# Patient Record
Sex: MALE | Race: WHITE | NOT HISPANIC OR LATINO | Employment: OTHER | URBAN - METROPOLITAN AREA
[De-identification: names, ages, dates, MRNs, and addresses within clinical notes are randomized per-mention and may not be internally consistent; named-entity substitution may affect disease eponyms.]

---

## 2017-06-15 PROBLEM — E03.9 HYPOTHYROIDISM IN ADULT: Status: ACTIVE | Noted: 2017-06-15

## 2017-06-15 PROBLEM — I10 ESSENTIAL HYPERTENSION: Status: ACTIVE | Noted: 2017-06-15

## 2017-06-15 PROBLEM — K85.90 ACUTE PANCREATITIS: Status: ACTIVE | Noted: 2017-06-15

## 2017-06-15 PROBLEM — E29.1 HYPOGONADISM IN MALE: Status: ACTIVE | Noted: 2017-06-15

## 2017-06-16 PROBLEM — R74.8 ELEVATED LIVER ENZYMES: Status: ACTIVE | Noted: 2017-06-16

## 2019-01-30 ENCOUNTER — OFFICE VISIT (OUTPATIENT)
Dept: NEUROLOGY | Facility: CLINIC | Age: 54
End: 2019-01-30
Payer: MEDICARE

## 2019-01-30 VITALS
HEART RATE: 78 BPM | RESPIRATION RATE: 16 BRPM | SYSTOLIC BLOOD PRESSURE: 142 MMHG | HEIGHT: 66 IN | BODY MASS INDEX: 31.92 KG/M2 | WEIGHT: 198.63 LBS | DIASTOLIC BLOOD PRESSURE: 90 MMHG

## 2019-01-30 DIAGNOSIS — E03.9 HYPOTHYROIDISM IN ADULT: ICD-10-CM

## 2019-01-30 DIAGNOSIS — E29.1 HYPOGONADISM IN MALE: ICD-10-CM

## 2019-01-30 DIAGNOSIS — R06.83 SNORING: ICD-10-CM

## 2019-01-30 DIAGNOSIS — R42 DIZZINESS: ICD-10-CM

## 2019-01-30 DIAGNOSIS — F41.9 ANXIETY: ICD-10-CM

## 2019-01-30 DIAGNOSIS — H53.8 BLURRED VISION: ICD-10-CM

## 2019-01-30 DIAGNOSIS — R41.3 MEMORY LOSS: ICD-10-CM

## 2019-01-30 DIAGNOSIS — M79.605 LEFT LEG PAIN: ICD-10-CM

## 2019-01-30 DIAGNOSIS — R51.9 HEADACHE, UNSPECIFIED HEADACHE TYPE: ICD-10-CM

## 2019-01-30 DIAGNOSIS — M54.50 LUMBAR PAIN: ICD-10-CM

## 2019-01-30 DIAGNOSIS — R55 PRE-SYNCOPE: ICD-10-CM

## 2019-01-30 DIAGNOSIS — R29.818 ROMBERG'S TEST POSITIVE: Primary | ICD-10-CM

## 2019-01-30 DIAGNOSIS — I10 ESSENTIAL HYPERTENSION: ICD-10-CM

## 2019-01-30 DIAGNOSIS — G47.33 OSA (OBSTRUCTIVE SLEEP APNEA): ICD-10-CM

## 2019-01-30 DIAGNOSIS — E66.9 OBESITY (BMI 30.0-34.9): ICD-10-CM

## 2019-01-30 PROBLEM — E66.811 OBESITY (BMI 30.0-34.9): Status: ACTIVE | Noted: 2019-01-30

## 2019-01-30 PROCEDURE — 3080F DIAST BP >= 90 MM HG: CPT | Mod: CPTII,S$GLB,, | Performed by: NURSE PRACTITIONER

## 2019-01-30 PROCEDURE — 3077F SYST BP >= 140 MM HG: CPT | Mod: CPTII,S$GLB,, | Performed by: NURSE PRACTITIONER

## 2019-01-30 PROCEDURE — 3008F BODY MASS INDEX DOCD: CPT | Mod: CPTII,S$GLB,, | Performed by: NURSE PRACTITIONER

## 2019-01-30 PROCEDURE — 99499 UNLISTED E&M SERVICE: CPT | Mod: S$GLB,,, | Performed by: NURSE PRACTITIONER

## 2019-01-30 PROCEDURE — 99205 PR OFFICE/OUTPT VISIT, NEW, LEVL V, 60-74 MIN: ICD-10-PCS | Mod: S$GLB,,, | Performed by: NURSE PRACTITIONER

## 2019-01-30 PROCEDURE — 3077F PR MOST RECENT SYSTOLIC BLOOD PRESSURE >= 140 MM HG: ICD-10-PCS | Mod: CPTII,S$GLB,, | Performed by: NURSE PRACTITIONER

## 2019-01-30 PROCEDURE — 99999 PR PBB SHADOW E&M-EST. PATIENT-LVL V: CPT | Mod: PBBFAC,,, | Performed by: NURSE PRACTITIONER

## 2019-01-30 PROCEDURE — 99499 RISK ADDL DX/OHS AUDIT: ICD-10-PCS | Mod: S$GLB,,, | Performed by: NURSE PRACTITIONER

## 2019-01-30 PROCEDURE — 3080F PR MOST RECENT DIASTOLIC BLOOD PRESSURE >= 90 MM HG: ICD-10-PCS | Mod: CPTII,S$GLB,, | Performed by: NURSE PRACTITIONER

## 2019-01-30 PROCEDURE — 99999 PR PBB SHADOW E&M-EST. PATIENT-LVL V: ICD-10-PCS | Mod: PBBFAC,,, | Performed by: NURSE PRACTITIONER

## 2019-01-30 PROCEDURE — 99205 OFFICE O/P NEW HI 60 MIN: CPT | Mod: S$GLB,,, | Performed by: NURSE PRACTITIONER

## 2019-01-30 PROCEDURE — 3008F PR BODY MASS INDEX (BMI) DOCUMENTED: ICD-10-PCS | Mod: CPTII,S$GLB,, | Performed by: NURSE PRACTITIONER

## 2019-01-30 RX ORDER — FLUTICASONE PROPIONATE 50 MCG
SPRAY, SUSPENSION (ML) NASAL
Refills: 2 | COMMUNITY
Start: 2019-01-15 | End: 2020-05-19 | Stop reason: ALTCHOICE

## 2019-01-30 RX ORDER — MELOXICAM 15 MG/1
15 TABLET ORAL DAILY
COMMUNITY
End: 2019-01-30

## 2019-01-30 RX ORDER — PANTOPRAZOLE SODIUM 40 MG/1
40 TABLET, DELAYED RELEASE ORAL DAILY
Refills: 1 | COMMUNITY
Start: 2019-01-10 | End: 2019-12-09 | Stop reason: SDUPTHER

## 2019-01-30 RX ORDER — AMLODIPINE AND BENAZEPRIL HYDROCHLORIDE 5; 10 MG/1; MG/1
CAPSULE ORAL
Status: ON HOLD | COMMUNITY
End: 2019-07-12 | Stop reason: HOSPADM

## 2019-01-30 RX ORDER — SUCRALFATE 1 G/1
TABLET ORAL
Refills: 1 | COMMUNITY
Start: 2019-01-23 | End: 2019-04-16

## 2019-01-30 RX ORDER — CLONAZEPAM 1 MG/1
TABLET ORAL
COMMUNITY
End: 2019-05-21

## 2019-01-30 NOTE — PROGRESS NOTES
"Subjective:      Chief Complaint:  Headache (Re-establish care); Blurred Vision; Memory Loss; and Pain (Pain to the left leg and lower back )      History of Present Illness  Dalton Kent is a 53 y.o. male who presents for an initial visit for evaluation for memory loss, headaches, blurred vision, and lower back and leg pain. He has HTN, hypothyroidism, hypogonadism, GERD, and RLS and REM sleep behavior disorder. Questionable diagnosis of prior Lyme disease. History of pancreatitis, bilateral CTR, L-spine surgery and C-spine surgery in 2016. He was previously followed by Dr. Honeycutt at [facility] [place].     Patient is a poor historian, due to his memory loss, and typically has wife with him at appointments, but is unaccompanied today.     His memory loss began several years ago, and has progressed over time. He cannot remember items regarding appointments, for example. He calls his wife, then forgets what he wants to tell her. He has severe anxiety, but denies depression. He drives without incident. He cooks on a stove, but has burned food, after forgetting that he was cooking. His wife notices his memory complaints. He is medically disabled from his spinal issues. He does have low testosterone and receives injections.     No family history of dementia. He has had multiple head injuries, and recalls having a garage door hit him on the head. No history of CVA. No tremor. He does have a history of hallucinations "a while back", but does not recall if he was taking certain medications at the time.     He has chronic lower back pain, but has progressed recently. He also has pain to his left hip and left leg. He had L-spine surgery with Dr. Geovanny Ontiveros in 2016. C-spine surgery in 2016 as well.   No falls, but he does feel of balance at times. No paresthesias to his feet. No bowel/bladder issues.     His headaches occur daily, and begin upon waking, are located to the upper occipital area. He has had headaches for years; " "however, they used to be in a different location and began occipitally one year ago.They last for 2-3 hours at a time. No photophobia, phonophobia, focal weakness, or autonomic complaints.     He does have blurred vision at times, more upon waking. This occurs with headaches and without headaches.     He does snore a lot. He has gained a significant amount of weight in the past few years. BP is elevated in with SBP in the 140's today. He does not take any abortive agent, due to GI complaints.     He reports to feeling as if he will pass out after yawning or sneezing hard. This began 3 months ago. This resolves quickly, but he describes it as a "black out".     I have reviewed all of this patient's past medical and surgical histories as well as family and social histories and active allergies and medications as documented in the electronic medical record.    Review of Systems  Review of Systems   Constitutional: Positive for fatigue.   HENT: Positive for tinnitus. Negative for hearing loss, trouble swallowing and voice change.    Eyes: Positive for visual disturbance.   Cardiovascular: Negative for leg swelling.   Gastrointestinal: Positive for abdominal pain.   Musculoskeletal: Positive for arthralgias and back pain.   Skin: Negative for rash.   Neurological: Positive for headaches. Negative for dizziness, tremors, seizures, facial asymmetry, speech difficulty, weakness, light-headedness and numbness.        Presyncope   Hematological: Does not bruise/bleed easily.   Psychiatric/Behavioral: Positive for decreased concentration and sleep disturbance. Negative for agitation, behavioral problems, confusion, dysphoric mood and hallucinations. The patient is nervous/anxious. The patient is not hyperactive.         Memory loss       Objective:       Vitals:    01/30/19 0844   BP: (!) 142/90   Pulse: 78   Resp: 16     Exam:  Gen Appearance, well developed/nourished in no apparent distress  CV: 2+ distal pulses with no " edema or swelling  Neuro:  MS: Awake, alert, oriented to place, person, time, situation. Sustains attention. Recent memory mildly impaired/remote memory intact, Language is full to spontaneous speech/repetition/naming/comprehension. Fund of Knowledge is full. Able to name current President  CN: Optic discs are flat with normal vasculature, PERRL, Extraoccular movements and visual fields are full. Normal facial sensation and strength, Hearing symmetric, Tongue and Palate are midline and strong. Shoulder Shrug symmetric and strong.  Motor: Normal bulk, tone, no abnormal movements. 5/5 strength bilateral upper/lower extremities with 1+ reflexes with muted plantar response  Sensory: symmetric to light touch, pain, temp, and vibration/proprioception. Romberg positive  Cerebellar: Finger-nose,Heal-shin, Rapid alternating movements intact  Gait: Normal stance, no ataxia    Imaging:   Will obtain prior workup from [facility] [place]    Labs:   Will obtain most recent labs from PCP    Assessment:   Dalton Kent is a 53 y.o. male who presents for an initial visit for evaluation for memory loss, headaches, blurred vision, and lower back and leg pain. He has HTN, hypothyroidism, hypogonadism, GERD, and RLS and REM sleep behavior disorder. Questionable diagnosis of prior Lyme disease. He was previously followed by Dr. Honeycutt at [facility] [place].   Plan:   I recommend:   1. Obtain all prior records from [facility]/Dr. Honeycutt; however, he has not seen him in years.   2. Obtain recent labs from Dr. Asim Blackman, and determine if any further labs are needed regarding his memory complaints.   3. MRI Brain without to evaluate c/o memory loss and headaches.   4. MRA Head and Carotid US to evaluate for vascular causes of his presyncopal complaints with sneezing and yawning. Need to rule out vertebrobasilar insufficiency.   5. MRI L-spine, given his left lumbar and left leg complaints. Note prior L-spine surgery in 2016 per Dr. Geovanny Ontiveros, whom he declines  seeing again.   6. EMG BLE to evaluate for lumbar radicular disease.   7. ROSANNA's to evaluate for PVD of the LLE, given his possible claudication complaints. Venous US negative in 2017.   8. Neuropsych testing to evaluate memory loss. He has untreated anxiety and hypogonadism and hypothyroidism, which is treated.   9. Sleep study to evaluate for ASAD, given his elevated BP, morning headaches, + snoring, and recent significant weight gain.   10. No specific recommendation for his headaches at this time, as clinical picture is suggestive of HTN or ASAD induced headaches currently. He does not use NSAIDs, due to GI upset, or any other abortive agent.   11. Refer to [facility] for formal eye exam to evaluate his complaint of blurred vision, which occurs independent of his headaches.     FU 8 weeks; will call with results

## 2019-02-01 ENCOUNTER — TELEPHONE (OUTPATIENT)
Dept: NEUROLOGY | Facility: CLINIC | Age: 54
End: 2019-02-01

## 2019-02-01 NOTE — TELEPHONE ENCOUNTER
----- Message from Jasmyne Emerson MA sent at 2019  8:18 AM CST -----  Contact: SELF  Dalton Kent  MRN: [id number]  : 1965  PCP: Asim Blackman  Home Phone      [phone]  Work Phone      Not on file.  Mobile          [phone]      MESSAGE: PATIENT HAS TWO MRIs SCHEDULED ON 2019, BUT REPORTS THAT HE IS CLAUSTROPHOBIC. REQUESTING FOR A MEDICATION TO BE SENT TO [id number] [place] [place] - [address] AT [facility] TO TAKE PRIOR TO THE TESTING TO HELP THE PATIENT RELAX. PLEASE ADVISE.    Phone number: [phone]

## 2019-02-05 RX ORDER — ALPRAZOLAM 0.5 MG/1
0.5 TABLET ORAL SEE ADMIN INSTRUCTIONS
Qty: 2 TABLET | Refills: 0 | Status: SHIPPED | OUTPATIENT
Start: 2019-02-05 | End: 2019-02-19 | Stop reason: SDUPTHER

## 2019-02-06 ENCOUNTER — HOSPITAL ENCOUNTER (OUTPATIENT)
Dept: PULMONOLOGY | Facility: HOSPITAL | Age: 54
Discharge: HOME | End: 2019-02-06
Attending: NURSE PRACTITIONER
Payer: MEDICARE

## 2019-02-06 ENCOUNTER — HOSPITAL ENCOUNTER (OUTPATIENT)
Dept: RADIOLOGY | Facility: HOSPITAL | Age: 54
Discharge: HOME | End: 2019-02-06
Attending: NURSE PRACTITIONER
Payer: MEDICARE

## 2019-02-06 DIAGNOSIS — R29.818 ROMBERG'S TEST POSITIVE: ICD-10-CM

## 2019-02-06 DIAGNOSIS — R42 DIZZINESS: ICD-10-CM

## 2019-02-06 DIAGNOSIS — R41.3 MEMORY LOSS: ICD-10-CM

## 2019-02-06 DIAGNOSIS — R51.9 HEADACHE, UNSPECIFIED HEADACHE TYPE: ICD-10-CM

## 2019-02-06 DIAGNOSIS — M54.50 LUMBAR PAIN: ICD-10-CM

## 2019-02-06 DIAGNOSIS — M79.605 LEFT LEG PAIN: ICD-10-CM

## 2019-02-06 DIAGNOSIS — R55 PRE-SYNCOPE: ICD-10-CM

## 2019-02-06 LAB
LEFT ABI: 0.94
LEFT ARM BP: 128 MMHG
LEFT DORSALIS PEDIS: 129 MMHG
LEFT POSTERIOR TIBIAL: 136 MMHG
LEFT TBI: 0.85
LEFT TOE PRESSURE: 123 MMHG
RIGHT ABI: 1.12
RIGHT ARM BP: 144 MMHG
RIGHT DORSALIS PEDIS: 161 MMHG
RIGHT POSTERIOR TIBIAL: 161 MMHG
RIGHT TBI: 1.04
RIGHT TOE PRESSURE: 150 MMHG

## 2019-02-06 PROCEDURE — 70551 MRI BRAIN STEM W/O DYE: CPT | Mod: TC

## 2019-02-06 PROCEDURE — 70551 MRI BRAIN STEM W/O DYE: CPT | Mod: 26,,, | Performed by: RADIOLOGY

## 2019-02-06 PROCEDURE — 93922 UPR/L XTREMITY ART 2 LEVELS: CPT | Mod: 26,,, | Performed by: INTERNAL MEDICINE

## 2019-02-06 PROCEDURE — 93880 EXTRACRANIAL BILAT STUDY: CPT | Mod: 26,,, | Performed by: RADIOLOGY

## 2019-02-06 PROCEDURE — 70551 MRI BRAIN WITHOUT CONTRAST: ICD-10-PCS | Mod: 26,,, | Performed by: RADIOLOGY

## 2019-02-06 PROCEDURE — 72148 MRI LUMBAR SPINE W/O DYE: CPT | Mod: 26,,, | Performed by: RADIOLOGY

## 2019-02-06 PROCEDURE — 93922 CV US ANKLE BRACHIAL INDICES WO STRESS W TOE TRACINGS (CUPID ONLY): ICD-10-PCS | Mod: 26,,, | Performed by: INTERNAL MEDICINE

## 2019-02-06 PROCEDURE — 72148 MRI LUMBAR SPINE WITHOUT CONTRAST: ICD-10-PCS | Mod: 26,,, | Performed by: RADIOLOGY

## 2019-02-06 PROCEDURE — 72148 MRI LUMBAR SPINE W/O DYE: CPT | Mod: TC

## 2019-02-06 PROCEDURE — 93880 EXTRACRANIAL BILAT STUDY: CPT | Mod: TC

## 2019-02-06 PROCEDURE — 93922 UPR/L XTREMITY ART 2 LEVELS: CPT | Mod: 50

## 2019-02-06 PROCEDURE — 93880 US CAROTID BILATERAL: ICD-10-PCS | Mod: 26,,, | Performed by: RADIOLOGY

## 2019-02-11 ENCOUNTER — HOSPITAL ENCOUNTER (OUTPATIENT)
Dept: SLEEP MEDICINE | Facility: HOSPITAL | Age: 54
Discharge: HOME | End: 2019-02-11
Attending: NURSE PRACTITIONER
Payer: MEDICARE

## 2019-02-11 DIAGNOSIS — G47.33 OBSTRUCTIVE SLEEP APNEA (ADULT) (PEDIATRIC): ICD-10-CM

## 2019-02-11 PROCEDURE — 95810 POLYSOM 6/> YRS 4/> PARAM: CPT

## 2019-02-11 PROCEDURE — 95810 PR POLYSOMNOGRAPHY, 4 OR MORE: ICD-10-PCS | Mod: 26,,, | Performed by: INTERNAL MEDICINE

## 2019-02-11 PROCEDURE — 95810 POLYSOM 6/> YRS 4/> PARAM: CPT | Mod: 26,,, | Performed by: INTERNAL MEDICINE

## 2019-02-19 RX ORDER — ALPRAZOLAM 0.5 MG/1
0.5 TABLET ORAL SEE ADMIN INSTRUCTIONS
Qty: 2 TABLET | Refills: 0 | Status: SHIPPED | OUTPATIENT
Start: 2019-02-19 | End: 2019-03-27

## 2019-02-19 NOTE — TELEPHONE ENCOUNTER
----- Message from Suellen Xiong sent at 2019  2:57 PM CST -----  Contact: WIFE  Dalton Kent  MRN: [id number]  : 1965  PCP: Asim Blackman  Home Phone      [phone]  Work Phone      Not on file.  Mobile          [phone]      MESSAGE: Patient is having an MRI tomorrow and needs something called into [facility] to help him calm down before he does the MRI.        Phone: [phone]

## 2019-02-20 ENCOUNTER — HOSPITAL ENCOUNTER (OUTPATIENT)
Dept: RADIOLOGY | Facility: HOSPITAL | Age: 54
Discharge: HOME | End: 2019-02-20
Attending: NURSE PRACTITIONER
Payer: MEDICARE

## 2019-02-20 DIAGNOSIS — R55 PRE-SYNCOPE: ICD-10-CM

## 2019-02-20 DIAGNOSIS — R51.9 HEADACHE, UNSPECIFIED HEADACHE TYPE: ICD-10-CM

## 2019-02-20 DIAGNOSIS — R42 DIZZINESS: ICD-10-CM

## 2019-02-20 PROCEDURE — 70544 MR ANGIOGRAPHY HEAD W/O DYE: CPT | Mod: 26,,, | Performed by: RADIOLOGY

## 2019-02-20 PROCEDURE — 70544 MR ANGIOGRAPHY HEAD W/O DYE: CPT | Mod: TC

## 2019-02-20 PROCEDURE — 70544 MRA BRAIN WITHOUT CONTRAST: ICD-10-PCS | Mod: 26,,, | Performed by: RADIOLOGY

## 2019-02-25 ENCOUNTER — PROCEDURE VISIT (OUTPATIENT)
Dept: NEUROLOGY | Facility: CLINIC | Age: 54
End: 2019-02-25
Payer: MEDICARE

## 2019-02-25 DIAGNOSIS — M79.605 LEFT LEG PAIN: ICD-10-CM

## 2019-02-25 DIAGNOSIS — M54.50 LUMBAR PAIN: ICD-10-CM

## 2019-02-25 PROCEDURE — 95886 MUSC TEST DONE W/N TEST COMP: CPT | Mod: S$GLB,,, | Performed by: PSYCHIATRY & NEUROLOGY

## 2019-02-25 PROCEDURE — 95910 PR NERVE CONDUCTION STUDY; 7-8 STUDIES: ICD-10-PCS | Mod: S$GLB,,, | Performed by: PSYCHIATRY & NEUROLOGY

## 2019-02-25 PROCEDURE — 95886 PR EMG COMPLETE, W/ NERVE CONDUCTION STUDIES, 5+ MUSCLES: ICD-10-PCS | Mod: S$GLB,,, | Performed by: PSYCHIATRY & NEUROLOGY

## 2019-02-25 PROCEDURE — 95910 NRV CNDJ TEST 7-8 STUDIES: CPT | Mod: S$GLB,,, | Performed by: PSYCHIATRY & NEUROLOGY

## 2019-02-25 RX ORDER — ROPINIROLE 1 MG/1
1 TABLET, FILM COATED ORAL NIGHTLY
Status: ON HOLD | COMMUNITY
End: 2019-07-12 | Stop reason: SDUPTHER

## 2019-02-25 NOTE — PROCEDURES
REPORT OF EMG and NERVE CONDUCTION STUDY    Name: Dalton Kent  Date of Study:  2/26/09  Referring Provider: RICH Bradshaw  Test Performed by:  MD Zully  Full Values Attached  Informed Consent Scanned.   No anesthesia used.   Amount of Blood Loss: none. The patient tolerated this procedure well.       Informed consent was obtained prior to performing this study. Two patient identifiers were confirmed with the patient prior to performing this study. A time out to determine correct patient and and agreement on procedure performed was conducted prior to the concentric needle examination.    Reason for the study:  Lumbar and left leg pain      Findings:   Nerve conduction studies of the  bilateral peroneal motor, bilateral tibial motor, bilateral sural nerves and bilateral H-reflexes were performed.   Amplitudes, distal latencies, conduction velocities, and F-waves were normal. H reflexes were symetric  EMG of selected muscles of the  bilateral legs  were performed as indicated on the attached sheets. Paraspinal muscles were not sampled due to scar from prior surgery in that region.   Insertional activity was normal without fasciculation or fibrillation, normal sized and phasia of motor units.      Impression: Normal EMG of the legs, although his study was mildly limited due to inability to sample lumbar paraspinal muscles given his prior lumbar surgical scarring.       Scar Andrea M.D. [facility].     CC: L Bradshaw.

## 2019-02-26 ENCOUNTER — TELEPHONE (OUTPATIENT)
Dept: NEUROLOGY | Facility: CLINIC | Age: 54
End: 2019-02-26

## 2019-02-26 NOTE — TELEPHONE ENCOUNTER
Sleep study showed mild sleep apnea only. CPAP was not recommended, but instead advised the following:     Do not sleep on the back.   Avoid alcohol and other sedatives.

## 2019-03-27 ENCOUNTER — OFFICE VISIT (OUTPATIENT)
Dept: NEUROLOGY | Facility: CLINIC | Age: 54
End: 2019-03-27
Payer: MEDICARE

## 2019-03-27 VITALS
WEIGHT: 195.75 LBS | HEART RATE: 98 BPM | BODY MASS INDEX: 31.46 KG/M2 | RESPIRATION RATE: 16 BRPM | SYSTOLIC BLOOD PRESSURE: 112 MMHG | DIASTOLIC BLOOD PRESSURE: 82 MMHG | HEIGHT: 66 IN

## 2019-03-27 DIAGNOSIS — F41.0 PANIC DISORDER: ICD-10-CM

## 2019-03-27 DIAGNOSIS — K21.9 GASTROESOPHAGEAL REFLUX DISEASE, ESOPHAGITIS PRESENCE NOT SPECIFIED: ICD-10-CM

## 2019-03-27 DIAGNOSIS — M48.061 LUMBAR FORAMINAL STENOSIS: ICD-10-CM

## 2019-03-27 DIAGNOSIS — R51.9 CHRONIC NONINTRACTABLE HEADACHE, UNSPECIFIED HEADACHE TYPE: ICD-10-CM

## 2019-03-27 DIAGNOSIS — E03.9 HYPOTHYROIDISM IN ADULT: ICD-10-CM

## 2019-03-27 DIAGNOSIS — R09.81 SINUS CONGESTION: ICD-10-CM

## 2019-03-27 DIAGNOSIS — G25.81 RESTLESS LEGS SYNDROME: ICD-10-CM

## 2019-03-27 DIAGNOSIS — I10 ESSENTIAL HYPERTENSION: ICD-10-CM

## 2019-03-27 DIAGNOSIS — M54.50 LUMBAR PAIN: ICD-10-CM

## 2019-03-27 DIAGNOSIS — R41.3 MEMORY LOSS: Primary | ICD-10-CM

## 2019-03-27 DIAGNOSIS — R45.1 AGITATION: ICD-10-CM

## 2019-03-27 DIAGNOSIS — M79.605 LEFT LEG PAIN: ICD-10-CM

## 2019-03-27 DIAGNOSIS — R55 PRE-SYNCOPE: ICD-10-CM

## 2019-03-27 DIAGNOSIS — E29.1 HYPOGONADISM IN MALE: ICD-10-CM

## 2019-03-27 DIAGNOSIS — F41.9 ANXIETY: ICD-10-CM

## 2019-03-27 DIAGNOSIS — G47.33 OBSTRUCTIVE SLEEP APNEA (ADULT) (PEDIATRIC): ICD-10-CM

## 2019-03-27 DIAGNOSIS — G89.29 CHRONIC NONINTRACTABLE HEADACHE, UNSPECIFIED HEADACHE TYPE: ICD-10-CM

## 2019-03-27 PROBLEM — R42 DIZZINESS: Status: RESOLVED | Noted: 2019-01-30 | Resolved: 2019-03-27

## 2019-03-27 PROBLEM — R29.818 ROMBERG'S TEST POSITIVE: Status: RESOLVED | Noted: 2019-01-30 | Resolved: 2019-03-27

## 2019-03-27 PROBLEM — H53.8 BLURRED VISION: Status: RESOLVED | Noted: 2019-01-30 | Resolved: 2019-03-27

## 2019-03-27 PROBLEM — R06.83 SNORING: Status: RESOLVED | Noted: 2019-01-30 | Resolved: 2019-03-27

## 2019-03-27 PROCEDURE — 3074F SYST BP LT 130 MM HG: CPT | Mod: CPTII,S$GLB,, | Performed by: NURSE PRACTITIONER

## 2019-03-27 PROCEDURE — 99215 PR OFFICE/OUTPT VISIT, EST, LEVL V, 40-54 MIN: ICD-10-PCS | Mod: S$GLB,,, | Performed by: NURSE PRACTITIONER

## 2019-03-27 PROCEDURE — 3008F PR BODY MASS INDEX (BMI) DOCUMENTED: ICD-10-PCS | Mod: CPTII,S$GLB,, | Performed by: NURSE PRACTITIONER

## 2019-03-27 PROCEDURE — 99999 PR PBB SHADOW E&M-EST. PATIENT-LVL IV: ICD-10-PCS | Mod: PBBFAC,,, | Performed by: NURSE PRACTITIONER

## 2019-03-27 PROCEDURE — 3079F PR MOST RECENT DIASTOLIC BLOOD PRESSURE 80-89 MM HG: ICD-10-PCS | Mod: CPTII,S$GLB,, | Performed by: NURSE PRACTITIONER

## 2019-03-27 PROCEDURE — 99215 OFFICE O/P EST HI 40 MIN: CPT | Mod: S$GLB,,, | Performed by: NURSE PRACTITIONER

## 2019-03-27 PROCEDURE — 3008F BODY MASS INDEX DOCD: CPT | Mod: CPTII,S$GLB,, | Performed by: NURSE PRACTITIONER

## 2019-03-27 PROCEDURE — 99499 RISK ADDL DX/OHS AUDIT: ICD-10-PCS | Mod: S$GLB,,, | Performed by: NURSE PRACTITIONER

## 2019-03-27 PROCEDURE — 99499 UNLISTED E&M SERVICE: CPT | Mod: S$GLB,,, | Performed by: NURSE PRACTITIONER

## 2019-03-27 PROCEDURE — 3074F PR MOST RECENT SYSTOLIC BLOOD PRESSURE < 130 MM HG: ICD-10-PCS | Mod: CPTII,S$GLB,, | Performed by: NURSE PRACTITIONER

## 2019-03-27 PROCEDURE — 3079F DIAST BP 80-89 MM HG: CPT | Mod: CPTII,S$GLB,, | Performed by: NURSE PRACTITIONER

## 2019-03-27 PROCEDURE — 99999 PR PBB SHADOW E&M-EST. PATIENT-LVL IV: CPT | Mod: PBBFAC,,, | Performed by: NURSE PRACTITIONER

## 2019-03-27 RX ORDER — NORTRIPTYLINE HYDROCHLORIDE 25 MG/1
25 CAPSULE ORAL NIGHTLY
Qty: 30 CAPSULE | Refills: 11 | Status: SHIPPED | OUTPATIENT
Start: 2019-03-27 | End: 2019-06-19 | Stop reason: SDUPTHER

## 2019-03-27 NOTE — PATIENT INSTRUCTIONS
See your ENT to ask if there is an alternate nose spray, which may help your sinus complaints more. I would advise not taking a daily antihistamine, as this can affect your memory.     Keep a headache diary until your next visit to discern if the headache medication works.

## 2019-03-27 NOTE — PROGRESS NOTES
"Subjective:      Chief Complaint:  Neurologic Problem (8 week follow up)      History of Present Illness  Dalton Kent is a 53 y.o. male who presents for an initial visit for evaluation for memory loss, headaches, blurred vision, and lower back and leg pain. He has HTN, hypothyroidism, hypogonadism, GERD, and RLS and REM sleep behavior disorder. Questionable diagnosis of prior Lyme disease. History of pancreatitis, bilateral CTR, L-spine surgery and C-spine surgery in 2016. He was previously followed by Dr. Honeycutt at [facility] [place].     He presents today for a follow up and to discuss diagnostic testing results. Patient is a poor historian, due to his memory loss, and is with his mother in law today. He was seen in 1/2019 for complaint of memory loss, chronic lower back pain, headaches, and presyncopal complaints.      He completed a sleep study, which showed mild ASAD only, and CPAP use was not suggested for this. He continues to report to "jumping" in his sleep. He takes Klonopin every night, which is helpful for this. He does not feel well rested in the morning.     He tripped over a 12 pack of coke since his last visit, and hurt his right shoulder. He received a right shoulder injection per Dr. Javed and is currently in PT for this.     Lumbar pain is ongoing, and is worse when leaning over for an extended amount of time. He continues with leg pain. ROSANNA's, which were completed at his last visit, were unremarkable. His MRI L-spine showed moderate foraminal narrowing at left L4/5. He reports ongoing restless leg complaints, helped with Requip, but he does have some breakthrough complaints to the left leg. He had L-spine surgery with Dr. Geovanny Ontiveros in 2016. C-spine surgery in 2016 as well. No falls, but he does feel of balance at times. No paresthesias to his feet. No bowel/bladder issues.     Memory is overall unchanged. He is scheduled for Neuropsych testing next month at [facility] [place].   His memory loss began several years " "ago, and has progressed over time. He cannot remember items regarding appointments, for example. He calls his wife, then forgets what he wants to tell her. He has severe anxiety, but denies depression. He drives without incident. He cooks on a stove, but has burned food, after forgetting that he was cooking. His wife notices his memory complaints. He is medically disabled from his spinal issues. He does have low testosterone and receives injections.     No family history of dementia. He has had multiple head injuries, and recalls having a garage door hit him on the head. No history of CVA. No tremor. He does have a history of hallucinations "a while back", but does not recall if he was taking certain medications at the time.     He reports to having a great deal of anxiety, which is chronic for him. He becomes agitated very easily.     Headaches are unchanged. His headaches occur daily, and begin upon waking, are located to the upper occipital area. He has had headaches for years; however, they used to be in a different location and began occipitally one year ago.They last for 2-3 hours at a time. No photophobia, phonophobia, focal weakness, or autonomic complaints.     He does complaint of sinus pain and pressure, helped with a nose spray prior. He has seen an ENT in the past.     He had a formal eye exam after his last visit for complaint of blurred vision, and was prescribed updated eyeglasses, which he did not obtain, due to cost.     He continues to feel as if he will pass out when yawning, sneezing, and with bowel movements. This began several months ago. He saw a Cardiologist a few years ago for a general cardiac workup, which was unremarkable per patient. He has never actually passed out, and his presyncopal complaints resolve within seconds.     I have reviewed all of this patient's past medical and surgical histories as well as family and social histories and active allergies and medications as documented " in the electronic medical record.    Review of Systems  Review of Systems   Constitutional: Positive for fatigue.   HENT: Positive for sinus pressure, sinus pain and tinnitus. Negative for hearing loss, trouble swallowing and voice change.    Eyes: Positive for visual disturbance.   Respiratory: Negative for shortness of breath.    Cardiovascular: Negative for leg swelling.   Gastrointestinal: Positive for abdominal pain.   Musculoskeletal: Positive for arthralgias, back pain and gait problem.   Skin: Negative for rash.   Neurological: Positive for dizziness and headaches. Negative for tremors, seizures, facial asymmetry, speech difficulty, weakness, light-headedness and numbness.        Presyncope   Hematological: Does not bruise/bleed easily.   Psychiatric/Behavioral: Positive for agitation, decreased concentration and sleep disturbance. Negative for behavioral problems, confusion, dysphoric mood and hallucinations. The patient is nervous/anxious. The patient is not hyperactive.         Memory loss       Objective:       Vitals:    03/27/19 1353   BP: 112/82   Pulse: 98   Resp: 16     Exam:  Gen Appearance, well developed/nourished in no apparent distress  CV: 2+ distal pulses with no edema or swelling  Neuro:  MS: Awake, alert, oriented to place, person, time, situation. Sustains attention. Recent memory mildly impaired/remote memory intact, Language is full to spontaneous speech/repetition/naming/comprehension. Fund of Knowledge is full. Able to name current President  CN: Optic discs are flat with normal vasculature, PERRL, Extraoccular movements and visual fields are full. Normal facial sensation and strength, Hearing symmetric, Tongue and Palate are midline and strong. Shoulder Shrug symmetric and strong.  Motor: Normal bulk, tone, no abnormal movements. 5/5 strength bilateral upper/lower extremities with 1+ reflexes with muted plantar response  Sensory: symmetric to light touch, pain, temp, and  vibration/proprioception. Romberg positive  Cerebellar: Finger-nose,Heal-shin, Rapid alternating movements intact  Gait: Normal stance, no ataxia    Imaging:   MRI Brain 1/2019:   FINDINGS:  Intracranial compartment: Ventricles and sulci are normal in size for age without evidence of hydrocephalus.  No extra-axial blood or fluid collections. Minimal burden of patchy T2 hyperintense foci scattered in the supratentorial periventricular and subcortical white matter most likely relate to sequelae of chronic microvascular ischemic disease.  The brain parenchyma appears normal. No mass lesion, acute hemorrhage, edema or acute infarct.  Normal vascular flow voids are preserved.    Skull/extracranial contents (limited evaluation): Bone marrow signal intensity is normal.      Impression       No acute abnormality.     MRA Brain 1/2019:   FINDINGS:  Anterior intracranial circulation: No high-grade focal stenosis, occlusion, aneurysm, or vascular malformation.    Posterior intracranial circulation: No high-grade focal stenosis, occlusion, aneurysm, or vascular malformation.      Impression       Normal MRA of the [place] of [place]     1/2019 MRI L-spine:   FINDINGS:  Spinal Alignment: Normal.    Vertebrae: Posterior fusion hardware at L4-5 and anterior fusion hardware at L5-S1 are unchanged, with associated disc spacer devices at L4-5 and L5-S1.  Susceptibility artifact is present surrounding the hardware.  There are associated postsurgical findings of left L4 hemilaminectomy.  Mild susceptibility artifact surrounds the hardware.  1.5 cm hemangioma is present in the posterosuperior portion of the T12 vertebral body.    Discs: Mild diffuse desiccation.  Disc spacer devices are present at L5-4 5 and L5-S1.    Cord: Normal cord signal. Conus terminates normally at the L1-2 level.    Degenerative findings: Minimal circumferential disc bulge at L2-3 with minimal bilateral facet arthropathy and minimal facet effusions in conjunction  with mild buckling of the ligamentum flavum does not result in significant compressive phenomena.  Mild circumferential disc bulge at L3-4 with mild bilateral facet arthropathy and buckling of the ligamentum flavum results in mild spinal canal stenosis and mild to moderate foraminal stenoses.  Spinal canal is posteriorly decompressed at L4-5; right neural foramen is widely patent while there is apparent moderate narrowing of the left neural foramen related to either postoperative granulation tissue along the inferior neural foramen or disc material.  Mild bilateral facet arthropathy at L5-S1 results in at most mild foraminal stenoses.    Paraspinal muscles & soft tissues: Normal.      Impression       Unchanged postoperative findings of L4-S1 fusion with left L4 hemilaminectomy.  There is apparent moderate narrowing of the left neural foramen at L4-5 related to either postoperative granulation tissue along the inferior foramen or disc material.    Additional mild disc and facet degeneration above the fused levels resulting in mild spinal canal stenosis and mild to moderate foraminal stenoses at L3-4.       2/2019 ROSANNA's:  · Normal resting ROSANNA's.    EMG BLE 2/2019:   Normal EMG of the legs, though study was mildly limited, due to inability to sample lumbar paraspinal muscles, given his prior lumbar surgical scarring.     Labs:   Will obtain most recent labs from PCP    Assessment:   Dalton Kent is a 53 y.o. male who presents for an initial visit for evaluation for memory loss, headaches, blurred vision, and lower back and leg pain. He has HTN, hypothyroidism, hypogonadism, GERD, and RLS and REM sleep behavior disorder. Questionable diagnosis of prior Lyme disease. He was previously followed by Dr. Honeycutt at [facility] [place].   Plan:   I recommend:   1. Continue with Neuropsych testing to formally evaluate his complaint of memory loss. I suspect this to be mood related, thus far. A pseudo-dementia is in consideration. Sleep  study showed mild ASAD only, not requiring treatment.   2. Refer to Psychiatry for severe anxiety/panic and agitation, which I suspect represents depression. His memory loss may be related. MRI Brain overall unremarkable, other than some cerebral atherosclerosis.   3. I would like to refer him to Cardiology for presyncope with yawning, sneezing, or defecating, but he declines this, as it is tolerable, and he has never actually passed out. MRI Brain and MRA unremarkable for IC and vascular causes. He understands that we could be missing a potential arhythmia/cardiac cause of his presyncope by not seeing Cardiology.   4. I would like to refer him to pain management for his left lumbar complaints, which are quite bothersome, but he declines, due to cost concerns. There is moderate foraminal narrowing at Left L4/5, which can explain his lumbar complaints; however, EMG BLE 2/2019 was unremarkable for radiculopathy; mildly limited study, due to prior surgical scarring above the lumbar paraspinal muscles. Note prior L-spine surgery in 2016 per Dr. Geovanny Ontiveros, whom he declines seeing again.   6. He is currently seeing Dr. Javed for right shoulder complaints, after a recent trip and fall, and received an injection and is in PT for this.   7. Advised to see an ENT regarding his sinus complaints. I advised not to take a daily antihistamine, due to concern over potential memory issues.   8. Start Pamelor for headache prevention and for insomnia. Would avoid beta blockers, given his relatively low BP, which is already treated with antihypertensives, and I would avoid AED's, given the potential effect of worsening memory loss. If Pamelor ineffective, could consider Botox of Aimovig.   9. Keep a headache diary until next visit.   10. Formal eye exam done to evaluate complaint of blurred vision-Rx for eyeglasses given, but not obtained, due to cost.   11. Attempted to obtain recent labs from Dr. Blackman, as well as prior workup from  [facility], but this was never received.     FU 12 weeks

## 2019-03-28 ENCOUNTER — TELEPHONE (OUTPATIENT)
Dept: PSYCHIATRY | Facility: CLINIC | Age: 54
End: 2019-03-28

## 2019-03-28 ENCOUNTER — TELEPHONE (OUTPATIENT)
Dept: NEUROLOGY | Facility: CLINIC | Age: 54
End: 2019-03-28

## 2019-03-28 DIAGNOSIS — R55 VASOVAGAL NEAR SYNCOPE: Primary | ICD-10-CM

## 2019-03-28 NOTE — TELEPHONE ENCOUNTER
----- Message from Suellen Xiong sent at 3/28/2019  9:22 AM CDT -----  Contact: WIFE  Dalton Kent  MRN: [id number]  : 1965  PCP: Asim Blackman  Home Phone      [phone]  Work Phone      Not on file.  Mobile          [phone]      MESSAGE: Wife states that the patient was seen on 19 and Azeb suggested that he have a work-up by a cardiologist but the patient declined to schedule the appointment at that time.  The wife would for us to schedule the appointment with the cardiologist.      [phone]

## 2019-03-28 NOTE — TELEPHONE ENCOUNTER
Anyone at [facility] is appropriate. I placed an external referral order so that he may see who is available. Please forward [facility] a copy of my last note on this patient.

## 2019-04-16 ENCOUNTER — OFFICE VISIT (OUTPATIENT)
Dept: PSYCHIATRY | Facility: CLINIC | Age: 54
End: 2019-04-16
Attending: PSYCHIATRY & NEUROLOGY
Payer: COMMERCIAL

## 2019-04-16 VITALS
DIASTOLIC BLOOD PRESSURE: 86 MMHG | WEIGHT: 195.44 LBS | HEART RATE: 82 BPM | SYSTOLIC BLOOD PRESSURE: 132 MMHG | HEIGHT: 66 IN | BODY MASS INDEX: 31.41 KG/M2 | RESPIRATION RATE: 19 BRPM

## 2019-04-16 DIAGNOSIS — F41.1 GENERALIZED ANXIETY DISORDER: Primary | ICD-10-CM

## 2019-04-16 DIAGNOSIS — F33.0 MILD EPISODE OF RECURRENT MAJOR DEPRESSIVE DISORDER: ICD-10-CM

## 2019-04-16 DIAGNOSIS — R41.89 PSEUDODEMENTIA: ICD-10-CM

## 2019-04-16 DIAGNOSIS — R41.3 MEMORY LOSS: ICD-10-CM

## 2019-04-16 DIAGNOSIS — F51.4 NIGHT TERRORS, ADULT: ICD-10-CM

## 2019-04-16 PROCEDURE — 99999 PR PBB SHADOW E&M-EST. PATIENT-LVL III: ICD-10-PCS | Mod: PBBFAC,,, | Performed by: PSYCHIATRY & NEUROLOGY

## 2019-04-16 PROCEDURE — 90792 PSYCH DIAG EVAL W/MED SRVCS: CPT | Mod: S$GLB,,, | Performed by: PSYCHIATRY & NEUROLOGY

## 2019-04-16 PROCEDURE — 90792 PR PSYCHIATRIC DIAGNOSTIC EVALUATION W/MEDICAL SERVICES: ICD-10-PCS | Mod: S$GLB,,, | Performed by: PSYCHIATRY & NEUROLOGY

## 2019-04-16 PROCEDURE — 99999 PR PBB SHADOW E&M-EST. PATIENT-LVL III: CPT | Mod: PBBFAC,,, | Performed by: PSYCHIATRY & NEUROLOGY

## 2019-04-16 RX ORDER — PRAZOSIN HYDROCHLORIDE 2 MG/1
2 CAPSULE ORAL NIGHTLY
Qty: 30 CAPSULE | Refills: 0 | Status: SHIPPED | OUTPATIENT
Start: 2019-04-16 | End: 2019-05-11 | Stop reason: SDUPTHER

## 2019-04-16 RX ORDER — PRAZOSIN HYDROCHLORIDE 1 MG/1
1 CAPSULE ORAL 2 TIMES DAILY
Qty: 60 CAPSULE | Refills: 0 | Status: SHIPPED | OUTPATIENT
Start: 2019-04-16 | End: 2019-05-09 | Stop reason: SDUPTHER

## 2019-04-16 RX ORDER — ALPRAZOLAM 0.5 MG/1
0.5 TABLET ORAL DAILY PRN
Qty: 3 TABLET | Refills: 0 | Status: SHIPPED | OUTPATIENT
Start: 2019-04-16 | End: 2019-05-21

## 2019-04-16 RX ORDER — ESCITALOPRAM OXALATE 10 MG/1
10 TABLET ORAL DAILY
Qty: 30 TABLET | Refills: 0 | Status: SHIPPED | OUTPATIENT
Start: 2019-04-16 | End: 2019-05-09 | Stop reason: SDUPTHER
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
## 2019-04-16 NOTE — PATIENT INSTRUCTIONS
Start taking Lexapro 10mg once per day in the morning    Start taking Prazosin 1mg twice per day.  After one week, add 2mg at night for a total of 3mg    This medication can cause you to be light headed when you stand up    Try to find some youtube progressive muscle relaxation

## 2019-04-23 ENCOUNTER — OFFICE VISIT (OUTPATIENT)
Dept: PSYCHIATRY | Facility: CLINIC | Age: 54
End: 2019-04-23
Payer: MEDICARE

## 2019-04-23 DIAGNOSIS — F41.1 GENERALIZED ANXIETY DISORDER: ICD-10-CM

## 2019-04-23 DIAGNOSIS — R41.3 MEMORY LOSS: Primary | ICD-10-CM

## 2019-04-23 DIAGNOSIS — F33.0 MILD EPISODE OF RECURRENT MAJOR DEPRESSIVE DISORDER: ICD-10-CM

## 2019-04-23 DIAGNOSIS — R41.89 PSEUDODEMENTIA: ICD-10-CM

## 2019-04-23 PROCEDURE — 96133 NRPSYC TST EVAL PHYS/QHP EA: CPT | Mod: S$GLB,,, | Performed by: PSYCHOLOGIST

## 2019-04-23 PROCEDURE — 96139 PSYCL/NRPSYC TST TECH EA: CPT | Mod: 59,S$GLB,, | Performed by: PSYCHOLOGIST

## 2019-04-23 PROCEDURE — 90791 PR PSYCHIATRIC DIAGNOSTIC EVALUATION: ICD-10-PCS | Mod: S$GLB,,, | Performed by: PSYCHOLOGIST

## 2019-04-23 PROCEDURE — 96132 NRPSYC TST EVAL PHYS/QHP 1ST: CPT | Mod: S$GLB,,, | Performed by: PSYCHOLOGIST

## 2019-04-23 PROCEDURE — 96139 PR PSYCH/NEUROPSYCH TEST ADMIN/SCORING, BY TECH, 2+ TESTS, EA ADDTL 30 MIN: ICD-10-PCS | Mod: 59,S$GLB,, | Performed by: PSYCHOLOGIST

## 2019-04-23 PROCEDURE — 96133 PR NEUROPSYCHOLOGIC TEST EVAL SVCS, EA ADDTL HR: ICD-10-PCS | Mod: S$GLB,,, | Performed by: PSYCHOLOGIST

## 2019-04-23 PROCEDURE — 96138 PSYCL/NRPSYC TECH 1ST: CPT | Mod: S$GLB,,, | Performed by: PSYCHOLOGIST

## 2019-04-23 PROCEDURE — 96138 PR PSYCH/NEUROPSYCH TEST ADMIN/SCORING, BY TECH, 2+ TESTS, 1ST 30 MIN: ICD-10-PCS | Mod: S$GLB,,, | Performed by: PSYCHOLOGIST

## 2019-04-23 PROCEDURE — 96132 PR NEUROPSYCHOLOGIC TEST EVAL SVCS, 1ST HR: ICD-10-PCS | Mod: S$GLB,,, | Performed by: PSYCHOLOGIST

## 2019-04-23 PROCEDURE — 90791 PSYCH DIAGNOSTIC EVALUATION: CPT | Mod: S$GLB,,, | Performed by: PSYCHOLOGIST

## 2019-04-25 ENCOUNTER — TELEPHONE (OUTPATIENT)
Dept: PSYCHIATRY | Facility: CLINIC | Age: 54
End: 2019-04-25

## 2019-04-25 NOTE — PSYCH TESTING
[facility] [address] [facility] [place], [place]  [phone]    REPORT OF NEUROPSYCHOLOGICAL EVALUATION    NAME: Dalton Kent   OC #:  [id number]  : 1965    REFERRED BY: Azeb Bradshaw NP    EVALUATED BY:  Pauline Avelar, Ph.D., Clinical Psychologist  Vira Griffith, Psychometrician    DATE OF EVALUATION: 2019    EVALUATION PROCEDURES AND TIME:  Conducted by Psychologist:  Integration of patient data, interpretation of standardized test results and clinical data, clinical decision making, treatment planning and report  Conducted by Technician:  Test administration and scoring of the following tests:  Repeatable Battery for the Assessment of Neuropsychological Status (RBANS)  Temporal Orientation Test  Naming Test from the Neuropsychological Assessment Battery (NAB)  Controlled Oral Word Association Test  Facial Recognition Test  Nicholas Visual Motor Gestalt Test  Wechsler Memory Scale IV Logical Memory & Visual Reproduction Battery (WMS-IV LMVR)  Wisconsin Card Sorting Test - 64 (WCST-64)  Trail Making Test, Parts A & B  August Depression Inventory - II (BDI-II)  August Anxiety Inventory (DAVION)  CPT Codes: 75623 - 1 unit; +75828 - 1 unit; 16014 - 1 unit; +26247 - 3 units   Time:  Psychologist - 2 hours; Technician - 1 hour 56 minutes    EVALUATION FINDINGS:  The diagnostic interview revealed that Mr. Dalton Kent is a 53-year-old white  male referred for Neuropsychological Evaluation on an outpatient basis due to subjective memory decline for the past 10 years.  He was not accompanied to this interview, so no one was available to corroborate or supplement the history he provided. Of note, he drove here this morning from [place], [place], without difficulty. He reported that his short-term memory is poor. He has to watch a movie 3 or 4 times to remember it. He reported he forgets things his wife tells him. He misplaces items at home. He forgets where he is going when driving. He  reported difficulty concentrating, particularly when there is a lot of noise. 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 Upon direct questioning, he also reported that he repeats himself; gets confused about what day it is; has to use a calendar to remind himself of things; has left food cooking on the stove; and loses his train of thought in conversation; and reports word-finding difficulty (not observed).  He manages his own medications but admits he sometimes does not take them correctly. His wife has always managed the family finances. No obvious functional impairment was reported or observed. He reported his memory seems to be getting worse over time. No precipitant could be identified. There is no family history of memory problems/dementia. Mr. Kent has a psychiatric history of depression, anxiety, and night terrors (he reports this is resolved). He has just started psychiatric treatment with Dr. Baker. He continues to report symptoms of anxiety and displays symptoms of depression (tearfulness, psychomotor slowing, poor sleep, flat affect). He denied suicidal ideation. He was cooperative in interview. The Mental Status Exam suggested reduced recent memory, concentration, abstraction ability, and judgment/insight.    The medical record also revealed prior medical history of memory loss, night terrors, headaches, RLS, HTN, hypothyroidism, obesity, GERD, chronic pain, and elevated liver enzymes. MRI of the brain completed on 2/6/2019 yielded these results: No acute abnormality.    TEST DATA:  Neuropsychological tests administered by the technician revealed that the patient reported he is a high school graduate with some college.  He was employed as a  prior to [employer] on disability 3 years ago.  He was alert and cooperative during the evaluation.  Effort on all tests was satisfactory to produce valid results.    Mr. Kent obtained a total score of 60  on the RBANS, which is at the 0.4 percentile, suggesting severe impairment in general cognitive functioning.  Five areas were tested.  The Immediate Memory Index revealed severe impairment in the ability to remember verbal information immediately after it is presented, with a score of 61 at the 0.5 percentile.  The Visuospatial/Constructional Index revealed mild impairment in the ability to perceive spatial relations and to construct a spatially accurate copy of a drawing, with a score of 78 at the 7th percentile. Visuospatial perception was mildly to moderately impaired. Constructional ability was average. The Language Index revealed mild impairment in the ability to respond verbally to either naming or retrieving learned material, with a score of 79 at the 8th percentile. Naming was average. Verbal fluency was moderately impaired. Attention, or the capacity to remember and manipulate both visually and orally presented information in short-term storage, was severely impaired, with a score of 64 at the 1st percentile. Delayed memory, or anterograde memory capacity, was severely impaired, with a score of 60 at the 0.4 percentile.  Subtest performances revealed mild to moderate impairment in list recall and list recognition, mild impairment in story recall, and average figure recall. The pattern of delayed verbal memory subtest scores is unusual in that story recall is better than list recall and list recognition, whereas story recall is typically considered the most difficult subtest for those with dementia. For reference, the expected average score on each index is 100, which is at the 50th percentile.    The Temporal Orientation Test indicated he was oriented to day of the week, day of the month, month, year, and time of day.  His score on this measure suggested no impairment in temporal orientation.    Naming, as assessed by the NAB Test, was mildly impaired.  Verbal fluency, as assessed by the Controlled Oral  Word Association Test, was in the low average range.    Performance on the Facial Recognition Test suggested prosopagnosia was present, as his score was in the moderately impaired range.  Reproductions of Nicholas Visual Motor Gestalt Test designs revealed no evidence of constructional dyspraxia.    The WMS-IV LMVR was administered to further assess memory.  His Immediate Memory Index of 55 was in the severely impaired range and his Delayed Memory Index of 60 was in the severely impaired range. His Auditory Memory Index of 47 was in the severely impaired range and his Visual Memory Index of 70 was in the moderately impaired range. The expected average score for each index is 100. Scaled scores of the memory indexes revealed severe impairment in immediate and delayed verbal memory, moderate impairment in immediate visual memory, and mild impairment in delayed visual memory. The pattern of scores for visual memory is unusual in that delayed visual memory was better that immediate visual memory, while in dementia delayed memory is typically worse than immediate memory.     The WCST-64 was administered to assess abstract reasoning and conceptualization.  His categories completed score (4) was in the average range. His total errors score (14) was in the average range and his perseverative errors score (7) was in the average range.  His performance suggested abstract reasoning skills were not impaired.    His score on the Trail Making Test, Part A, (40 seconds for completion) indicated that psychomotor speed was in the mildly impaired range.  His score on Part B (101 seconds for completion) indicated that his ability to handle complex relationships which require rapid change of set, or mental flexibility, was in the mildly impaired range.    The BDI-II was administered to assess for depression.  His score of 36 suggested severe depression was present.  The DAVION was administered to assess for anxiety.  His score of 0  suggested minimal anxiety was present.    SUMMARY AND RECOMMENDATIONS:  Mr. Kent was referred for Neuropsychological Evaluation on an outpatient basis due to subjective memory decline for the past 10 years.  His general cognitive abilities as assessed by the RBANS were in the severely impaired range, with severely impaired immediate verbal memory, attention, and delayed memory; and mildly impaired visuospatial/constructional abilities (due to reduced visuospatial perception) and language (due to reduced verbal fluency).  Further assessment of specific cognitive abilities revealed deficits in naming, facial recognition, psychomotor speed, and mental flexibility, with intact temporal orientation, verbal fluency, constructional ability, and abstract reasoning.  Additional memory assessment revealed severe impairment in immediate and delayed verbal memory, moderate impairment in immediate visual memory, and mild impairment in delayed visual memory.  Personality test data suggested the presence of severe depression.  Neuropsychological Evaluation results revealed impairment in immediate and delayed verbal memory, immediate visual memory, visuospatial perception, attention, facial recognition, psychomotor speed, and mental flexibility; and variability in naming, verbal fluency, and delayed visual memory (performance in the average/low average and impaired ranges); with severe depression. The history and pattern of test results suggests the impairment he experiences is likely due to pseudodementia due to depression as opposed to dementia. (He has no medical history to account for memory loss; has severe depression; unusual pattern among some memory subtests; no family history of dementia; good abstract reasoning; drove here from [place] alone without difficulty; has no functional impairment.) It is hoped that aggressive psychiatric treatment may result in improved cognitive functioning. He will continue to see   Baldev for psychiatric care.        Interpretation and report and coding were completed on 4/25/2019.

## 2019-04-25 NOTE — PROGRESS NOTES
Psychiatry Initial Visit (PhD/LCSW)    Date:  4/23/2019    CPT Code: 15647    Referred by:  Azeb Bradshaw NP    Chief complaint/reason for encounter:  Neuropsychological Evaluation    Clinical status of patient:  Outpatient    Met with:  Patient    History of present illness: Mr. Dalton Kent is a 53-year-old white  male referred for Neuropsychological Evaluation on an outpatient basis due to subjective memory decline for the past 10 years.  He was not accompanied to this interview, so no one was available to corroborate or supplement the history he provided. Of note, he drove here this morning from [place], without difficulty. He reported that his short-term memory is poor. He has to watch a movie 3 or 4 times to remember it. He reported he forgets things his wife tells him. He misplaces items at home. He forgets where he is going when driving. He reported difficulty concentrating, particularly when there is a lot of noise. 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 Upon direct questioning, he also reported that he repeats himself; gets confused about what day it is; has to use a calendar to remind himself of things; has left food cooking on the stove; and loses his train of thought in conversation; and reports word-finding difficulty (not observed).  He manages his own medications but admits he sometimes does not take them correctly. His wife has always managed the family finances. No obvious functional impairment was reported or observed. He reported his memory seems to be getting worse over time. No precipitant could be identified. There is no family history of memory problems/dementia. Mr. Kent has a psychiatric history of depression, anxiety, and night terrors (he reports this is resolved). He has never been hospitalized. His PCP has prescribed some psychiatric medication in the past. He has just started psychiatric treatment with Dr. Baker with  diagnoses of LEANNE, mild episode of recurrent major depression, and pseudodementia. He continues to report symptoms of anxiety and displays symptoms of depression (tearfulness, psychomotor slowing, poor sleep, flat affect). He denied suicidal ideation. He was cooperative in interview.     Pain scale: noncontributory    Symptoms:  Mood: tearfulness, insomnia, psychomotor slowing  Anxiety:  excessive worry, fearfulness  Substance abuse: denied  Cognitive functioning:  subjective memory decline    Psychiatric history: as above    Medical history: memory loss, night terrors, headaches, RLS, HTN, hypothyroidism, obesity, GERD, chronic pain, and elevated liver enzymes. MRI of the brain completed on 2/6/2019 yielded these results: No acute abnormality.    Family history of psychiatric illness: mother with panic disorder    Social history (marriage, employment, etc.):    High school graduate with some college.  He was employed as a  prior to [employer] on disability 3 years ago.  . 2 daughters. Lives with wife and mother-in-law. Jain. Enjoys fishing, walking, housework, working in [employer].    Substance use:   Alcohol: 6 beers on the weekend   Drugs: no   Tobacco: quit smoking over 20 years ago   Caffeine: 6 cokes per day    Strengths and Liabilities:   Strength:  Pt is expressive/articulate.   Liability:  Pt has subjective memory loss.    Mental Status Exam:  General appearance:  appears stated age, casually dressed  Speech:  normal rate and tone  Level of cooperation:  cooperative  Thought processes:  logical, goal-directed  Mood:  depressed, anxious  Thought content:  no illusions, no visual hallucinations, no auditory hallucinations, no delusions, no active or passive homicidal thoughts, no active or passive suicidal ideation, no obsessions, no compulsions, no violence  Affect:  flat  Orientation:  oriented to person, place, and time  Memory:  Recent memory:  1 of 3 objects after brief delay.     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  Report of Neuropsychological Evaluation will follow. It can be accessed through the Chart Review activity in Epic under the Notes tab.  It will be titled Psych Testing.  Pt will continue to see Dr. Baker for psychiatric care.    Return to clinic:  as scheduled    Length of time:  55 minutes

## 2019-04-30 ENCOUNTER — TELEPHONE (OUTPATIENT)
Dept: NEUROLOGY | Facility: CLINIC | Age: 54
End: 2019-04-30

## 2019-04-30 NOTE — TELEPHONE ENCOUNTER
His memory testing showed severe impairment; however, his testing results were not suggestive of dementia, but of a pseudo-dementia to where he has impairment like a patient with dementia, but this is from his depression, which can slow the brain down significantly. The treatment for this is aggressive management of his depression, so continued care with Psychiatry is necessary to improve this.

## 2019-05-14 RX ORDER — PRAZOSIN HYDROCHLORIDE 1 MG/1
1 CAPSULE ORAL 2 TIMES DAILY
Qty: 60 CAPSULE | Refills: 0 | Status: SHIPPED | OUTPATIENT
Start: 2019-05-14 | End: 2019-05-21 | Stop reason: SDUPTHER

## 2019-05-14 RX ORDER — PRAZOSIN HYDROCHLORIDE 2 MG/1
2 CAPSULE ORAL NIGHTLY
Qty: 30 CAPSULE | Refills: 0 | Status: SHIPPED | OUTPATIENT
Start: 2019-05-14 | End: 2019-05-21 | Stop reason: SDUPTHER

## 2019-05-14 RX ORDER — ESCITALOPRAM OXALATE 10 MG/1
TABLET ORAL
Qty: 30 TABLET | Refills: 0 | Status: SHIPPED | OUTPATIENT
Start: 2019-05-14 | End: 2019-05-21

## 2019-05-21 ENCOUNTER — OFFICE VISIT (OUTPATIENT)
Dept: PSYCHIATRY | Facility: CLINIC | Age: 54
End: 2019-05-21
Attending: PSYCHIATRY & NEUROLOGY
Payer: COMMERCIAL

## 2019-05-21 VITALS
BODY MASS INDEX: 31.01 KG/M2 | SYSTOLIC BLOOD PRESSURE: 122 MMHG | WEIGHT: 197.56 LBS | RESPIRATION RATE: 18 BRPM | DIASTOLIC BLOOD PRESSURE: 74 MMHG | HEIGHT: 67 IN | HEART RATE: 78 BPM

## 2019-05-21 DIAGNOSIS — R41.89 PSEUDODEMENTIA: ICD-10-CM

## 2019-05-21 DIAGNOSIS — R71.8 MICROCYTOSIS: ICD-10-CM

## 2019-05-21 DIAGNOSIS — F41.1 GENERALIZED ANXIETY DISORDER: ICD-10-CM

## 2019-05-21 DIAGNOSIS — F33.0 MILD EPISODE OF RECURRENT MAJOR DEPRESSIVE DISORDER: Primary | ICD-10-CM

## 2019-05-21 PROCEDURE — 99214 OFFICE O/P EST MOD 30 MIN: CPT | Mod: S$GLB,,, | Performed by: PSYCHIATRY & NEUROLOGY

## 2019-05-21 PROCEDURE — 3074F SYST BP LT 130 MM HG: CPT | Mod: CPTII,S$GLB,, | Performed by: PSYCHIATRY & NEUROLOGY

## 2019-05-21 PROCEDURE — 3008F BODY MASS INDEX DOCD: CPT | Mod: CPTII,S$GLB,, | Performed by: PSYCHIATRY & NEUROLOGY

## 2019-05-21 PROCEDURE — 3078F DIAST BP <80 MM HG: CPT | Mod: CPTII,S$GLB,, | Performed by: PSYCHIATRY & NEUROLOGY

## 2019-05-21 PROCEDURE — 99999 PR PBB SHADOW E&M-EST. PATIENT-LVL IV: ICD-10-PCS | Mod: PBBFAC,,, | Performed by: PSYCHIATRY & NEUROLOGY

## 2019-05-21 PROCEDURE — 99999 PR PBB SHADOW E&M-EST. PATIENT-LVL IV: CPT | Mod: PBBFAC,,, | Performed by: PSYCHIATRY & NEUROLOGY

## 2019-05-21 PROCEDURE — 3074F PR MOST RECENT SYSTOLIC BLOOD PRESSURE < 130 MM HG: ICD-10-PCS | Mod: CPTII,S$GLB,, | Performed by: PSYCHIATRY & NEUROLOGY

## 2019-05-21 PROCEDURE — 99214 PR OFFICE/OUTPT VISIT, EST, LEVL IV, 30-39 MIN: ICD-10-PCS | Mod: S$GLB,,, | Performed by: PSYCHIATRY & NEUROLOGY

## 2019-05-21 PROCEDURE — 3078F PR MOST RECENT DIASTOLIC BLOOD PRESSURE < 80 MM HG: ICD-10-PCS | Mod: CPTII,S$GLB,, | Performed by: PSYCHIATRY & NEUROLOGY

## 2019-05-21 PROCEDURE — 3008F PR BODY MASS INDEX (BMI) DOCUMENTED: ICD-10-PCS | Mod: CPTII,S$GLB,, | Performed by: PSYCHIATRY & NEUROLOGY

## 2019-05-21 RX ORDER — METOPROLOL SUCCINATE 25 MG/1
25 TABLET, EXTENDED RELEASE ORAL 2 TIMES DAILY
COMMUNITY
End: 2021-02-18 | Stop reason: DRUGHIGH

## 2019-05-21 RX ORDER — PRAZOSIN HYDROCHLORIDE 1 MG/1
1 CAPSULE ORAL 2 TIMES DAILY
Qty: 60 CAPSULE | Refills: 1 | Status: ON HOLD | OUTPATIENT
Start: 2019-05-21 | End: 2019-07-12 | Stop reason: SDUPTHER

## 2019-05-21 RX ORDER — PRAZOSIN HYDROCHLORIDE 2 MG/1
2 CAPSULE ORAL NIGHTLY
Qty: 30 CAPSULE | Refills: 1 | Status: ON HOLD | OUTPATIENT
Start: 2019-05-21 | End: 2019-07-12 | Stop reason: HOSPADM

## 2019-05-21 RX ORDER — ESCITALOPRAM OXALATE 20 MG/1
20 TABLET ORAL DAILY
Qty: 30 TABLET | Refills: 1 | Status: ON HOLD | OUTPATIENT
Start: 2019-05-21 | End: 2019-07-12 | Stop reason: SDUPTHER
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
## 2019-06-07 RX ORDER — PRAZOSIN HYDROCHLORIDE 1 MG/1
1 CAPSULE ORAL 2 TIMES DAILY
Qty: 60 CAPSULE | Refills: 0 | OUTPATIENT
Start: 2019-06-07 | End: 2019-08-06

## 2019-06-07 RX ORDER — ESCITALOPRAM OXALATE 10 MG/1
TABLET ORAL
Qty: 30 TABLET | Refills: 0 | OUTPATIENT
Start: 2019-06-07

## 2019-06-12 ENCOUNTER — TELEPHONE (OUTPATIENT)
Dept: PSYCHIATRY | Facility: CLINIC | Age: 54
End: 2019-06-12

## 2019-06-19 ENCOUNTER — OFFICE VISIT (OUTPATIENT)
Dept: NEUROLOGY | Facility: CLINIC | Age: 54
End: 2019-06-19
Payer: MEDICARE

## 2019-06-19 VITALS
RESPIRATION RATE: 16 BRPM | SYSTOLIC BLOOD PRESSURE: 114 MMHG | BODY MASS INDEX: 31.35 KG/M2 | HEART RATE: 110 BPM | WEIGHT: 199.75 LBS | DIASTOLIC BLOOD PRESSURE: 72 MMHG | HEIGHT: 67 IN

## 2019-06-19 DIAGNOSIS — R79.0 ABNORMAL LEVEL OF BLOOD MINERAL: ICD-10-CM

## 2019-06-19 DIAGNOSIS — G89.29 CHRONIC NONINTRACTABLE HEADACHE, UNSPECIFIED HEADACHE TYPE: Primary | ICD-10-CM

## 2019-06-19 DIAGNOSIS — G47.52 REM SLEEP BEHAVIOR DISORDER: ICD-10-CM

## 2019-06-19 DIAGNOSIS — F41.9 ANXIETY: ICD-10-CM

## 2019-06-19 DIAGNOSIS — R53.81 MALAISE: ICD-10-CM

## 2019-06-19 DIAGNOSIS — K21.9 GASTROESOPHAGEAL REFLUX DISEASE, ESOPHAGITIS PRESENCE NOT SPECIFIED: ICD-10-CM

## 2019-06-19 DIAGNOSIS — M79.605 LEFT LEG PAIN: ICD-10-CM

## 2019-06-19 DIAGNOSIS — R53.1 WEAKNESS: ICD-10-CM

## 2019-06-19 DIAGNOSIS — G25.81 RESTLESS LEGS SYNDROME: ICD-10-CM

## 2019-06-19 DIAGNOSIS — M48.061 LUMBAR FORAMINAL STENOSIS: ICD-10-CM

## 2019-06-19 DIAGNOSIS — E29.1 HYPOGONADISM IN MALE: ICD-10-CM

## 2019-06-19 DIAGNOSIS — R41.3 MEMORY LOSS: ICD-10-CM

## 2019-06-19 DIAGNOSIS — M54.50 LUMBAR PAIN: ICD-10-CM

## 2019-06-19 DIAGNOSIS — E55.9 VITAMIN D DEFICIENCY: ICD-10-CM

## 2019-06-19 DIAGNOSIS — I10 ESSENTIAL HYPERTENSION: ICD-10-CM

## 2019-06-19 DIAGNOSIS — R41.89 PSEUDODEMENTIA: ICD-10-CM

## 2019-06-19 DIAGNOSIS — R55 PRE-SYNCOPE: ICD-10-CM

## 2019-06-19 DIAGNOSIS — G47.33 OBSTRUCTIVE SLEEP APNEA (ADULT) (PEDIATRIC): ICD-10-CM

## 2019-06-19 DIAGNOSIS — E03.9 HYPOTHYROIDISM IN ADULT: ICD-10-CM

## 2019-06-19 DIAGNOSIS — R51.9 CHRONIC NONINTRACTABLE HEADACHE, UNSPECIFIED HEADACHE TYPE: Primary | ICD-10-CM

## 2019-06-19 PROCEDURE — 99499 UNLISTED E&M SERVICE: CPT | Mod: S$GLB,,, | Performed by: NURSE PRACTITIONER

## 2019-06-19 PROCEDURE — 99499 RISK ADDL DX/OHS AUDIT: ICD-10-PCS | Mod: S$GLB,,, | Performed by: NURSE PRACTITIONER

## 2019-06-19 PROCEDURE — 3074F SYST BP LT 130 MM HG: CPT | Mod: CPTII,S$GLB,, | Performed by: NURSE PRACTITIONER

## 2019-06-19 PROCEDURE — 3074F PR MOST RECENT SYSTOLIC BLOOD PRESSURE < 130 MM HG: ICD-10-PCS | Mod: CPTII,S$GLB,, | Performed by: NURSE PRACTITIONER

## 2019-06-19 PROCEDURE — 3078F PR MOST RECENT DIASTOLIC BLOOD PRESSURE < 80 MM HG: ICD-10-PCS | Mod: CPTII,S$GLB,, | Performed by: NURSE PRACTITIONER

## 2019-06-19 PROCEDURE — 3008F PR BODY MASS INDEX (BMI) DOCUMENTED: ICD-10-PCS | Mod: CPTII,S$GLB,, | Performed by: NURSE PRACTITIONER

## 2019-06-19 PROCEDURE — 99999 PR PBB SHADOW E&M-EST. PATIENT-LVL IV: ICD-10-PCS | Mod: PBBFAC,,, | Performed by: NURSE PRACTITIONER

## 2019-06-19 PROCEDURE — 99215 OFFICE O/P EST HI 40 MIN: CPT | Mod: S$GLB,,, | Performed by: NURSE PRACTITIONER

## 2019-06-19 PROCEDURE — 99215 PR OFFICE/OUTPT VISIT, EST, LEVL V, 40-54 MIN: ICD-10-PCS | Mod: S$GLB,,, | Performed by: NURSE PRACTITIONER

## 2019-06-19 PROCEDURE — 3078F DIAST BP <80 MM HG: CPT | Mod: CPTII,S$GLB,, | Performed by: NURSE PRACTITIONER

## 2019-06-19 PROCEDURE — 99999 PR PBB SHADOW E&M-EST. PATIENT-LVL IV: CPT | Mod: PBBFAC,,, | Performed by: NURSE PRACTITIONER

## 2019-06-19 PROCEDURE — 3008F BODY MASS INDEX DOCD: CPT | Mod: CPTII,S$GLB,, | Performed by: NURSE PRACTITIONER

## 2019-06-19 RX ORDER — CLONAZEPAM 1 MG/1
1 TABLET ORAL NIGHTLY
Status: ON HOLD | COMMUNITY
End: 2019-07-12 | Stop reason: HOSPADM

## 2019-06-19 RX ORDER — CLONAZEPAM 0.5 MG/1
0.5 TABLET ORAL NIGHTLY
Status: ON HOLD | COMMUNITY
End: 2019-07-12 | Stop reason: SDUPTHER

## 2019-06-19 RX ORDER — NORTRIPTYLINE HYDROCHLORIDE 25 MG/1
25 CAPSULE ORAL NIGHTLY
Qty: 30 CAPSULE | Refills: 5 | Status: ON HOLD | OUTPATIENT
Start: 2019-06-19 | End: 2019-07-12 | Stop reason: SDUPTHER
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
## 2019-06-19 NOTE — PATIENT INSTRUCTIONS
Please check with your insurance to see which counselors are covered by your insurance, and please consider going to counseling for your anxiety and agitation.

## 2019-07-01 PROBLEM — R45.851 THREATENING SUICIDE: Status: ACTIVE | Noted: 2019-07-01

## 2019-07-02 ENCOUNTER — TELEPHONE (OUTPATIENT)
Dept: PSYCHIATRY | Facility: CLINIC | Age: 54
End: 2019-07-02

## 2019-07-02 PROBLEM — F33.2 SEVERE RECURRENT MAJOR DEPRESSION WITHOUT PSYCHOTIC FEATURES: Status: ACTIVE | Noted: 2019-07-02

## 2019-07-02 PROBLEM — F10.20 ALCOHOL USE DISORDER, MODERATE, DEPENDENCE: Status: ACTIVE | Noted: 2019-07-02
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
## 2019-07-09 ENCOUNTER — TELEPHONE (OUTPATIENT)
Dept: NEUROLOGY | Facility: CLINIC | Age: 54
End: 2019-07-09

## 2019-07-09 NOTE — TELEPHONE ENCOUNTER
I am sorry to hear of his admit and difficulties. Is there something particular that I can help her with in regards to what I am doing for him in this clinic?

## 2019-07-09 NOTE — TELEPHONE ENCOUNTER
----- Message from Elicia Owens MA sent at 2019  3:44 PM CDT -----  Contact: Wife, Josselyn  I contacted Mrs Kent to let her know you would be out of the office. I offered to take a message for you, she would like to speak with you. Per chart, patient is admitted to [facility] for attempted suicide. Let me know what you want to do.      ----- Message -----  From: Elicia Owens MA  Sent: 2019  11:54 AM  To: Elicia Owens MA        ----- Message -----  From: Denita Villalobos  Sent: 2019  11:42 AM  To: [facility] Staff    Dalton Kent  MRN: [id number]  : 1965  PCP: Asim Blackman  Home Phone      [phone]  Work Phone      Not on file.  Mobile          [phone]      MESSAGE:   Patient's wife would like a call. Patient has been admitted at [facility]. She would like to discuss with Azeb. Please advise.    Josselyn [phone]

## 2019-07-11 ENCOUNTER — TELEPHONE (OUTPATIENT)
Dept: NEUROLOGY | Facility: CLINIC | Age: 54
End: 2019-07-11

## 2019-07-11 NOTE — TELEPHONE ENCOUNTER
His labs show a low Vitamin D level. This can cause fatigue. Please start Vitamin D3 5000IU over the counter 1 by mouth each day.     He has mild anemia, but also has a low Ferritin/Iron level, which can aggravate restless leg symptoms. I would like him to see his PCP once he is discharged for evaluation of what could be causing his anemia and low Ferritin. He may need a colonoscopy or other testing to evaluate for sources of unknown blood loss. He may benefit from Iron supplementation, which he can discuss further with his PCP.

## 2019-07-16 ENCOUNTER — OFFICE VISIT (OUTPATIENT)
Dept: PSYCHIATRY | Facility: CLINIC | Age: 54
End: 2019-07-16
Attending: PSYCHIATRY & NEUROLOGY
Payer: MEDICARE

## 2019-07-16 VITALS
WEIGHT: 200.38 LBS | HEIGHT: 66 IN | DIASTOLIC BLOOD PRESSURE: 84 MMHG | HEART RATE: 96 BPM | SYSTOLIC BLOOD PRESSURE: 132 MMHG | BODY MASS INDEX: 32.2 KG/M2 | RESPIRATION RATE: 19 BRPM

## 2019-07-16 DIAGNOSIS — R41.89 PSEUDODEMENTIA: ICD-10-CM

## 2019-07-16 DIAGNOSIS — F41.1 GENERALIZED ANXIETY DISORDER: ICD-10-CM

## 2019-07-16 DIAGNOSIS — F10.20 ALCOHOL USE DISORDER, MODERATE, DEPENDENCE: ICD-10-CM

## 2019-07-16 DIAGNOSIS — F33.0 MILD EPISODE OF RECURRENT MAJOR DEPRESSIVE DISORDER: Primary | ICD-10-CM

## 2019-07-16 PROCEDURE — 99999 PR PBB SHADOW E&M-EST. PATIENT-LVL II: CPT | Mod: PBBFAC,,, | Performed by: PSYCHIATRY & NEUROLOGY

## 2019-07-16 PROCEDURE — 3008F PR BODY MASS INDEX (BMI) DOCUMENTED: ICD-10-PCS | Mod: CPTII,S$GLB,, | Performed by: PSYCHIATRY & NEUROLOGY

## 2019-07-16 PROCEDURE — 3079F PR MOST RECENT DIASTOLIC BLOOD PRESSURE 80-89 MM HG: ICD-10-PCS | Mod: CPTII,S$GLB,, | Performed by: PSYCHIATRY & NEUROLOGY

## 2019-07-16 PROCEDURE — 3079F DIAST BP 80-89 MM HG: CPT | Mod: CPTII,S$GLB,, | Performed by: PSYCHIATRY & NEUROLOGY

## 2019-07-16 PROCEDURE — 90833 PSYTX W PT W E/M 30 MIN: CPT | Mod: S$GLB,,, | Performed by: PSYCHIATRY & NEUROLOGY

## 2019-07-16 PROCEDURE — 99214 OFFICE O/P EST MOD 30 MIN: CPT | Mod: S$GLB,,, | Performed by: PSYCHIATRY & NEUROLOGY

## 2019-07-16 PROCEDURE — 3075F SYST BP GE 130 - 139MM HG: CPT | Mod: CPTII,S$GLB,, | Performed by: PSYCHIATRY & NEUROLOGY

## 2019-07-16 PROCEDURE — 3008F BODY MASS INDEX DOCD: CPT | Mod: CPTII,S$GLB,, | Performed by: PSYCHIATRY & NEUROLOGY

## 2019-07-16 PROCEDURE — 90833 PR PSYCHOTHERAPY W/PATIENT W/E&M, 30 MIN (ADD ON): ICD-10-PCS | Mod: S$GLB,,, | Performed by: PSYCHIATRY & NEUROLOGY

## 2019-07-16 PROCEDURE — 3075F PR MOST RECENT SYSTOLIC BLOOD PRESS GE 130-139MM HG: ICD-10-PCS | Mod: CPTII,S$GLB,, | Performed by: PSYCHIATRY & NEUROLOGY

## 2019-07-16 PROCEDURE — 99214 PR OFFICE/OUTPT VISIT, EST, LEVL IV, 30-39 MIN: ICD-10-PCS | Mod: S$GLB,,, | Performed by: PSYCHIATRY & NEUROLOGY

## 2019-07-16 PROCEDURE — 99999 PR PBB SHADOW E&M-EST. PATIENT-LVL II: ICD-10-PCS | Mod: PBBFAC,,, | Performed by: PSYCHIATRY & NEUROLOGY

## 2019-07-16 RX ORDER — CLONAZEPAM 0.5 MG/1
0.5 TABLET ORAL NIGHTLY
Qty: 30 TABLET | Refills: 0 | Status: SHIPPED | OUTPATIENT
Start: 2019-07-16 | End: 2019-08-14

## 2019-07-16 NOTE — PROGRESS NOTES
Outpatient Psychiatry Follow-Up Visit (MD/NP)    7/16/2019    Clinical Status of Patient:  Outpatient (Ambulatory)    Chief Complaint:  Dalton Kent is a 53 y.o. male who presents today for follow-up of depression.  Met with patient and spouse.      Interval History and Content of Current Session:  Interim Events/Subjective Report/Content of Current Session:     Psychiatric History  Diagnosis: No  Providers including therapists: no  Past suicidal ideation or attempts: no  Hospitalizations: yes recently  Family Psych Hx: no  Access to a gun: no  Seizures: no  Thyroid Disorder: on synthroid  Past Medication:     Gabapentin made him very anxious and depressed     Current Medication:  Depakote 500mg bid  remeron 15mg qhs  Clonazepam 0.5mg qhs  Lexapro 20mg qday  Nortriptyline 25mg qhs  Prazosin 1mg BID        Compliance / Side effects     Psychosocial  Development: met all milestones  Education: some college  Work: disabled   Marital Status:  30+  Home: family  Children: 2 daughters  Jainism: Anabaptism  Hobbies: Fish  Diet:  Exercise:     Substance Use  Tobacco: former  Alcohol: 6 pack on the weekend  Illicit Substances: no     4/16/19  Patient is referred from Azeb Bradshaw.  He is 53 and has been experiencing signficant problems with his memory.  He has upcoming memory testing.  He also suffers from chronic headaches and high degrees of anxiety around things such as being around a lot of people, flying, and other social like anxieties.  He denies depression but cries easily at home and in the office.  He has been on antidepressants before but not sure which.       5/21  Patient continues to have little insight into his depression.  He has crying spells at least once per week.  He had psychological testing done that was suggestive of pseudodementia and depression. Wife has noticed some improvement of memory since starting lexapro.  He continues to have vivid dreams / nightmares.  He would  like to wean off of clonazepam.      7/16  Patient recently admitted to [facility] with suicidal ideation.  He is here for hospital follow up.  He was given remeron and depakote as new medicine, as well as had his clonazepam lowered to 0.5mg.  He is no longer drinking.  He is interested in stopping his clonazepam.  Patient feels like his mood is worsened because he hasn't had a testosterone injection.  Overall his mood has significantly improved since stopping drinking.     Improved Controlled Symptoms of Depression: diminished mood or loss of interest/anhedonia; irritability, diminished energy, change in sleep, change in appetite, diminished concentration or cognition or indecisiveness, PMA/R, excessive guilt or hopelessness or worthlessness, suicidal ideations     Affect improved, does appear tired     Endorses Changes in Sleep: +trouble with initiation, maintenance, early morning awakening with inability to return to sleep, hypersomnolence      Recently he is sleeping 6-7 hours per night     Denies Suicidal/Homicidal ideations: active/passive ideations, organized plans, future intentions     No psychosis or nestor     Continued Symptoms of LEANNE: all of the following excessive anxiety/worry/fear, more days than not, about numerous issues, difficult to control, with restlessness, fatigue, poor concentration, irritability, muscle tension, sleep disturbance; causes functionally impairing distress      No Symptoms of Panic Disorder: recurrent panic attacks (palpitations/heart racing, sweating, shakiness, dyspnea, choking, chest pain/discomfort, Gi symptoms, dizzy/lightheadedness, hot/col flashes, paresthesias, derealization, fear of losing control or fear of dying), precipitated or un-precipitated, source of worry and/or behavioral changes secondary, with or without agoraphobia     Denies Symptoms of PTSD: h/o trauma; re-experiencing/intrusive symptoms, avoidant behavior, negative alterations in cognition or mood,  "hyperarousal symptoms; with or without dissociative symptoms     Psychotherapy:  · Target symptoms: depression, anxiety   · Why chosen therapy is appropriate versus another modality: relevant to diagnosis  · Outcome monitoring methods: self-report, observation  · Therapeutic intervention type: behavior modifying psychotherapy, supportive psychotherapy  · Topics discussed/themes: building skills sets for symptom management, symptom recognition  · The patient's response to the intervention is accepting. The patient's progress toward treatment goals is good.   · Duration of intervention: 16 minutes.    Review of Systems   Review of systems  Constitutional: No recent change in weight or fever  Musculoskeletal: +hip pain  Respiratory: No cough or shortness of breath  Cardiovascular: No chest pain, palpitations, or leg swelling  Gastrointestinal: No abdominal pain, nausea vomiting, or diarrhea  Genitourinary: No pain on urination  Neurologic: No dizziness, seizures, or headaches  Eyes: No change in vision  HENT: No congestion, hearing problem, tinnitus, dysphagia, or sore throat  Skin: No rash or wound      Past Medical, Family and Social History: The patient's past medical, family and social history have been reviewed and updated as appropriate within the electronic medical record - see encounter notes.    Compliance: yes    Side effects: None    Risk Parameters:  Patient reports no suicidal ideation  Patient reports no homicidal ideation  Patient reports no self-injurious behavior  Patient reports no violent behavior    Exam (detailed: at least 9 elements; comprehensive: all 15 elements)   Constitutional  Vitals:  Most recent vital signs, dated less than 90 days prior to this appointment, were reviewed.   Vitals:    07/16/19 1030   BP: 132/84   Pulse: 96   Resp: 19   Weight: 90.9 kg (200 lb 6.4 oz)   Height: 5' 6" (1.676 m)        General:  unremarkable, age appropriate     Musculoskeletal  Muscle Strength/Tone:  no " spasicity, no rigidity   Gait & Station:  non-ataxic     Psychiatric  Speech:  no latency; no press   Mood & Affect:  steady  congruent and appropriate   Thought Process:  normal and logical   Associations:  intact   Thought Content:  normal, no suicidality, no homicidality, delusions, or paranoia   Insight:  intact   Judgement: behavior is adequate to circumstances   Orientation:  grossly intact   Memory: intact for content of interview   Language: grossly intact   Attention Span & Concentration:  able to focus   Fund of Knowledge:  intact and appropriate to age and level of education     Assessment and Diagnosis   Status/Progress: Based on the examination today, the patient's problem(s) is/are improved.  New problems have been presented today.   Co-morbidities are not complicating management of the primary condition.  There are no active rule-out diagnoses for this patient at this time.     General Impression:       ICD-10-CM ICD-9-CM   1. Mild episode of recurrent major depressive disorder F33.0 296.31   2. Alcohol use disorder, moderate, dependence F10.20 303.90   3. Generalized anxiety disorder F41.1 300.02   4. 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 The patient expresses understanding of the above and displays the capacity to agree with this treatment given said understanding. Patient also agrees that, currently, the benefits outweigh the risks and  would like to pursue treatment at this time.     Checked LA  and no irregularities were noted.      Return to Clinic: 3 weeks

## 2019-07-17 ENCOUNTER — OFFICE VISIT (OUTPATIENT)
Dept: INTERNAL MEDICINE | Facility: CLINIC | Age: 54
End: 2019-07-17
Payer: MEDICARE

## 2019-07-17 VITALS
RESPIRATION RATE: 16 BRPM | HEART RATE: 88 BPM | HEIGHT: 66 IN | SYSTOLIC BLOOD PRESSURE: 118 MMHG | OXYGEN SATURATION: 97 % | WEIGHT: 200.81 LBS | DIASTOLIC BLOOD PRESSURE: 66 MMHG | BODY MASS INDEX: 32.27 KG/M2

## 2019-07-17 DIAGNOSIS — Z12.11 SPECIAL SCREENING FOR MALIGNANT NEOPLASMS, COLON: ICD-10-CM

## 2019-07-17 DIAGNOSIS — M70.62 GREATER TROCHANTERIC BURSITIS, LEFT: Primary | ICD-10-CM

## 2019-07-17 DIAGNOSIS — I10 ESSENTIAL HYPERTENSION: ICD-10-CM

## 2019-07-17 PROCEDURE — 3074F SYST BP LT 130 MM HG: CPT | Mod: CPTII,S$GLB,, | Performed by: INTERNAL MEDICINE

## 2019-07-17 PROCEDURE — 3074F PR MOST RECENT SYSTOLIC BLOOD PRESSURE < 130 MM HG: ICD-10-PCS | Mod: CPTII,S$GLB,, | Performed by: INTERNAL MEDICINE

## 2019-07-17 PROCEDURE — 3078F PR MOST RECENT DIASTOLIC BLOOD PRESSURE < 80 MM HG: ICD-10-PCS | Mod: CPTII,S$GLB,, | Performed by: INTERNAL MEDICINE

## 2019-07-17 PROCEDURE — 3078F DIAST BP <80 MM HG: CPT | Mod: CPTII,S$GLB,, | Performed by: INTERNAL MEDICINE

## 2019-07-17 PROCEDURE — 99999 PR PBB SHADOW E&M-EST. PATIENT-LVL IV: ICD-10-PCS | Mod: PBBFAC,,, | Performed by: INTERNAL MEDICINE

## 2019-07-17 PROCEDURE — 99203 PR OFFICE/OUTPT VISIT, NEW, LEVL III, 30-44 MIN: ICD-10-PCS | Mod: 25,S$GLB,, | Performed by: INTERNAL MEDICINE

## 2019-07-17 PROCEDURE — 99999 PR PBB SHADOW E&M-EST. PATIENT-LVL IV: CPT | Mod: PBBFAC,,, | Performed by: INTERNAL MEDICINE

## 2019-07-17 PROCEDURE — 3008F BODY MASS INDEX DOCD: CPT | Mod: CPTII,S$GLB,, | Performed by: INTERNAL MEDICINE

## 2019-07-17 PROCEDURE — 3008F PR BODY MASS INDEX (BMI) DOCUMENTED: ICD-10-PCS | Mod: CPTII,S$GLB,, | Performed by: INTERNAL MEDICINE

## 2019-07-17 PROCEDURE — 96372 PR INJECTION,THERAP/PROPH/DIAG2ST, IM OR SUBCUT: ICD-10-PCS | Mod: S$GLB,,, | Performed by: INTERNAL MEDICINE

## 2019-07-17 PROCEDURE — 96372 THER/PROPH/DIAG INJ SC/IM: CPT | Mod: S$GLB,,, | Performed by: INTERNAL MEDICINE

## 2019-07-17 PROCEDURE — 99203 OFFICE O/P NEW LOW 30 MIN: CPT | Mod: 25,S$GLB,, | Performed by: INTERNAL MEDICINE

## 2019-07-17 RX ORDER — METHYLPREDNISOLONE ACETATE 80 MG/ML
80 INJECTION, SUSPENSION INTRA-ARTICULAR; INTRALESIONAL; INTRAMUSCULAR; SOFT TISSUE ONCE
Status: COMPLETED | OUTPATIENT
Start: 2019-07-17 | End: 2019-07-17

## 2019-07-17 RX ORDER — DIVALPROEX SODIUM 500 MG/1
TABLET, FILM COATED, EXTENDED RELEASE ORAL
Refills: 0 | COMMUNITY
Start: 2019-07-15 | End: 2019-09-30

## 2019-07-17 RX ORDER — METHYLPREDNISOLONE 4 MG/1
TABLET ORAL
Qty: 1 PACKAGE | Refills: 0 | Status: SHIPPED | OUTPATIENT
Start: 2019-07-17 | End: 2019-08-21

## 2019-07-17 RX ORDER — AMLODIPINE AND BENAZEPRIL HYDROCHLORIDE 5; 10 MG/1; MG/1
1 CAPSULE ORAL DAILY
COMMUNITY
End: 2019-11-27 | Stop reason: SDUPTHER

## 2019-07-17 RX ADMIN — METHYLPREDNISOLONE ACETATE 80 MG: 80 INJECTION, SUSPENSION INTRA-ARTICULAR; INTRALESIONAL; INTRAMUSCULAR; SOFT TISSUE at 04:07

## 2019-07-17 NOTE — LETTER
July 17, 2019      Azeb Bradshaw NP  [address] [address]           [facility]  [address] [facility] [address]  Phone: [phone]  Fax: [phone]          Patient: Dalton Kent   MR Number: [id number]   YOB: 1965   Date of Visit: 7/17/2019       Dear Azeb Bradshaw:    Thank you for referring Dalton Kent to me for evaluation. Attached you will find relevant portions of my assessment and plan of care.    If you have questions, please do not hesitate to call me. I look forward to following Dalton Kent along with you.    Sincerely,    Horacio Velazquez MD    Enclosure  CC:  No Recipients    If you would like to receive this communication electronically, please contact [email or web address] or [phone] to request more information on [employer] access.    For providers and/or their staff who would like to refer a patient to [facility], please contact us through our one-stop-shop provider referral line, [facility], at [phone].    If you feel you have received this communication in error or would no longer like to receive these types of communications, please e-mail [email or web address]

## 2019-07-17 NOTE — PROGRESS NOTES
Subjective:       Patient ID: Dalton Kent is a 53 y.o. male.    Chief Complaint: Establish Care and Hip Pain (left burning)    Hip Pain    Incident onset: left hip lateral pain for 6 weeks. There was no injury mechanism. The pain is present in the left hip. The pain is at a severity of 6/10. The pain is moderate. Nothing aggravates the symptoms. He has tried nothing for the symptoms.     Review of Systems   Musculoskeletal: Positive for arthralgias.       Objective:      Physical Exam   Musculoskeletal:        Left hip: He exhibits bony tenderness.        Legs:  Trochanteric bursitis   Tenderness upon palpation        Assessment:       1. Greater trochanteric bursitis, left    2. Special screening for malignant neoplasms, colon    3. Essential hypertension        Plan:   Dalton was seen today for establish care and hip pain.    Diagnoses and all orders for this visit:    Greater trochanteric bursitis, left  -     methylPREDNISolone acetate injection 80 mg  -     methylPREDNISolone (MEDROL DOSEPACK) 4 mg tablet; use as directed  If not better will do intra bursal steroids.    Special screening for malignant neoplasms, colon  -     Case request GI: COLONOSCOPY    Essential hypertension  -     CBC auto differential; Future  -     Comprehensive metabolic panel; Future  -     TSH; Future  -     Lipid panel; Future    Well controlled.  Continue same medication and dose.  1. Keep weight close to ideal body weight.   2.   Avoid high salt foods (olives, pickles, smoked meats, salted potato chips, etc.).   Do not add salt to your food at the table.   Use only small amounts of salt when cooking.   3. Begin an exercise program. Discuss with your doctor what type of exercise program would be best for you. It doesn't have to be difficult. Even brisk walking for 20 minutes three times a week is a good form of exercise.   4. Avoid medicines which contain heart stimulants. This includes many cold and sinus decongestant  pills and sprays as well as diet pills. Check the warnings about hypertension on the label. Stimulants such as amphetamine or cocaine could be lethal for someone with hypertension. Never take these.      Labs from hospital reviewed.      Problem List Items Addressed This Visit     None

## 2019-07-18 ENCOUNTER — TELEPHONE (OUTPATIENT)
Dept: INTERNAL MEDICINE | Facility: CLINIC | Age: 54
End: 2019-07-18

## 2019-07-18 NOTE — TELEPHONE ENCOUNTER
----- Message from Vonnie Santiago sent at 2019  8:41 AM CDT -----  Contact: Josselyn (wife)  Dalton Kent  MRN: [id number]  : 1965  PCP: Horacio Velazquez  Home Phone      [phone]  Work Phone      Not on file.  Mobile          [phone]    MESSAGE:   The patient had an appointment with Marcus Karimi on 19. He was prescribed medication -    methylPREDNISolone (MEDROL DOSEPACK) 4 mg tablet. The medication was sent to [facility]. Pt would like the Rx for his medication sent to [facility] [place].    Phone # [phone]    [id number] - [place] - [address] AT [facility]

## 2019-07-18 NOTE — TELEPHONE ENCOUNTER
Prescription below canceled at [facility] by pharmacy technician Noy. Prescription below called to pharmacist Navya. Patient notified.     methylPREDNISolone (MEDROL DOSEPACK) 4 mg tablet 1 Package 0 7/17/2019  --   Sig: use as directed   Sent to pharmacy as: methylPREDNISolone (MEDROL DOSEPACK) 4 mg tablet   Class: Normal   Order: [id number]   Date/Time Signed: 7/17/2019 15:59       E-Prescribing Status: Receipt confirmed by pharmacy (7/17/2019  3:59 PM CDT)

## 2019-07-19 DIAGNOSIS — Z12.11 COLON CANCER SCREENING: ICD-10-CM

## 2019-07-29 DIAGNOSIS — G89.29 CHRONIC NONINTRACTABLE HEADACHE, UNSPECIFIED HEADACHE TYPE: ICD-10-CM

## 2019-07-29 DIAGNOSIS — R51.9 CHRONIC NONINTRACTABLE HEADACHE, UNSPECIFIED HEADACHE TYPE: ICD-10-CM

## 2019-07-30 RX ORDER — NORTRIPTYLINE HYDROCHLORIDE 25 MG/1
25 CAPSULE ORAL NIGHTLY
Qty: 90 CAPSULE | Refills: 1 | OUTPATIENT
Start: 2019-07-30 | End: 2020-07-29

## 2019-07-31 RX ORDER — PRAZOSIN HYDROCHLORIDE 1 MG/1
1 CAPSULE ORAL 2 TIMES DAILY
Qty: 60 CAPSULE | Refills: 11 | Status: SHIPPED | OUTPATIENT
Start: 2019-07-31 | End: 2019-08-21

## 2019-08-09 ENCOUNTER — TELEPHONE (OUTPATIENT)
Dept: PSYCHIATRY | Facility: CLINIC | Age: 54
End: 2019-08-09

## 2019-08-09 ENCOUNTER — PATIENT OUTREACH (OUTPATIENT)
Dept: ADMINISTRATIVE | Facility: HOSPITAL | Age: 54
End: 2019-08-09

## 2019-08-09 RX ORDER — PRAZOSIN HYDROCHLORIDE 2 MG/1
2 CAPSULE ORAL 2 TIMES DAILY
Qty: 60 CAPSULE | Refills: 1 | Status: SHIPPED | OUTPATIENT
Start: 2019-08-09 | End: 2019-08-12 | Stop reason: SDUPTHER

## 2019-08-09 NOTE — TELEPHONE ENCOUNTER
----- Message from Perla Black MA sent at 8/9/2019 10:27 AM CDT -----  Contact: caryn wife  States pt is supposed to be taking 3 mg of prazosin. He gets a 1mg and 2mg Rx. They only received the 1mg Rx. Please order the 2mg of prazosin.     [facility] mail order

## 2019-08-12 RX ORDER — PRAZOSIN HYDROCHLORIDE 2 MG/1
2 CAPSULE ORAL NIGHTLY
Qty: 90 CAPSULE | Refills: 0 | Status: SHIPPED | OUTPATIENT
Start: 2019-08-12 | End: 2019-09-13 | Stop reason: ALTCHOICE

## 2019-08-12 RX ORDER — ESCITALOPRAM OXALATE 20 MG/1
20 TABLET ORAL DAILY
Qty: 90 TABLET | Refills: 0 | Status: SHIPPED | OUTPATIENT
Start: 2019-08-12 | End: 2019-11-15 | Stop reason: ALTCHOICE

## 2019-08-12 NOTE — TELEPHONE ENCOUNTER
Patient's wife Josselyn states patient is needing this medication to go to [facility] per his insurance.

## 2019-08-13 RX ORDER — BISACODYL 5 MG
20 TABLET, DELAYED RELEASE (ENTERIC COATED) ORAL ONCE
Qty: 4 TABLET | Refills: 0 | Status: ON HOLD | OUTPATIENT
Start: 2019-08-28 | End: 2019-08-29 | Stop reason: HOSPADM

## 2019-08-14 ENCOUNTER — PATIENT MESSAGE (OUTPATIENT)
Dept: NEUROLOGY | Facility: CLINIC | Age: 54
End: 2019-08-14

## 2019-08-14 ENCOUNTER — ANESTHESIA EVENT (OUTPATIENT)
Dept: ENDOSCOPY | Facility: HOSPITAL | Age: 54
End: 2019-08-14
Payer: MEDICARE

## 2019-08-14 ENCOUNTER — HOSPITAL ENCOUNTER (OUTPATIENT)
Dept: PREADMISSION TESTING | Facility: HOSPITAL | Age: 54
Discharge: HOME | End: 2019-08-14
Attending: COLON & RECTAL SURGERY
Payer: MEDICARE

## 2019-08-14 DIAGNOSIS — Z12.11 COLON CANCER SCREENING: Primary | ICD-10-CM

## 2019-08-14 NOTE — ANESTHESIA PREPROCEDURE EVALUATION
08/14/2019  Dalton Kent is a 53 y.o., male   Pre-op Assessment    I have reviewed the Patient Summary Reports.    I have reviewed the Nursing Notes.   I have reviewed the Medications.     Review of Systems  Anesthesia Hx:  No problems with previous Anesthesia  History of prior surgery of interest to airway management or planning: Denies Family Hx of Anesthesia complications.   Denies Personal Hx of Anesthesia complications.   Social:  Non-Smoker, Social Alcohol Use    Hematology/Oncology:  Hematology Normal   Oncology Normal     EENT/Dental:EENT/Dental Normal   Cardiovascular:   Exercise tolerance: good Hypertension, well controlled    Pulmonary:  Pulmonary Normal  Denies Sleep Apnea.    Renal/:  Renal/ Normal     Hepatic/GI:   Bowel Prep. GERD, well controlled    Musculoskeletal:  Musculoskeletal Normal Hx lumbar sx   Neurological:   Headaches    Endocrine:   Hypothyroidism    Dermatological:  Skin Normal    Psych:   Psychiatric History depression          Physical Exam  General:  Well nourished, Obesity    Airway/Jaw/Neck:  Airway Findings: Mouth Opening: Normal Tongue: Normal  General Airway Assessment: Adult  TM Distance: Normal, at least 6 cm  Jaw/Neck Findings:  Neck ROM: Normal ROM      Dental:  Dental Findings: In tact   Chest/Lungs:  Chest/Lungs Clear    Heart/Vascular:  Heart Findings: Normal Heart murmur: negative    Abdomen:  Abdomen Findings: Normal      Mental Status:  Mental Status Findings:  Cooperative, Alert and Oriented         Anesthesia Plan  Type of Anesthesia, risks & benefits discussed:  Anesthesia Type:  general  Patient's Preference:   Intra-op Monitoring Plan: standard ASA monitors  Intra-op Monitoring Plan Comments:   Post Op Pain Control Plan:   Post Op Pain Control Plan Comments:   Induction:   IV  Beta Blocker:  Patient is on a Beta-Blocker and has received one  dose within the past 24 hours (No further documentation required).       Informed Consent: Patient understands risks and agrees with Anesthesia plan.  Questions answered. Anesthesia consent signed with patient.  ASA Score: 2     Day of Surgery Review of History & Physical: I have interviewed and examined the patient. I have reviewed the patient's H&P dated: 8/29/19. There are no significant changes.  H&P update referred to the surgeon.

## 2019-08-21 ENCOUNTER — OFFICE VISIT (OUTPATIENT)
Dept: PSYCHIATRY | Facility: CLINIC | Age: 54
End: 2019-08-21
Payer: COMMERCIAL

## 2019-08-21 ENCOUNTER — OFFICE VISIT (OUTPATIENT)
Dept: INTERNAL MEDICINE | Facility: CLINIC | Age: 54
End: 2019-08-21
Payer: MEDICARE

## 2019-08-21 VITALS
BODY MASS INDEX: 32.94 KG/M2 | HEIGHT: 66 IN | SYSTOLIC BLOOD PRESSURE: 122 MMHG | RESPIRATION RATE: 18 BRPM | DIASTOLIC BLOOD PRESSURE: 70 MMHG | WEIGHT: 204.94 LBS | HEART RATE: 76 BPM

## 2019-08-21 VITALS
DIASTOLIC BLOOD PRESSURE: 80 MMHG | RESPIRATION RATE: 16 BRPM | HEART RATE: 80 BPM | WEIGHT: 203.5 LBS | SYSTOLIC BLOOD PRESSURE: 120 MMHG | BODY MASS INDEX: 32.71 KG/M2 | HEIGHT: 66 IN

## 2019-08-21 DIAGNOSIS — G25.81 RESTLESS LEGS SYNDROME: ICD-10-CM

## 2019-08-21 DIAGNOSIS — E78.2 MIXED HYPERLIPIDEMIA: ICD-10-CM

## 2019-08-21 DIAGNOSIS — F33.2 SEVERE RECURRENT MAJOR DEPRESSION WITHOUT PSYCHOTIC FEATURES: ICD-10-CM

## 2019-08-21 DIAGNOSIS — R41.89 PSEUDODEMENTIA: ICD-10-CM

## 2019-08-21 DIAGNOSIS — E03.9 HYPOTHYROIDISM IN ADULT: ICD-10-CM

## 2019-08-21 DIAGNOSIS — G25.81 RESTLESS LEG SYNDROME: Primary | ICD-10-CM

## 2019-08-21 DIAGNOSIS — I10 ESSENTIAL HYPERTENSION: Primary | ICD-10-CM

## 2019-08-21 DIAGNOSIS — F10.20 ALCOHOL USE DISORDER, MODERATE, DEPENDENCE: ICD-10-CM

## 2019-08-21 DIAGNOSIS — R79.9 ABNORMAL FINDING OF BLOOD CHEMISTRY: ICD-10-CM

## 2019-08-21 DIAGNOSIS — F10.20 UNCOMPLICATED ALCOHOL DEPENDENCE: ICD-10-CM

## 2019-08-21 DIAGNOSIS — Z11.59 NEED FOR HEPATITIS C SCREENING TEST: ICD-10-CM

## 2019-08-21 DIAGNOSIS — F41.9 ANXIETY: ICD-10-CM

## 2019-08-21 DIAGNOSIS — M54.9 BACK PAIN, UNSPECIFIED BACK LOCATION, UNSPECIFIED BACK PAIN LATERALITY, UNSPECIFIED CHRONICITY: ICD-10-CM

## 2019-08-21 PROBLEM — R45.851 THREATENING SUICIDE: Status: RESOLVED | Noted: 2019-07-01 | Resolved: 2019-08-21

## 2019-08-21 PROCEDURE — 99999 PR PBB SHADOW E&M-EST. PATIENT-LVL III: CPT | Mod: PBBFAC,,, | Performed by: PSYCHIATRY & NEUROLOGY

## 2019-08-21 PROCEDURE — 99215 OFFICE O/P EST HI 40 MIN: CPT | Mod: S$GLB,,, | Performed by: PSYCHIATRY & NEUROLOGY

## 2019-08-21 PROCEDURE — 90833 PSYTX W PT W E/M 30 MIN: CPT | Mod: S$GLB,,, | Performed by: PSYCHIATRY & NEUROLOGY

## 2019-08-21 PROCEDURE — 3008F BODY MASS INDEX DOCD: CPT | Mod: CPTII,S$GLB,, | Performed by: PSYCHIATRY & NEUROLOGY

## 2019-08-21 PROCEDURE — 3079F PR MOST RECENT DIASTOLIC BLOOD PRESSURE 80-89 MM HG: ICD-10-PCS | Mod: CPTII,S$GLB,, | Performed by: INTERNAL MEDICINE

## 2019-08-21 PROCEDURE — 99499 RISK ADDL DX/OHS AUDIT: ICD-10-PCS | Mod: S$GLB,,, | Performed by: PSYCHIATRY & NEUROLOGY

## 2019-08-21 PROCEDURE — 3078F PR MOST RECENT DIASTOLIC BLOOD PRESSURE < 80 MM HG: ICD-10-PCS | Mod: CPTII,S$GLB,, | Performed by: PSYCHIATRY & NEUROLOGY

## 2019-08-21 PROCEDURE — 3079F DIAST BP 80-89 MM HG: CPT | Mod: CPTII,S$GLB,, | Performed by: INTERNAL MEDICINE

## 2019-08-21 PROCEDURE — 90471 IMMUNIZATION ADMIN: CPT | Mod: S$GLB,,, | Performed by: INTERNAL MEDICINE

## 2019-08-21 PROCEDURE — 99499 UNLISTED E&M SERVICE: CPT | Mod: S$GLB,,, | Performed by: PSYCHIATRY & NEUROLOGY

## 2019-08-21 PROCEDURE — 99213 PR OFFICE/OUTPT VISIT, EST, LEVL III, 20-29 MIN: ICD-10-PCS | Mod: 25,S$GLB,, | Performed by: INTERNAL MEDICINE

## 2019-08-21 PROCEDURE — 90833 PR PSYCHOTHERAPY W/PATIENT W/E&M, 30 MIN (ADD ON): ICD-10-PCS | Mod: S$GLB,,, | Performed by: PSYCHIATRY & NEUROLOGY

## 2019-08-21 PROCEDURE — 3008F PR BODY MASS INDEX (BMI) DOCUMENTED: ICD-10-PCS | Mod: CPTII,S$GLB,, | Performed by: INTERNAL MEDICINE

## 2019-08-21 PROCEDURE — 3008F PR BODY MASS INDEX (BMI) DOCUMENTED: ICD-10-PCS | Mod: CPTII,S$GLB,, | Performed by: PSYCHIATRY & NEUROLOGY

## 2019-08-21 PROCEDURE — 90714 TD VACCINE GREATER THAN OR EQUAL TO 7YO PRESERVATIVE FREE IM: ICD-10-PCS | Mod: S$GLB,,, | Performed by: INTERNAL MEDICINE

## 2019-08-21 PROCEDURE — 99999 PR PBB SHADOW E&M-EST. PATIENT-LVL III: ICD-10-PCS | Mod: PBBFAC,,, | Performed by: PSYCHIATRY & NEUROLOGY

## 2019-08-21 PROCEDURE — 99215 PR OFFICE/OUTPT VISIT, EST, LEVL V, 40-54 MIN: ICD-10-PCS | Mod: S$GLB,,, | Performed by: PSYCHIATRY & NEUROLOGY

## 2019-08-21 PROCEDURE — 90471 TD VACCINE GREATER THAN OR EQUAL TO 7YO PRESERVATIVE FREE IM: ICD-10-PCS | Mod: S$GLB,,, | Performed by: INTERNAL MEDICINE

## 2019-08-21 PROCEDURE — 99999 PR PBB SHADOW E&M-EST. PATIENT-LVL III: CPT | Mod: PBBFAC,,, | Performed by: INTERNAL MEDICINE

## 2019-08-21 PROCEDURE — 3074F SYST BP LT 130 MM HG: CPT | Mod: CPTII,S$GLB,, | Performed by: INTERNAL MEDICINE

## 2019-08-21 PROCEDURE — 99499 UNLISTED E&M SERVICE: CPT | Mod: S$GLB,,, | Performed by: INTERNAL MEDICINE

## 2019-08-21 PROCEDURE — 3074F PR MOST RECENT SYSTOLIC BLOOD PRESSURE < 130 MM HG: ICD-10-PCS | Mod: CPTII,S$GLB,, | Performed by: PSYCHIATRY & NEUROLOGY

## 2019-08-21 PROCEDURE — 99499 RISK ADDL DX/OHS AUDIT: ICD-10-PCS | Mod: S$GLB,,, | Performed by: INTERNAL MEDICINE

## 2019-08-21 PROCEDURE — 90714 TD VACC NO PRESV 7 YRS+ IM: CPT | Mod: S$GLB,,, | Performed by: INTERNAL MEDICINE

## 2019-08-21 PROCEDURE — 3074F SYST BP LT 130 MM HG: CPT | Mod: CPTII,S$GLB,, | Performed by: PSYCHIATRY & NEUROLOGY

## 2019-08-21 PROCEDURE — 3008F BODY MASS INDEX DOCD: CPT | Mod: CPTII,S$GLB,, | Performed by: INTERNAL MEDICINE

## 2019-08-21 PROCEDURE — 99999 PR PBB SHADOW E&M-EST. PATIENT-LVL III: ICD-10-PCS | Mod: PBBFAC,,, | Performed by: INTERNAL MEDICINE

## 2019-08-21 PROCEDURE — 3074F PR MOST RECENT SYSTOLIC BLOOD PRESSURE < 130 MM HG: ICD-10-PCS | Mod: CPTII,S$GLB,, | Performed by: INTERNAL MEDICINE

## 2019-08-21 PROCEDURE — 99213 OFFICE O/P EST LOW 20 MIN: CPT | Mod: 25,S$GLB,, | Performed by: INTERNAL MEDICINE

## 2019-08-21 PROCEDURE — 3078F DIAST BP <80 MM HG: CPT | Mod: CPTII,S$GLB,, | Performed by: PSYCHIATRY & NEUROLOGY

## 2019-08-21 RX ORDER — MELOXICAM 15 MG/1
15 TABLET ORAL DAILY
Qty: 30 TABLET | Refills: 0 | Status: SHIPPED | OUTPATIENT
Start: 2019-08-21 | End: 2019-09-20

## 2019-08-21 RX ORDER — LEVOTHYROXINE SODIUM 125 UG/1
125 TABLET ORAL DAILY
Qty: 90 TABLET | Refills: 1 | Status: SHIPPED | OUTPATIENT
Start: 2019-08-21 | End: 2020-06-15

## 2019-08-21 NOTE — PROGRESS NOTES
"Outpatient Psychiatry Follow-Up Visit (MD/NP)    8/21/2019    Clinical Status of Patient:  Outpatient (Ambulatory)    Chief Complaint:  Dalton Kent is a 53 y.o. male who presents today for follow-up of depression.  Met with patient.      Interval History and Content of Current Session:  Interim Events/Subjective Report/Content of Current Session: This is this pt's first encounter with this provider.     The patient was seen and examined. His chart was reviewed. Reviewed notes by Matthew Baker MD at 7/16/2019 11:01 AM.    The patient has been compliant with treatment. He denied any side effects. He reports good tolerability and efficacy. He is on the following medications:  Depakote 500mg bid  remeron 15mg Po qhs  Lexapro 20mg Po qday  Nortriptyline 25mg PO qhs  Prazosin 3mg po q HS BID    No new psychosocial stressors have occurred since his last appointment. The patient has a supportive wife- the marriage is stable. His 2 daughters are doing well. He is on SSI-disability. He tries to stay active around the house and fish when able. Finances are stable. Housing is stable.     No new medical issues have occurred since his last appointment. He is having a colonoscopy done in the near future. He denied any recent medication changes.     "I'm ok.. Just having trouble sleeping still."     Improved/controlled Symptoms of Depression: denied diminished mood or loss of interest/anhedonia; mild irritability, +diminished energy, +change in sleep, no change in appetite, no diminished concentration or cognition or indecisiveness,no PMA/R, no excessive guilt or hopelessness or worthlessness, no suicidal ideations     Continued Changes in Sleep: no trouble with initiation/maintenance, no early morning awakening with inability to return to sleep, no hypersomnolence; pt reports that he still moves in his sleep and does not feel rested in the AM.      Denied Suicidal/Homicidal ideations: no active/passive " ideations, organized plans, or future intentions     Denied Symptoms of nestor or hypomania: no elevated, expansive, or irritable mood with no increased energy or activity; with no inflated self-esteem or grandiosity, decreased need for sleep, increased rate of speech, FOI or racing thoughts, distractibility, increased goal directed activity or PMA, or risky/disinhibited behavior    Denied Symptoms of psychosis: no hallucinations, delusions, disorganized thinking, disorganized behavior or abnormal motor behavior, or negative symptoms    Controlled Symptoms of LEANNE: all of the following excessive anxiety/worry/fear, more days than not, about numerous issues, difficult to control, with restlessness, fatigue, poor concentration, irritability, muscle tension, sleep disturbance; causes functionally impairing distress; no panic attacks; without agoraphobia     Alcohol use: pt reports no usage since he was hospitalized in 7/2019; he had self-tapered off of clonopin without incident     Psychotherapy:  · Target symptoms: depression, anxiety   · Why chosen therapy is appropriate versus another modality: relevant to diagnosis  · Outcome monitoring methods: self-report, observation  · Therapeutic intervention type: behavior modifying psychotherapy, supportive psychotherapy  · Topics discussed/themes: difficulty managing affect in interpersonal relationships, building skills sets for symptom management, symptom recognition  · The patient's response to the intervention is accepting. The patient's progress toward treatment goals is good.   · Duration of intervention: 16 minutes.    Review of Systems   Review of systems  General ROS: negative  Ophthalmic ROS: negative  ENT ROS: negative  Allergy and Immunology ROS: negative  Hematological and Lymphatic ROS: negative  Endocrine ROS: negative  Respiratory ROS: no cough, shortness of breath, or wheezing  Cardiovascular ROS: no chest pain or dyspnea on exertion  Gastrointestinal ROS: no  "abdominal pain, change in bowel habits, or black or bloody stools  Genito-Urinary ROS: no dysuria, trouble voiding, or hematuria  Musculoskeletal ROS: positive for chronic back pain   Neurological ROS: no TIA or stroke symptoms  negative  Dermatological ROS: negative        Past Medical, Family and Social History: The patient's past medical, family and social history have been reviewed and updated as appropriate within the electronic medical record - see encounter notes.    Compliance: yes    Side effects: None    Risk Parameters:  Patient reports no suicidal ideation  Patient reports no homicidal ideation  Patient reports no self-injurious behavior  Patient reports no violent behavior    Exam (detailed: at least 9 elements; comprehensive: all 15 elements)   Constitutional  Vitals:  Most recent vital signs, dated less than 90 days prior to this appointment, were reviewed.   Vitals:    08/21/19 1229   BP: 122/70   Pulse: 76   Resp: 18   Weight: 92.9 kg (204 lb 14.7 oz)   Height: 5' 6" (1.676 m)     Body mass index is 33.07 kg/m².       General:  unremarkable, age appropriate     Musculoskeletal  Muscle Strength/Tone:  no spasicity, no rigidity   Gait & Station:  non-ataxic     Psychiatric  Speech:  no latency; no press   Mood & Affect:  steady  congruent and appropriate   Thought Process:  normal and logical   Associations:  intact   Thought Content:  normal, no suicidality, no homicidality, delusions, or paranoia   Insight:  intact   Judgement: behavior is adequate to circumstances   Orientation:  grossly intact   Memory: intact for content of interview   Language: grossly intact   Attention Span & Concentration:  able to focus   Fund of Knowledge:  intact and appropriate to age and level of education     Assessment and Diagnosis   Status/Progress: Based on the examination today, the patient's problem(s) is/are improved.  New problems have been presented today.   Co-morbidities are not complicating management of the " primary condition.  There are no active rule-out diagnoses for this patient at this time.     General Impression:     MDD, mild, recurrent  LEANNE  Pseudodementia/Memory loss    Alcohol use disorder, in early full remission    RLS  HTN  HLD  Chronic Headaches  GERD  Microcytic Anemia  Obese: chronic back pain      Intervention/Counseling/Treatment Plan     Medications:   Depression/Anxiety:  Continue Lexapro 20mg Qday  Continue Prazosin 3mg QHS  Continue Remeron 15mg QHS  Continue Nortriptyline 25mg PO qhs  Continue Depakote ER- change to 1000 mg po q HS     Cognitive Impairment:  Likely pseudodemtnia per neurocognitive evaluation     Anxiety:  Lexapro  taperred off of clonazepam     Night terrors:  Prazosin as above    Microcytosis:  Labs reviewed, talk to PCP about iron deficiency anemia    Alcohol Use Disorder:  Pt counseled; encourage sustained remission    RLS:   requip 1 mg po q HS  Consider trial of gabapentin  Check Iron panel    Hypothyroidism:   TSH still elevated; consider further optimizing further if indicated- defer to PCP       Discussed diagnosis, risks and benefits of proposed treatment vs alternative treatments vs no treatment, and potential side effects of these treatments. The patient expresses understanding of the above and displays the capacity to agree with this treatment given said understanding. Patient also agrees that, currently, the benefits outweigh the risks and would like to pursue treatment at this time.     Therapy:  Pt to continue with psychotherapy as scheduled     Labs:  Reviewed labs from 8/14/19 with the patient; check ferritin, iron panel, B12/MMA, folate, Mg, VPA      Return to Clinic: 1 month, sooner if needed     Jimmy Bradshaw MD  Psychiatry

## 2019-08-21 NOTE — PROGRESS NOTES
Subjective:       Patient ID: Dalton Kent is a 53 y.o. male.    Chief Complaint: Follow-up    Dalton Kent is a 53 y.o. male  Here with follow up for labs .  His BP is well controlled.        Review of Systems   Constitutional: Negative for chills and fever.   HENT: Negative for congestion, postnasal drip and sore throat.    Eyes: Negative for photophobia.   Respiratory: Negative for chest tightness and shortness of breath.    Cardiovascular: Negative for chest pain.   Gastrointestinal: Negative for abdominal distention, abdominal pain, blood in stool and vomiting.   Genitourinary: Negative for dysuria, flank pain and hematuria.   Musculoskeletal: Positive for arthralgias. Negative for back pain.   Skin: Negative for pallor.   Neurological: Negative for dizziness, seizures, facial asymmetry, speech difficulty and numbness.   Hematological: Does not bruise/bleed easily.   Psychiatric/Behavioral: Negative for agitation and suicidal ideas. The patient is not nervous/anxious.        Objective:      Physical Exam   Constitutional: He is oriented to person, place, and time. He appears well-developed and well-nourished.   HENT:   Head: Normocephalic and atraumatic.   Neck: No JVD present.   Cardiovascular: Normal rate, regular rhythm and normal heart sounds.   Pulmonary/Chest: Effort normal and breath sounds normal.   Abdominal: Soft. Bowel sounds are normal. There is no tenderness.   Musculoskeletal: He exhibits no edema.        Left hip: He exhibits bony tenderness.        Legs:  Trochanteric bursitis   Tenderness upon palpation    Neurological: He is alert and oriented to person, place, and time.   Skin: Skin is warm and dry.        Bruising ;fell  About 1 weeks ago    Psychiatric: He has a normal mood and affect. His behavior is normal. Judgment and thought content normal.   Nursing note and vitals reviewed.      Assessment:       1. Essential hypertension    2. Mixed hyperlipidemia    3.  Hypothyroidism in adult    4. Back pain, unspecified back location, unspecified back pain laterality, unspecified chronicity        Plan:   Dalton was seen today for follow-up.    Diagnoses and all orders for this visit:    Essential hypertension    Well controlled.  Continue same medication and dose.  1. Keep weight close to ideal body weight.   2.   Avoid high salt foods (olives, pickles, smoked meats, salted potato chips, etc.).   Do not add salt to your food at the table.   Use only small amounts of salt when cooking.   3. Begin an exercise program. Discuss with your doctor what type of exercise program would be best for you. It doesn't have to be difficult. Even brisk walking for 20 minutes three times a week is a good form of exercise.   4. Avoid medicines which contain heart stimulants. This includes many cold and sinus decongestant pills and sprays as well as diet pills. Check the warnings about hypertension on the label. Stimulants such as amphetamine or cocaine could be lethal for someone with hypertension. Never take these.    Mixed hyperlipidemia  Limit the cholesterol in your diet to less than 300 mg per day.   Fats should contribute no more than 20 to 35% of your daily calories.   Less than 7 to 10% of your calories should come from saturated fat.   Avoid saturated fat products e.g., Butter, some oils, meat, and poultry fat contain a lot of saturated fat.   Check food labels for fat and cholesterol content. Choose the foods with less fat per serving.   Limit the amount of butter and margarine you eat.   Use salad dressings and margarine made with polyunsaturated and monounsaturated fats.   Use egg whites or egg substitutes rather than whole eggs.   Replace whole-milk dairy products with nonfat or low-fat milk, cheese, spreads, and yogurt.   Eat skinless chicken, turkey, fish, and meatless entrees more often than red meat.   Choose lean cuts of meat and trim off all visible fat. Keep portion sizes  moderate.   Avoid fatty desserts such as ice cream, cream-filled cakes, and cheesecakes. Choose fresh fruits, nonfat frozen yogurt, Popsicles, etc.   Reduce the amount of fried foods, vending machine food, and fast food you eat.   Eat fruits and vegetables (especially fresh fruits and leafy vegetables), beans, and whole grains daily. The fiber in these foods helps lower cholesterol.   Look for low-fat or nonfat varieties of the foods you like to eat, or look for substitutes.   You may need to exercise 60 minutes a day to prevent weight gain and 90 minutes a day to lose weight.  Hypothyroidism in adult  -     levothyroxine (SYNTHROID) 125 MCG tablet; Take 1 tablet (125 mcg total) by mouth once daily.  tsh high  Increase dose     Back pain, unspecified back location, unspecified back pain laterality, unspecified chronicity  -     meloxicam (MOBIC) 15 MG tablet; Take 1 tablet (15 mg total) by mouth once daily.      Problem List Items Addressed This Visit     Essential hypertension - Primary    Hypothyroidism in adult    Mixed hyperlipidemia

## 2019-08-29 ENCOUNTER — HOSPITAL ENCOUNTER (OUTPATIENT)
Facility: HOSPITAL | Age: 54
Discharge: HOME | End: 2019-08-29
Attending: COLON & RECTAL SURGERY | Admitting: COLON & RECTAL SURGERY
Payer: MEDICARE

## 2019-08-29 ENCOUNTER — ANESTHESIA (OUTPATIENT)
Dept: ENDOSCOPY | Facility: HOSPITAL | Age: 54
End: 2019-08-29
Payer: MEDICARE

## 2019-08-29 VITALS
HEART RATE: 68 BPM | SYSTOLIC BLOOD PRESSURE: 109 MMHG | OXYGEN SATURATION: 100 % | RESPIRATION RATE: 18 BRPM | DIASTOLIC BLOOD PRESSURE: 68 MMHG | TEMPERATURE: 97 F

## 2019-08-29 DIAGNOSIS — Z12.11 COLON CANCER SCREENING: ICD-10-CM

## 2019-08-29 PROCEDURE — 27201012 HC FORCEPS, HOT/COLD, DISP: Performed by: COLON & RECTAL SURGERY

## 2019-08-29 PROCEDURE — 25000003 PHARM REV CODE 250: Performed by: NURSE ANESTHETIST, CERTIFIED REGISTERED

## 2019-08-29 PROCEDURE — 88305 TISSUE SPECIMEN TO PATHOLOGY - SURGERY: ICD-10-PCS | Mod: 26,,, | Performed by: PATHOLOGY

## 2019-08-29 PROCEDURE — 37000009 HC ANESTHESIA EA ADD 15 MINS: Performed by: COLON & RECTAL SURGERY

## 2019-08-29 PROCEDURE — 45380 COLONOSCOPY AND BIOPSY: CPT | Performed by: COLON & RECTAL SURGERY

## 2019-08-29 PROCEDURE — 45380 PR COLONOSCOPY,BIOPSY: ICD-10-PCS | Mod: PT,,, | Performed by: COLON & RECTAL SURGERY

## 2019-08-29 PROCEDURE — 37000008 HC ANESTHESIA 1ST 15 MINUTES: Performed by: COLON & RECTAL SURGERY

## 2019-08-29 PROCEDURE — 45380 COLONOSCOPY AND BIOPSY: CPT | Mod: PT,,, | Performed by: COLON & RECTAL SURGERY

## 2019-08-29 PROCEDURE — 00811 ANES LWR INTST NDSC NOS: CPT | Mod: QZ,PT | Performed by: NURSE ANESTHETIST, CERTIFIED REGISTERED

## 2019-08-29 PROCEDURE — 88305 TISSUE EXAM BY PATHOLOGIST: CPT | Performed by: PATHOLOGY

## 2019-08-29 PROCEDURE — 63600175 PHARM REV CODE 636 W HCPCS: Performed by: NURSE ANESTHETIST, CERTIFIED REGISTERED

## 2019-08-29 PROCEDURE — 88305 TISSUE EXAM BY PATHOLOGIST: CPT | Mod: 26,,, | Performed by: PATHOLOGY

## 2019-08-29 PROCEDURE — 63600175 PHARM REV CODE 636 W HCPCS: Performed by: COLON & RECTAL SURGERY

## 2019-08-29 RX ORDER — LIDOCAINE HYDROCHLORIDE 20 MG/ML
INJECTION, SOLUTION EPIDURAL; INFILTRATION; INTRACAUDAL; PERINEURAL
Status: DISCONTINUED | OUTPATIENT
Start: 2019-08-29 | End: 2019-08-29

## 2019-08-29 RX ORDER — SODIUM CHLORIDE, SODIUM LACTATE, POTASSIUM CHLORIDE, CALCIUM CHLORIDE 600; 310; 30; 20 MG/100ML; MG/100ML; MG/100ML; MG/100ML
INJECTION, SOLUTION INTRAVENOUS CONTINUOUS
Status: DISCONTINUED | OUTPATIENT
Start: 2019-08-29 | End: 2019-08-29 | Stop reason: HOSPADM

## 2019-08-29 RX ORDER — PROPOFOL 10 MG/ML
VIAL (ML) INTRAVENOUS
Status: DISCONTINUED | OUTPATIENT
Start: 2019-08-29 | End: 2019-08-29

## 2019-08-29 RX ADMIN — PROPOFOL 50 MG: 10 INJECTION, EMULSION INTRAVENOUS at 10:08

## 2019-08-29 RX ADMIN — SODIUM CHLORIDE, SODIUM LACTATE, POTASSIUM CHLORIDE, AND CALCIUM CHLORIDE: .6; .31; .03; .02 INJECTION, SOLUTION INTRAVENOUS at 09:08

## 2019-08-29 RX ADMIN — PROPOFOL 150 MG: 10 INJECTION, EMULSION INTRAVENOUS at 10:08

## 2019-08-29 RX ADMIN — LIDOCAINE HYDROCHLORIDE 5 ML: 20 INJECTION, SOLUTION EPIDURAL; INFILTRATION; INTRACAUDAL; PERINEURAL at 10:08

## 2019-08-29 RX ADMIN — PROPOFOL 50 MG: 10 INJECTION, EMULSION INTRAVENOUS at 11:08

## 2019-08-29 NOTE — ANESTHESIA POSTPROCEDURE EVALUATION
Anesthesia Post Evaluation    Patient: Dalton Kent    Procedure(s) Performed: Procedure(s) (LRB):  COLONOSCOPY (N/A)    Final Anesthesia Type: general  Patient location during evaluation: PACU  Patient participation: Yes- Able to Participate  Level of consciousness: awake and alert, oriented and awake  Post-procedure vital signs: reviewed and stable  Pain management: adequate  Airway patency: patent  PONV status at discharge: No PONV  Anesthetic complications: no      Cardiovascular status: blood pressure returned to baseline, hemodynamically stable and stable  Respiratory status: unassisted, spontaneous ventilation and room air  Hydration status: euvolemic  Follow-up not needed.          Vitals Value Taken Time   /68 8/29/2019 11:15 AM   Temp 36.1 °C (97 °F) 8/29/2019 11:06 AM   Pulse 68 8/29/2019 11:15 AM   Resp 18 8/29/2019 11:15 AM   SpO2 100 % 8/29/2019 11:15 AM         Event Time     Out of Recovery 11:29:09          Pain/Adarsh Score: Adarsh Score: 9 (8/29/2019 11:06 AM)

## 2019-08-29 NOTE — H&P
Procedure : Colonoscopy    Indication(s):  asymptomatic screening exam    Review of patient's allergies indicates:   Allergen Reactions    Gralise [gabapentin]      Depression    Norco [hydrocodone-acetaminophen] Itching    Percocet [oxycodone-acetaminophen] Itching       Past Medical History:   Diagnosis Date    Alcohol abuse     Anxiety     Depression     GERD (gastroesophageal reflux disease)     Headache     Hx of psychiatric care     Hypertension     Hypothyroid     Lyme disease     Restless leg syndrome     Sleep difficulties     Therapy     Uncontrolled REM sleep behavior disorder        Prior to Admission medications    Medication Sig Start Date End Date Taking? Authorizing Provider   amlodipine-benazepril 5-10 mg (LOTREL) 5-10 mg per capsule Take 1 capsule by mouth once daily.   Yes Historical Provider, MD   diphenhydrAMINE (BENADRYL) 50 MG capsule Take 1 capsule (50 mg total) by mouth every evening. 7/12/19  Yes CARMELINA Gao   divalproex ER (DEPAKOTE) 500 MG Tb24 TK 1 T PO BID 7/15/19  Yes Historical Provider, MD   escitalopram oxalate (LEXAPRO) 20 MG tablet Take 1 tablet (20 mg total) by mouth once daily. 8/12/19 10/11/19 Yes Jimmy Bradshaw MD   levothyroxine (SYNTHROID) 112 MCG tablet Take 112 mcg by mouth once daily.   Yes Historical Provider, MD   levothyroxine (SYNTHROID) 125 MCG tablet Take 1 tablet (125 mcg total) by mouth once daily. 8/21/19 8/20/20 Yes Horacio Velazquez MD   meloxicam (MOBIC) 15 MG tablet Take 1 tablet (15 mg total) by mouth once daily. 8/21/19 9/20/19 Yes Horacio Velazquez MD   metoprolol succinate (TOPROL-XL) 25 MG 24 hr tablet Take 25 mg by mouth once daily.   Yes Historical Provider, MD   nortriptyline (PAMELOR) 25 MG capsule Take 1 capsule (25 mg total) by mouth every evening. 7/12/19 7/11/20 Yes CARMELINA Gao   pantoprazole (PROTONIX) 40 MG tablet Take 40 mg by mouth once daily. 1/10/19  Yes Historical Provider, MD    prazosin (MINIPRESS) 2 MG Cap Take 1 capsule (2 mg total) by mouth every evening.  Patient taking differently: Take 3 mg by mouth every evening.  8/12/19 8/11/20 Yes Jimmy Bradshaw MD   rOPINIRole (REQUIP) 1 MG tablet Take 1 tablet (1 mg total) by mouth every evening. 7/12/19  Yes CARMELINA Gao   bisacodyl (DULCOLAX) 5 mg EC tablet Take 4 tablets (20 mg total) by mouth once. Per Dr. Mart instruction sheet for 1 dose 8/28/19 8/28/19  Giuseppe Mart MD   fluticasone (FLONASE) 50 mcg/actuation nasal spray 1 TO 2 SPRAY(S) IN EACH NOSTRIL DAILY 1/15/19   Historical Provider, MD   mirtazapine (REMERON) 15 MG tablet Take 1 tablet (15 mg total) by mouth every evening. 7/12/19 7/11/20  CARMELINA Gao   polyethylene glycol (COLYTE) 240-22.72-6.72 -5.84 gram SolR Take 4000mls by mouth once as directed by Dr. Mart's instruction sheet 8/28/19 8/28/19  Giuseppe Mart MD       Sedation Problems: NO    Family History   Problem Relation Age of Onset    Colon cancer Neg Hx     Colon polyps Neg Hx     Crohn's disease Neg Hx     Rectal cancer Neg Hx     Ulcerative colitis Neg Hx        Fam Hx of Sedation Problems: NO    Social History     Socioeconomic History    Marital status:      Spouse name: Josselyn Kent    Number of children: 2    Years of education: Not on file    Highest education level: Not on file   Occupational History    Not on file   Social Needs    Financial resource strain: Not on file    Food insecurity:     Worry: Not on file     Inability: Not on file    Transportation needs:     Medical: Not on file     Non-medical: Not on file   Tobacco Use    Smoking status: Never Smoker    Smokeless tobacco: Never Used   Substance and Sexual Activity    Alcohol use: Yes     Comment: socially    Drug use: No    Sexual activity: Yes     Partners: Female   Lifestyle    Physical activity:     Days per week: Not on file     Minutes per session: Not on file    Stress: To  some extent   Relationships    Social connections:     Talks on phone: Not on file     Gets together: Not on file     Attends Yarsani service: Not on file     Active member of club or organization: Not on file     Attends meetings of clubs or organizations: Not on file     Relationship status: Not on file   Other Topics Concern    Patient feels they ought to cut down on drinking/drug use No    Patient annoyed by others criticizing their drinking/drug use No    Patient has felt bad or guilty about drinking/drug use No    Patient has had a drink/used drugs as an eye opener in the AM No   Social History Narrative    Not on file       Review of Systems -     Respiratory ROS: no cough, shortness of breath, or wheezing  Cardiovascular ROS: no chest pain or dyspnea on exertion  Gastrointestinal ROS: no abdominal pain, change in bowel habits, or black or bloody stools  Musculoskeletal ROS: negative  Neurological ROS: no TIA or stroke symptoms        Physical Exam:  General: no distress  Head: normocephalic  Airway:  normal oropharynx, airway normal  Neck: supple, symmetrical, trachea midline  Lungs:  normal respiratory effort  Heart: regular rate and rhythm, S1, S2 normal, no murmur, rub or gallop  Abdomen: soft, non-tender non-distented; bowel sounds normal; no masses,  no organomegaly  Extremities: no cyanosis or edema, or clubbing       Deep Sedation: Mallampati Score per anesthesia     SedationPlan :Moderate     ASA : II    Patient is medically cleared for anesthesia.    Anesthesia/Surgery risks, benefits and alternative options discussed and understood by patient/family.

## 2019-08-29 NOTE — TRANSFER OF CARE
Anesthesia Transfer of Care Note    Patient: Dalton Kent    Procedure(s) Performed: Procedure(s) (LRB):  COLONOSCOPY (N/A)    Patient location: PACU    Anesthesia Type: general    Transport from OR: Transported from OR on 6-10 L/min O2 by face mask with adequate spontaneous ventilation    Post pain: adequate analgesia    Post assessment: no apparent anesthetic complications and tolerated procedure well    Post vital signs: stable    Level of consciousness: sedated    Nausea/Vomiting: no nausea/vomiting    Complications: none    Transfer of care protocol was followed      Last vitals:   Visit Vitals  /66 (BP Location: Left arm, Patient Position: Lying)   Pulse 73   Temp 36.1 °C (97 °F)   Resp 18   SpO2 99%

## 2019-08-29 NOTE — DISCHARGE SUMMARY
Discharge Note  Short Stay      SUMMARY     Admit Date: 8/29/2019    Attending Physician: Giuseppe Mart MD     Discharge Physician: Giuseppe Mart MD    Discharge Date: 8/29/2019 11:13 AM    Admission Diagnosis: asymptomatic screening exam    Final Diagnosis:  Colon lipoma    Procedures Performed:    Colonoscopy biopsy cold forceps    Disposition: Home or Self Care    Condition at Discharge:  Stable    Patient Instructions:   Current Discharge Medication List      CONTINUE these medications which have NOT CHANGED    Details   amlodipine-benazepril 5-10 mg (LOTREL) 5-10 mg per capsule Take 1 capsule by mouth once daily.      diphenhydrAMINE (BENADRYL) 50 MG capsule Take 1 capsule (50 mg total) by mouth every evening.  Qty: 30 capsule, Refills: 11      divalproex ER (DEPAKOTE) 500 MG Tb24 TK 1 T PO BID  Refills: 0      escitalopram oxalate (LEXAPRO) 20 MG tablet Take 1 tablet (20 mg total) by mouth once daily.  Qty: 90 tablet, Refills: 0      !! levothyroxine (SYNTHROID) 112 MCG tablet Take 112 mcg by mouth once daily.      !! levothyroxine (SYNTHROID) 125 MCG tablet Take 1 tablet (125 mcg total) by mouth once daily.  Qty: 90 tablet, Refills: 1    Associated Diagnoses: Hypothyroidism in adult      meloxicam (MOBIC) 15 MG tablet Take 1 tablet (15 mg total) by mouth once daily.  Qty: 30 tablet, Refills: 0    Associated Diagnoses: Back pain, unspecified back location, unspecified back pain laterality, unspecified chronicity      metoprolol succinate (TOPROL-XL) 25 MG 24 hr tablet Take 25 mg by mouth once daily.      nortriptyline (PAMELOR) 25 MG capsule Take 1 capsule (25 mg total) by mouth every evening.  Qty: 30 capsule, Refills: 11    Associated Diagnoses: Chronic nonintractable headache, unspecified headache type      pantoprazole (PROTONIX) 40 MG tablet Take 40 mg by mouth once daily.  Refills: 1      prazosin (MINIPRESS) 2 MG Cap Take 1 capsule (2 mg total) by mouth every evening.  Qty: 90 capsule, Refills: 0       rOPINIRole (REQUIP) 1 MG tablet Take 1 tablet (1 mg total) by mouth every evening.  Qty: 30 tablet, Refills: 11      fluticasone (FLONASE) 50 mcg/actuation nasal spray 1 TO 2 SPRAY(S) IN EACH NOSTRIL DAILY  Refills: 2      mirtazapine (REMERON) 15 MG tablet Take 1 tablet (15 mg total) by mouth every evening.  Qty: 30 tablet, Refills: 11       !! - Potential duplicate medications found. Please discuss with provider.      STOP taking these medications       bisacodyl (DULCOLAX) 5 mg EC tablet Comments:   Reason for Stopping:         polyethylene glycol (COLYTE) 240-22.72-6.72 -5.84 gram SolR Comments:   Reason for Stopping:               Discharge Procedure Orders (must include Diet, Follow-up, Activity)   Discharge Procedure Orders (must include Diet, Follow-up, Activity)   Activity as tolerated        Repeat colonoscopy 10 years.  High fiber diet

## 2019-08-31 ENCOUNTER — PATIENT MESSAGE (OUTPATIENT)
Dept: INTERNAL MEDICINE | Facility: CLINIC | Age: 54
End: 2019-08-31

## 2019-08-31 ENCOUNTER — LAB VISIT (OUTPATIENT)
Dept: LAB | Facility: HOSPITAL | Age: 54
End: 2019-08-31
Attending: PSYCHIATRY & NEUROLOGY
Payer: MEDICARE

## 2019-08-31 DIAGNOSIS — R79.9 ABNORMAL FINDING OF BLOOD CHEMISTRY: ICD-10-CM

## 2019-08-31 DIAGNOSIS — F10.20 UNCOMPLICATED ALCOHOL DEPENDENCE: ICD-10-CM

## 2019-08-31 DIAGNOSIS — G25.81 RESTLESS LEG SYNDROME: ICD-10-CM

## 2019-08-31 LAB
FERRITIN SERPL-MCNC: 7 NG/ML (ref 20–300)
FOLATE SERPL-MCNC: 12.1 NG/ML (ref 4–24)
IRON SERPL-MCNC: 93 UG/DL (ref 45–160)
MAGNESIUM SERPL-MCNC: 2.2 MG/DL (ref 1.6–2.6)
SATURATED IRON: 18 % (ref 20–50)
TOTAL IRON BINDING CAPACITY: 517 UG/DL (ref 250–450)
TRANSFERRIN SERPL-MCNC: 349 MG/DL (ref 200–375)
VALPROATE SERPL-MCNC: 32.2 UG/ML (ref 50–100)

## 2019-08-31 PROCEDURE — 80164 ASSAY DIPROPYLACETIC ACD TOT: CPT

## 2019-08-31 PROCEDURE — 82746 ASSAY OF FOLIC ACID SERUM: CPT

## 2019-08-31 PROCEDURE — 82728 ASSAY OF FERRITIN: CPT

## 2019-08-31 PROCEDURE — 83540 ASSAY OF IRON: CPT

## 2019-08-31 PROCEDURE — 83921 ORGANIC ACID SINGLE QUANT: CPT

## 2019-08-31 PROCEDURE — 83735 ASSAY OF MAGNESIUM: CPT

## 2019-08-31 PROCEDURE — 83735 ASSAY OF MAGNESIUM: CPT | Mod: 91

## 2019-08-31 PROCEDURE — 36415 COLL VENOUS BLD VENIPUNCTURE: CPT

## 2019-09-05 LAB — METHYLMALONATE SERPL-SCNC: 0.34 UMOL/L

## 2019-09-06 LAB — MAGNESIUM RBC-MCNC: 5.1 MG/DL (ref 4–6.4)

## 2019-09-07 ENCOUNTER — PATIENT MESSAGE (OUTPATIENT)
Dept: SURGERY | Facility: CLINIC | Age: 54
End: 2019-09-07

## 2019-09-07 ENCOUNTER — PATIENT MESSAGE (OUTPATIENT)
Dept: PSYCHIATRY | Facility: CLINIC | Age: 54
End: 2019-09-07

## 2019-09-09 ENCOUNTER — PATIENT MESSAGE (OUTPATIENT)
Dept: PSYCHIATRY | Facility: CLINIC | Age: 54
End: 2019-09-09

## 2019-09-09 ENCOUNTER — PATIENT MESSAGE (OUTPATIENT)
Dept: INTERNAL MEDICINE | Facility: CLINIC | Age: 54
End: 2019-09-09

## 2019-09-09 DIAGNOSIS — M54.9 BACK PAIN, UNSPECIFIED BACK LOCATION, UNSPECIFIED BACK PAIN LATERALITY, UNSPECIFIED CHRONICITY: Primary | ICD-10-CM

## 2019-09-11 ENCOUNTER — TELEPHONE (OUTPATIENT)
Dept: PSYCHIATRY | Facility: CLINIC | Age: 54
End: 2019-09-11

## 2019-09-11 NOTE — TELEPHONE ENCOUNTER
As per Dr Jimmy Bradshaw, patient's labs showed his iron level was low. Patient advised to take iron supplement OTC twice daily. Patient is also advised to follow up with his PCP to find out a cause.     Spoke with patient's wife, Josselyn. Josselyn voiced understanding. She stated patient will be scheduled ASAP and he will begin the supplement today.

## 2019-09-12 ENCOUNTER — OFFICE VISIT (OUTPATIENT)
Dept: INTERNAL MEDICINE | Facility: CLINIC | Age: 54
End: 2019-09-12
Payer: MEDICARE

## 2019-09-12 VITALS
BODY MASS INDEX: 32.81 KG/M2 | HEART RATE: 84 BPM | WEIGHT: 204.13 LBS | RESPIRATION RATE: 16 BRPM | SYSTOLIC BLOOD PRESSURE: 128 MMHG | DIASTOLIC BLOOD PRESSURE: 84 MMHG | HEIGHT: 66 IN

## 2019-09-12 DIAGNOSIS — R79.0 DECREASED FERRITIN: Primary | ICD-10-CM

## 2019-09-12 PROCEDURE — G0008 ADMIN INFLUENZA VIRUS VAC: HCPCS | Mod: S$GLB,,, | Performed by: INTERNAL MEDICINE

## 2019-09-12 PROCEDURE — 3008F PR BODY MASS INDEX (BMI) DOCUMENTED: ICD-10-PCS | Mod: CPTII,S$GLB,, | Performed by: INTERNAL MEDICINE

## 2019-09-12 PROCEDURE — 3008F BODY MASS INDEX DOCD: CPT | Mod: CPTII,S$GLB,, | Performed by: INTERNAL MEDICINE

## 2019-09-12 PROCEDURE — 99213 OFFICE O/P EST LOW 20 MIN: CPT | Mod: 25,S$GLB,, | Performed by: INTERNAL MEDICINE

## 2019-09-12 PROCEDURE — 3074F SYST BP LT 130 MM HG: CPT | Mod: CPTII,S$GLB,, | Performed by: INTERNAL MEDICINE

## 2019-09-12 PROCEDURE — 3079F DIAST BP 80-89 MM HG: CPT | Mod: CPTII,S$GLB,, | Performed by: INTERNAL MEDICINE

## 2019-09-12 PROCEDURE — 99999 PR PBB SHADOW E&M-EST. PATIENT-LVL III: ICD-10-PCS | Mod: PBBFAC,,, | Performed by: INTERNAL MEDICINE

## 2019-09-12 PROCEDURE — G0008 FLU VACCINE (QUAD) GREATER THAN OR EQUAL TO 3YO PRESERVATIVE FREE IM: ICD-10-PCS | Mod: S$GLB,,, | Performed by: INTERNAL MEDICINE

## 2019-09-12 PROCEDURE — 3079F PR MOST RECENT DIASTOLIC BLOOD PRESSURE 80-89 MM HG: ICD-10-PCS | Mod: CPTII,S$GLB,, | Performed by: INTERNAL MEDICINE

## 2019-09-12 PROCEDURE — 90686 IIV4 VACC NO PRSV 0.5 ML IM: CPT | Mod: S$GLB,,, | Performed by: INTERNAL MEDICINE

## 2019-09-12 PROCEDURE — 3074F PR MOST RECENT SYSTOLIC BLOOD PRESSURE < 130 MM HG: ICD-10-PCS | Mod: CPTII,S$GLB,, | Performed by: INTERNAL MEDICINE

## 2019-09-12 PROCEDURE — 90686 FLU VACCINE (QUAD) GREATER THAN OR EQUAL TO 3YO PRESERVATIVE FREE IM: ICD-10-PCS | Mod: S$GLB,,, | Performed by: INTERNAL MEDICINE

## 2019-09-12 PROCEDURE — 99999 PR PBB SHADOW E&M-EST. PATIENT-LVL III: CPT | Mod: PBBFAC,,, | Performed by: INTERNAL MEDICINE

## 2019-09-12 PROCEDURE — 99213 PR OFFICE/OUTPT VISIT, EST, LEVL III, 20-29 MIN: ICD-10-PCS | Mod: 25,S$GLB,, | Performed by: INTERNAL MEDICINE

## 2019-09-12 RX ORDER — ROPINIROLE 1 MG/1
1 TABLET, FILM COATED ORAL NIGHTLY
Qty: 90 TABLET | Refills: 0 | Status: SHIPPED | OUTPATIENT
Start: 2019-09-12 | End: 2019-11-27 | Stop reason: SDUPTHER

## 2019-09-12 NOTE — TELEPHONE ENCOUNTER
Requested Prescriptions     Pending Prescriptions Disp Refills    rOPINIRole (REQUIP) 1 MG tablet 90 tablet 0     Sig: Take 1 tablet (1 mg total) by mouth every evening.   Seen today. [facility]

## 2019-09-12 NOTE — PROGRESS NOTES
Subjective:       Patient ID: Dalton Kent is a 53 y.o. male.    Chief Complaint: Results (iron ; Dr Bradshaw sent me here for low iron )    Dalton Kent is a 53 y.o. male  Here for low ferritin     His anemia work up showed low iron stores     8/14 : HB 12.5,  HCTZ 41       S/p colonoscopy recently .    Reviewed       Findings:  lipoma, 15 mm in size, located in Proximal ascending colon, biopsies obtained with cold biopsy forceps     EBL: none     Impression:  Ascending colon lipoma  Otherwise normal colonoscopy to the cecum with no evidence of neoplasia, diverticular disease or mucosal abnormality.     Recommendation:  Repeat exam: 10 years  Return to my office prn  High fiber diet          He is taking meloxicam  For pains  He assures me no black stools   He is compliant  With acid reducer .      I asked him to stop meloxicam.      Review of Systems   Constitutional: Negative for chills and fever.   HENT: Negative for congestion, postnasal drip and sore throat.    Eyes: Negative for photophobia.   Respiratory: Negative for chest tightness and shortness of breath.    Cardiovascular: Negative for chest pain.   Gastrointestinal: Negative for abdominal distention, abdominal pain, blood in stool and vomiting.   Genitourinary: Negative for dysuria, flank pain and hematuria.   Musculoskeletal: Positive for arthralgias. Negative for back pain.   Skin: Negative for pallor.   Neurological: Negative for dizziness, seizures, facial asymmetry, speech difficulty and numbness.   Hematological: Does not bruise/bleed easily.   Psychiatric/Behavioral: Negative for agitation and suicidal ideas. The patient is not nervous/anxious.        Objective:      Physical Exam   Constitutional: He is oriented to person, place, and time. He appears well-developed and well-nourished.   HENT:   Head: Normocephalic and atraumatic.   Neck: No JVD present.   Cardiovascular: Normal rate, regular rhythm and normal heart  sounds.   Pulmonary/Chest: Effort normal and breath sounds normal.   Abdominal: Soft. Bowel sounds are normal. There is no tenderness.   Musculoskeletal: He exhibits no edema.        Left hip: He exhibits bony tenderness.        Legs:  Trochanteric bursitis   Tenderness upon palpation    Neurological: He is alert and oriented to person, place, and time.   Skin: Skin is warm and dry.   Bruising ;fell  About 1 weeks ago    Psychiatric: He has a normal mood and affect. His behavior is normal. Judgment and thought content normal.   Nursing note and vitals reviewed.      Assessment:       1. Decreased ferritin        Plan:   Dalton was seen today for results.    Diagnoses and all orders for this visit:    Decreased ferritin    his hct is normal   No blood loss reported   He is scheduled  For upcoming blood work   Continue PPI   Stop meloxicam   Take tylenol for  aches and pains  No NSAIDS .    Problem List Items Addressed This Visit     None

## 2019-09-13 ENCOUNTER — OFFICE VISIT (OUTPATIENT)
Dept: PSYCHIATRY | Facility: CLINIC | Age: 54
End: 2019-09-13
Payer: COMMERCIAL

## 2019-09-13 VITALS
RESPIRATION RATE: 19 BRPM | HEIGHT: 66 IN | BODY MASS INDEX: 32.9 KG/M2 | WEIGHT: 204.69 LBS | SYSTOLIC BLOOD PRESSURE: 134 MMHG | DIASTOLIC BLOOD PRESSURE: 78 MMHG | HEART RATE: 80 BPM

## 2019-09-13 DIAGNOSIS — F41.9 ANXIETY: Primary | ICD-10-CM

## 2019-09-13 DIAGNOSIS — G25.81 RESTLESS LEGS SYNDROME: ICD-10-CM

## 2019-09-13 DIAGNOSIS — F10.20 ALCOHOL USE DISORDER, MODERATE, DEPENDENCE: ICD-10-CM

## 2019-09-13 DIAGNOSIS — G47.52 REM SLEEP BEHAVIOR DISORDER: ICD-10-CM

## 2019-09-13 DIAGNOSIS — F33.2 SEVERE RECURRENT MAJOR DEPRESSION WITHOUT PSYCHOTIC FEATURES: ICD-10-CM

## 2019-09-13 DIAGNOSIS — R41.89 PSEUDODEMENTIA: ICD-10-CM

## 2019-09-13 PROCEDURE — 3075F SYST BP GE 130 - 139MM HG: CPT | Mod: CPTII,S$GLB,, | Performed by: PSYCHIATRY & NEUROLOGY

## 2019-09-13 PROCEDURE — 90833 PR PSYCHOTHERAPY W/PATIENT W/E&M, 30 MIN (ADD ON): ICD-10-PCS | Mod: S$GLB,,, | Performed by: PSYCHIATRY & NEUROLOGY

## 2019-09-13 PROCEDURE — 99215 PR OFFICE/OUTPT VISIT, EST, LEVL V, 40-54 MIN: ICD-10-PCS | Mod: S$GLB,,, | Performed by: PSYCHIATRY & NEUROLOGY

## 2019-09-13 PROCEDURE — 3078F PR MOST RECENT DIASTOLIC BLOOD PRESSURE < 80 MM HG: ICD-10-PCS | Mod: CPTII,S$GLB,, | Performed by: PSYCHIATRY & NEUROLOGY

## 2019-09-13 PROCEDURE — 99999 PR PBB SHADOW E&M-EST. PATIENT-LVL III: CPT | Mod: PBBFAC,,, | Performed by: PSYCHIATRY & NEUROLOGY

## 2019-09-13 PROCEDURE — 3008F BODY MASS INDEX DOCD: CPT | Mod: CPTII,S$GLB,, | Performed by: PSYCHIATRY & NEUROLOGY

## 2019-09-13 PROCEDURE — 90833 PSYTX W PT W E/M 30 MIN: CPT | Mod: S$GLB,,, | Performed by: PSYCHIATRY & NEUROLOGY

## 2019-09-13 PROCEDURE — 3008F PR BODY MASS INDEX (BMI) DOCUMENTED: ICD-10-PCS | Mod: CPTII,S$GLB,, | Performed by: PSYCHIATRY & NEUROLOGY

## 2019-09-13 PROCEDURE — 99999 PR PBB SHADOW E&M-EST. PATIENT-LVL III: ICD-10-PCS | Mod: PBBFAC,,, | Performed by: PSYCHIATRY & NEUROLOGY

## 2019-09-13 PROCEDURE — 99215 OFFICE O/P EST HI 40 MIN: CPT | Mod: S$GLB,,, | Performed by: PSYCHIATRY & NEUROLOGY

## 2019-09-13 PROCEDURE — 3078F DIAST BP <80 MM HG: CPT | Mod: CPTII,S$GLB,, | Performed by: PSYCHIATRY & NEUROLOGY

## 2019-09-13 PROCEDURE — 3075F PR MOST RECENT SYSTOLIC BLOOD PRESS GE 130-139MM HG: ICD-10-PCS | Mod: CPTII,S$GLB,, | Performed by: PSYCHIATRY & NEUROLOGY

## 2019-09-13 NOTE — PROGRESS NOTES
"Outpatient Psychiatry Follow-Up Visit (MD/NP)    9/13/2019    Clinical Status of Patient:  Outpatient (Ambulatory)    Chief Complaint:  Dalton Kent is a 53 y.o. male who presents today for follow-up of depression.  Met with patient and spouse.      Interval History and Content of Current Session:  Interim Events/Subjective Report/Content of Current Session: This is this pt's first encounter with this provider.     The patient was seen and examined. His chart was reviewed. Reviewed notes by Horacio Velazquez MD at 8/21/2019  3:00 PM; Giuseppe Mart MD at 8/29/2019 10:39 AM;  Giuseppe Mart MD at 8/29/2019 11:13 AM; Horacio Velazquez MD at 9/12/2019 10:39 AM; reviewed sleep study in media section of chart.    The patient has been compliant with treatment. He denied any side effects. He reports good tolerability and efficacy. He is on the following medications:    No new psychosocial stressors have occurred since his last appointment. The patient has a supportive wife- the marriage is stable. His 2 daughters are doing well. He is on SSI-disability. He tries to stay active around the house and fish when able. Finances are stable. Housing is stable.     No new medical issues have occurred since his last appointment. He had the colonoscopy done. He denied any recent medication changes aside from the increase in synthroid..     "Alrihgt.. I don't know." He reports that symptoms persist minimally changed since last seen. His wife corroborates the account.     Improved/controlled Symptoms of Depression: denied diminished mood or loss of interest/anhedonia; +irritability, +diminished energy, +change in sleep, no change in appetite, no diminished concentration or cognition or indecisiveness, no PMA/R, no excessive guilt or hopelessness or worthlessness, no suicidal ideations     Continued Changes in Sleep: no trouble with initiation/maintenance, no early morning awakening with inability to return to sleep, no " hypersomnolence; pt reports that he still moves in his sleep and does not feel rested in the AM (sleep study in 7/2019 reports REM movement sleep disorder).      Denied Suicidal/Homicidal ideations: no active/passive ideations, organized plans, or future intentions     Denied Symptoms of nestor or hypomania: no elevated, expansive, or irritable mood with no increased energy or activity; with no inflated self-esteem or grandiosity, decreased need for sleep, increased rate of speech, FOI or racing thoughts, distractibility, increased goal directed activity or PMA, or risky/disinhibited behavior    Denied Symptoms of psychosis: no hallucinations, delusions, disorganized thinking, disorganized behavior or abnormal motor behavior, or negative symptoms    Continued Symptoms of LEANNE: +excessive anxiety/worry/fear, +more days than not, +about numerous issues, +difficult to control, with +restlessness, +fatigue, no poor concentration, +irritability, +muscle tension, +sleep disturbance; +causes functionally impairing distress; no panic attacks; without agoraphobia     Alcohol use: pt reports no usage since he was hospitalized in 7/2019; he had no lapses since last seen. His wife feels that he never was dependent on it.     Memory problems: continue unchanged (no change since stopping klonopin); possibly in part iatrogenic with noted polypharmacy     Collateral from his wife reports that he has been relatively stable, but he has been irritable and tired.     Psychotherapy:  · Target symptoms: depression, anxiety   · Why chosen therapy is appropriate versus another modality: relevant to diagnosis  · Outcome monitoring methods: self-report, observation  · Therapeutic intervention type: behavior modifying psychotherapy, supportive psychotherapy  · Topics discussed/themes: difficulty managing affect in interpersonal relationships, building skills sets for symptom management, symptom recognition  · The patient's response to the  "intervention is accepting. The patient's progress toward treatment goals is good.   · Duration of intervention: 16 minutes.    Review of Systems   Review of systems  General ROS: negative  Ophthalmic ROS: negative  ENT ROS: negative  Allergy and Immunology ROS: negative  Hematological and Lymphatic ROS: negative  Endocrine ROS: negative  Respiratory ROS: no cough, shortness of breath, or wheezing  Cardiovascular ROS: no chest pain or dyspnea on exertion  Gastrointestinal ROS: no abdominal pain, change in bowel habits, or black or bloody stools  Genito-Urinary ROS: no dysuria, trouble voiding, or hematuria  Musculoskeletal ROS: positive for chronic back pain and leg weakness   Neurological ROS: no TIA or stroke symptoms  negative  Dermatological ROS: negative      Past Medical, Family and Social History: The patient's past medical, family and social history have been reviewed and updated as appropriate within the electronic medical record - see encounter notes.    Compliance: yes    Side effects: None    Risk Parameters:  Patient reports no suicidal ideation  Patient reports no homicidal ideation  Patient reports no self-injurious behavior  Patient reports no violent behavior    Exam (detailed: at least 9 elements; comprehensive: all 15 elements)   Constitutional  Vitals:  Most recent vital signs, dated less than 90 days prior to this appointment, were reviewed.   Vitals:    09/13/19 1158   BP: 134/78   Pulse: 80   Resp: 19   Weight: 92.8 kg (204 lb 11.2 oz)   Height: 5' 6" (1.676 m)     Body mass index is 33.04 kg/m².       General:  unremarkable, age appropriate     Musculoskeletal  Muscle Strength/Tone:  no spasicity, no rigidity   Gait & Station:  non-ataxic     Psychiatric  Speech:  no latency; no press   Mood & Affect:  steady  congruent and appropriate   Thought Process:  normal and logical   Associations:  intact   Thought Content:  normal, no suicidality, no homicidality, delusions, or paranoia   Insight:  " intact   Judgement: behavior is adequate to circumstances   Orientation:  grossly intact   Memory: intact for content of interview   Language: grossly intact   Attention Span & Concentration:  able to focus   Fund of Knowledge:  intact and appropriate to age and level of education     Assessment and Diagnosis   Status/Progress: Based on the examination today, the patient's problem(s) is/are adequately but not ideally controlled.  New problems have been presented today.   Co-morbidities are not complicating management of the primary condition.  There are no active rule-out diagnoses for this patient at this time.     General Impression:     MDD, mild, recurrent  LEANNE  Pseudodementia/Memory loss  REM Movement Sleep disorders    Alcohol use disorder, in early full remission    RLS  HTN  HLD  Chronic Headaches  GERD  Microcytic Anemia  Obese: chronic back pain      Intervention/Counseling/Treatment Plan     Medications:   Depression/Anxiety:  Continue Lexapro 20mg Qday  Continue Prazosin 3mg QHS- taper off as it has been ineffective and not indicated for REM movement sleep disorders  Continue Remeron 15mg QHS- consider optimizing further   Continue Nortriptyline 25mg PO qhs-consider tapering off if able  Continue Depakote ER 1000 mg po q HS- consider tapering off as the indication and benefit are questionable     Cognitive Impairment:  Likely pseudodemtnia per neurocognitive evaluation     Anxiety:  Lexapro  taperred off of clonazepam     REM movement sleep disorder  Prazosin as above    Microcytosis:  Labs reviewed, talk to PCP about iron deficiency anemia    Alcohol Use Disorder:  Pt counseled; encourage sustained remission    RLS:   requip 1 mg po q HS  Consider re-trial of Lyrica  +iron deficiency     Hypothyroidism:   TSH still elevated; consider further optimizing further if indicated- defer to PCP  -synthroid increased to 125 mcg po q AM- recheck level in 3 month     Iron deficiency:  Start Iron 325 mg po BID for  3 months then re-check levels in 3 months    Discussed diagnosis, risks and benefits of proposed treatment vs alternative treatments vs no treatment, and potential side effects of these treatments. The patient expresses understanding of the above and displays the capacity to agree with this treatment given said understanding. Patient also agrees that, currently, the benefits outweigh the risks and would like to pursue treatment at this time.     Therapy:  Pt to continue with psychotherapy as scheduled     Labs:  Reviewed labs from 8/31/19 with the patient; reviewed  ferritin, iron panel, B12/MMA, folate, Mg, and VPA; +iron deficiency noted    Will try to consolidate treatment as he is on duplicate therapy with perceived limited efficacy     Return to Clinic: 1 month, sooner if needed     Jimmy Bradshaw MD  Psychiatry

## 2019-09-17 ENCOUNTER — PATIENT MESSAGE (OUTPATIENT)
Dept: INTERNAL MEDICINE | Facility: CLINIC | Age: 54
End: 2019-09-17

## 2019-09-17 DIAGNOSIS — I10 HYPERTENSION, UNSPECIFIED TYPE: Primary | ICD-10-CM

## 2019-09-30 ENCOUNTER — OFFICE VISIT (OUTPATIENT)
Dept: PSYCHIATRY | Facility: CLINIC | Age: 54
End: 2019-09-30
Payer: MEDICARE

## 2019-09-30 VITALS
SYSTOLIC BLOOD PRESSURE: 121 MMHG | BODY MASS INDEX: 33.15 KG/M2 | DIASTOLIC BLOOD PRESSURE: 68 MMHG | WEIGHT: 206.25 LBS | HEART RATE: 78 BPM | RESPIRATION RATE: 19 BRPM | HEIGHT: 66 IN

## 2019-09-30 DIAGNOSIS — R41.89 PSEUDODEMENTIA: ICD-10-CM

## 2019-09-30 DIAGNOSIS — F41.9 ANXIETY: Primary | ICD-10-CM

## 2019-09-30 DIAGNOSIS — G25.81 RESTLESS LEGS SYNDROME: ICD-10-CM

## 2019-09-30 DIAGNOSIS — G47.52 REM SLEEP BEHAVIOR DISORDER: ICD-10-CM

## 2019-09-30 DIAGNOSIS — F33.2 SEVERE RECURRENT MAJOR DEPRESSION WITHOUT PSYCHOTIC FEATURES: ICD-10-CM

## 2019-09-30 PROCEDURE — 99215 PR OFFICE/OUTPT VISIT, EST, LEVL V, 40-54 MIN: ICD-10-PCS | Mod: S$GLB,,, | Performed by: PSYCHIATRY & NEUROLOGY

## 2019-09-30 PROCEDURE — 99999 PR PBB SHADOW E&M-EST. PATIENT-LVL III: ICD-10-PCS | Mod: PBBFAC,,, | Performed by: PSYCHIATRY & NEUROLOGY

## 2019-09-30 PROCEDURE — 99215 OFFICE O/P EST HI 40 MIN: CPT | Mod: S$GLB,,, | Performed by: PSYCHIATRY & NEUROLOGY

## 2019-09-30 PROCEDURE — 99999 PR PBB SHADOW E&M-EST. PATIENT-LVL III: CPT | Mod: PBBFAC,,, | Performed by: PSYCHIATRY & NEUROLOGY

## 2019-09-30 PROCEDURE — 3078F DIAST BP <80 MM HG: CPT | Mod: CPTII,S$GLB,, | Performed by: PSYCHIATRY & NEUROLOGY

## 2019-09-30 PROCEDURE — 3074F SYST BP LT 130 MM HG: CPT | Mod: CPTII,S$GLB,, | Performed by: PSYCHIATRY & NEUROLOGY

## 2019-09-30 PROCEDURE — 3008F BODY MASS INDEX DOCD: CPT | Mod: CPTII,S$GLB,, | Performed by: PSYCHIATRY & NEUROLOGY

## 2019-09-30 PROCEDURE — 3078F PR MOST RECENT DIASTOLIC BLOOD PRESSURE < 80 MM HG: ICD-10-PCS | Mod: CPTII,S$GLB,, | Performed by: PSYCHIATRY & NEUROLOGY

## 2019-09-30 PROCEDURE — 3074F PR MOST RECENT SYSTOLIC BLOOD PRESSURE < 130 MM HG: ICD-10-PCS | Mod: CPTII,S$GLB,, | Performed by: PSYCHIATRY & NEUROLOGY

## 2019-09-30 PROCEDURE — 3008F PR BODY MASS INDEX (BMI) DOCUMENTED: ICD-10-PCS | Mod: CPTII,S$GLB,, | Performed by: PSYCHIATRY & NEUROLOGY

## 2019-09-30 RX ORDER — DIVALPROEX SODIUM 250 MG/1
TABLET, FILM COATED, EXTENDED RELEASE ORAL
Qty: 28 TABLET | Refills: 0 | Status: SHIPPED | OUTPATIENT
Start: 2019-09-30 | End: 2019-10-20 | Stop reason: SDUPTHER

## 2019-09-30 RX ORDER — MIRTAZAPINE 30 MG/1
30 TABLET, FILM COATED ORAL NIGHTLY
Qty: 30 TABLET | Refills: 1 | Status: SHIPPED | OUTPATIENT
Start: 2019-09-30 | End: 2019-10-22 | Stop reason: SDUPTHER

## 2019-09-30 NOTE — PROGRESS NOTES
"Outpatient Psychiatry Follow-Up Visit (MD/NP)    9/30/2019    Clinical Status of Patient:  Outpatient (Ambulatory)    Chief Complaint:  Dalton Kent is a 53 y.o. male who presents today for follow-up of depression.  Met with patient and spouse.      Interval History and Content of Current Session:  Interim Events/Subjective Report/Content of Current Session: This is this pt's first encounter with this provider.     The patient was seen and examined. His chart was reviewed. Reviewed notes by Horacio Velazquez MD at 8/21/2019  3:00 PM; Giuseppe Mart MD at 8/29/2019 10:39 AM;  Giuseppe Mart MD at 8/29/2019 11:13 AM; Horacio Velazquez MD at 9/12/2019 10:39 AM; reviewed sleep study in media section of chart. No new labs were charted in Epic since his last appointment    The patient has been compliant with treatment. He denied any side effects. He reports good tolerability and efficacy. He stopped the prazosin without any changes.     No new psychosocial stressors have occurred since his last appointment. The patient has a supportive wife- the marriage is stable. His 2 daughters are doing well. He is on SSI-disability. He tries to stay active around the house and fish when able. Finances are stable. Housing is stable.     No new medical issues have occurred since his last appointment. He denied any recent medication changes aside from the increase in synthroid..     "Ok.. I don't know.. Still not sleeping right." He reports that symptoms persist minimally changed since his last appionment. His wife corroborates the account and noted nothing has been better or worse.     Improved/controlled Symptoms of Depression: denied diminished mood or loss of interest/anhedonia; +irritability, +diminished energy, +change in sleep, no change in appetite, no diminished concentration or cognition or indecisiveness, no PMA/R, no excessive guilt or hopelessness or worthlessness, no suicidal ideations     Continued Changes " in Sleep: no trouble with initiation/maintenance, no early morning awakening with inability to return to sleep, no hypersomnolence; pt reports that he still moves in his sleep and does not feel rested in the AM (sleep study in 7/2019 reports REM movement sleep disorder).      Denied Suicidal/Homicidal ideations: no active/passive ideations, organized plans, or future intentions     Denied Symptoms of nestor or hypomania: no elevated, expansive, or irritable mood with no increased energy or activity; with no inflated self-esteem or grandiosity, decreased need for sleep, increased rate of speech, FOI or racing thoughts, distractibility, increased goal directed activity or PMA, or risky/disinhibited behavior    Denied Symptoms of psychosis: no hallucinations, delusions, disorganized thinking, disorganized behavior or abnormal motor behavior, or negative symptoms    Continued Symptoms of LEANNE: +excessive anxiety/worry/fear, +more days than not, +about numerous issues, +difficult to control, with +restlessness, +fatigue, no poor concentration, +irritability, +muscle tension, +sleep disturbance; +causes functionally impairing distress; no panic attacks; without agoraphobia     Alcohol use: pt reports no usage since he was hospitalized in 7/2019; he had no lapses since last seen. His wife feels that he never was dependent on it.     Memory problems: continue unchanged (no change since stopping klonopin); possibly in part iatrogenic with noted polypharmacy     Collateral from his wife reports that he has been relatively stable, but he has been irritable and tired and remains unchanged since last seen     Psychotherapy:  · Target symptoms: depression, anxiety   · Why chosen therapy is appropriate versus another modality: relevant to diagnosis  · Outcome monitoring methods: self-report, observation  · Therapeutic intervention type: behavior modifying psychotherapy, supportive psychotherapy  · Topics discussed/themes: difficulty  "managing affect in interpersonal relationships, building skills sets for symptom management, symptom recognition  · The patient's response to the intervention is accepting. The patient's progress toward treatment goals is good.   · Duration of intervention: 5 minutes.    Review of Systems   Review of systems  General ROS: negative  Ophthalmic ROS: negative  ENT ROS: negative  Allergy and Immunology ROS: negative  Hematological and Lymphatic ROS: negative  Endocrine ROS: negative  Respiratory ROS: no cough, shortness of breath, or wheezing  Cardiovascular ROS: no chest pain or dyspnea on exertion  Gastrointestinal ROS: no abdominal pain, change in bowel habits, or black or bloody stools  Genito-Urinary ROS: no dysuria, trouble voiding, or hematuria  Musculoskeletal ROS: positive for chronic back pain and leg weakness   Neurological ROS: no TIA or stroke symptoms  negative  Dermatological ROS: negative      Past Medical, Family and Social History: The patient's past medical, family and social history have been reviewed and updated as appropriate within the electronic medical record - see encounter notes.    Compliance: yes    Side effects: None    Risk Parameters:  Patient reports no suicidal ideation  Patient reports no homicidal ideation  Patient reports no self-injurious behavior  Patient reports no violent behavior    Exam (detailed: at least 9 elements; comprehensive: all 15 elements)   Constitutional  Vitals:  Most recent vital signs, dated less than 90 days prior to this appointment, were reviewed.   Vitals:    09/30/19 1259   BP: 121/68   Pulse: 78   Resp: 19   Weight: 93.6 kg (206 lb 3.9 oz)   Height: 5' 6" (1.676 m)     Body mass index is 33.29 kg/m².       General:  unremarkable, age appropriate     Musculoskeletal  Muscle Strength/Tone:  no spasicity, no rigidity   Gait & Station:  non-ataxic     Psychiatric  Speech:  no latency; no press   Mood & Affect:  steady, anxious"the same"  congruent and " appropriate   Thought Process:  normal and logical   Associations:  intact   Thought Content:  normal, no suicidality, no homicidality, delusions, or paranoia   Insight:  intact, has awareness of illness   Judgement: behavior is adequate to circumstances   Orientation:  grossly intact, person, place, situation, time/date, day of week, month of year, year   Memory: intact for content of interview, able to remember recent events- yes, able to remember remote events- yes   Language: grossly intact, able to name, able to repeat   Attention Span & Concentration:  able to focus, completed tasks   Fund of Knowledge:  intact and appropriate to age and level of education, familiar with aspects of current personal life     Assessment and Diagnosis   Status/Progress: Based on the examination today, the patient's problem(s) is/are adequately but not ideally controlled.  New problems have been presented today.   Co-morbidities are not complicating management of the primary condition.  There are no active rule-out diagnoses for this patient at this time.     General Impression:     MDD, mild, recurrent  LEANNE  Pseudodementia/Memory loss  REM Movement Sleep disorders    Alcohol use disorder, in early full remission    RLS  HTN  HLD  Chronic Headaches  GERD  Microcytic Anemia  Obese: chronic back pain      Intervention/Counseling/Treatment Plan     Medications:   Depression/Anxiety:  Continue Lexapro 20mg Qday  Stop Prazosin- pt no longer taking and tapered off without incident  Increase Remeron 30 mg QHS- consider optimizing further   Continue Nortriptyline 25 mg PO qhs-consider tapering off if able penodng results of remeron  Decrease Depakote   Mg po q HS x 2 week, 500 mg x 2 weeks, 250 mg po x 2 weeks, then stop- will monitor closely for effect     Cognitive Impairment:  Likely pseudodemtnia per neurocognitive evaluation or in part iatrogenic; continuee to monitor     Anxiety:  Lexapro/remeron as above  Previously taperred  off of clonazepam     REM movement sleep disorder  Consider re-trial of klonopin  Start trial of melatoinin OTC   Remeron as above    Microcytosis:  Labs reviewed, secondary to iron deficiency anemia    Alcohol Use Disorder:  Pt counseled; encourage sustained remission    RLS:   Continue requip 1 mg po q HS- consider further optimizing  Consider re-trial of Lyrica  +iron deficiency     Hypothyroidism:   TSH still elevated; consider further optimizing further if indicated- defer to PCP  -synthroid increased to 125 mcg po q AM- rechecking this Wednesday     Iron deficiency:  Start/continue Iron 325 mg po BID for 3 months then re-check levels in 2.5 months    Discussed diagnosis, risks and benefits of proposed treatment vs alternative treatments vs no treatment, and potential side effects of these treatments. The patient expresses understanding of the above and displays the capacity to agree with this treatment given said understanding. Patient also agrees that, currently, the benefits outweigh the risks and would like to pursue treatment at this time.     Therapy:  Pt to continue with psychotherapy as scheduled/needed     Labs:  Reviewed labs from 8/31/19 with the patient; reviewed  ferritin, iron panel, B12/MMA, folate, Mg, and VPA; +iron deficiency noted; no new labs obtained since his last appointment.     Will try to consolidate treatment as he is on duplicate therapy with perceived limited efficacy     Return to Clinic: 6 weeks, sooner if needed     Jimmy Bradshaw MD  Psychiatry

## 2019-10-02 ENCOUNTER — LAB VISIT (OUTPATIENT)
Dept: LAB | Facility: HOSPITAL | Age: 54
End: 2019-10-02
Attending: INTERNAL MEDICINE
Payer: MEDICARE

## 2019-10-02 DIAGNOSIS — E03.9 HYPOTHYROIDISM IN ADULT: ICD-10-CM

## 2019-10-02 DIAGNOSIS — I10 HYPERTENSION, UNSPECIFIED TYPE: ICD-10-CM

## 2019-10-02 DIAGNOSIS — Z11.59 NEED FOR HEPATITIS C SCREENING TEST: ICD-10-CM

## 2019-10-02 LAB
TESTOST SERPL-MCNC: 251 NG/DL (ref 304–1227)
TSH SERPL DL<=0.005 MIU/L-ACNC: 1.4 UIU/ML (ref 0.4–4)

## 2019-10-02 PROCEDURE — 84443 ASSAY THYROID STIM HORMONE: CPT

## 2019-10-02 PROCEDURE — 86803 HEPATITIS C AB TEST: CPT

## 2019-10-02 PROCEDURE — 84403 ASSAY OF TOTAL TESTOSTERONE: CPT

## 2019-10-02 PROCEDURE — 36415 COLL VENOUS BLD VENIPUNCTURE: CPT

## 2019-10-03 LAB — HCV AB SERPL QL IA: NEGATIVE

## 2019-10-08 ENCOUNTER — PATIENT OUTREACH (OUTPATIENT)
Dept: ADMINISTRATIVE | Facility: OTHER | Age: 54
End: 2019-10-08

## 2019-10-08 ENCOUNTER — TELEPHONE (OUTPATIENT)
Dept: INTERNAL MEDICINE | Facility: CLINIC | Age: 54
End: 2019-10-08

## 2019-10-08 NOTE — TELEPHONE ENCOUNTER
----- Message from Vonnie Santiago sent at 10/8/2019 12:55 PM CDT -----  Contact: Josselyn (wife)  Dalton Kent  MRN: [id number]  : 1965  PCP: Horacio Velazquez  Home Phone      [phone]  Work Phone      Not on file.  Mobile          [phone]    MESSAGE:   She requests to speak to a nurse regarding lab work results performed at [facility].     Phone # [phone]    Pharmacy - [id number] - [place] - [address] AT [facility]

## 2019-10-11 ENCOUNTER — HOSPITAL ENCOUNTER (OUTPATIENT)
Dept: RADIOLOGY | Facility: HOSPITAL | Age: 54
Discharge: HOME | End: 2019-10-11
Attending: PHYSICAL MEDICINE & REHABILITATION
Payer: MEDICARE

## 2019-10-11 ENCOUNTER — OFFICE VISIT (OUTPATIENT)
Dept: PAIN MEDICINE | Facility: CLINIC | Age: 54
End: 2019-10-11
Payer: MEDICARE

## 2019-10-11 VITALS
HEART RATE: 106 BPM | DIASTOLIC BLOOD PRESSURE: 78 MMHG | BODY MASS INDEX: 33.54 KG/M2 | RESPIRATION RATE: 16 BRPM | SYSTOLIC BLOOD PRESSURE: 142 MMHG | WEIGHT: 208.69 LBS | HEIGHT: 66 IN

## 2019-10-11 DIAGNOSIS — Z98.1 HISTORY OF LUMBAR SPINAL FUSION: ICD-10-CM

## 2019-10-11 DIAGNOSIS — M54.2 NECK PAIN, CHRONIC: ICD-10-CM

## 2019-10-11 DIAGNOSIS — Z98.1 S/P CERVICAL SPINAL FUSION: ICD-10-CM

## 2019-10-11 DIAGNOSIS — M54.12 CERVICAL RADICULOPATHY: Primary | ICD-10-CM

## 2019-10-11 DIAGNOSIS — M96.1 POSTLAMINECTOMY SYNDROME OF LUMBAR REGION: ICD-10-CM

## 2019-10-11 DIAGNOSIS — M47.816 LUMBAR SPONDYLOSIS: ICD-10-CM

## 2019-10-11 DIAGNOSIS — M54.50 CHRONIC LEFT-SIDED LOW BACK PAIN WITHOUT SCIATICA: ICD-10-CM

## 2019-10-11 DIAGNOSIS — M51.36 DDD (DEGENERATIVE DISC DISEASE), LUMBAR: ICD-10-CM

## 2019-10-11 DIAGNOSIS — G89.29 NECK PAIN, CHRONIC: ICD-10-CM

## 2019-10-11 DIAGNOSIS — G89.29 CHRONIC LEFT-SIDED LOW BACK PAIN WITHOUT SCIATICA: ICD-10-CM

## 2019-10-11 DIAGNOSIS — M79.18 MYOFASCIAL PAIN: ICD-10-CM

## 2019-10-11 PROCEDURE — 3077F PR MOST RECENT SYSTOLIC BLOOD PRESSURE >= 140 MM HG: ICD-10-PCS | Mod: CPTII,S$GLB,, | Performed by: PHYSICAL MEDICINE & REHABILITATION

## 2019-10-11 PROCEDURE — 72110 XR LUMBAR SPINE 5 VIEW WITH FLEX AND EXT: ICD-10-PCS | Mod: 26,,, | Performed by: RADIOLOGY

## 2019-10-11 PROCEDURE — 3077F SYST BP >= 140 MM HG: CPT | Mod: CPTII,S$GLB,, | Performed by: PHYSICAL MEDICINE & REHABILITATION

## 2019-10-11 PROCEDURE — 3078F DIAST BP <80 MM HG: CPT | Mod: CPTII,S$GLB,, | Performed by: PHYSICAL MEDICINE & REHABILITATION

## 2019-10-11 PROCEDURE — 20553 NJX 1/MLT TRIGGER POINTS 3/>: CPT | Mod: S$GLB,,, | Performed by: PHYSICAL MEDICINE & REHABILITATION

## 2019-10-11 PROCEDURE — 99205 PR OFFICE/OUTPT VISIT, NEW, LEVL V, 60-74 MIN: ICD-10-PCS | Mod: 25,S$GLB,, | Performed by: PHYSICAL MEDICINE & REHABILITATION

## 2019-10-11 PROCEDURE — 99999 PR PBB SHADOW E&M-EST. PATIENT-LVL IV: CPT | Mod: PBBFAC,,, | Performed by: PHYSICAL MEDICINE & REHABILITATION

## 2019-10-11 PROCEDURE — 3078F PR MOST RECENT DIASTOLIC BLOOD PRESSURE < 80 MM HG: ICD-10-PCS | Mod: CPTII,S$GLB,, | Performed by: PHYSICAL MEDICINE & REHABILITATION

## 2019-10-11 PROCEDURE — 3008F PR BODY MASS INDEX (BMI) DOCUMENTED: ICD-10-PCS | Mod: CPTII,S$GLB,, | Performed by: PHYSICAL MEDICINE & REHABILITATION

## 2019-10-11 PROCEDURE — 99205 OFFICE O/P NEW HI 60 MIN: CPT | Mod: 25,S$GLB,, | Performed by: PHYSICAL MEDICINE & REHABILITATION

## 2019-10-11 PROCEDURE — 99999 PR PBB SHADOW E&M-EST. PATIENT-LVL IV: ICD-10-PCS | Mod: PBBFAC,,, | Performed by: PHYSICAL MEDICINE & REHABILITATION

## 2019-10-11 PROCEDURE — 72110 X-RAY EXAM L-2 SPINE 4/>VWS: CPT | Mod: TC

## 2019-10-11 PROCEDURE — 72110 X-RAY EXAM L-2 SPINE 4/>VWS: CPT | Mod: 26,,, | Performed by: RADIOLOGY

## 2019-10-11 PROCEDURE — 20553 PR INJECT TRIGGER POINTS, > 3: ICD-10-PCS | Mod: S$GLB,,, | Performed by: PHYSICAL MEDICINE & REHABILITATION

## 2019-10-11 PROCEDURE — 72050 X-RAY EXAM NECK SPINE 4/5VWS: CPT | Mod: TC

## 2019-10-11 PROCEDURE — 3008F BODY MASS INDEX DOCD: CPT | Mod: CPTII,S$GLB,, | Performed by: PHYSICAL MEDICINE & REHABILITATION

## 2019-10-11 PROCEDURE — 72050 XR CERVICAL SPINE COMPLETE 5 VIEW: ICD-10-PCS | Mod: 26,,, | Performed by: RADIOLOGY

## 2019-10-11 PROCEDURE — 72050 X-RAY EXAM NECK SPINE 4/5VWS: CPT | Mod: 26,,, | Performed by: RADIOLOGY

## 2019-10-11 RX ORDER — AA/PROT/LYSINE/METHIO/VIT C/B6 50-12.5 MG
10 TABLET ORAL DAILY
COMMUNITY
End: 2019-12-06

## 2019-10-11 RX ORDER — PREGABALIN 75 MG/1
CAPSULE ORAL
Qty: 180 CAPSULE | Refills: 1 | Status: SHIPPED | OUTPATIENT
Start: 2019-10-11 | End: 2019-10-11

## 2019-10-11 RX ORDER — BUPIVACAINE HYDROCHLORIDE 2.5 MG/ML
10 INJECTION, SOLUTION EPIDURAL; INFILTRATION; INTRACAUDAL
Status: COMPLETED | OUTPATIENT
Start: 2019-10-11 | End: 2019-10-11

## 2019-10-11 RX ORDER — PREGABALIN 75 MG/1
CAPSULE ORAL
Qty: 60 CAPSULE | Refills: 1 | Status: SHIPPED | OUTPATIENT
Start: 2019-10-11 | End: 2019-12-06

## 2019-10-11 RX ORDER — TRIAMCINOLONE ACETONIDE 40 MG/ML
40 INJECTION, SUSPENSION INTRA-ARTICULAR; INTRAMUSCULAR
Status: COMPLETED | OUTPATIENT
Start: 2019-10-11 | End: 2019-10-11

## 2019-10-11 RX ADMIN — BUPIVACAINE HYDROCHLORIDE 25 MG: 2.5 INJECTION, SOLUTION EPIDURAL; INFILTRATION; INTRACAUDAL at 03:10

## 2019-10-11 RX ADMIN — TRIAMCINOLONE ACETONIDE 40 MG: 40 INJECTION, SUSPENSION INTRA-ARTICULAR; INTRAMUSCULAR at 03:10

## 2019-10-11 NOTE — PROCEDURES
Procedures     Trigger Point Injection:   The procedure was discussed with the patient including complications of damage, bleeding, infection, and failure of pain relief.     All medications, allergies, and relevant histories were reviewed. No recent antibiotics or infections.  A time-out was taken to verify the correct patient, procedure, laterality, and appropriate medications/allergies.    Trigger points were identified by palpation and marked. Alcohol utilized to prep  sites. A 25-gauge needle was advanced to the point of maximal tenderness, and 1 mL of a mixture of 4 mL of 0.25% bupivacaine with kenalog 40 mg was injected after negative aspiration in a fanlike distribution. All sites done in the same manner. Patient tolerated the procedure well and without complications. Sites injected included:  Left quadratus lumborum, left gluteal musculature, left lumbar paraspinals    The patient tolerated the procedure well and was discharged in excellent condition.

## 2019-10-11 NOTE — PROGRESS NOTES
Onset: Years  Location: Shoulder, arm, lower back left side, hip pain   Radiation: left leg and right arm  Quality: Aching, burning, throbbing, grabbing, tingling, deep, superficial, electric   Exacerbating Factors: exercise, lifting, sitting, standing and walking for more than 20 minutes  Alleviating Factors: nothing  Associated Symptoms: denies {RED FLAGS:05217}    Severity: Currently: 4/10   Typical Range: 7-8/10     Exacerbation: 8/10

## 2019-10-11 NOTE — LETTER
October 11, 2019      Horacio Velazquez MD  [address]  [facility] [address]           [place] [facility]  [address] [facility] [address]  Phone: [phone]  Fax: [phone]          Patient: Dalton Kent   MR Number: [id number]   YOB: 1965   Date of Visit: 10/11/2019       Dear Dr. Horacio Velazquez:    Thank you for referring Dalton Kent to me for evaluation. Attached you will find relevant portions of my assessment and plan of care.    If you have questions, please do not hesitate to call me. I look forward to following Dalton Kent along with you.    Sincerely,    Tori Prado MD    Enclosure  CC:  No Recipients    If you would like to receive this communication electronically, please contact [email or web address] or [phone] to request more information on [employer] access.    For providers and/or their staff who would like to refer a patient to [facility], please contact us through our one-stop-shop provider referral line, [facility] [place], at [phone].    If you feel you have received this communication in error or would no longer like to receive these types of communications, please e-mail [email or web address]

## 2019-10-11 NOTE — PROGRESS NOTES
[facility]  New Patient H&P    Referring Provider: Horacio Velazquez Md  [address] [address]    Chief Complaint:   Chief Complaint   Patient presents with    Consult    Back Pain    Neck Pain    Shoulder Pain       History of Present Illness: Dalton Kent is a 53 y.o. male referred by Dr. Horacio Velazquez for back and neck pain.    Back pain is worse. Back pain began ~ 10 years ago. He has had 2 back surgeries. Back pain is localized to the left over the lateral iliac crest. No radiation into legs. He is unable to describe the quality of the pain, other than feeling deep. Worse with walking, cutting grass, standing, and slight flexion. Pain alleviated by nothing in particular. Left leg gets weak with cutting grass or doing dishes. Denies leg numbness.     He has had neck surgery with Dr. Ontiveros in 2015. He doesn't have neck pain but is having right arm pain and shooting pain into his small, ring and middle fingers and also pain in his tricep area. Pain described as shooting and shocking in the arm. Nothing particularly aggravates it, but did start while fishing and worse at night time. Nothing helps the pain. He did therapy recently.     Onset: Years  Location: Shoulder, arm, lower back left side, hip pain   Radiation: left leg and right arm  Quality: Aching, burning, throbbing, grabbing, tingling, deep, superficial, electric   Exacerbating Factors: exercise, lifting, sitting, standing and walking for more than 20 minutes  Alleviating Factors: nothing  Associated Symptoms: denies night fever/night sweats, urinary incontinence, bowel incontinence, significant weight loss, significant motor weakness and loss of sensations    Severity: Currently: 4/10   Typical Range: 7-8/10     Exacerbation: 8/10     Pain Disability Index  Family/Home Responsibilities:: 7  Recreation:: 9  Social Activity:: 5  Occupation:: 10  Sexual Behavior:: 10  Self Care:: 5  Life-Support Activities:: 1  Pain  Disability Index (PDI): 47    Previous Therapies:  PT/OT: yes last was 3-4 years ago  Surgery: yes He has had neck surgery with Dr. Ontiveros in 2015. He has had 2 back surgeries. First back surgery was in [facility] with Dr. Sorin Thomas. Second back surgery was with Dr. Ontiveros in [place] at [facility] in 2014. He has had back injection with Demetrius Rosas in [place] prior to the back surgeries.   Previous Medications:   - NSAIDS: Cant take because of stomach ulcers  - Muscle Relaxants: Not tried  - TCAs: Nortriptyline 25 mg  - SNRIs: Not tried  - Topicals: Bengay, biofreeze not effective  - Anticonvulsants: Gabapentin caused worsening depression, claustrophobia, trouble swallowing  - Opioids: None currently    Current Pain Medications:  1. Nortriptyline 25 mg   2. Tylenol     Blood Thinners: None    Full Medication List:    Current Outpatient Medications:     amlodipine-benazepril 5-10 mg (LOTREL) 5-10 mg per capsule, Take 1 capsule by mouth once daily., Disp: , Rfl:     coenzyme Q10 (CO Q-10) 10 mg capsule, Take 10 mg by mouth once daily., Disp: , Rfl:     divalproex ER (DEPAKOTE ER) 250 MG 24 hr tablet, Take 750 mg (500 +250 mg tab) nightly for 2 weeks, then 500 mg nightly for 2 weeks, then 250 mg nightly for 2 weeks then stop, Disp: 28 tablet, Rfl: 0    escitalopram oxalate (LEXAPRO) 20 MG tablet, Take 1 tablet (20 mg total) by mouth once daily., Disp: 90 tablet, Rfl: 0    fluticasone (FLONASE) 50 mcg/actuation nasal spray, 1 TO 2 SPRAY(S) IN EACH NOSTRIL DAILY, Disp: , Rfl: 2    levothyroxine (SYNTHROID) 125 MCG tablet, Take 1 tablet (125 mcg total) by mouth once daily., Disp: 90 tablet, Rfl: 1    melatonin 10 mg Tab, Take 10 mg by mouth every evening., Disp: , Rfl:     metoprolol succinate (TOPROL-XL) 25 MG 24 hr tablet, Take 25 mg by mouth once daily., Disp: , Rfl:     mirtazapine (REMERON) 30 MG tablet, Take 1 tablet (30 mg total) by mouth every evening., Disp: 30 tablet, Rfl:  1    Multivits/Iron Fum/FA/D3/Lycop (MULTI FOR HIM ORAL), Take by mouth., Disp: , Rfl:     nortriptyline (PAMELOR) 25 MG capsule, Take 1 capsule (25 mg total) by mouth every evening., Disp: 30 capsule, Rfl: 11    pantoprazole (PROTONIX) 40 MG tablet, Take 40 mg by mouth once daily., Disp: , Rfl: 1    rOPINIRole (REQUIP) 1 MG tablet, Take 1 tablet (1 mg total) by mouth every evening., Disp: 90 tablet, Rfl: 0    UNABLE TO FIND, medication name: Inner ear for ringing, Disp: , Rfl:     diphenhydrAMINE (BENADRYL) 50 MG capsule, Take 1 capsule (50 mg total) by mouth every evening. (Patient not taking: Reported on 10/11/2019), Disp: 30 capsule, Rfl: 11    pregabalin (LYRICA) 75 MG capsule, Take 1 capsule QHS x 1 week, then increase to BID (if tolerated)., Disp: 60 capsule, Rfl: 1    Current Facility-Administered Medications:     bupivacaine (PF) 0.25% (2.5 mg/ml) injection 25 mg, 10 mL, Intrapleural, 1 time in Clinic/HOD, Tori Prado MD    triamcinolone acetonide injection 40 mg, 40 mg, INTRABURSAL, 1 time in Clinic/HOD, Tori Prado MD     Review of Systems:  Review of Systems   Constitutional: Negative for fever and weight loss.   HENT: Positive for tinnitus. Negative for ear pain.    Eyes: Negative for pain and redness.   Respiratory: Negative for cough and shortness of breath.    Cardiovascular: Negative for chest pain and palpitations.   Gastrointestinal: Positive for heartburn. Negative for constipation.        GERD w/ NSAIDs   Genitourinary: Negative.         Denies urinary incontinence. Denies urine retention.    Musculoskeletal: Positive for back pain and neck pain.   Skin: Negative for itching and rash.   Neurological: Positive for tingling, weakness and headaches. Negative for seizures.   Endo/Heme/Allergies: Negative for environmental allergies. Does not bruise/bleed easily.   Psychiatric/Behavioral: Positive for depression. The patient is nervous/anxious and has insomnia.   "      Allergies:  Gralise [gabapentin]; Norco [hydrocodone-acetaminophen]; and Percocet [oxycodone-acetaminophen]     Medical History:   has a past medical history of Alcohol abuse, Anxiety, Depression, GERD (gastroesophageal reflux disease), Headache, psychiatric care, Hypertension, Hypothyroid, Lyme disease, Restless leg syndrome, Sleep difficulties, Therapy, and Uncontrolled REM sleep behavior disorder.    Surgical History:   has a past surgical history that includes Anal fistulotomy; Anal sphincterotomy; Appendectomy; Colonoscopy (2/2015); Back surgery; Neck surgery; Hand surgery (Bilateral); Carpal tunnel release; Trigger finger release; Nose surgery; and Colonoscopy (N/A, 8/29/2019).    Social History:   reports that he has never smoked. He has never used smokeless tobacco. He reports that he drinks alcohol. He reports that he does not use drugs.    Physical Exam:  BP (!) 142/78   Pulse 106   Resp 16   Ht 5' 6" (1.676 m)   Wt 94.7 kg (208 lb 10.7 oz)   BMI 33.68 kg/m²   GEN: No acute distress. Calm, comfortable  HENT: Normocephalic, atraumatic, moist mucous membranes  EYE: Anicteric sclera, non-injected.   CV: Non-diaphoretic. Regular Rate. Radial Pulses 2+.  RESP: Breathing comfortably. Chest expansion symmetric.  EXT: No clubbing, cyanosis.   SKIN: Warm, & dry to palpation.  PSYCH: Pleasant mood and appropriate affect. Recent and remote memory intact.   GAIT: Independent, normal ambulation  Lumbar Spine Exam:       Inspection: No erythema, bruising.       Palpation: (+) TTP of lumbar paraspinals, quadratus lumborum insertion at iliac crest on left.       ROM:  Limited in flexion, extension, lateral bending      (+) Facet loading bilaterally      (-) Straight Leg Raise bilaterally  Hip Exam:      Inspection: No gross deformity or apparent leg length discrepancy      Palpation: No TTP to bilateral greater trochanteric bursas, piriformis.       ROM: Slight limitation but no pain in internal rotation, " external rotation  Neck Exam:       Inspection: No erythema, bruising. Poor posture, hunched shoulders up into ears      Palpation: No TTP of cervical paraspinals bilaterally or midline spinous processes.       ROM: Limitation in lateral rotation to either side.       Provocative Maneuvers:  (-) Spurling's bilaterally  Shoulder Exam:       Inspection: No erythema, bruising, surgical incisions      Palpation: No TTP of AC joint, subacromial area.       ROM: Bilateral abduction limitation to about 120 degrees  Neurologic Exam:     Alert. Speech is fluent and appropriate.     Cranial Nerves: Extra-ocular movements intact. Pupils equal. No strabismus. Face Symmetric. Jaw opens midline. Uvula midline. Tongue midline. Shoulder shrug symmetric.       Strength: 5/5 throughout bilateral upper extremities     Sensation: Grossly intact to light touch in bilateral upper extremities     Reflexes: 2+ in bilateral patella, Unable to elicit bilateral triceps, biceps, brachioradialis     Tone: No abnormality appreciated in bilateral upper or lower extremities     No Clonus     (-) Rosas bilaterally    Imaging:  - MRI L-spine 02/06/2019:  Spinal Alignment: Normal.  Vertebrae: Posterior fusion hardware at L4-5 and anterior fusion hardware at L5-S1 are unchanged, with associated disc spacer devices at L4-5 and L5-S1.  Susceptibility artifact is present surrounding the hardware.  There are associated postsurgical findings of left L4 hemilaminectomy.  Mild susceptibility artifact surrounds the hardware.  1.5 cm hemangioma is present in the posterosuperior portion of the T12 vertebral body.  Discs: Mild diffuse desiccation.  Disc spacer devices are present at L5-4 5 and L5-S1.  Cord: Normal cord signal. Conus terminates normally at the L1-2 level.  Degenerative findings: Minimal circumferential disc bulge at L2-3 with minimal bilateral facet arthropathy and minimal facet effusions in conjunction with mild buckling of the ligamentum  flavum does not result in significant compressive phenomena.  Mild circumferential disc bulge at L3-4 with mild bilateral facet arthropathy and buckling of the ligamentum flavum results in mild spinal canal stenosis and mild to moderate foraminal stenoses.  Spinal canal is posteriorly decompressed at L4-5; right neural foramen is widely patent while there is apparent moderate narrowing of the left neural foramen related to either postoperative granulation tissue along the inferior neural foramen or disc material.  Mild bilateral facet arthropathy at L5-S1 results in at most mild foraminal stenoses.  Paraspinal muscles & soft tissues: Normal    - EMG/NCS BLE 2/25/19:  Impression: Normal EMG of the legs, although his study was mildly limited due to inability to sample lumbar paraspinal muscles given his prior lumbar surgical scarring.     Labs:  BMP  Lab Results   Component Value Date     08/14/2019    K 4.9 08/14/2019     08/14/2019    CO2 32 (H) 08/14/2019    BUN 18 08/14/2019    CREATININE 1.2 08/14/2019    CALCIUM 9.4 08/14/2019    ANIONGAP 9 08/14/2019    ESTGFRAFRICA >60 08/14/2019    EGFRNONAA >60 08/14/2019     Lab Results   Component Value Date    ALT 22 08/14/2019    AST 17 08/14/2019    ALKPHOS 65 08/14/2019    BILITOT 0.4 08/14/2019     Lab Results   Component Value Date     08/14/2019       Assessment:  Dalton Kent is a 53 y.o. male with the following diagnoses based on history, exam, and imaging:    Problem List Items Addressed This Visit     None      Visit Diagnoses     Neck pain, chronic    -  Primary    Relevant Medications    pregabalin (LYRICA) 75 MG capsule    Other Relevant Orders    X-Ray Cervical Spine Complete 5 view    Ambulatory consult to Physical Therapy    S/P cervical spinal fusion        Relevant Medications    pregabalin (LYRICA) 75 MG capsule    Other Relevant Orders    X-Ray Cervical Spine Complete 5 view    Ambulatory consult to Physical Therapy     History of lumbar spinal fusion        Relevant Medications    pregabalin (LYRICA) 75 MG capsule    Other Relevant Orders    X-Ray Lumbar Complete With Flex And Ext    X-Ray Cervical Spine Complete 5 view    Ambulatory consult to Physical Therapy    Chronic left-sided low back pain without sciatica        Relevant Medications    pregabalin (LYRICA) 75 MG capsule    Other Relevant Orders    X-Ray Lumbar Complete With Flex And Ext    X-Ray Cervical Spine Complete 5 view    Ambulatory consult to Physical Therapy    Myofascial pain        Relevant Medications    triamcinolone acetonide injection 40 mg    bupivacaine (PF) 0.25% (2.5 mg/ml) injection 25 mg    pregabalin (LYRICA) 75 MG capsule    Other Relevant Orders    Ambulatory consult to Physical Therapy    Lumbar spondylosis        Relevant Medications    pregabalin (LYRICA) 75 MG capsule    Other Relevant Orders    X-Ray Lumbar Complete With Flex And Ext    Ambulatory consult to Physical Therapy    DDD (degenerative disc disease), lumbar        Relevant Medications    pregabalin (LYRICA) 75 MG capsule    Other Relevant Orders    Ambulatory consult to Physical Therapy    Postlaminectomy syndrome of lumbar region        Relevant Medications    pregabalin (LYRICA) 75 MG capsule    Other Relevant Orders    X-Ray Lumbar Complete With Flex And Ext    Ambulatory consult to Physical Therapy    Cervical radiculopathy        Relevant Medications    pregabalin (LYRICA) 75 MG capsule    Other Relevant Orders    Ambulatory consult to Physical Therapy    EMG W/ ULTRASOUND AND NERVE CONDUCTION TEST 1 Extremity          This is a pleasant 53-year-old gentleman with chronic neck and back pain.  He has had both neck and back surgeries but has persistent pain. His back pain appears to be multifactorial in nature, with contributions from myofascial pain, lumbar spondylosis and possible discogenic pain. He does not have neck pain, but does have radicular arm pain on the right.      Treatment Plan: I discussed with the patient the following assessment and recommendations. The following is the plan the patient agreed upon:  - PT/OT/HEP:  Referral to PT for neck and back pain.  I would like him to try TENS unit there and transition quickly to home exercise program.  He states he only wants to do 2 visits and then transition to home exercise program due to difficulty with transportation to get there, cost  - Procedures:   - Performed trigger point injection into left lumbar paraspinals, quadratus lumborum insertion at iliac crest, and gluteal insertion at iliac crest on the left. I have explained the risks, benefits, and alternatives of the procedure in detail. The patient voices understanding and all questions have been answered. The patient agrees to proceed as planned.  - discussed future possible caudal epidural steroid injection, medial branch blocks above level of fusion in the lumbar spine, and even possible spinal cord stimulation in the future as well.  - Medications:  Will start Lyrica 75 mg twice daily.  Advised him to start with 75 mg at night and increase to twice daily after 1 week.  He does have an allergy to gabapentin, and I informed him that if he has any response similar to what happened with gabapentin to stop this medication immediately.  Lyrica may also help with his anxiety.  -  reviewed and consistent  - continue nortriptyline 25 mg at night.  He does have room to increase this in the future.  - Imaging: Reviewed.  Will order x-rays of lumbar and cervical spine.  - Labs: Reviewed.  Medications are appropriately dosed for current hepatorenal function.  - NIKKI for Dr. Ontiveros and Dr. Javed at [facility]  - Ordered EMG of right upper extremity to assess possible cervical radiculopathy.    Follow Up: RTC in 2 months or sooner as needed.    I spent greater than 65 minutes in total in todays visit with the patient, with more than 30 minutes of that time direct face  to face counseling and education with the patient today. We discussed the disease process, prognosis, treatment plan, and risks and benefits.    Tori Prado M.D.  Interventional Pain Medicine / Physical Medicine & Rehabilitation    Disclaimer: This note was partly generated using dictation software which may occasionally result in transcription errors.

## 2019-10-14 ENCOUNTER — PATIENT MESSAGE (OUTPATIENT)
Dept: NEUROLOGY | Facility: CLINIC | Age: 54
End: 2019-10-14

## 2019-10-18 ENCOUNTER — DOCUMENTATION ONLY (OUTPATIENT)
Dept: PAIN MEDICINE | Facility: CLINIC | Age: 54
End: 2019-10-18

## 2019-10-18 NOTE — PROGRESS NOTES
Outside records documentation:  Records obtained from outside provider at [facility].   Records from 2016.  Prior L5-S1 ALIF by Dr. Thomas, but with recurrent back pain and left lower extremity symptoms secondary to the level above (L4-5) herniated disk.  He underwent L4-5 TLIF with instrumentation and bone graft at [facility] with Dr. Isaias Ontiveros MD in May of 2016.  Postoperatively he was being treated with Percocet, tramadol and Gralise. Gralise had caused depressive episodes and was stopped.  EMG and nerve conduction study with bilateral C6 radiculopathy and bilateral carpal tunnel syndrome at the time.  In August of 2016, he underwent C5-6, C6-7 ACDF with Dr. Isaias Ontiveros MD for cervical spondylosis.  Previous injections with Dr. Honeycutt & Dr. Pulido.   He underwent right carpal tunnel release with Dr. Marin Rosas MD in May of 2017  He had injections of the right middle finger trigger finger at the A1 pulley.  He later underwent left carpal tunnel relief and right middle finger tenolysis with Dr. Marin Rosas MD.    Outside Shoulder MRI Right 2/22/19:  Impression:   1. Degenerative arthropathy at AC joint  2. Pericapsular soft tissue swelling and periosteal soft tissue swelling about the AC joint and distal clavicle. A low-grade partial tear of the trapezius muscle at the insertion upon the clavicle and a low-grade edema or strain of the anterior head of deltoid muscle  3. Suspect a sprain of the coracoclavicular ligaments, which are not torn  4. TEndinosis of the supraspinatus without a tear  5. Slight fraying of the labrum

## 2019-10-20 RX ORDER — DIVALPROEX SODIUM 250 MG/1
TABLET, FILM COATED, EXTENDED RELEASE ORAL
Qty: 28 TABLET | Refills: 0 | Status: SHIPPED | OUTPATIENT
Start: 2019-10-20 | End: 2019-11-15

## 2019-10-22 RX ORDER — MIRTAZAPINE 30 MG/1
30 TABLET, FILM COATED ORAL NIGHTLY
Qty: 30 TABLET | Refills: 1 | Status: SHIPPED | OUTPATIENT
Start: 2019-10-22 | End: 2019-11-20 | Stop reason: SDUPTHER

## 2019-10-24 ENCOUNTER — PATIENT OUTREACH (OUTPATIENT)
Dept: ADMINISTRATIVE | Facility: OTHER | Age: 54
End: 2019-10-24

## 2019-10-28 ENCOUNTER — PROCEDURE VISIT (OUTPATIENT)
Dept: NEUROLOGY | Facility: CLINIC | Age: 54
End: 2019-10-28
Payer: MEDICARE

## 2019-10-28 DIAGNOSIS — Z98.1 S/P CERVICAL SPINAL FUSION: ICD-10-CM

## 2019-10-28 DIAGNOSIS — M54.12 CERVICAL RADICULAR PAIN: ICD-10-CM

## 2019-10-28 PROCEDURE — 95911 NRV CNDJ TEST 9-10 STUDIES: CPT | Mod: S$GLB,,, | Performed by: PSYCHIATRY & NEUROLOGY

## 2019-10-28 PROCEDURE — 95886 PR EMG COMPLETE, W/ NERVE CONDUCTION STUDIES, 5+ MUSCLES: ICD-10-PCS | Mod: S$GLB,,, | Performed by: PSYCHIATRY & NEUROLOGY

## 2019-10-28 PROCEDURE — 95886 MUSC TEST DONE W/N TEST COMP: CPT | Mod: S$GLB,,, | Performed by: PSYCHIATRY & NEUROLOGY

## 2019-10-28 PROCEDURE — 95911 PR NERVE CONDUCTION STUDY; 9-10 STUDIES: ICD-10-PCS | Mod: S$GLB,,, | Performed by: PSYCHIATRY & NEUROLOGY

## 2019-10-28 NOTE — PROCEDURES
EMG W/ ULTRASOUND AND NERVE CONDUCTION TEST 2 Extremities  Date/Time: 10/28/2019 4:00 PM  Performed by: Scar Andrea MD  Authorized by: Tori Prado MD       REPORT OF EMG and NERVE CONDUCTION STUDY    Name: Dalton Kent  Date of Study:  10/28/19  Referring Physician:  Dr Prado  Test Performed by:  MD Zully  Full Values Attached  Informed Consent Scanned.   No anesthesia used.   Amount of Blood Loss: none. The patient tolerated this procedure well.       Informed consent was obtained prior to performing this study. Two patient identifiers were confirmed with the patient prior to performing this study. A time out to determine correct patient and and agreement on procedure performed was conducted prior to the concentric needle examination.    Reason for the study:  Right shoulder and arm pain      Findings:   Nerve conduction studies of the bilateral median (motor and sensory)nerves, bilateral ulnar (motor and sensory) nerves, and  bilateral radial sensory nerves were performed.   Amplitudes, distal latencies, conduction velocities, and F-waves were normal.   EMG of selected muscles of the bilateral arms and bilateral cervical paraspinal muscles were performed as indicated on the attached sheets.  Insertional activity was normal without fasciculation or fibrillation, normal sized and phasia of motor units.      Impression:  Abnormal Study secondary to the Presence of:    Normal EMG/NCS of the arms.   Note EMG/NCS is limited in evaluation of central (ex. myelopathic) lesions. Clinical and imaging correlation suggested.     Scar Andrea M.D. [facility].     A copy of this EMG/NCS report will be sent to Dr Prado . Thanks for sending this patient for EMG/NCS. The patient plans to await further recommendations from you regarding the above report at his next scheduled visit.

## 2019-11-15 ENCOUNTER — OFFICE VISIT (OUTPATIENT)
Dept: PSYCHIATRY | Facility: CLINIC | Age: 54
End: 2019-11-15
Payer: MEDICARE

## 2019-11-15 VITALS
DIASTOLIC BLOOD PRESSURE: 86 MMHG | RESPIRATION RATE: 19 BRPM | HEART RATE: 92 BPM | BODY MASS INDEX: 33.91 KG/M2 | WEIGHT: 211 LBS | SYSTOLIC BLOOD PRESSURE: 140 MMHG | HEIGHT: 66 IN

## 2019-11-15 DIAGNOSIS — G47.52 REM SLEEP BEHAVIOR DISORDER: Primary | ICD-10-CM

## 2019-11-15 DIAGNOSIS — F33.2 SEVERE RECURRENT MAJOR DEPRESSION WITHOUT PSYCHOTIC FEATURES: ICD-10-CM

## 2019-11-15 DIAGNOSIS — F41.9 ANXIETY: ICD-10-CM

## 2019-11-15 DIAGNOSIS — R41.89 PSEUDODEMENTIA: ICD-10-CM

## 2019-11-15 PROCEDURE — 90833 PR PSYCHOTHERAPY W/PATIENT W/E&M, 30 MIN (ADD ON): ICD-10-PCS | Mod: S$GLB,,, | Performed by: PSYCHIATRY & NEUROLOGY

## 2019-11-15 PROCEDURE — 99214 PR OFFICE/OUTPT VISIT, EST, LEVL IV, 30-39 MIN: ICD-10-PCS | Mod: S$GLB,,, | Performed by: PSYCHIATRY & NEUROLOGY

## 2019-11-15 PROCEDURE — 3008F PR BODY MASS INDEX (BMI) DOCUMENTED: ICD-10-PCS | Mod: CPTII,S$GLB,, | Performed by: PSYCHIATRY & NEUROLOGY

## 2019-11-15 PROCEDURE — 90833 PSYTX W PT W E/M 30 MIN: CPT | Mod: S$GLB,,, | Performed by: PSYCHIATRY & NEUROLOGY

## 2019-11-15 PROCEDURE — 99214 OFFICE O/P EST MOD 30 MIN: CPT | Mod: S$GLB,,, | Performed by: PSYCHIATRY & NEUROLOGY

## 2019-11-15 PROCEDURE — 3079F DIAST BP 80-89 MM HG: CPT | Mod: CPTII,S$GLB,, | Performed by: PSYCHIATRY & NEUROLOGY

## 2019-11-15 PROCEDURE — 3077F SYST BP >= 140 MM HG: CPT | Mod: CPTII,S$GLB,, | Performed by: PSYCHIATRY & NEUROLOGY

## 2019-11-15 PROCEDURE — 3079F PR MOST RECENT DIASTOLIC BLOOD PRESSURE 80-89 MM HG: ICD-10-PCS | Mod: CPTII,S$GLB,, | Performed by: PSYCHIATRY & NEUROLOGY

## 2019-11-15 PROCEDURE — 99999 PR PBB SHADOW E&M-EST. PATIENT-LVL III: CPT | Mod: PBBFAC,,, | Performed by: PSYCHIATRY & NEUROLOGY

## 2019-11-15 PROCEDURE — 99999 PR PBB SHADOW E&M-EST. PATIENT-LVL III: ICD-10-PCS | Mod: PBBFAC,,, | Performed by: PSYCHIATRY & NEUROLOGY

## 2019-11-15 PROCEDURE — 3008F BODY MASS INDEX DOCD: CPT | Mod: CPTII,S$GLB,, | Performed by: PSYCHIATRY & NEUROLOGY

## 2019-11-15 PROCEDURE — 3077F PR MOST RECENT SYSTOLIC BLOOD PRESSURE >= 140 MM HG: ICD-10-PCS | Mod: CPTII,S$GLB,, | Performed by: PSYCHIATRY & NEUROLOGY

## 2019-11-15 RX ORDER — DULOXETIN HYDROCHLORIDE 30 MG/1
30 CAPSULE, DELAYED RELEASE ORAL DAILY
Qty: 30 CAPSULE | Refills: 1 | Status: SHIPPED | OUTPATIENT
Start: 2019-11-15 | End: 2019-12-07 | Stop reason: SDUPTHER

## 2019-11-15 NOTE — PROGRESS NOTES
"Outpatient Psychiatry Follow-Up Visit (MD/NP)    11/15/2019    Clinical Status of Patient:  Outpatient (Ambulatory)    Chief Complaint:  Dalton Kent is a 54 y.o. male who presents today for follow-up of depression.  Met with patient.      Interval History and Content of Current Session:  Interim Events/Subjective Report/Content of Current Session:     The patient was seen and examined. His chart was reviewed. Reviewed notes by Tori Prado MD at 10/11/2019  3:22 PM;  Tori Prado MD at 10/11/2019  3:22 PM; Tori Prado MD at 10/18/2019  1:50 PM; and Scar Andrea MD at 10/28/2019  4:21 PM    The patient has been compliant with treatment. He denied any side effects. He reports good tolerability and efficacy. He stopped the prazosin without any changes (now reports increased "fear of death").     No new psychosocial stressors have occurred since his last appointment. The patient has a supportive wife- the marriage is stable. His 2 daughters are doing well. He is on SSI-disability. He tries to stay active around the house and fish when able. Finances are stable. Housing is stable.     No new medical issues have occurred since his last appointment. He had some medication changes since last seen. He is seeing pain speciality he was resumed on Lyrica 75 mg po BID (no noted benefit at this time    "Ok.. I'm afraid to die a lot and I don't want to start drinking again.." He reports that symptoms persist minimally changed since his last appointment (no worsening since stopping depakote).    Improved/controlled Symptoms of Depression: denied diminished mood or loss of interest/anhedonia; +irritability, +diminished energy, +change in sleep, no change in appetite, no diminished concentration or cognition or indecisiveness, no PMA/R, no excessive guilt or hopelessness or worthlessness, no suicidal ideations     Continued Changes in Sleep: no trouble with initiation/maintenance, no early morning " awakening with inability to return to sleep, no hypersomnolence; pt reports that he still moves in his sleep and does not feel rested in the AM (sleep study in 7/2019 reports REM movement sleep disorder).      Denied Suicidal/Homicidal ideations: no active/passive ideations, organized plans, or future intentions     Denied Symptoms of nestor or hypomania: no elevated, expansive, or irritable mood with no increased energy or activity; with no inflated self-esteem or grandiosity, decreased need for sleep, increased rate of speech, FOI or racing thoughts, distractibility, increased goal directed activity or PMA, or risky/disinhibited behavior    Denied Symptoms of psychosis: no hallucinations, delusions, disorganized thinking, disorganized behavior or abnormal motor behavior, or negative symptoms    Continued Symptoms of LEANNE: +excessive anxiety/worry/fear, +more days than not, +about numerous issues, +difficult to control, with +restlessness, +fatigue, no poor concentration, +irritability, +muscle tension, +sleep disturbance; +causes functionally impairing distress; no panic attacks; without agoraphobia     Alcohol use: pt reports no usage since he was hospitalized in 7/2019; he had no lapses since last seen. He has had cravings to drink    Memory problems: continue unchanged (no change since stopping klonopin); possibly in part iatrogenic with noted polypharmacy     Previous medication trials include: Depakote, nortriptyline, prazosin, Lexapro,  Xanax, klonopin, ativan, remeron    Psychotherapy:  · Target symptoms: depression, anxiety   · Why chosen therapy is appropriate versus another modality: relevant to diagnosis  · Outcome monitoring methods: self-report, observation  · Therapeutic intervention type: behavior modifying psychotherapy, supportive psychotherapy  · Topics discussed/themes: difficulty managing affect in interpersonal relationships, building skills sets for symptom management, symptom  "recognition  · The patient's response to the intervention is accepting. The patient's progress toward treatment goals is good.   · Duration of intervention: 16 minutes.    Review of Systems   Review of systems  General ROS: negative  Ophthalmic ROS: negative  ENT ROS: negative  Allergy and Immunology ROS: negative  Hematological and Lymphatic ROS: negative  Endocrine ROS: negative  Respiratory ROS: no cough, shortness of breath, or wheezing  Cardiovascular ROS: no chest pain or dyspnea on exertion  Gastrointestinal ROS: no abdominal pain, change in bowel habits, or black or bloody stools  Genito-Urinary ROS: no dysuria, trouble voiding, or hematuria  Musculoskeletal ROS: positive for chronic back pain and leg weakness   Neurological ROS: no TIA or stroke symptoms  negative  Dermatological ROS: negative      Past Medical, Family and Social History: The patient's past medical, family and social history have been reviewed and updated as appropriate within the electronic medical record - see encounter notes.    Compliance: yes    Side effects: None    Risk Parameters:  Patient reports no suicidal ideation  Patient reports no homicidal ideation  Patient reports no self-injurious behavior  Patient reports no violent behavior    Exam (detailed: at least 9 elements; comprehensive: all 15 elements)   Constitutional  Vitals:  Most recent vital signs, dated less than 90 days prior to this appointment, were reviewed.   Vitals:    11/15/19 1122   BP: (!) 140/86   Pulse: 92   Resp: 19   Weight: 95.7 kg (210 lb 15.7 oz)   Height: 5' 6" (1.676 m)     Body mass index is 34.05 kg/m².       General:  unremarkable, age appropriate     Musculoskeletal  Muscle Strength/Tone:  no spasicity, no rigidity   Gait & Station:  non-ataxic     Psychiatric  Speech:  no latency; no press   Mood & Affect:  steady, anxious"the same"  congruent and appropriate   Thought Process:  normal and logical   Associations:  intact   Thought Content:  normal, " no suicidality, no homicidality, delusions, or paranoia   Insight:  intact, has awareness of illness   Judgement: behavior is adequate to circumstances   Orientation:  grossly intact, person, place, situation, time/date, day of week, month of year, year   Memory: intact for content of interview, able to remember recent events- yes, able to remember remote events- yes   Language: grossly intact, able to name, able to repeat   Attention Span & Concentration:  able to focus, completed tasks   Fund of Knowledge:  intact and appropriate to age and level of education, familiar with aspects of current personal life     Assessment and Diagnosis   Status/Progress: Based on the examination today, the patient's problem(s) is/are adequately but not ideally controlled.  New problems have been presented today.   Co-morbidities are not complicating management of the primary condition.  There are no active rule-out diagnoses for this patient at this time.     General Impression:     MDD, mild, recurrent  LEANNE  Pseudodementia/Memory loss  REM Movement Sleep disorders    Alcohol use disorder, in early full remission    RLS  HTN  HLD  Chronic Headaches  GERD  Microcytic Anemia  Obese: chronic back pain      Intervention/Counseling/Treatment Plan     Medications:   Depression/Anxiety:  Decrease Lexapro to 10 mg Qday x 1 week then stop; start Cymbalta 30 mg po q day in 1 week   ContinueRemeron to 45 mg po QHS- consider optimizing further   Continue Nortriptyline 25 mg PO qhs-consider tapering off if able pending results of remeron  Stop Depakote- pt tapered off without incident     Cognitive Impairment:  Likely pseudodemtnia per neurocognitive evaluation or in part iatrogenic; continuee to monitor     Anxiety:  Lexapro/remeron as above  Previously taperred off of clonazepam     REM movement sleep disorder  Consider re-trial of klonopin  Start trial of melatoinin OTC   Remeron as above    Microcytosis:  Labs reviewed, secondary to iron  deficiency anemia    Alcohol Use Disorder:  Pt counseled; encourage sustained remission    RLS:   Continue requip 1 mg po q HS- consider further optimizing  Consider re-trial of Lyrica  +iron deficiency     Hypothyroidism:   TSH still elevated; consider further optimizing further if indicated- defer to PCP  -synthroid increased to 125 mcg po q AM- rechecking this Wednesday     Iron deficiency:  Start/continue Iron 325 mg po BID for 3 months then re-check levels in 1 month    Chronic Headaches:  Cymbalta as above    Discussed diagnosis, risks and benefits of proposed treatment vs alternative treatments vs no treatment, and potential side effects of these treatments. The patient expresses understanding of the above and displays the capacity to agree with this treatment given said understanding. Patient also agrees that, currently, the benefits outweigh the risks and would like to pursue treatment at this time.     Therapy:  Pt to continue with psychotherapy as scheduled/needed     Labs:  Reviewed labs from 8/31/19 with the patient; reviewed  ferritin, iron panel, B12/MMA, folate, Mg, and VPA; +iron deficiency noted; no new labs obtained since his last appointment.     Will try to consolidate treatment as he is on duplicate therapy with perceived limited efficacy     Return to Clinic: 6 weeks, sooner if needed     Jimmy Bradshaw MD  Psychiatry

## 2019-11-19 ENCOUNTER — PATIENT MESSAGE (OUTPATIENT)
Dept: INTERNAL MEDICINE | Facility: CLINIC | Age: 54
End: 2019-11-19

## 2019-11-19 NOTE — TELEPHONE ENCOUNTER
This patient would like to know if it's ok for him to take magnesium tablets. I see that Dr. Bradshaw checked his magnesium level on 8/31/19 and it was normal. Please advise. Thanks.

## 2019-11-20 RX ORDER — MIRTAZAPINE 30 MG/1
30 TABLET, FILM COATED ORAL NIGHTLY
Qty: 30 TABLET | Refills: 1 | Status: SHIPPED | OUTPATIENT
Start: 2019-11-20 | End: 2019-12-18 | Stop reason: SDUPTHER

## 2019-11-21 ENCOUNTER — PATIENT MESSAGE (OUTPATIENT)
Dept: PAIN MEDICINE | Facility: CLINIC | Age: 54
End: 2019-11-21

## 2019-11-22 ENCOUNTER — TELEPHONE (OUTPATIENT)
Dept: PAIN MEDICINE | Facility: CLINIC | Age: 54
End: 2019-11-22

## 2019-11-27 NOTE — TELEPHONE ENCOUNTER
Requested Prescriptions     Pending Prescriptions Disp Refills    amlodipine-benazepril 5-10 mg (LOTREL) 5-10 mg per capsule 90 capsule 1     Sig: Take 1 capsule by mouth once daily.    rOPINIRole (REQUIP) 1 MG tablet 90 tablet 1     Sig: Take 1 tablet (1 mg total) by mouth every evening.   LOV: 9/12/19. [facility]

## 2019-12-01 ENCOUNTER — PATIENT OUTREACH (OUTPATIENT)
Dept: ADMINISTRATIVE | Facility: OTHER | Age: 54
End: 2019-12-01

## 2019-12-01 ENCOUNTER — PATIENT MESSAGE (OUTPATIENT)
Dept: INTERNAL MEDICINE | Facility: CLINIC | Age: 54
End: 2019-12-01

## 2019-12-02 RX ORDER — ROPINIROLE 1 MG/1
1 TABLET, FILM COATED ORAL NIGHTLY
Qty: 90 TABLET | Refills: 1 | Status: SHIPPED | OUTPATIENT
Start: 2019-12-02 | End: 2020-03-16

## 2019-12-02 RX ORDER — AMLODIPINE AND BENAZEPRIL HYDROCHLORIDE 5; 10 MG/1; MG/1
1 CAPSULE ORAL DAILY
Qty: 90 CAPSULE | Refills: 1 | Status: SHIPPED | OUTPATIENT
Start: 2019-12-02 | End: 2021-01-28

## 2019-12-06 ENCOUNTER — OFFICE VISIT (OUTPATIENT)
Dept: PAIN MEDICINE | Facility: CLINIC | Age: 54
End: 2019-12-06
Payer: MEDICARE

## 2019-12-06 ENCOUNTER — PATIENT MESSAGE (OUTPATIENT)
Dept: PAIN MEDICINE | Facility: CLINIC | Age: 54
End: 2019-12-06

## 2019-12-06 VITALS
BODY MASS INDEX: 34.86 KG/M2 | DIASTOLIC BLOOD PRESSURE: 80 MMHG | RESPIRATION RATE: 16 BRPM | OXYGEN SATURATION: 96 % | WEIGHT: 216.94 LBS | HEIGHT: 66 IN | SYSTOLIC BLOOD PRESSURE: 132 MMHG | HEART RATE: 105 BPM

## 2019-12-06 DIAGNOSIS — G89.29 CHRONIC RIGHT SHOULDER PAIN: ICD-10-CM

## 2019-12-06 DIAGNOSIS — M54.50 CHRONIC LEFT-SIDED LOW BACK PAIN WITHOUT SCIATICA: ICD-10-CM

## 2019-12-06 DIAGNOSIS — M96.1 POSTLAMINECTOMY SYNDROME OF LUMBAR REGION: ICD-10-CM

## 2019-12-06 DIAGNOSIS — M54.12 CERVICAL RADICULOPATHY: ICD-10-CM

## 2019-12-06 DIAGNOSIS — M25.511 CHRONIC RIGHT SHOULDER PAIN: ICD-10-CM

## 2019-12-06 DIAGNOSIS — G89.29 CHRONIC LEFT-SIDED LOW BACK PAIN WITHOUT SCIATICA: ICD-10-CM

## 2019-12-06 DIAGNOSIS — M79.10 MYALGIA, UNSPECIFIED SITE: ICD-10-CM

## 2019-12-06 DIAGNOSIS — G89.29 NECK PAIN, CHRONIC: ICD-10-CM

## 2019-12-06 DIAGNOSIS — M54.2 NECK PAIN, CHRONIC: ICD-10-CM

## 2019-12-06 DIAGNOSIS — M79.18 MYOFASCIAL PAIN: ICD-10-CM

## 2019-12-06 DIAGNOSIS — M47.816 LUMBAR SPONDYLOSIS: ICD-10-CM

## 2019-12-06 DIAGNOSIS — Z98.1 HISTORY OF LUMBAR SPINAL FUSION: ICD-10-CM

## 2019-12-06 DIAGNOSIS — M51.36 DDD (DEGENERATIVE DISC DISEASE), LUMBAR: ICD-10-CM

## 2019-12-06 DIAGNOSIS — Z98.1 S/P CERVICAL SPINAL FUSION: Primary | ICD-10-CM

## 2019-12-06 PROCEDURE — 3075F PR MOST RECENT SYSTOLIC BLOOD PRESS GE 130-139MM HG: ICD-10-PCS | Mod: CPTII,S$GLB,, | Performed by: PHYSICAL MEDICINE & REHABILITATION

## 2019-12-06 PROCEDURE — 99999 PR PBB SHADOW E&M-EST. PATIENT-LVL IV: CPT | Mod: PBBFAC,,, | Performed by: PHYSICAL MEDICINE & REHABILITATION

## 2019-12-06 PROCEDURE — 96372 THER/PROPH/DIAG INJ SC/IM: CPT | Mod: S$GLB,,, | Performed by: PHYSICAL MEDICINE & REHABILITATION

## 2019-12-06 PROCEDURE — 3079F PR MOST RECENT DIASTOLIC BLOOD PRESSURE 80-89 MM HG: ICD-10-PCS | Mod: CPTII,S$GLB,, | Performed by: PHYSICAL MEDICINE & REHABILITATION

## 2019-12-06 PROCEDURE — 3079F DIAST BP 80-89 MM HG: CPT | Mod: CPTII,S$GLB,, | Performed by: PHYSICAL MEDICINE & REHABILITATION

## 2019-12-06 PROCEDURE — 96372 PR INJECTION,THERAP/PROPH/DIAG2ST, IM OR SUBCUT: ICD-10-PCS | Mod: S$GLB,,, | Performed by: PHYSICAL MEDICINE & REHABILITATION

## 2019-12-06 PROCEDURE — 99999 PR PBB SHADOW E&M-EST. PATIENT-LVL IV: ICD-10-PCS | Mod: PBBFAC,,, | Performed by: PHYSICAL MEDICINE & REHABILITATION

## 2019-12-06 PROCEDURE — 3008F BODY MASS INDEX DOCD: CPT | Mod: CPTII,S$GLB,, | Performed by: PHYSICAL MEDICINE & REHABILITATION

## 2019-12-06 PROCEDURE — 3008F PR BODY MASS INDEX (BMI) DOCUMENTED: ICD-10-PCS | Mod: CPTII,S$GLB,, | Performed by: PHYSICAL MEDICINE & REHABILITATION

## 2019-12-06 PROCEDURE — 99214 PR OFFICE/OUTPT VISIT, EST, LEVL IV, 30-39 MIN: ICD-10-PCS | Mod: 25,S$GLB,, | Performed by: PHYSICAL MEDICINE & REHABILITATION

## 2019-12-06 PROCEDURE — 99214 OFFICE O/P EST MOD 30 MIN: CPT | Mod: 25,S$GLB,, | Performed by: PHYSICAL MEDICINE & REHABILITATION

## 2019-12-06 PROCEDURE — 3075F SYST BP GE 130 - 139MM HG: CPT | Mod: CPTII,S$GLB,, | Performed by: PHYSICAL MEDICINE & REHABILITATION

## 2019-12-06 RX ORDER — BUPIVACAINE HYDROCHLORIDE 2.5 MG/ML
10 INJECTION, SOLUTION EPIDURAL; INFILTRATION; INTRACAUDAL
Status: COMPLETED | OUTPATIENT
Start: 2019-12-06 | End: 2019-12-06

## 2019-12-06 RX ORDER — PREGABALIN 150 MG/1
150 CAPSULE ORAL 2 TIMES DAILY
Qty: 60 CAPSULE | Refills: 3 | Status: SHIPPED | OUTPATIENT
Start: 2019-12-06 | End: 2020-03-16

## 2019-12-06 RX ORDER — TRIAMCINOLONE ACETONIDE 40 MG/ML
40 INJECTION, SUSPENSION INTRA-ARTICULAR; INTRAMUSCULAR
Status: COMPLETED | OUTPATIENT
Start: 2019-12-06 | End: 2019-12-06

## 2019-12-06 RX ADMIN — BUPIVACAINE HYDROCHLORIDE 25 MG: 2.5 INJECTION, SOLUTION EPIDURAL; INFILTRATION; INTRACAUDAL at 02:12

## 2019-12-06 RX ADMIN — TRIAMCINOLONE ACETONIDE 40 MG: 40 INJECTION, SUSPENSION INTRA-ARTICULAR; INTRAMUSCULAR at 03:12

## 2019-12-06 NOTE — PROCEDURES
Procedures     Trigger Point Injection:   The procedure was discussed with the patient including complications of damage, bleeding, infection, and failure of pain relief.     All medications, allergies, and relevant histories were reviewed. No recent antibiotics or infections.  A time-out was taken to verify the correct patient, procedure, laterality, and appropriate medications/allergies.    Trigger points were identified by palpation and marked. Alcohol utilized to prep  sites. A 25-gauge needle was advanced to the point of maximal tenderness, and 1 mL of a mixture of 9 mL of 0.25% bupivacaine with kenalog 40 mg was injected after negative aspiration in a fanlike distribution. All sites done in the same manner. Patient tolerated the procedure well and without complications. Sites injected included:  Right trapezius, right levator scapulae, right rhomboid, right cervical paraspinals.     The patient tolerated the procedure well and was discharged in excellent condition.

## 2019-12-06 NOTE — PROGRESS NOTES
Onset: years  Location: back and arm pain  Radiation: shoulder and arms   Quality: Aching, Deep and Sharp  Exacerbating Factors: standing for more than 5 minutes, walking for more than 5 minutes and daily activity  Alleviating Factors: nothing  Associated Symptoms: denies {RED FLAGS:65546}    Severity: Currently: 8/10   Typical Range: 8-9/10     Exacerbation: 9/10

## 2019-12-06 NOTE — PROGRESS NOTES
[facility]    Chief Complaint:   Chief Complaint   Patient presents with    Follow-up     pt was placed on wait list for pt at [facility] and was not yet contacted        Initial HPI (10/11/19): Dalton Kent is a 54 y.o. male referred by Dr. Velazquez for back and neck pain.    Back pain is worse. Back pain began ~ 10 years ago. He has had 2 back surgeries. Back pain is localized to the left over the lateral iliac crest. No radiation into legs. He is unable to describe the quality of the pain, other than feeling deep. Worse with walking, cutting grass, standing, and slight flexion. Pain alleviated by nothing in particular. Left leg gets weak with cutting grass or doing dishes. Denies leg numbness.     He has had neck surgery with Dr. Ontiveros in 2015. He doesn't have neck pain but is having right arm pain and shooting pain into his small, ring and middle fingers and also pain in his tricep area. Pain described as shooting and shocking in the arm. Nothing particularly aggravates it, but did start while fishing and worse at night time. Nothing helps the pain. He did therapy recently.     Onset: Years  Location: Shoulder, arm, lower back left side, hip pain   Radiation: left leg and right arm  Quality: Aching, burning, throbbing, grabbing, tingling, deep, superficial, electric   Exacerbating Factors: exercise, lifting, sitting, standing and walking for more than 20 minutes  Alleviating Factors: nothing  Associated Symptoms: denies night fever/night sweats, urinary incontinence, bowel incontinence, significant weight loss, significant motor weakness and loss of sensations    Severity: Currently: 4/10   Typical Range: 7-8/10     Exacerbation: 8/10     Interval History (12/6/19):  Dalton Kent returns today for follow up.  At the last clinic visit, referred to PT for neck and back pain to try TENS unit and transition quickly to HEP after 2 visits. We also performed TPI into left lumbar  paraspinals, quadratus lumborum insertion at iliac crest, and gluteal insertion at iliac crest on the left. Rx-ed Lyrica 75 mg twice daily, continued nortriptyline 25 mg at night. Ordered x-rays of lumbar and cervical spine at last visit. We had him sign NIKKI for Dr. Ontiveros and Dr. Javed at [facility]. We also referred him for EMG of right upper extremity to assess possible cervical radiculopathy.    He is still waiting on [facility] for PT. He doesn't think the lumbar TPIs helped. He is taking lyrica, and he denies any side effects. His wife does feel his anxiety is getting better. Currently, the neck pain is worse and the back pain is stable.  No change in the location or quality of the back pain since the most recent visit is reported.  Neck/shoulder pain is diffuse and may be more focused in the axilla area. States it feels like an egg is there. No significant interval events or traumas. No change in bowel or bladder function, no saddle anesthesia, & no new weakness or numbness is reported.     Current Pain Scales:   Severity: Currently: 8/10   Typical Range: 8-9/10     Exacerbation: 9/10       Pain Disability Index  Family/Home Responsibilities:: 8  Recreation:: 8  Social Activity:: 8  Occupation:: 0  Sexual Behavior:: 0  Self Care:: 8  Life-Support Activities:: 8  Pain Disability Index (PDI): 40    Previous Interventions:  - 10/11/19: Lumbar TPIs: Did not think they were effective  - Previous spine injections with Dr. Jimmy Rosas in [place] prior to the back surgeries. Also with Dr. Honeycutt and Dr. Pulido.     Previous Therapies:  PT/OT: yes last was 3-4 years ago  Surgery: yes He has had C5-7 ACDF with Dr. Ontiveros in 2016. He has had 2 back surgeries. First back surgery was L5-S1 ALIF in [facility] with Dr. Sorin Thomas. He had above level L4-5 herniated disk and underwent second back surgery, L4-5 TLIF at [facility] with Dr. Isaias Ontiveros in May 2016. Right then left carpal  tunnel release in 2017 w/ Dr. Marin Rosas in May 2017.   Previous Medications:   - NSAIDS: Cant take because of stomach ulcers  - Muscle Relaxants: Not tried  - TCAs: Nortriptyline 25 mg  - SNRIs: Not tried  - Topicals: Bengay, biofreeze not effective  - Anticonvulsants: Gabapentin (Gralise) caused worsening depression, claustrophobia, trouble swallowing  - Opioids: None currently    Current Pain Medications:  1. Nortriptyline 25 mg   2. Tylenol  3. Cymbalta 30 mg daily.      Blood Thinners: None    Full Medication List:    Current Outpatient Medications:     amlodipine-benazepril 5-10 mg (LOTREL) 5-10 mg per capsule, Take 1 capsule by mouth once daily., Disp: 90 capsule, Rfl: 1    DULoxetine (CYMBALTA) 30 MG capsule, Take 1 capsule (30 mg total) by mouth once daily., Disp: 30 capsule, Rfl: 1    fluticasone (FLONASE) 50 mcg/actuation nasal spray, 1 TO 2 SPRAY(S) IN EACH NOSTRIL DAILY, Disp: , Rfl: 2    levothyroxine (SYNTHROID) 125 MCG tablet, Take 1 tablet (125 mcg total) by mouth once daily., Disp: 90 tablet, Rfl: 1    melatonin 5 mg Chew, Take 10 mg by mouth every evening. , Disp: , Rfl:     metoprolol succinate (TOPROL-XL) 25 MG 24 hr tablet, Take 25 mg by mouth once daily., Disp: , Rfl:     mirtazapine (REMERON) 30 MG tablet, TAKE 1 TABLET (30 MG TOTAL) BY MOUTH EVERY EVENING., Disp: 30 tablet, Rfl: 1    Multivits/Iron Fum/FA/D3/Lycop (MULTI FOR HIM ORAL), Take by mouth., Disp: , Rfl:     nortriptyline (PAMELOR) 25 MG capsule, Take 1 capsule (25 mg total) by mouth every evening., Disp: 30 capsule, Rfl: 11    pantoprazole (PROTONIX) 40 MG tablet, Take 40 mg by mouth once daily., Disp: , Rfl: 1    pregabalin (LYRICA) 150 MG capsule, Take 1 capsule (150 mg total) by mouth 2 (two) times daily., Disp: 60 capsule, Rfl: 3    rOPINIRole (REQUIP) 1 MG tablet, Take 1 tablet (1 mg total) by mouth every evening., Disp: 90 tablet, Rfl: 1  No current facility-administered medications for this visit.   "    Review of Systems:  Review of Systems   Constitutional: Negative for fever and weight loss.   HENT: Positive for tinnitus. Negative for ear pain.    Eyes: Negative for pain and redness.   Respiratory: Negative for cough and shortness of breath.    Cardiovascular: Negative for chest pain and palpitations.   Gastrointestinal: Positive for heartburn. Negative for constipation.        GERD w/ NSAIDs   Genitourinary: Negative.         Denies urinary incontinence. Denies urine retention.    Musculoskeletal: Positive for back pain and neck pain.   Skin: Negative for itching and rash.   Neurological: Positive for tingling, weakness and headaches. Negative for seizures.   Endo/Heme/Allergies: Negative for environmental allergies. Does not bruise/bleed easily.   Psychiatric/Behavioral: Positive for depression. The patient is nervous/anxious and has insomnia.        Allergies:  Gralise [gabapentin]; Norco [hydrocodone-acetaminophen]; and Percocet [oxycodone-acetaminophen]     Medical History:   has a past medical history of Alcohol abuse, Anxiety, Depression, GERD (gastroesophageal reflux disease), Headache, psychiatric care, Hypertension, Hypothyroid, Lyme disease, Restless leg syndrome, Sleep difficulties, Therapy, and Uncontrolled REM sleep behavior disorder.    Surgical History:   has a past surgical history that includes Anal fistulotomy; Anal sphincterotomy; Appendectomy; Colonoscopy (2/2015); Back surgery; Neck surgery; Hand surgery (Bilateral); Carpal tunnel release; Trigger finger release; Nose surgery; and Colonoscopy (N/A, 8/29/2019).    Social History:   reports that he has never smoked. He has never used smokeless tobacco. He reports that he drinks alcohol. He reports that he does not use drugs.    Physical Exam:  /80   Pulse 105   Resp 16   Ht 5' 6" (1.676 m)   Wt 98.4 kg (216 lb 14.9 oz)   SpO2 96%   BMI 35.01 kg/m²   GEN: No acute distress. Calm, comfortable  HENT: Normocephalic, atraumatic, " moist mucous membranes  EYE: Anicteric sclera, non-injected.   CV: Non-diaphoretic. Regular Rate. Radial Pulses 2+.  RESP: Breathing comfortably. Chest expansion symmetric.  EXT: No clubbing, cyanosis.   SKIN: Warm, & dry to palpation.  PSYCH: Pleasant mood and appropriate affect. Recent and remote memory intact.   GAIT: Independent, normal ambulation  Neck Exam:       Inspection: No erythema, bruising. Poor posture, hunched shoulders up into ears      Palpation: (+) TTP of cervical paraspinals and trapezius on the right.       ROM: Limitation in lateral rotation to either side.       Provocative Maneuvers:  (-) Spurling's bilaterally  Shoulder Exam:       Inspection: No erythema, bruising, surgical incisions      Palpation: No TTP of AC joint, subacromial area.       ROM: Bilateral abduction limitation to about 120 degrees. Pain with internal rotation      Provocative Maneuvers:    (-) Hawkin's b/l    (-) Empty can b/l   Neurologic Exam:     Alert. Speech is fluent and appropriate.     Cranial Nerves: Extra-ocular movements intact. Pupils equal. No strabismus. Face Symmetric. Jaw opens midline. Uvula midline. Tongue midline. Shoulder shrug symmetric.       Strength: 5/5 throughout bilateral upper extremities     Sensation: Grossly intact to light touch in bilateral upper extremities     Reflexes: 2+ in bilateral patella, Unable to elicit bilateral triceps, biceps, brachioradialis     Tone: No abnormality appreciated in bilateral upper or lower extremities     No Clonus     (-) Rosas bilaterally    Imaging:  - EMG/NCS BUE 10/28/19:  Normal EMG/NCS of the arms.     - X-ray C-spine 10/11/19:  Prior multilevel fusion at C5-C6 and C6-C7.  Fusion screws appear intact and alignment is maintained.  No significant change in alignment with flexion or extension.  There is no plain film evidence of fracture or acute subluxation.  No prevertebral soft tissue swelling.  Minimal degenerative spurring along the anterior inferior  endplate of C4.  No disc space narrowing at C2, C3 or C4    - X-ray L-spine 10/11/19:  Prior multilevel fusion at L4-L5 and L5-S1.  Surgical fixation hardware appears intact.  Alignment is maintained.  There is no significant change in alignment with flexion or extension.  Mild degenerative change involving L1 through L3 without significant focal disc space narrowing.  There is no evidence of an acute fracture.  Alignment is maintained.  No evidence of spondylolysis or spondylolisthesis    - MRI L-spine 02/06/2019:  Spinal Alignment: Normal.  Vertebrae: Posterior fusion hardware at L4-5 and anterior fusion hardware at L5-S1 are unchanged, with associated disc spacer devices at L4-5 and L5-S1.  Susceptibility artifact is present surrounding the hardware.  There are associated postsurgical findings of left L4 hemilaminectomy.  Mild susceptibility artifact surrounds the hardware.  1.5 cm hemangioma is present in the posterosuperior portion of the T12 vertebral body.  Discs: Mild diffuse desiccation.  Disc spacer devices are present at L5-4 5 and L5-S1.  Cord: Normal cord signal. Conus terminates normally at the L1-2 level.  Degenerative findings: Minimal circumferential disc bulge at L2-3 with minimal bilateral facet arthropathy and minimal facet effusions in conjunction with mild buckling of the ligamentum flavum does not result in significant compressive phenomena.  Mild circumferential disc bulge at L3-4 with mild bilateral facet arthropathy and buckling of the ligamentum flavum results in mild spinal canal stenosis and mild to moderate foraminal stenoses.  Spinal canal is posteriorly decompressed at L4-5; right neural foramen is widely patent while there is apparent moderate narrowing of the left neural foramen related to either postoperative granulation tissue along the inferior neural foramen or disc material.  Mild bilateral facet arthropathy at L5-S1 results in at most mild foraminal stenoses.  Paraspinal  muscles & soft tissues: Normal    - EMG/NCS BLE 2/25/19:  Impression: Normal EMG of the legs, although his study was mildly limited due to inability to sample lumbar paraspinal muscles given his prior lumbar surgical scarring.     - Outside Shoulder MRI Right 2/22/19:  Impression:   1. Degenerative arthropathy at AC joint  2. Pericapsular soft tissue swelling and periosteal soft tissue swelling about the AC joint and distal clavicle. A low-grade partial tear of the trapezius muscle at the insertion upon the clavicle and a low-grade edema or strain of the anterior head of deltoid muscle  3. Suspect a sprain of the coracoclavicular ligaments, which are not torn  4. TEndinosis of the supraspinatus without a tear  5. Slight fraying of the labrum    - EMG/NCS (outside records) with bilateral C6 radiculopathy and bilateral carpal tunnel syndrome     Labs:  Lab Results   Component Value Date     08/14/2019    K 4.9 08/14/2019     08/14/2019    CO2 32 (H) 08/14/2019    BUN 18 08/14/2019    CREATININE 1.2 08/14/2019    CALCIUM 9.4 08/14/2019    ANIONGAP 9 08/14/2019    ESTGFRAFRICA >60 08/14/2019    EGFRNONAA >60 08/14/2019     Lab Results   Component Value Date    ALT 22 08/14/2019    AST 17 08/14/2019    ALKPHOS 65 08/14/2019    BILITOT 0.4 08/14/2019     Lab Results   Component Value Date     08/14/2019       Assessment:  Dalton Kent is a 54 y.o. male with the following diagnoses based on history, exam, and imaging:    Problem List Items Addressed This Visit     None      Visit Diagnoses     S/P cervical spinal fusion    -  Primary    Relevant Medications    pregabalin (LYRICA) 150 MG capsule    Other Relevant Orders    Ambulatory consult to Physical Therapy    Neck pain, chronic        Relevant Medications    pregabalin (LYRICA) 150 MG capsule    Other Relevant Orders    Ambulatory consult to Physical Therapy    Myofascial pain        Relevant Medications    triamcinolone acetonide  injection 40 mg (Completed)    bupivacaine (PF) 0.25% (2.5 mg/ml) injection 25 mg (Completed)    pregabalin (LYRICA) 150 MG capsule    Other Relevant Orders    Ambulatory consult to Physical Therapy    Myalgia, unspecified site        Relevant Medications    triamcinolone acetonide injection 40 mg (Completed)    bupivacaine (PF) 0.25% (2.5 mg/ml) injection 25 mg (Completed)    Other Relevant Orders    Ambulatory consult to Physical Therapy    History of lumbar spinal fusion        Relevant Medications    pregabalin (LYRICA) 150 MG capsule    Other Relevant Orders    Ambulatory consult to Physical Therapy    Chronic right shoulder pain        Relevant Orders    Ambulatory consult to Physical Therapy    Ambulatory referral/consult to Orthopedics    X-ray Shoulder 2 or More Views Right    Chronic left-sided low back pain without sciatica        Relevant Medications    pregabalin (LYRICA) 150 MG capsule    Lumbar spondylosis        Relevant Medications    pregabalin (LYRICA) 150 MG capsule    DDD (degenerative disc disease), lumbar        Relevant Medications    pregabalin (LYRICA) 150 MG capsule    Postlaminectomy syndrome of lumbar region        Relevant Medications    pregabalin (LYRICA) 150 MG capsule    Cervical radiculopathy        Relevant Medications    pregabalin (LYRICA) 150 MG capsule          This is a pleasant 54 y.o. gentleman with chronic neck and back pain.  He has had both neck and back surgeries but has persistent pain. His back pain appears to be multifactorial in nature, with contributions from myofascial pain, lumbar spondylosis and possible discogenic pain. His neck pain, also appears multifactorial. He denies changes in pain, but appears less radicular to me today. MRI with tear in trapezius, which may be contributing to the pain, as I do not think it is coming from the RTC based on my exam today.  Pain may even be coming from latissimus or serratus issue based on where he localizes pain.   Shoulder pain may also be stemming from his neck, but currently my suspicion is low and think this appears more myofascial.     Treatment Plan: I discussed with the patient the following assessment and recommendations. The following is the plan the patient agreed upon:  - PT/OT/HEP:  Rx for PT, external for neck, and shoulder pain.   - Procedures:   - Performed trigger point injection into right cervical paraspinals, upper trapezius, right latissimus and levator scapulae and rhomboid. I have explained the risks, benefits, and alternatives of the procedure in detail. The patient voices understanding and all questions have been answered. The patient agrees to proceed as planned.  - Consider future possible caudal epidural steroid injection, medial branch blocks above level of fusion in the lumbar spine, and even possible spinal cord stimulation in the future as well.  - Medications:   - Increase Lyrica to 150 mg twice daily.  Lyrica seems to be helping (PDI improved from 47 to 40) pain and may also help with his anxiety.  -  reviewed and consistent  - Continue nortriptyline 25 mg at night.  He does have room to increase this in the future.  - Continue Cymbalta 30 mg daily. Room to increase to 60 mg daily, but just started on this medication.   - Imaging:   - Reviewed x-rays ordered at previous visit.   - Will order x-rays of right shoulder.   - Consider new c-spine MRI in future if pain persists.   - Labs: Reviewed.  Medications are appropriately dosed for current hepatorenal function.  - Reviewed EMG/NCS findings   - Outside records reviewed and added to chart  - Refer to orthopedics for right shoulder pain. Not sure if he is a great surgical candidate, but would appreciate second opinion on his shoulder pain.     Follow Up: RTC in 3 months or sooner as needed.    Tori Prado M.D.  Interventional Pain Medicine / Physical Medicine & Rehabilitation    Disclaimer: This note was partly generated using dictation  software which may occasionally result in transcription errors.

## 2019-12-08 ENCOUNTER — PATIENT OUTREACH (OUTPATIENT)
Dept: ADMINISTRATIVE | Facility: OTHER | Age: 54
End: 2019-12-08

## 2019-12-08 RX ORDER — DULOXETIN HYDROCHLORIDE 30 MG/1
CAPSULE, DELAYED RELEASE ORAL
Qty: 30 CAPSULE | Refills: 1 | Status: SHIPPED | OUTPATIENT
Start: 2019-12-08 | End: 2019-12-23

## 2019-12-09 RX ORDER — PANTOPRAZOLE SODIUM 40 MG/1
40 TABLET, DELAYED RELEASE ORAL DAILY
Qty: 90 TABLET | Refills: 3 | Status: SHIPPED | OUTPATIENT
Start: 2019-12-09 | End: 2022-12-21 | Stop reason: SDUPTHER

## 2019-12-10 ENCOUNTER — PATIENT MESSAGE (OUTPATIENT)
Dept: INTERNAL MEDICINE | Facility: CLINIC | Age: 54
End: 2019-12-10

## 2019-12-10 DIAGNOSIS — E03.9 HYPOTHYROIDISM IN ADULT: Primary | ICD-10-CM

## 2019-12-11 ENCOUNTER — OFFICE VISIT (OUTPATIENT)
Dept: ORTHOPEDICS | Facility: CLINIC | Age: 54
End: 2019-12-11
Payer: MEDICARE

## 2019-12-11 ENCOUNTER — HOSPITAL ENCOUNTER (OUTPATIENT)
Dept: RADIOLOGY | Facility: HOSPITAL | Age: 54
Discharge: HOME | End: 2019-12-11
Attending: PHYSICAL MEDICINE & REHABILITATION
Payer: MEDICARE

## 2019-12-11 ENCOUNTER — OFFICE VISIT (OUTPATIENT)
Dept: INTERNAL MEDICINE | Facility: CLINIC | Age: 54
End: 2019-12-11
Payer: MEDICARE

## 2019-12-11 VITALS
RESPIRATION RATE: 16 BRPM | HEART RATE: 100 BPM | SYSTOLIC BLOOD PRESSURE: 138 MMHG | HEIGHT: 67 IN | HEART RATE: 96 BPM | WEIGHT: 216.25 LBS | DIASTOLIC BLOOD PRESSURE: 100 MMHG | WEIGHT: 217.81 LBS | HEIGHT: 66 IN | BODY MASS INDEX: 34.75 KG/M2 | BODY MASS INDEX: 34.19 KG/M2 | OXYGEN SATURATION: 97 % | RESPIRATION RATE: 16 BRPM | DIASTOLIC BLOOD PRESSURE: 82 MMHG | SYSTOLIC BLOOD PRESSURE: 126 MMHG

## 2019-12-11 DIAGNOSIS — R06.89 GASPING FOR BREATH: Primary | ICD-10-CM

## 2019-12-11 DIAGNOSIS — M79.621 PAIN IN RIGHT UPPER ARM: Primary | ICD-10-CM

## 2019-12-11 DIAGNOSIS — M25.511 CHRONIC RIGHT SHOULDER PAIN: ICD-10-CM

## 2019-12-11 DIAGNOSIS — R06.83 LOUD SNORING: ICD-10-CM

## 2019-12-11 DIAGNOSIS — G47.33 OSA (OBSTRUCTIVE SLEEP APNEA): ICD-10-CM

## 2019-12-11 DIAGNOSIS — G89.29 CHRONIC RIGHT SHOULDER PAIN: ICD-10-CM

## 2019-12-11 PROCEDURE — 3074F SYST BP LT 130 MM HG: CPT | Mod: CPTII,S$GLB,, | Performed by: PHYSICIAN ASSISTANT

## 2019-12-11 PROCEDURE — 3075F SYST BP GE 130 - 139MM HG: CPT | Mod: CPTII,S$GLB,, | Performed by: INTERNAL MEDICINE

## 2019-12-11 PROCEDURE — 3008F PR BODY MASS INDEX (BMI) DOCUMENTED: ICD-10-PCS | Mod: CPTII,S$GLB,, | Performed by: PHYSICIAN ASSISTANT

## 2019-12-11 PROCEDURE — 99213 OFFICE O/P EST LOW 20 MIN: CPT | Mod: S$GLB,,, | Performed by: INTERNAL MEDICINE

## 2019-12-11 PROCEDURE — 3078F PR MOST RECENT DIASTOLIC BLOOD PRESSURE < 80 MM HG: ICD-10-PCS | Mod: CPTII,S$GLB,, | Performed by: INTERNAL MEDICINE

## 2019-12-11 PROCEDURE — 3075F PR MOST RECENT SYSTOLIC BLOOD PRESS GE 130-139MM HG: ICD-10-PCS | Mod: CPTII,S$GLB,, | Performed by: INTERNAL MEDICINE

## 2019-12-11 PROCEDURE — 99203 OFFICE O/P NEW LOW 30 MIN: CPT | Mod: S$GLB,,, | Performed by: PHYSICIAN ASSISTANT

## 2019-12-11 PROCEDURE — 99999 PR PBB SHADOW E&M-EST. PATIENT-LVL IV: CPT | Mod: PBBFAC,,, | Performed by: PHYSICIAN ASSISTANT

## 2019-12-11 PROCEDURE — 99999 PR PBB SHADOW E&M-EST. PATIENT-LVL III: CPT | Mod: PBBFAC,,, | Performed by: INTERNAL MEDICINE

## 2019-12-11 PROCEDURE — 99999 PR PBB SHADOW E&M-EST. PATIENT-LVL IV: ICD-10-PCS | Mod: PBBFAC,,, | Performed by: PHYSICIAN ASSISTANT

## 2019-12-11 PROCEDURE — 3008F BODY MASS INDEX DOCD: CPT | Mod: CPTII,S$GLB,, | Performed by: PHYSICIAN ASSISTANT

## 2019-12-11 PROCEDURE — 73030 X-RAY EXAM OF SHOULDER: CPT | Mod: TC,RT

## 2019-12-11 PROCEDURE — 3078F DIAST BP <80 MM HG: CPT | Mod: CPTII,S$GLB,, | Performed by: INTERNAL MEDICINE

## 2019-12-11 PROCEDURE — 3079F PR MOST RECENT DIASTOLIC BLOOD PRESSURE 80-89 MM HG: ICD-10-PCS | Mod: CPTII,S$GLB,, | Performed by: PHYSICIAN ASSISTANT

## 2019-12-11 PROCEDURE — 99213 PR OFFICE/OUTPT VISIT, EST, LEVL III, 20-29 MIN: ICD-10-PCS | Mod: S$GLB,,, | Performed by: INTERNAL MEDICINE

## 2019-12-11 PROCEDURE — 99203 PR OFFICE/OUTPT VISIT, NEW, LEVL III, 30-44 MIN: ICD-10-PCS | Mod: S$GLB,,, | Performed by: PHYSICIAN ASSISTANT

## 2019-12-11 PROCEDURE — 99999 PR PBB SHADOW E&M-EST. PATIENT-LVL III: ICD-10-PCS | Mod: PBBFAC,,, | Performed by: INTERNAL MEDICINE

## 2019-12-11 PROCEDURE — 3008F PR BODY MASS INDEX (BMI) DOCUMENTED: ICD-10-PCS | Mod: CPTII,S$GLB,, | Performed by: INTERNAL MEDICINE

## 2019-12-11 PROCEDURE — 73030 X-RAY EXAM OF SHOULDER: CPT | Mod: 26,RT,, | Performed by: RADIOLOGY

## 2019-12-11 PROCEDURE — 3079F DIAST BP 80-89 MM HG: CPT | Mod: CPTII,S$GLB,, | Performed by: PHYSICIAN ASSISTANT

## 2019-12-11 PROCEDURE — 3074F PR MOST RECENT SYSTOLIC BLOOD PRESSURE < 130 MM HG: ICD-10-PCS | Mod: CPTII,S$GLB,, | Performed by: PHYSICIAN ASSISTANT

## 2019-12-11 PROCEDURE — 3008F BODY MASS INDEX DOCD: CPT | Mod: CPTII,S$GLB,, | Performed by: INTERNAL MEDICINE

## 2019-12-11 PROCEDURE — 73030 XR SHOULDER COMPLETE 2 OR MORE VIEWS RIGHT: ICD-10-PCS | Mod: 26,RT,, | Performed by: RADIOLOGY

## 2019-12-11 NOTE — PROGRESS NOTES
Subjective:      Patient ID: Dalton Kent is a 54 y.o. male.    Chief Complaint: Shoulder Pain (right sided )    53 yo M presents to clinic with c/o right shoulder/arm pain x 1 year. States that pain started shortly after he used right arm to stop tree branch from hitting him in the face.    He was previously seen at [facility] for this and for neck and back pain. He reports history of several back and neck surgeries and states that he was told by Dr. Ontiveros that he needed another surgery but did not attend follow up appointment. He completed several rounds of physical therapy in the past, mostly for neck and back but did also work on shoulder. States that therapy had no improvement of sx.      He is currently seeing Dr. Prado for neck and back pain. Had trigger point injections to right trapezius, right levator scapulae, right rhomboid, right cervical paraspinals on 12/6/19 and reports about 70% relief of pain after injections. Dr. Prado also referred him to physical therapy, which he has not begun yet. He is currently taking Nortriptyline 25 mg,Tylenol, and Cymbalta 30 mg daily for pain.    States that his shoulder is not bothering him at all today. However, the back of his right upper arm is still hurting, although it has improved some since injection. He denies any numbness or tingling to arm or fingers. Denies any weakness. States that pain stops at elbow. He had normal EMG of upper extremities in October.          Review of Systems   Constitution: Negative for chills, diaphoresis and fever.   HENT: Negative for congestion, ear discharge and ear pain.    Eyes: Negative for blurred vision, discharge, double vision and pain.   Cardiovascular: Negative for chest pain, claudication and cyanosis.   Respiratory: Negative for cough, hemoptysis and shortness of breath.    Endocrine: Negative for cold intolerance and heat intolerance.   Skin: Negative for color change, dry skin, itching and rash.    Musculoskeletal: Positive for back pain, joint pain and neck pain. Negative for arthritis, falls, gout, joint swelling and muscle weakness.   Gastrointestinal: Negative for abdominal pain and change in bowel habit.   Neurological: Negative for brief paralysis, disturbances in coordination and dizziness.   Psychiatric/Behavioral: Negative for hallucinations.         Objective:            General    Constitutional: He is oriented to person, place, and time. He appears well-developed and well-nourished. No distress.   HENT:   Head: Atraumatic.   Eyes: EOM are normal. Right eye exhibits no discharge. Left eye exhibits no discharge.   Cardiovascular: Normal rate.    Pulmonary/Chest: Effort normal. No respiratory distress.   Abdominal: Soft.   Neurological: He is alert and oriented to person, place, and time.   Psychiatric: He has a normal mood and affect. His behavior is normal.         Right Shoulder Exam     Inspection/Observation   Swelling: absent  Bruising: absent  Scars: absent  Deformity: absent  Scapular Winging: absent  Scapular Dyskinesia: negative  Atrophy: absent    Range of Motion   Active abduction: 120 (+ pain to posterior right upper arm)   Passive abduction: 120   Forward Flexion: 130   External Rotation 0 degrees: 40   Internal rotation 0 degrees: Lumbar     Tests & Signs   Apprehension: negative  Cross arm: negative  Drop arm: negative  Camacho test: negative  Impingement: negative  Lift Off Sign: negative  Belly Press: negative  Active Compression Test ([place]'s Sign): negative  Speed's Test: negative    Other   Sensation: normal    Comments:  No tenderness to shoulder or upper arm    Left Shoulder Exam     Inspection/Observation   Swelling: absent  Bruising: absent  Scars: absent  Deformity: absent  Scapular Winging: absent  Scapular Dyskinesia: negative  Atrophy: absent    Range of Motion   Active abduction: 140   Passive abduction: 140   Forward Flexion: 140   External Rotation 0 degrees: 40    Internal rotation 0 degrees: Mid thoracic     Tests & Signs   Apprehension: negative  Cross arm: negative  Drop arm: negative  Camacho test: negative  Impingement: negative  Lift Off Sign: negative  Belly Press: negative  Active Compression test ([place]'s Sign): negative  Speed's Test: negative    Other   Sensation: normal     Comments:  No tenderness to shoulder or upper arm      Muscle Strength   Right Upper Extremity   Shoulder Abduction: 5/5 (+ pain to postior right upper arm)   Shoulder Internal Rotation: 5/5   Shoulder External Rotation: 5/5   Supraspinatus: 5/5/5   Subscapularis: 5/5/5   Biceps: 5/5/5   Left Upper Extremity  Shoulder Abduction: 5/5   Shoulder Internal Rotation: 5/5   Shoulder External Rotation: 5/5   Supraspinatus: 5/5/5   Subscapularis: 5/5/5   Biceps: 5/5/5     Vascular Exam     Right Pulses      Radial:                    2+      Left Pulses      Radial:                    2+          Xrays right shoulder 12/11/19:  The acromioclavicular and glenohumeral joint spaces are intact.  No fracture or dislocation is seen.  Cervical fusion hardware is identified.    EMG upper extremities 10/28/19:  Normal EMG/NCS of the arms        Assessment:       Encounter Diagnosis   Name Primary?    Chronic right shoulder pain           Plan:         I made the decision to obtain old records of the patient including previous notes and imaging. I independently reviewed and interpreted the prior imaging.    Discussed treatment options with patient. We also discussed further imaging of shoulder and upper arm and possible referral to surgeon to discuss any surgical options due to decreased ROM and chronic pain.  Since he has had good relief with trigger point injections, he would like to try therapy again and continue to see Dr. Prado for pain management.    1. Physical therapy, patient will notify clinic if he needs new referral.  2. Ice compress to the affected area 2-3x a day for 15-20 minutes as needed  for pain management.  3. Continue taking medications as prescribed by Dr. Prado for pain management.  4. RTC as needed for follow-up.    Patient voices understanding of and agrees with treatment plan. All of the patient's questions were answered and the patient will contact us if  he has any questions or concerns in the interim.

## 2019-12-11 NOTE — PROGRESS NOTES
"Subjective:       Patient ID: Dalton Kent is a 54 y.o. male.    Chief Complaint: Sleep Apnea and Sore Throat    Dalton Kent is a 54 y.o. male  Here with concern for sleep apnea.  He reports that he snores a lot , difficulty in breathing   " my airway blocks"  " I need a sleep study "    Review of Systems   Constitutional: Negative for chills and fever.   HENT: Negative for congestion, postnasal drip and sore throat.    Eyes: Negative for photophobia.   Respiratory: Negative for chest tightness and shortness of breath.    Cardiovascular: Negative for chest pain.   Gastrointestinal: Negative for abdominal distention, abdominal pain, blood in stool and vomiting.   Genitourinary: Negative for dysuria, flank pain and hematuria.   Musculoskeletal: Positive for arthralgias. Negative for back pain.   Skin: Negative for pallor.   Neurological: Negative for dizziness, seizures, facial asymmetry, speech difficulty and numbness.   Hematological: Does not bruise/bleed easily.   Psychiatric/Behavioral: Negative for agitation and suicidal ideas. The patient is not nervous/anxious.        Objective:      Physical Exam   Constitutional: He is oriented to person, place, and time. He appears well-developed and well-nourished.   HENT:   Head: Normocephalic and atraumatic.   Neck: No JVD present.   Cardiovascular: Normal rate, regular rhythm and normal heart sounds.   Pulmonary/Chest: Effort normal and breath sounds normal.   Abdominal: Soft. Bowel sounds are normal. There is no tenderness.   Musculoskeletal: He exhibits no edema.        Left hip: He exhibits bony tenderness.        Legs:  Trochanteric bursitis   Tenderness upon palpation    Neurological: He is alert and oriented to person, place, and time.   Skin: Skin is warm and dry.   Bruising ;fell  About 1 weeks ago    Psychiatric: He has a normal mood and affect. His behavior is normal. Judgment and thought content normal.   Nursing note and vitals " reviewed.      Assessment:       1. Gasping for breath    2. Loud snoring    3. ASAD (obstructive sleep apnea)        Plan:   Dalton was seen today for sleep apnea and sore throat.    Diagnoses and all orders for this visit:    Gasping for breath  -     Polysomnography 4 or more parameters; Future    Loud snoring  -     Polysomnography 4 or more parameters; Future    ASAD (obstructive sleep apnea)  -     Polysomnography 4 or more parameters; Future      # The patient is asked to make an attempt to improve diet and exercise patterns to aid in medical management of this problem.     # Eat  5 small meals a day.     # Cut out high carbohydrate  foods : bread, rice, pasta, potatoes.     # Exercise/walk 5x/week for at least 30-45  minutes.    Lab Results   Component Value Date    TSH 0.902 12/11/2019           Problem List Items Addressed This Visit     None

## 2019-12-11 NOTE — LETTER
December 12, 2019      Tori Prado MD  [address]  [address]  [facility] [address]           [facility]  [address] [facility] [address]  Phone: [phone]          Patient: Dalton Kent   MR Number: [id number]   YOB: 1965   Date of Visit: 12/11/2019       Dear Dr. Tori Prado:    Thank you for referring Dalton Kent to me for evaluation. Attached you will find relevant portions of my assessment and plan of care.    If you have questions, please do not hesitate to call me. I look forward to following Dalton Kent along with you.    Sincerely,    Susana Ivory PA-C    Enclosure  CC:  No Recipients    If you would like to receive this communication electronically, please contact [email or web address] or [phone] to request more information on [facility] access.    For providers and/or their staff who would like to refer a patient to [facility], please contact us through our one-stop-shop provider referral line, [facility], at [phone].    If you feel you have received this communication in error or would no longer like to receive these types of communications, please e-mail [email or web address]

## 2019-12-13 ENCOUNTER — TELEPHONE (OUTPATIENT)
Dept: INTERNAL MEDICINE | Facility: CLINIC | Age: 54
End: 2019-12-13

## 2019-12-13 NOTE — TELEPHONE ENCOUNTER
----- Message from Perla Black MA sent at 12/13/2019  2:51 PM CST -----  Contact: Laura in sleep lab at [facility]  Sleep lab requesting questionnaire of sleep study request be signed by Dr. Velazquez. They will fax it back to be signed.

## 2019-12-16 ENCOUNTER — TELEPHONE (OUTPATIENT)
Dept: INTERNAL MEDICINE | Facility: CLINIC | Age: 54
End: 2019-12-16

## 2019-12-16 NOTE — TELEPHONE ENCOUNTER
----- Message from Sandrita Castillo sent at 2019  2:49 PM CST -----  Contact: [facility] Lab  Dalton Kent  MRN: [id number]  : 1965  PCP: Horacio Velazquez  Home Phone      [phone]  Work Phone      Not on file.  Mobile          [phone]    MESSAGE:     Received order for patient from Dr. Velazquez but it was not signed. Please have Dr. Velazquez sign order and fax back to number below.    Phone: [phone]  Fax [phone]

## 2019-12-17 ENCOUNTER — PATIENT OUTREACH (OUTPATIENT)
Dept: ADMINISTRATIVE | Facility: OTHER | Age: 54
End: 2019-12-17

## 2019-12-19 ENCOUNTER — OFFICE VISIT (OUTPATIENT)
Dept: NEUROLOGY | Facility: CLINIC | Age: 54
End: 2019-12-19
Payer: MEDICARE

## 2019-12-19 VITALS
WEIGHT: 213.88 LBS | HEART RATE: 98 BPM | BODY MASS INDEX: 33.57 KG/M2 | SYSTOLIC BLOOD PRESSURE: 136 MMHG | RESPIRATION RATE: 18 BRPM | HEIGHT: 67 IN | DIASTOLIC BLOOD PRESSURE: 92 MMHG

## 2019-12-19 DIAGNOSIS — F33.2 SEVERE RECURRENT MAJOR DEPRESSION WITHOUT PSYCHOTIC FEATURES: ICD-10-CM

## 2019-12-19 DIAGNOSIS — E29.1 HYPOGONADISM IN MALE: ICD-10-CM

## 2019-12-19 DIAGNOSIS — R51.9 HEADACHE, UNSPECIFIED HEADACHE TYPE: ICD-10-CM

## 2019-12-19 DIAGNOSIS — R41.3 MEMORY LOSS: ICD-10-CM

## 2019-12-19 DIAGNOSIS — M54.50 LUMBAR PAIN: ICD-10-CM

## 2019-12-19 DIAGNOSIS — G25.81 RESTLESS LEGS SYNDROME: ICD-10-CM

## 2019-12-19 DIAGNOSIS — G47.52 REM SLEEP BEHAVIOR DISORDER: ICD-10-CM

## 2019-12-19 DIAGNOSIS — R41.89 PSEUDODEMENTIA: Primary | ICD-10-CM

## 2019-12-19 DIAGNOSIS — R55 PRE-SYNCOPE: ICD-10-CM

## 2019-12-19 DIAGNOSIS — F10.20 ALCOHOL USE DISORDER, MODERATE, DEPENDENCE: ICD-10-CM

## 2019-12-19 DIAGNOSIS — E03.9 HYPOTHYROIDISM IN ADULT: ICD-10-CM

## 2019-12-19 DIAGNOSIS — F41.9 ANXIETY: ICD-10-CM

## 2019-12-19 DIAGNOSIS — E78.2 MIXED HYPERLIPIDEMIA: ICD-10-CM

## 2019-12-19 DIAGNOSIS — I10 ESSENTIAL HYPERTENSION: ICD-10-CM

## 2019-12-19 PROBLEM — Z12.11 COLON CANCER SCREENING: Status: RESOLVED | Noted: 2019-08-29 | Resolved: 2019-12-19

## 2019-12-19 PROCEDURE — 99999 PR PBB SHADOW E&M-EST. PATIENT-LVL IV: CPT | Mod: PBBFAC,,, | Performed by: NURSE PRACTITIONER

## 2019-12-19 PROCEDURE — 3008F PR BODY MASS INDEX (BMI) DOCUMENTED: ICD-10-PCS | Mod: CPTII,S$GLB,, | Performed by: NURSE PRACTITIONER

## 2019-12-19 PROCEDURE — 3008F BODY MASS INDEX DOCD: CPT | Mod: CPTII,S$GLB,, | Performed by: NURSE PRACTITIONER

## 2019-12-19 PROCEDURE — 99499 RISK ADDL DX/OHS AUDIT: ICD-10-PCS | Mod: S$GLB,,, | Performed by: NURSE PRACTITIONER

## 2019-12-19 PROCEDURE — 99214 PR OFFICE/OUTPT VISIT, EST, LEVL IV, 30-39 MIN: ICD-10-PCS | Mod: S$GLB,,, | Performed by: NURSE PRACTITIONER

## 2019-12-19 PROCEDURE — 3080F DIAST BP >= 90 MM HG: CPT | Mod: CPTII,S$GLB,, | Performed by: NURSE PRACTITIONER

## 2019-12-19 PROCEDURE — 3080F PR MOST RECENT DIASTOLIC BLOOD PRESSURE >= 90 MM HG: ICD-10-PCS | Mod: CPTII,S$GLB,, | Performed by: NURSE PRACTITIONER

## 2019-12-19 PROCEDURE — 3075F PR MOST RECENT SYSTOLIC BLOOD PRESS GE 130-139MM HG: ICD-10-PCS | Mod: CPTII,S$GLB,, | Performed by: NURSE PRACTITIONER

## 2019-12-19 PROCEDURE — 99499 UNLISTED E&M SERVICE: CPT | Mod: S$GLB,,, | Performed by: NURSE PRACTITIONER

## 2019-12-19 PROCEDURE — 3075F SYST BP GE 130 - 139MM HG: CPT | Mod: CPTII,S$GLB,, | Performed by: NURSE PRACTITIONER

## 2019-12-19 PROCEDURE — 99999 PR PBB SHADOW E&M-EST. PATIENT-LVL IV: ICD-10-PCS | Mod: PBBFAC,,, | Performed by: NURSE PRACTITIONER

## 2019-12-19 PROCEDURE — 99214 OFFICE O/P EST MOD 30 MIN: CPT | Mod: S$GLB,,, | Performed by: NURSE PRACTITIONER

## 2019-12-19 RX ORDER — MIRTAZAPINE 30 MG/1
30 TABLET, FILM COATED ORAL NIGHTLY
Qty: 30 TABLET | Refills: 1 | Status: SHIPPED | OUTPATIENT
Start: 2019-12-19 | End: 2020-01-13

## 2019-12-19 NOTE — PROGRESS NOTES
"Subjective:      Chief Complaint:  No chief complaint on file.      History of Present Illness  Dalton Kent is a 54 y.o. male with memory loss, headaches, and lower back and leg pain. Neuropsych testing in 2019 showed severe cognitive impairment, but this did not rise to the level of dementia-Pseudodementia is suspected, secondary to anxiety/depression. 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 He has HTN, hypothyroidism, hypogonadism, GERD, and RLS and REM sleep behavior disorder. Questionable diagnosis of prior Lyme disease. History of pancreatitis, bilateral CTR, L-spine surgery and C-spine surgery in 2016. He was previously followed by Dr. Honeycutt at [facility]. He is medically disabled from his spinal issues.    He presents today for a follow up visit. Shortly after his last visit with me, he was admitted to [facility] for attempted suicide. No ETOH since this time, but continues with urges at times. He is now seeing Dr. Jimmy Bradshaw for his mood.     Memory has improved some since his last visit.     REM sleep behavior issues are treated by Psychiatry, though he continues to "fight in his sleep". Remeron started, and Dr. Bradshaw is considering tapering Pamelor.   He saw PCP for report of gasping for air and snoring, and a Sleep study ordered by Dr. Evans.     Labs done at his last visit with me in 6/2019 showed a low Vitamin D level, for which supplementation was advised, as well as a low Ferritin level with mild anemia, which he was advised to discuss further with PCP, as this can contribute to RLS complaints.     He is seeing Dr. Prado for pain management. He completed an EMG BUE with Dr. Andrea recently for complaint of RUE pain, which was overall unremarkable. He continues with right shoulder pain, which occurs when he looks upward. Right Shoulder X-rays were unremarkable.     He was referred to Cardiology previously for presyncopal complaints.Toprol was started for tachycardia on 7 day holter " monitor. He is no longer having any presyncopal complaints. Prior he felt as if this would occur when yawning, sneezing, and with bowel movements.    Headaches are resolved at this time, but do occur occasionally and are mild. Pamelor was started for this. He is also taking Lyrica now for pain, which he believes is helpful for his headaches.     I have reviewed all of this patient's past medical and surgical histories as well as family and social histories and active allergies and medications as documented in the electronic medical record.    Review of Systems  Review of Systems   Constitutional: Positive for fatigue.   HENT: Negative for hearing loss, sinus pressure, sinus pain, tinnitus, trouble swallowing and voice change.    Eyes: Negative for visual disturbance.   Respiratory: Negative for shortness of breath.    Cardiovascular: Negative for leg swelling.   Gastrointestinal: Negative for abdominal pain.   Musculoskeletal: Positive for arthralgias and back pain. Negative for gait problem.   Skin: Negative for rash.   Neurological: Positive for weakness. Negative for dizziness, tremors, seizures, facial asymmetry, speech difficulty, light-headedness, numbness and headaches.        Presyncope   Hematological: Does not bruise/bleed easily.   Psychiatric/Behavioral: Positive for decreased concentration and sleep disturbance. Negative for agitation, behavioral problems, confusion, dysphoric mood, hallucinations, self-injury and suicidal ideas. The patient is not nervous/anxious and is not hyperactive.         Memory loss       Objective:       There were no vitals filed for this visit.  Exam:  Gen Appearance, well developed/nourished in no apparent distress  CV: 2+ distal pulses with no edema or swelling  Neuro:  MS: Awake, alert, oriented to place, person, time, situation. Sustains attention. Recent memory mildly impaired/remote memory intact, Language is full to spontaneous speech/repetition/naming/comprehension.  Fund of Knowledge is full. Able to name current President  CN: Optic discs are flat with normal vasculature, PERRL, Extraoccular movements and visual fields are full. Normal facial sensation and strength, Hearing symmetric, Tongue and Palate are midline and strong. Shoulder Shrug symmetric and strong.  Motor: Normal bulk, tone, no abnormal movements. 5/5 strength bilateral upper/lower extremities with 1+ reflexes with muted plantar response  Sensory: symmetric to light touch, pain, temp, and vibration/proprioception. Romberg positive  Cerebellar: Finger-nose,Heal-shin, Rapid alternating movements intact  Gait: Normal stance, no ataxia  MSK Survey: no pain with ROM to RUE and no tenderness today with exam.     Imaging:   MRI Brain 1/2019:   FINDINGS:  Intracranial compartment: Ventricles and sulci are normal in size for age without evidence of hydrocephalus.  No extra-axial blood or fluid collections. Minimal burden of patchy T2 hyperintense foci scattered in the supratentorial periventricular and subcortical white matter most likely relate to sequelae of chronic microvascular ischemic disease.  The brain parenchyma appears normal. No mass lesion, acute hemorrhage, edema or acute infarct.  Normal vascular flow voids are preserved.    Skull/extracranial contents (limited evaluation): Bone marrow signal intensity is normal.      Impression       No acute abnormality.     MRA Brain 1/2019:   FINDINGS:  Anterior intracranial circulation: No high-grade focal stenosis, occlusion, aneurysm, or vascular malformation.    Posterior intracranial circulation: No high-grade focal stenosis, occlusion, aneurysm, or vascular malformation.      Impression       Normal MRA of the [place] of [place]     1/2019 MRI L-spine:   FINDINGS:  Spinal Alignment: Normal.    Vertebrae: Posterior fusion hardware at L4-5 and anterior fusion hardware at L5-S1 are unchanged, with associated disc spacer devices at L4-5 and L5-S1.  Susceptibility  artifact is present surrounding the hardware.  There are associated postsurgical findings of left L4 hemilaminectomy.  Mild susceptibility artifact surrounds the hardware.  1.5 cm hemangioma is present in the posterosuperior portion of the T12 vertebral body.    Discs: Mild diffuse desiccation.  Disc spacer devices are present at L5-4 5 and L5-S1.    Cord: Normal cord signal. Conus terminates normally at the L1-2 level.    Degenerative findings: Minimal circumferential disc bulge at L2-3 with minimal bilateral facet arthropathy and minimal facet effusions in conjunction with mild buckling of the ligamentum flavum does not result in significant compressive phenomena.  Mild circumferential disc bulge at L3-4 with mild bilateral facet arthropathy and buckling of the ligamentum flavum results in mild spinal canal stenosis and mild to moderate foraminal stenoses.  Spinal canal is posteriorly decompressed at L4-5; right neural foramen is widely patent while there is apparent moderate narrowing of the left neural foramen related to either postoperative granulation tissue along the inferior neural foramen or disc material.  Mild bilateral facet arthropathy at L5-S1 results in at most mild foraminal stenoses.    Paraspinal muscles & soft tissues: Normal.      Impression       Unchanged postoperative findings of L4-S1 fusion with left L4 hemilaminectomy.  There is apparent moderate narrowing of the left neural foramen at L4-5 related to either postoperative granulation tissue along the inferior foramen or disc material.    Additional mild disc and facet degeneration above the fused levels resulting in mild spinal canal stenosis and mild to moderate foraminal stenoses at L3-4.       2/2019 ROSANNA's:  · Normal resting ROSANNA's.    EMG BLE 2/2019:   Normal EMG of the legs, though study was mildly limited, due to inability to sample lumbar paraspinal muscles, given his prior lumbar surgical scarring.     EMG BUE 10/2019:   Impression:   Abnormal Study secondary to the Presence of:    Normal EMG/NCS of the arms.   Note EMG/NCS is limited in evaluation of central (ex. myelopathic) lesions. Clinical and imaging correlation suggested.      Labs:   Will obtain most recent labs from PCP    Assessment:   Dalton Kent is a 54 y.o. male who presents for an initial visit for evaluation for memory loss, headaches, blurred vision, and lower back and leg pain. He has HTN, hypothyroidism, hypogonadism, GERD, and RLS and REM sleep behavior disorder. Questionable diagnosis of prior Lyme disease. He was previously followed by Dr. Honeycutt at [facility].   Plan:   I recommend:   1. Memory loss is likely caused by pseudodementia from his severe depression, which is now being treated by Dr. Jimmy Bradshaw/Psychiatry.   -REM sleep disorder now treated by Psychiatry with Remeron-they may taper Pamelor, as he is on Remeron. Pamelor started for headaches originally, but Lyrica was started since then for pain, which may also be helping his headaches.   -no longer on Klonopin for REM sleep disorder.   -Note Psych admit earlier this year for suicidal ideation with ETOH abuse, which he has stopped since then.   2. Will monitor for development of neurodegenerative disorder, given his REM sleep behavior issues and his memory loss.   3. Continue Pamelor for headache prevention for now, but Psychiatry may wean this if needed, as he is now also on Lyrica. I would avoid AED's, given the potential effect of worsening memory loss. If Pamelor ineffective, could consider Botox of Aimovig. On Metoprolol for tachycardia per Cardiology.    4. Continue follow up with Cardiology. Presyncopal complaints resolved after starting Metoprolol for tachycardia on 7 day holter monitor.  MRI Brain and MRA unremarkable for IC and vascular causes.  -He had tachycardia since his last visit, which was believed to be related to oversupplementation for his thyroid disease  5. Pending repeat sleep  study to evaluate for ASAD again per PCP. Prior study showed mild ASAD, not requiring treatment. He did gain 14 pounds since his last visit.   9. Continue to follow with Dr. Prado/pain management at any point for his lumbar complaints and his right shoulder complaints. EMG BUE unremarkable for radiculopathy, and X-rays of right shoulder were unremarkable.  -He saw Dr. Javed for his shoulder complaints prior.   -There is moderate foraminal narrowing at Left L4/5, which can explain his lumbar complaints; however, EMG BLE 2/2019 was unremarkable for radiculopathy; mildly limited study, due to prior surgical scarring above the lumbar paraspinal muscles.   -Note prior L-spine surgery in 2016 per Dr. Geovanny Ontiveros, whom he declines seeing again.     FU 6 months    CC: Dr. Jimmy Bradshaw

## 2019-12-19 NOTE — PATIENT INSTRUCTIONS
Nortriptyline was prescribed for headache prevention, but Psychiatry may wean this. You should still have some headache prevention with Remeron (Mirtazepine) and Lyrica.

## 2019-12-23 ENCOUNTER — OFFICE VISIT (OUTPATIENT)
Dept: PSYCHIATRY | Facility: CLINIC | Age: 54
End: 2019-12-23
Payer: MEDICARE

## 2019-12-23 VITALS
DIASTOLIC BLOOD PRESSURE: 86 MMHG | RESPIRATION RATE: 18 BRPM | BODY MASS INDEX: 33.94 KG/M2 | SYSTOLIC BLOOD PRESSURE: 140 MMHG | WEIGHT: 211.19 LBS | HEIGHT: 66 IN | HEART RATE: 92 BPM

## 2019-12-23 DIAGNOSIS — R41.89 PSEUDODEMENTIA: ICD-10-CM

## 2019-12-23 DIAGNOSIS — F41.9 ANXIETY: ICD-10-CM

## 2019-12-23 DIAGNOSIS — F10.20 ALCOHOL USE DISORDER, MODERATE, DEPENDENCE: ICD-10-CM

## 2019-12-23 DIAGNOSIS — R41.3 MEMORY LOSS: Primary | ICD-10-CM

## 2019-12-23 DIAGNOSIS — F33.2 SEVERE RECURRENT MAJOR DEPRESSION WITHOUT PSYCHOTIC FEATURES: ICD-10-CM

## 2019-12-23 DIAGNOSIS — G25.81 RESTLESS LEGS SYNDROME: ICD-10-CM

## 2019-12-23 PROCEDURE — 99214 OFFICE O/P EST MOD 30 MIN: CPT | Mod: S$GLB,,, | Performed by: PSYCHIATRY & NEUROLOGY

## 2019-12-23 PROCEDURE — 3008F BODY MASS INDEX DOCD: CPT | Mod: CPTII,S$GLB,, | Performed by: PSYCHIATRY & NEUROLOGY

## 2019-12-23 PROCEDURE — 3079F PR MOST RECENT DIASTOLIC BLOOD PRESSURE 80-89 MM HG: ICD-10-PCS | Mod: CPTII,S$GLB,, | Performed by: PSYCHIATRY & NEUROLOGY

## 2019-12-23 PROCEDURE — 3079F DIAST BP 80-89 MM HG: CPT | Mod: CPTII,S$GLB,, | Performed by: PSYCHIATRY & NEUROLOGY

## 2019-12-23 PROCEDURE — 90833 PR PSYCHOTHERAPY W/PATIENT W/E&M, 30 MIN (ADD ON): ICD-10-PCS | Mod: S$GLB,,, | Performed by: PSYCHIATRY & NEUROLOGY

## 2019-12-23 PROCEDURE — 99214 PR OFFICE/OUTPT VISIT, EST, LEVL IV, 30-39 MIN: ICD-10-PCS | Mod: S$GLB,,, | Performed by: PSYCHIATRY & NEUROLOGY

## 2019-12-23 PROCEDURE — 90833 PSYTX W PT W E/M 30 MIN: CPT | Mod: S$GLB,,, | Performed by: PSYCHIATRY & NEUROLOGY

## 2019-12-23 PROCEDURE — 3008F PR BODY MASS INDEX (BMI) DOCUMENTED: ICD-10-PCS | Mod: CPTII,S$GLB,, | Performed by: PSYCHIATRY & NEUROLOGY

## 2019-12-23 PROCEDURE — 99999 PR PBB SHADOW E&M-EST. PATIENT-LVL III: ICD-10-PCS | Mod: PBBFAC,,, | Performed by: PSYCHIATRY & NEUROLOGY

## 2019-12-23 PROCEDURE — 99999 PR PBB SHADOW E&M-EST. PATIENT-LVL III: CPT | Mod: PBBFAC,,, | Performed by: PSYCHIATRY & NEUROLOGY

## 2019-12-23 PROCEDURE — 3077F SYST BP >= 140 MM HG: CPT | Mod: CPTII,S$GLB,, | Performed by: PSYCHIATRY & NEUROLOGY

## 2019-12-23 PROCEDURE — 3077F PR MOST RECENT SYSTOLIC BLOOD PRESSURE >= 140 MM HG: ICD-10-PCS | Mod: CPTII,S$GLB,, | Performed by: PSYCHIATRY & NEUROLOGY

## 2019-12-23 RX ORDER — DULOXETIN HYDROCHLORIDE 60 MG/1
60 CAPSULE, DELAYED RELEASE ORAL DAILY
Qty: 30 CAPSULE | Refills: 1 | Status: SHIPPED | OUTPATIENT
Start: 2019-12-23 | End: 2020-01-14 | Stop reason: SDUPTHER

## 2019-12-23 NOTE — PROGRESS NOTES
"Outpatient Psychiatry Follow-Up Visit (MD/NP)    12/23/2019    Clinical Status of Patient:  Outpatient (Ambulatory)    Chief Complaint:  Dalton Kent is a 54 y.o. male who presents today for follow-up of depression.  Met with patient.      Interval History and Content of Current Session:  Interim Events/Subjective Report/Content of Current Session:     The patient was seen and examined. His chart was reviewed. Reviewed notes by Tori Prado MD at 12/6/2019  5:05 PM; Horacio Velazquez MD at 12/11/2019  2:11 PM; Susana Ivory PA-C at 12/12/2019  8:55 AM; and  Azeb Bradshaw NP at 12/19/2019  1:24 PM.     The patient has been compliant with treatment. He denied any side effects. He reports good tolerability and efficacy.     No new psychosocial stressors have occurred since his last appointment. The patient has a supportive wife- the marriage is stable. His 2 daughters are doing well. He is on SSI-disability. He tries to stay active around the house and fish when able. Finances are stable. Housing is stable.     No new medical issues have occurred since his last appointment. He had some medication changes since last seen. He is seeing pain speciality- his Lyrica was increased. He recently had some cold/flu like symptoms which resolved several days.     "Ok.. I'm still afraid to die." He reports that symptoms adequately managed since his last appointment with noted improvement. .    Improved/controlled Symptoms of Depression: denied diminished mood or loss of interest/anhedonia; less irritability, +diminished energy, +change in sleep, no change in appetite, no diminished concentration or cognition or indecisiveness, no PMA/R, no excessive guilt or hopelessness or worthlessness, no suicidal ideations     Continued but less Changes in Sleep: no trouble with initiation/maintenance, no early morning awakening with inability to return to sleep, no hypersomnolence; pt reports that he still moves in his " sleep and does not feel rested in the AM (sleep study in 7/2019 reports REM movement sleep disorder); he continues having sleep issues with snoring.      Denied Suicidal/Homicidal ideations: no active/passive ideations, organized plans, or future intentions     Denied Symptoms of nestor or hypomania: no elevated, expansive, or irritable mood with no increased energy or activity; with no inflated self-esteem or grandiosity, decreased need for sleep, increased rate of speech, FOI or racing thoughts, distractibility, increased goal directed activity or PMA, or risky/disinhibited behavior    Denied Symptoms of psychosis: no hallucinations, delusions, disorganized thinking, disorganized behavior or abnormal motor behavior, or negative symptoms    Continued but less Symptoms of LEANNE: +excessive anxiety/worry/fear,  with less restlessness, less fatigue, no poor concentration, less irritability, less muscle tension, less sleep disturbance; +causes functionally impairing distress; no panic attacks; without agoraphobia     Alcohol use: pt reports no usage since he was hospitalized in 7/2019; he had no lapses since last seen. He has had cravings to drink    Memory problems: continue unchanged (no change since stopping klonopin); possibly in part iatrogenic with noted polypharmacy     Previous medication trials include: Depakote, nortriptyline, prazosin, Lexapro,  Xanax, klonopin, ativan, remeron    Psychotherapy:  · Target symptoms: depression, anxiety   · Why chosen therapy is appropriate versus another modality: relevant to diagnosis  · Outcome monitoring methods: self-report, observation  · Therapeutic intervention type: behavior modifying psychotherapy, supportive psychotherapy  · Topics discussed/themes: difficulty managing affect in interpersonal relationships, building skills sets for symptom management, symptom recognition  · The patient's response to the intervention is accepting. The patient's progress toward treatment  "goals is good.   · Duration of intervention: 16 minutes.    Review of Systems   Review of systems  General ROS: negative  Ophthalmic ROS: negative  ENT ROS: negative  Allergy and Immunology ROS: negative  Hematological and Lymphatic ROS: negative  Endocrine ROS: negative  Respiratory ROS: no cough, shortness of breath, or wheezing  Cardiovascular ROS: no chest pain or dyspnea on exertion  Gastrointestinal ROS: no abdominal pain, change in bowel habits, or black or bloody stools  Genito-Urinary ROS: no dysuria, trouble voiding, or hematuria  Musculoskeletal ROS: positive for chronic back pain and leg weakness   Neurological ROS: no TIA or stroke symptoms  negative  Dermatological ROS: negative      Past Medical, Family and Social History: The patient's past medical, family and social history have been reviewed and updated as appropriate within the electronic medical record - see encounter notes.    Compliance: yes    Side effects: None    Risk Parameters:  Patient reports no suicidal ideation  Patient reports no homicidal ideation  Patient reports no self-injurious behavior  Patient reports no violent behavior    Exam (detailed: at least 9 elements; comprehensive: all 15 elements)   Constitutional  Vitals:  Most recent vital signs, dated less than 90 days prior to this appointment, were reviewed.   Vitals:    12/23/19 1029   BP: (!) 140/86   Pulse: 92   Resp: 18   Weight: 95.8 kg (211 lb 3.2 oz)   Height: 5' 6" (1.676 m)     Body mass index is 34.09 kg/m².       General:  unremarkable, age appropriate     Musculoskeletal  Muscle Strength/Tone:  no spasicity, no rigidity   Gait & Station:  non-ataxic     Psychiatric  Speech:  no latency; no press   Mood & Affect:  steady, anxious"ok"  congruent and appropriate   Thought Process:  normal and logical   Associations:  intact   Thought Content:  normal, no suicidality, no homicidality, delusions, or paranoia   Insight:  intact, has awareness of illness   Judgement: " behavior is adequate to circumstances   Orientation:  grossly intact, person, place, situation, time/date, day of week, month of year, year   Memory: intact for content of interview, able to remember recent events- yes, able to remember remote events- yes   Language: grossly intact, able to name, able to repeat   Attention Span & Concentration:  able to focus, completed tasks   Fund of Knowledge:  intact and appropriate to age and level of education, familiar with aspects of current personal life     Assessment and Diagnosis   Status/Progress: Based on the examination today, the patient's problem(s) is/are adequately but not ideally controlled.  New problems have been presented today.   Co-morbidities are not complicating management of the primary condition.  There are no active rule-out diagnoses for this patient at this time.     General Impression:     MDD, mild, recurrent  LEANNE  Pseudodementia/Memory loss  REM Movement Sleep disorders    Alcohol use disorder, in early full remission    RLS  HTN  HLD  Chronic Headaches  GERD  Microcytic Anemia  Obese: chronic back pain      Intervention/Counseling/Treatment Plan     Medications:   Depression/Anxiety:  Increase Cymbalta to 60 mg po q day in 1 week   Continue Remeron to 45 mg po QHS- consider optimizing further   Stop Nortriptyline  Lyrica off-label for anxiety     Cognitive Impairment:  Likely pseudodemtnia per neurocognitive evaluation or in part iatrogenic; continuee to monitor     Anxiety:  Cymblata/remeron as above  Lyrica off-label for anxiety  Previously taperred off of clonazepam     REM movement sleep disorder  Consider re-trial of klonopin  Start trial of melatoinin OTC   Remeron as above    Microcytosis:  Labs reviewed, secondary to iron deficiency anemia    Alcohol Use Disorder:  Pt counseled; encourage sustained remission    RLS:   Continue requip 1 mg po q HS- consider further optimizing  +iron deficiency     Hypothyroidism:   TSH still elevated;  consider further optimizing further if indicated- defer to PCP  -synthroid increased to 125 mcg po q AM- rechecking this Wednesday     Iron deficiency:  Start/continue Iron 325 mg po BID for 2 months then re-check levels in 2 months    Chronic Headaches:  Cymbalta as above    Discussed diagnosis, risks and benefits of proposed treatment vs alternative treatments vs no treatment, and potential side effects of these treatments. The patient expresses understanding of the above and displays the capacity to agree with this treatment given said understanding. Patient also agrees that, currently, the benefits outweigh the risks and would like to pursue treatment at this time.     Therapy:  Pt to continue with psychotherapy as scheduled/needed     Labs:  Reviewed labs from 12/11/19 with the patient; recheck Iron panel in 2 months    Will continue to try to consolidate treatment as he is on duplicate therapy with perceived limited efficacy     Return to Clinic: 2 months, sooner if needed     Jimmy Bradshaw MD  Psychiatry

## 2019-12-27 ENCOUNTER — TELEPHONE (OUTPATIENT)
Dept: INTERNAL MEDICINE | Facility: CLINIC | Age: 54
End: 2019-12-27

## 2019-12-27 NOTE — TELEPHONE ENCOUNTER
Spoke with Sleep Lab and they stated they just got the authorization for [employer] yesterday. Contacted pt's wife Josselyn and informed her of this and instructed her to contact sleep lab and ask for Guillermina to schedule patient. Instructions were given to me from sleep lab to give to the patient. Josselyn verbalized understanding with no questions or concerns.

## 2019-12-27 NOTE — TELEPHONE ENCOUNTER
----- Message from Vonnie Santiago sent at 2019  8:59 AM CST -----  Contact: Josselyn (wife)  Dalton Kent  MRN: [id number]  : 1965  PCP: Horacio Velazquez  Home Phone      [phone]  Work Phone      Not on file.  Mobile          [phone]    MESSAGE:   PCP ordered a sleep study / insurance mailed the patient a letter - testing approved. The hospital has not contacted the patient to schedule the appointment - requests to speak to nurse.     Phone # [phone]    Pharmacy -  [id number] - [place] - [address] AT [facility]

## 2019-12-29 ENCOUNTER — HOSPITAL ENCOUNTER (OUTPATIENT)
Dept: SLEEP MEDICINE | Facility: HOSPITAL | Age: 54
Discharge: HOME | End: 2019-12-29
Attending: INTERNAL MEDICINE
Payer: MEDICARE

## 2019-12-29 DIAGNOSIS — G47.33 OBSTRUCTIVE SLEEP APNEA (ADULT) (PEDIATRIC): ICD-10-CM

## 2019-12-29 PROCEDURE — 95810 POLYSOM 6/> YRS 4/> PARAM: CPT

## 2020-01-03 RX ORDER — DULOXETIN HYDROCHLORIDE 30 MG/1
CAPSULE, DELAYED RELEASE ORAL
Qty: 30 CAPSULE | Refills: 1 | Status: SHIPPED | OUTPATIENT
Start: 2020-01-03 | End: 2020-01-14 | Stop reason: SDUPTHER

## 2020-01-05 ENCOUNTER — PATIENT MESSAGE (OUTPATIENT)
Dept: INTERNAL MEDICINE | Facility: CLINIC | Age: 55
End: 2020-01-05

## 2020-01-05 DIAGNOSIS — G47.33 OSA (OBSTRUCTIVE SLEEP APNEA): Primary | ICD-10-CM

## 2020-01-09 ENCOUNTER — TELEPHONE (OUTPATIENT)
Dept: INTERNAL MEDICINE | Facility: CLINIC | Age: 55
End: 2020-01-09

## 2020-01-09 NOTE — TELEPHONE ENCOUNTER
Results of polysomnogram received via fax from sleep lab. They will contact the patient to schedule a return to the sleep lab for a titration study. Results are available in Epic. Thanks.

## 2020-01-12 ENCOUNTER — HOSPITAL ENCOUNTER (OUTPATIENT)
Dept: SLEEP MEDICINE | Facility: HOSPITAL | Age: 55
Discharge: HOME | End: 2020-01-12
Attending: INTERNAL MEDICINE
Payer: MEDICARE

## 2020-01-12 ENCOUNTER — PATIENT MESSAGE (OUTPATIENT)
Dept: PSYCHIATRY | Facility: CLINIC | Age: 55
End: 2020-01-12

## 2020-01-12 DIAGNOSIS — G47.33 OBSTRUCTIVE SLEEP APNEA (ADULT) (PEDIATRIC): ICD-10-CM

## 2020-01-12 PROCEDURE — 95811 POLYSOM 6/>YRS CPAP 4/> PARM: CPT

## 2020-01-13 ENCOUNTER — PATIENT MESSAGE (OUTPATIENT)
Dept: PSYCHIATRY | Facility: CLINIC | Age: 55
End: 2020-01-13

## 2020-01-13 RX ORDER — MIRTAZAPINE 30 MG/1
TABLET, FILM COATED ORAL
Qty: 30 TABLET | Refills: 1 | Status: SHIPPED | OUTPATIENT
Start: 2020-01-13 | End: 2020-01-14

## 2020-01-14 RX ORDER — MIRTAZAPINE 30 MG/1
30 TABLET, FILM COATED ORAL NIGHTLY
Qty: 90 TABLET | Refills: 0 | Status: SHIPPED | OUTPATIENT
Start: 2020-01-14 | End: 2020-02-19 | Stop reason: SDUPTHER

## 2020-01-14 RX ORDER — DULOXETIN HYDROCHLORIDE 60 MG/1
60 CAPSULE, DELAYED RELEASE ORAL DAILY
Qty: 90 CAPSULE | Refills: 0 | Status: SHIPPED | OUTPATIENT
Start: 2020-01-14 | End: 2020-01-14

## 2020-01-14 RX ORDER — DULOXETIN HYDROCHLORIDE 60 MG/1
CAPSULE, DELAYED RELEASE ORAL
Qty: 30 CAPSULE | Refills: 1 | Status: SHIPPED | OUTPATIENT
Start: 2020-01-14 | End: 2020-03-30

## 2020-01-14 RX ORDER — DULOXETIN HYDROCHLORIDE 30 MG/1
30 CAPSULE, DELAYED RELEASE ORAL DAILY
Qty: 90 CAPSULE | Refills: 0 | Status: SHIPPED | OUTPATIENT
Start: 2020-01-14 | End: 2020-01-31 | Stop reason: DRUGHIGH

## 2020-01-15 ENCOUNTER — PATIENT MESSAGE (OUTPATIENT)
Dept: PSYCHIATRY | Facility: CLINIC | Age: 55
End: 2020-01-15

## 2020-01-20 ENCOUNTER — TELEPHONE (OUTPATIENT)
Dept: INTERNAL MEDICINE | Facility: CLINIC | Age: 55
End: 2020-01-20

## 2020-01-20 DIAGNOSIS — G47.33 OSA (OBSTRUCTIVE SLEEP APNEA): Primary | ICD-10-CM

## 2020-01-20 NOTE — TELEPHONE ENCOUNTER
Results are in, but waiting for Dr. Velazquez to review. Results are not in Epic yet. Elsa will call tomorrow.

## 2020-01-20 NOTE — TELEPHONE ENCOUNTER
----- Message from Sandrita Castillo sent at 2020  4:11 PM CST -----  Contact: Josselyn/Wife  Dalton Kent  MRN: [id number]  : 1965  PCP: Horacio Velazquez  Home Phone      [phone]  Work Phone      Not on file.  Mobile          [phone]    MESSAGE:   He went a week ago yesterday to have a sleep study done and he is wondering how long do the results typically take to come in. Please call to advise.    Phone: [phone]

## 2020-01-27 ENCOUNTER — PATIENT OUTREACH (OUTPATIENT)
Dept: ADMINISTRATIVE | Facility: OTHER | Age: 55
End: 2020-01-27

## 2020-01-31 ENCOUNTER — OFFICE VISIT (OUTPATIENT)
Dept: PAIN MEDICINE | Facility: CLINIC | Age: 55
End: 2020-01-31
Payer: MEDICARE

## 2020-01-31 VITALS
WEIGHT: 216.81 LBS | DIASTOLIC BLOOD PRESSURE: 91 MMHG | HEART RATE: 88 BPM | SYSTOLIC BLOOD PRESSURE: 132 MMHG | HEIGHT: 66 IN | BODY MASS INDEX: 34.84 KG/M2 | RESPIRATION RATE: 16 BRPM

## 2020-01-31 DIAGNOSIS — G89.29 NECK PAIN, CHRONIC: ICD-10-CM

## 2020-01-31 DIAGNOSIS — F41.8 SITUATIONAL ANXIETY: ICD-10-CM

## 2020-01-31 DIAGNOSIS — M79.18 MYOFASCIAL PAIN: ICD-10-CM

## 2020-01-31 DIAGNOSIS — M54.12 CERVICAL RADICULOPATHY: ICD-10-CM

## 2020-01-31 DIAGNOSIS — M54.12 CERVICAL RADICULOPATHY: Primary | ICD-10-CM

## 2020-01-31 DIAGNOSIS — Z98.1 S/P CERVICAL SPINAL FUSION: ICD-10-CM

## 2020-01-31 DIAGNOSIS — M54.2 NECK PAIN, CHRONIC: ICD-10-CM

## 2020-01-31 PROCEDURE — 99499 RISK ADDL DX/OHS AUDIT: ICD-10-PCS | Mod: S$GLB,,, | Performed by: PHYSICAL MEDICINE & REHABILITATION

## 2020-01-31 PROCEDURE — 3080F DIAST BP >= 90 MM HG: CPT | Mod: CPTII,S$GLB,, | Performed by: PHYSICAL MEDICINE & REHABILITATION

## 2020-01-31 PROCEDURE — 99499 UNLISTED E&M SERVICE: CPT | Mod: S$GLB,,, | Performed by: PHYSICAL MEDICINE & REHABILITATION

## 2020-01-31 PROCEDURE — 99999 PR PBB SHADOW E&M-EST. PATIENT-LVL III: ICD-10-PCS | Mod: PBBFAC,,, | Performed by: PHYSICAL MEDICINE & REHABILITATION

## 2020-01-31 PROCEDURE — 3080F PR MOST RECENT DIASTOLIC BLOOD PRESSURE >= 90 MM HG: ICD-10-PCS | Mod: CPTII,S$GLB,, | Performed by: PHYSICAL MEDICINE & REHABILITATION

## 2020-01-31 PROCEDURE — 3075F PR MOST RECENT SYSTOLIC BLOOD PRESS GE 130-139MM HG: ICD-10-PCS | Mod: CPTII,S$GLB,, | Performed by: PHYSICAL MEDICINE & REHABILITATION

## 2020-01-31 PROCEDURE — 99214 PR OFFICE/OUTPT VISIT, EST, LEVL IV, 30-39 MIN: ICD-10-PCS | Mod: S$GLB,,, | Performed by: PHYSICAL MEDICINE & REHABILITATION

## 2020-01-31 PROCEDURE — 99214 OFFICE O/P EST MOD 30 MIN: CPT | Mod: S$GLB,,, | Performed by: PHYSICAL MEDICINE & REHABILITATION

## 2020-01-31 PROCEDURE — 3008F PR BODY MASS INDEX (BMI) DOCUMENTED: ICD-10-PCS | Mod: CPTII,S$GLB,, | Performed by: PHYSICAL MEDICINE & REHABILITATION

## 2020-01-31 PROCEDURE — 99999 PR PBB SHADOW E&M-EST. PATIENT-LVL III: CPT | Mod: PBBFAC,,, | Performed by: PHYSICAL MEDICINE & REHABILITATION

## 2020-01-31 PROCEDURE — 3008F BODY MASS INDEX DOCD: CPT | Mod: CPTII,S$GLB,, | Performed by: PHYSICAL MEDICINE & REHABILITATION

## 2020-01-31 PROCEDURE — 3075F SYST BP GE 130 - 139MM HG: CPT | Mod: CPTII,S$GLB,, | Performed by: PHYSICAL MEDICINE & REHABILITATION

## 2020-01-31 RX ORDER — DIAZEPAM 5 MG/1
5 TABLET ORAL ONCE
Qty: 1 TABLET | Refills: 0 | Status: SHIPPED | OUTPATIENT
Start: 2020-01-31 | End: 2020-02-19

## 2020-01-31 RX ORDER — BACLOFEN 10 MG/1
10 TABLET ORAL 3 TIMES DAILY PRN
Qty: 90 TABLET | Refills: 2 | Status: SHIPPED | OUTPATIENT
Start: 2020-01-31 | End: 2020-03-16

## 2020-01-31 RX ORDER — BACLOFEN 10 MG/1
10 TABLET ORAL 3 TIMES DAILY PRN
Qty: 90 TABLET | Refills: 2 | Status: SHIPPED | OUTPATIENT
Start: 2020-01-31 | End: 2020-01-31 | Stop reason: SDUPTHER

## 2020-01-31 RX ORDER — DIPHENHYDRAMINE HCL 50 MG
CAPSULE ORAL
COMMUNITY
Start: 2019-12-30 | End: 2020-01-31

## 2020-01-31 RX ORDER — DIAZEPAM 5 MG/1
5 TABLET ORAL ONCE
Qty: 1 TABLET | Refills: 0 | Status: SHIPPED | OUTPATIENT
Start: 2020-01-31 | End: 2020-01-31 | Stop reason: SDUPTHER

## 2020-01-31 NOTE — PROGRESS NOTES
[facility]    Chief Complaint:   Chief Complaint   Patient presents with    Follow-up     pt did not complete PT      Initial HPI (10/11/19): Dalton Kent is a 54 y.o. male referred by Dr. Velazquez for back and neck pain.    Back pain is worse. Back pain began ~ 10 years ago. He has had 2 back surgeries. Back pain is localized to the left over the lateral iliac crest. No radiation into legs. He is unable to describe the quality of the pain, other than feeling deep. Worse with walking, cutting grass, standing, and slight flexion. Pain alleviated by nothing in particular. Left leg gets weak with cutting grass or doing dishes. Denies leg numbness.     He has had neck surgery with Dr. Ontiveros in 2015. He doesn't have neck pain but is having right arm pain and shooting pain into his small, ring and middle fingers and also pain in his tricep area. Pain described as shooting and shocking in the arm. Nothing particularly aggravates it, but did start while fishing and worse at night time. Nothing helps the pain. He did therapy recently.     Onset: Years  Location: Shoulder, arm, lower back left side, hip pain   Radiation: left leg and right arm  Quality: Aching, burning, throbbing, grabbing, tingling, deep, superficial, electric   Exacerbating Factors: exercise, lifting, sitting, standing and walking for more than 20 minutes  Alleviating Factors: nothing  Associated Symptoms: denies night fever/night sweats, urinary incontinence, bowel incontinence, significant weight loss, significant motor weakness and loss of sensations    Severity: Currently: 4/10   Typical Range: 7-8/10     Exacerbation: 8/10     Interval History (01/31/2020):  Dalton Kent returns today for follow up.  At the last clinic visit, referred to PT for neck and shoulder pain, performed right cervical TPIs, increased lyrica to 150 mg BID, referred to orthopedics for right shoulder pain.    In the interim, he saw Dr. Bradshaw,  Psych on 12/23/19 and review of notes from Dr. Bradshaw show he has increased cymbalta to 60 mg daily, continued remeron and stopped nortriptyline.     He is did not get into PT because he really wants to avoid this. He is taking lyrica, and he denies any side effects, and thinks it is helping.  Currently, the neck pain is getting worse. Pain going down into the right/shoulder. Pain worse at night. Pain is affecting sleep. Back pain is getting worse. Back pain bothers him more when he walks and gets pain in his legs when walks. No significant interval events or traumas. No change in bowel or bladder function, no saddle anesthesia, & no new weakness or numbness is reported.     Current Pain Scales:   Severity: Currently: 8/10   Typical Range: 8-9/10     Exacerbation: 9/10       Pain Disability Index  Family/Home Responsibilities:: 6  Recreation:: 6  Social Activity:: 5  Occupation:: 6  Sexual Behavior:: 6  Self Care:: 6  Life-Support Activities:: 4  Pain Disability Index (PDI): 39    Previous Interventions:  - 10/11/19: Lumbar TPIs: Did not think they were effective  - Previous spine injections with Dr. Jimmy Rosas in [place] prior to the back surgeries. Also with Dr. Honeycutt and Dr. Pulido.     Previous Therapies:  PT/OT: yes last was 3-4 years ago  Surgery: yes He has had C5-7 ACDF with Dr. Ontiveros in 2016. He has had 2 back surgeries. First back surgery was L5-S1 ALIF in [facility] with Dr. Sorin Thomas. He had above level L4-5 herniated disk and underwent second back surgery, L4-5 TLIF at [facility] with Dr. Isaias Ontiveros in May 2016. Right then left carpal tunnel release in 2017 w/ Dr. Marin Rosas in May 2017.   Previous Medications:   - NSAIDS: Cant take because of stomach ulcers  - Muscle Relaxants: Xanax. Klonopin. Ativan.   - TCAs: Nortriptyline 25 mg  - SNRIs: Not tried  - Topicals: Bengay, biofreeze not effective  - Anticonvulsants: Gabapentin (Gralise) caused worsening  depression, claustrophobia, trouble swallowing. Depakote.   - Opioids: None currently    Current Pain Medications:  1. Lyrica 150 mg BID  2. Tylenol  3. Cymbalta 60 mg daily.    4. Remeron    Blood Thinners: None    Full Medication List:    Current Outpatient Medications:     amlodipine-benazepril 5-10 mg (LOTREL) 5-10 mg per capsule, Take 1 capsule by mouth once daily., Disp: 90 capsule, Rfl: 1    DULoxetine (CYMBALTA) 60 MG capsule, TAKE 1 CAPSULE BY MOUTH EVERY DAY, Disp: 30 capsule, Rfl: 1    fluticasone (FLONASE) 50 mcg/actuation nasal spray, 1 TO 2 SPRAY(S) IN EACH NOSTRIL DAILY, Disp: , Rfl: 2    levothyroxine (SYNTHROID) 125 MCG tablet, Take 1 tablet (125 mcg total) by mouth once daily. (Patient taking differently: Take 112 mcg by mouth once daily. ), Disp: 90 tablet, Rfl: 1    MAGNESIUM ORAL, Take 500 mg by mouth once daily., Disp: , Rfl:     melatonin 5 mg Chew, Take 10 mg by mouth every evening. , Disp: , Rfl:     metoprolol succinate (TOPROL-XL) 25 MG 24 hr tablet, Take 25 mg by mouth once daily., Disp: , Rfl:     mirtazapine (REMERON) 30 MG tablet, Take 1 tablet (30 mg total) by mouth every evening., Disp: 90 tablet, Rfl: 0    Multivits/Iron Fum/FA/D3/Lycop (MULTI FOR HIM ORAL), Take by mouth., Disp: , Rfl:     pantoprazole (PROTONIX) 40 MG tablet, Take 1 tablet (40 mg total) by mouth once daily., Disp: 90 tablet, Rfl: 3    pregabalin (LYRICA) 150 MG capsule, Take 1 capsule (150 mg total) by mouth 2 (two) times daily., Disp: 60 capsule, Rfl: 3    rOPINIRole (REQUIP) 1 MG tablet, Take 1 tablet (1 mg total) by mouth every evening., Disp: 90 tablet, Rfl: 1    baclofen (LIORESAL) 10 MG tablet, Take 1 tablet (10 mg total) by mouth 3 (three) times daily as needed., Disp: 90 tablet, Rfl: 2    diazePAM (VALIUM) 5 MG tablet, Take 1 tablet (5 mg total) by mouth once. 45 minutes to 1 hour prior to MRI for procedural anxiety for 1 dose, Disp: 1 tablet, Rfl: 0     Review of Systems:  Review of Systems  "  Constitutional: Negative for fever and weight loss.   HENT: Positive for tinnitus. Negative for ear pain.    Eyes: Negative for pain and redness.   Respiratory: Negative for cough and shortness of breath.    Cardiovascular: Negative for chest pain and palpitations.   Gastrointestinal: Positive for heartburn. Negative for constipation.        GERD w/ NSAIDs   Genitourinary: Negative.         Denies urinary incontinence. Denies urine retention.    Musculoskeletal: Positive for back pain and neck pain.   Skin: Negative for itching and rash.   Neurological: Positive for tingling, weakness and headaches. Negative for seizures.   Endo/Heme/Allergies: Negative for environmental allergies. Does not bruise/bleed easily.   Psychiatric/Behavioral: Positive for depression. The patient is nervous/anxious and has insomnia.        Allergies:  Gralise [gabapentin]; Norco [hydrocodone-acetaminophen]; and Percocet [oxycodone-acetaminophen]     Medical History:   has a past medical history of Alcohol abuse, Anxiety, Depression, GERD (gastroesophageal reflux disease), Headache, psychiatric care, Hypertension, Hypothyroid, Lyme disease, Restless leg syndrome, Sleep difficulties, Therapy, and Uncontrolled REM sleep behavior disorder.    Surgical History:   has a past surgical history that includes Anal fistulotomy; Anal sphincterotomy; Appendectomy; Colonoscopy (2/2015); Back surgery; Neck surgery; Hand surgery (Bilateral); Carpal tunnel release; Trigger finger release; Nose surgery; and Colonoscopy (N/A, 8/29/2019).    Social History:   reports that he has never smoked. He has never used smokeless tobacco. He reports that he drinks alcohol. He reports that he does not use drugs.    Physical Exam:  BP (!) 132/91   Pulse 88   Resp 16   Ht 5' 6" (1.676 m)   Wt 98.4 kg (216 lb 13.2 oz)   BMI 35.00 kg/m²   GEN: No acute distress. Calm, comfortable  HENT: Normocephalic, atraumatic, moist mucous membranes  EYE: Anicteric sclera, " non-injected.   CV: Non-diaphoretic. Regular Rate. Radial Pulses 2+.  RESP: Breathing comfortably. Chest expansion symmetric.  EXT: No clubbing, cyanosis.   SKIN: Warm, & dry to palpation.  PSYCH: Pleasant mood and appropriate affect. Recent and remote memory intact.   GAIT: Independent, normal ambulation  Neck Exam:       Inspection: No erythema, bruising. Poor posture, hunched shoulders up into ears      Palpation: (+) TTP of cervical paraspinals and trapezius on the right.       ROM: Limitation in lateral rotation to either side.       Provocative Maneuvers:  (-) Spurling's bilaterally  Shoulder Exam:       Inspection: No erythema, bruising, surgical incisions      Palpation: No TTP of AC joint, subacromial area.       ROM: Bilateral abduction limitation to about 120 degrees. Pain with internal rotation  Neurologic Exam:     Alert. Speech is fluent and appropriate.     Cranial Nerves: Extra-ocular movements intact. Pupils equal. No strabismus. Face Symmetric. Jaw opens midline. Uvula midline. Tongue midline. Shoulder shrug symmetric.       Strength: 5/5 throughout bilateral upper extremities     Sensation: Grossly intact to light touch in bilateral upper extremities     Reflexes: 2+ in bilateral patella, Unable to elicit bilateral triceps, biceps, brachioradialis     Tone: No abnormality appreciated in bilateral upper or lower extremities     No Clonus     (-) Rosas bilaterally    Imaging:  - X-ray Shoulder 12/11/19:  The acromioclavicular and glenohumeral joint spaces are intact.  No fracture or dislocation is seen.  Cervical fusion hardware is identified    - EMG/NCS BUE 10/28/19:  Normal EMG/NCS of the arms.     - X-ray C-spine 10/11/19:  Prior multilevel fusion at C5-C6 and C6-C7.  Fusion screws appear intact and alignment is maintained.  No significant change in alignment with flexion or extension.  There is no plain film evidence of fracture or acute subluxation.  No prevertebral soft tissue swelling.   Minimal degenerative spurring along the anterior inferior endplate of C4.  No disc space narrowing at C2, C3 or C4    - X-ray L-spine 10/11/19:  Prior multilevel fusion at L4-L5 and L5-S1.  Surgical fixation hardware appears intact.  Alignment is maintained.  There is no significant change in alignment with flexion or extension.  Mild degenerative change involving L1 through L3 without significant focal disc space narrowing.  There is no evidence of an acute fracture.  Alignment is maintained.  No evidence of spondylolysis or spondylolisthesis    - MRI L-spine 02/06/2019:  Spinal Alignment: Normal.  Vertebrae: Posterior fusion hardware at L4-5 and anterior fusion hardware at L5-S1 are unchanged, with associated disc spacer devices at L4-5 and L5-S1.  Susceptibility artifact is present surrounding the hardware.  There are associated postsurgical findings of left L4 hemilaminectomy.  Mild susceptibility artifact surrounds the hardware.  1.5 cm hemangioma is present in the posterosuperior portion of the T12 vertebral body.  Discs: Mild diffuse desiccation.  Disc spacer devices are present at L5-4 5 and L5-S1.  Cord: Normal cord signal. Conus terminates normally at the L1-2 level.  Degenerative findings: Minimal circumferential disc bulge at L2-3 with minimal bilateral facet arthropathy and minimal facet effusions in conjunction with mild buckling of the ligamentum flavum does not result in significant compressive phenomena.  Mild circumferential disc bulge at L3-4 with mild bilateral facet arthropathy and buckling of the ligamentum flavum results in mild spinal canal stenosis and mild to moderate foraminal stenoses.  Spinal canal is posteriorly decompressed at L4-5; right neural foramen is widely patent while there is apparent moderate narrowing of the left neural foramen related to either postoperative granulation tissue along the inferior neural foramen or disc material.  Mild bilateral facet arthropathy at L5-S1  results in at most mild foraminal stenoses.  Paraspinal muscles & soft tissues: Normal    - EMG/NCS BLE 2/25/19:  Impression: Normal EMG of the legs, although his study was mildly limited due to inability to sample lumbar paraspinal muscles given his prior lumbar surgical scarring.     - Outside Shoulder MRI Right 2/22/19:  Impression:   1. Degenerative arthropathy at AC joint  2. Pericapsular soft tissue swelling and periosteal soft tissue swelling about the AC joint and distal clavicle. A low-grade partial tear of the trapezius muscle at the insertion upon the clavicle and a low-grade edema or strain of the anterior head of deltoid muscle  3. Suspect a sprain of the coracoclavicular ligaments, which are not torn  4. TEndinosis of the supraspinatus without a tear  5. Slight fraying of the labrum    - EMG/NCS (outside records) with bilateral C6 radiculopathy and bilateral carpal tunnel syndrome     Labs:  Lab Results   Component Value Date     08/14/2019    K 4.9 08/14/2019     08/14/2019    CO2 32 (H) 08/14/2019    BUN 18 08/14/2019    CREATININE 1.2 08/14/2019    CALCIUM 9.4 08/14/2019    ANIONGAP 9 08/14/2019    ESTGFRAFRICA >60 08/14/2019    EGFRNONAA >60 08/14/2019     Lab Results   Component Value Date    ALT 22 08/14/2019    AST 17 08/14/2019    ALKPHOS 65 08/14/2019    BILITOT 0.4 08/14/2019     Lab Results   Component Value Date     08/14/2019       Assessment:  Dalton Kent is a 54 y.o. male with the following diagnoses based on history, exam, and imaging:    Problem List Items Addressed This Visit     None      Visit Diagnoses     Cervical radiculopathy    -  Primary    Relevant Medications    baclofen (LIORESAL) 10 MG tablet    Other Relevant Orders    MRI Cervical Spine Without Contrast    S/P cervical spinal fusion        Relevant Medications    baclofen (LIORESAL) 10 MG tablet    Other Relevant Orders    MRI Cervical Spine Without Contrast    Myofascial pain         Relevant Medications    baclofen (LIORESAL) 10 MG tablet    Other Relevant Orders    MRI Cervical Spine Without Contrast    Neck pain, chronic        Relevant Medications    baclofen (LIORESAL) 10 MG tablet    Other Relevant Orders    MRI Cervical Spine Without Contrast    Situational anxiety        Relevant Medications    diazePAM (VALIUM) 5 MG tablet          This is a pleasant 54 y.o. gentleman with chronic neck and back pain.  He has had both neck and back surgeries but has persistent pain.     His back pain appears to be multifactorial in nature, with contributions from myofascial pain, lumbar spondylosis and possible discogenic pain.     His neck pain, also appears multifactorial. MRI with tear in trapezius, which may be contributing to the pain, as I do not think it is coming from the RTC based on exam. Muscular aspects of the pain and tenderness improved with TPI. Pain likely mostly radicular from cervical spine.     Treatment Plan: I discussed with the patient the following assessment and recommendations. The following is the plan the patient agreed upon:  - PT/OT/HEP: Encouraged him to start for PT, external for neck, and shoulder pain.  - Procedures:   - Scheduled for C7-T1 IL ANURAG after cervical MRI. I have explained the risks, benefits, and alternatives of the procedure in detail. The patient voices understanding and all questions have been answered. The patient agrees to proceed as planned.  - Consider future possible caudal epidural steroid injection, medial branch blocks above level of fusion in the lumbar spine, and even possible spinal cord stimulation in the future as well.  - Medications:   - Cont Lyrica to 150 mg twice daily.  Lyrica seems to be helping (PDI improved from 47 to 40) pain and may also help with his anxiety.  - Continue Cymbalta 60 mg daily.   - Start baclofen 10 mg TID. Titration provided in writing for patient.   - Imaging: Order cervical MRI today.   - Labs: Reviewed.   Medications are appropriately dosed for current hepatorenal function.    Follow Up: RTC after procedure.    Tori Prado M.D.  Interventional Pain Medicine / Physical Medicine & Rehabilitation    Disclaimer: This note was partly generated using dictation software which may occasionally result in transcription errors.

## 2020-01-31 NOTE — PATIENT INSTRUCTIONS
We are starting baclofen 10 mg for pain. Start with 1 tablet at night for 1 week as needed. Then you may increase to 1 tablet in the day and 1 tablet at night as needed for 1 week. Then you may increase to 1 tablet up to 3 times per day. Do not stop suddenly, as it may cause withdrawal. Main side effect of this medication is sedation.       Consider starting Steven Chi.

## 2020-01-31 NOTE — H&P (VIEW-ONLY)
[facility]    Chief Complaint:   Chief Complaint   Patient presents with    Follow-up     pt did not complete PT      Initial HPI (10/11/19): Dalton Kent is a 54 y.o. male referred by Dr. Velazquez for back and neck pain.    Back pain is worse. Back pain began ~ 10 years ago. He has had 2 back surgeries. Back pain is localized to the left over the lateral iliac crest. No radiation into legs. He is unable to describe the quality of the pain, other than feeling deep. Worse with walking, cutting grass, standing, and slight flexion. Pain alleviated by nothing in particular. Left leg gets weak with cutting grass or doing dishes. Denies leg numbness.     He has had neck surgery with Dr. Ontiveros in 2015. He doesn't have neck pain but is having right arm pain and shooting pain into his small, ring and middle fingers and also pain in his tricep area. Pain described as shooting and shocking in the arm. Nothing particularly aggravates it, but did start while fishing and worse at night time. Nothing helps the pain. He did therapy recently.     Onset: Years  Location: Shoulder, arm, lower back left side, hip pain   Radiation: left leg and right arm  Quality: Aching, burning, throbbing, grabbing, tingling, deep, superficial, electric   Exacerbating Factors: exercise, lifting, sitting, standing and walking for more than 20 minutes  Alleviating Factors: nothing  Associated Symptoms: denies night fever/night sweats, urinary incontinence, bowel incontinence, significant weight loss, significant motor weakness and loss of sensations    Severity: Currently: 4/10   Typical Range: 7-8/10     Exacerbation: 8/10     Interval History (01/31/2020):  Dalton Kent returns today for follow up.  At the last clinic visit, referred to PT for neck and shoulder pain, performed right cervical TPIs, increased lyrica to 150 mg BID, referred to orthopedics for right shoulder pain.    In the interim, he saw Dr. Bradshaw,  Psych on 12/23/19 and review of notes from Dr. Bradshaw show he has increased cymbalta to 60 mg daily, continued remeron and stopped nortriptyline.     He is did not get into PT because he really wants to avoid this. He is taking lyrica, and he denies any side effects, and thinks it is helping.  Currently, the neck pain is getting worse. Pain going down into the right/shoulder. Pain worse at night. Pain is affecting sleep. Back pain is getting worse. Back pain bothers him more when he walks and gets pain in his legs when walks. No significant interval events or traumas. No change in bowel or bladder function, no saddle anesthesia, & no new weakness or numbness is reported.     Current Pain Scales:   Severity: Currently: 8/10   Typical Range: 8-9/10     Exacerbation: 9/10       Pain Disability Index  Family/Home Responsibilities:: 6  Recreation:: 6  Social Activity:: 5  Occupation:: 6  Sexual Behavior:: 6  Self Care:: 6  Life-Support Activities:: 4  Pain Disability Index (PDI): 39    Previous Interventions:  - 10/11/19: Lumbar TPIs: Did not think they were effective  - Previous spine injections with Dr. Jimmy Rosas in [place] prior to the back surgeries. Also with Dr. Honeycutt and Dr. Pulido.     Previous Therapies:  PT/OT: yes last was 3-4 years ago  Surgery: yes He has had C5-7 ACDF with Dr. Ontiveros in 2016. He has had 2 back surgeries. First back surgery was L5-S1 ALIF in [facility] with Dr. Sorin Thomas. He had above level L4-5 herniated disk and underwent second back surgery, L4-5 TLIF at [facility] with Dr. Isaias Ontiveros in May 2016. Right then left carpal tunnel release in 2017 w/ Dr. Marin Rosas in May 2017.   Previous Medications:   - NSAIDS: Cant take because of stomach ulcers  - Muscle Relaxants: Xanax. Klonopin. Ativan.   - TCAs: Nortriptyline 25 mg  - SNRIs: Not tried  - Topicals: Bengay, biofreeze not effective  - Anticonvulsants: Gabapentin (Gralise) caused worsening  depression, claustrophobia, trouble swallowing. Depakote.   - Opioids: None currently    Current Pain Medications:  1. Lyrica 150 mg BID  2. Tylenol  3. Cymbalta 60 mg daily.    4. Remeron    Blood Thinners: None    Full Medication List:    Current Outpatient Medications:     amlodipine-benazepril 5-10 mg (LOTREL) 5-10 mg per capsule, Take 1 capsule by mouth once daily., Disp: 90 capsule, Rfl: 1    DULoxetine (CYMBALTA) 60 MG capsule, TAKE 1 CAPSULE BY MOUTH EVERY DAY, Disp: 30 capsule, Rfl: 1    fluticasone (FLONASE) 50 mcg/actuation nasal spray, 1 TO 2 SPRAY(S) IN EACH NOSTRIL DAILY, Disp: , Rfl: 2    levothyroxine (SYNTHROID) 125 MCG tablet, Take 1 tablet (125 mcg total) by mouth once daily. (Patient taking differently: Take 112 mcg by mouth once daily. ), Disp: 90 tablet, Rfl: 1    MAGNESIUM ORAL, Take 500 mg by mouth once daily., Disp: , Rfl:     melatonin 5 mg Chew, Take 10 mg by mouth every evening. , Disp: , Rfl:     metoprolol succinate (TOPROL-XL) 25 MG 24 hr tablet, Take 25 mg by mouth once daily., Disp: , Rfl:     mirtazapine (REMERON) 30 MG tablet, Take 1 tablet (30 mg total) by mouth every evening., Disp: 90 tablet, Rfl: 0    Multivits/Iron Fum/FA/D3/Lycop (MULTI FOR HIM ORAL), Take by mouth., Disp: , Rfl:     pantoprazole (PROTONIX) 40 MG tablet, Take 1 tablet (40 mg total) by mouth once daily., Disp: 90 tablet, Rfl: 3    pregabalin (LYRICA) 150 MG capsule, Take 1 capsule (150 mg total) by mouth 2 (two) times daily., Disp: 60 capsule, Rfl: 3    rOPINIRole (REQUIP) 1 MG tablet, Take 1 tablet (1 mg total) by mouth every evening., Disp: 90 tablet, Rfl: 1    baclofen (LIORESAL) 10 MG tablet, Take 1 tablet (10 mg total) by mouth 3 (three) times daily as needed., Disp: 90 tablet, Rfl: 2    diazePAM (VALIUM) 5 MG tablet, Take 1 tablet (5 mg total) by mouth once. 45 minutes to 1 hour prior to MRI for procedural anxiety for 1 dose, Disp: 1 tablet, Rfl: 0     Review of Systems:  Review of Systems  "  Constitutional: Negative for fever and weight loss.   HENT: Positive for tinnitus. Negative for ear pain.    Eyes: Negative for pain and redness.   Respiratory: Negative for cough and shortness of breath.    Cardiovascular: Negative for chest pain and palpitations.   Gastrointestinal: Positive for heartburn. Negative for constipation.        GERD w/ NSAIDs   Genitourinary: Negative.         Denies urinary incontinence. Denies urine retention.    Musculoskeletal: Positive for back pain and neck pain.   Skin: Negative for itching and rash.   Neurological: Positive for tingling, weakness and headaches. Negative for seizures.   Endo/Heme/Allergies: Negative for environmental allergies. Does not bruise/bleed easily.   Psychiatric/Behavioral: Positive for depression. The patient is nervous/anxious and has insomnia.        Allergies:  Gralise [gabapentin]; Norco [hydrocodone-acetaminophen]; and Percocet [oxycodone-acetaminophen]     Medical History:   has a past medical history of Alcohol abuse, Anxiety, Depression, GERD (gastroesophageal reflux disease), Headache, psychiatric care, Hypertension, Hypothyroid, Lyme disease, Restless leg syndrome, Sleep difficulties, Therapy, and Uncontrolled REM sleep behavior disorder.    Surgical History:   has a past surgical history that includes Anal fistulotomy; Anal sphincterotomy; Appendectomy; Colonoscopy (2/2015); Back surgery; Neck surgery; Hand surgery (Bilateral); Carpal tunnel release; Trigger finger release; Nose surgery; and Colonoscopy (N/A, 8/29/2019).    Social History:   reports that he has never smoked. He has never used smokeless tobacco. He reports that he drinks alcohol. He reports that he does not use drugs.    Physical Exam:  BP (!) 132/91   Pulse 88   Resp 16   Ht 5' 6" (1.676 m)   Wt 98.4 kg (216 lb 13.2 oz)   BMI 35.00 kg/m²   GEN: No acute distress. Calm, comfortable  HENT: Normocephalic, atraumatic, moist mucous membranes  EYE: Anicteric sclera, " non-injected.   CV: Non-diaphoretic. Regular Rate. Radial Pulses 2+.  RESP: Breathing comfortably. Chest expansion symmetric.  EXT: No clubbing, cyanosis.   SKIN: Warm, & dry to palpation.  PSYCH: Pleasant mood and appropriate affect. Recent and remote memory intact.   GAIT: Independent, normal ambulation  Neck Exam:       Inspection: No erythema, bruising. Poor posture, hunched shoulders up into ears      Palpation: (+) TTP of cervical paraspinals and trapezius on the right.       ROM: Limitation in lateral rotation to either side.       Provocative Maneuvers:  (-) Spurling's bilaterally  Shoulder Exam:       Inspection: No erythema, bruising, surgical incisions      Palpation: No TTP of AC joint, subacromial area.       ROM: Bilateral abduction limitation to about 120 degrees. Pain with internal rotation  Neurologic Exam:     Alert. Speech is fluent and appropriate.     Cranial Nerves: Extra-ocular movements intact. Pupils equal. No strabismus. Face Symmetric. Jaw opens midline. Uvula midline. Tongue midline. Shoulder shrug symmetric.       Strength: 5/5 throughout bilateral upper extremities     Sensation: Grossly intact to light touch in bilateral upper extremities     Reflexes: 2+ in bilateral patella, Unable to elicit bilateral triceps, biceps, brachioradialis     Tone: No abnormality appreciated in bilateral upper or lower extremities     No Clonus     (-) Rosas bilaterally    Imaging:  - X-ray Shoulder 12/11/19:  The acromioclavicular and glenohumeral joint spaces are intact.  No fracture or dislocation is seen.  Cervical fusion hardware is identified    - EMG/NCS BUE 10/28/19:  Normal EMG/NCS of the arms.     - X-ray C-spine 10/11/19:  Prior multilevel fusion at C5-C6 and C6-C7.  Fusion screws appear intact and alignment is maintained.  No significant change in alignment with flexion or extension.  There is no plain film evidence of fracture or acute subluxation.  No prevertebral soft tissue swelling.   Minimal degenerative spurring along the anterior inferior endplate of C4.  No disc space narrowing at C2, C3 or C4    - X-ray L-spine 10/11/19:  Prior multilevel fusion at L4-L5 and L5-S1.  Surgical fixation hardware appears intact.  Alignment is maintained.  There is no significant change in alignment with flexion or extension.  Mild degenerative change involving L1 through L3 without significant focal disc space narrowing.  There is no evidence of an acute fracture.  Alignment is maintained.  No evidence of spondylolysis or spondylolisthesis    - MRI L-spine 02/06/2019:  Spinal Alignment: Normal.  Vertebrae: Posterior fusion hardware at L4-5 and anterior fusion hardware at L5-S1 are unchanged, with associated disc spacer devices at L4-5 and L5-S1.  Susceptibility artifact is present surrounding the hardware.  There are associated postsurgical findings of left L4 hemilaminectomy.  Mild susceptibility artifact surrounds the hardware.  1.5 cm hemangioma is present in the posterosuperior portion of the T12 vertebral body.  Discs: Mild diffuse desiccation.  Disc spacer devices are present at L5-4 5 and L5-S1.  Cord: Normal cord signal. Conus terminates normally at the L1-2 level.  Degenerative findings: Minimal circumferential disc bulge at L2-3 with minimal bilateral facet arthropathy and minimal facet effusions in conjunction with mild buckling of the ligamentum flavum does not result in significant compressive phenomena.  Mild circumferential disc bulge at L3-4 with mild bilateral facet arthropathy and buckling of the ligamentum flavum results in mild spinal canal stenosis and mild to moderate foraminal stenoses.  Spinal canal is posteriorly decompressed at L4-5; right neural foramen is widely patent while there is apparent moderate narrowing of the left neural foramen related to either postoperative granulation tissue along the inferior neural foramen or disc material.  Mild bilateral facet arthropathy at L5-S1  results in at most mild foraminal stenoses.  Paraspinal muscles & soft tissues: Normal    - EMG/NCS BLE 2/25/19:  Impression: Normal EMG of the legs, although his study was mildly limited due to inability to sample lumbar paraspinal muscles given his prior lumbar surgical scarring.     - Outside Shoulder MRI Right 2/22/19:  Impression:   1. Degenerative arthropathy at AC joint  2. Pericapsular soft tissue swelling and periosteal soft tissue swelling about the AC joint and distal clavicle. A low-grade partial tear of the trapezius muscle at the insertion upon the clavicle and a low-grade edema or strain of the anterior head of deltoid muscle  3. Suspect a sprain of the coracoclavicular ligaments, which are not torn  4. TEndinosis of the supraspinatus without a tear  5. Slight fraying of the labrum    - EMG/NCS (outside records) with bilateral C6 radiculopathy and bilateral carpal tunnel syndrome     Labs:  Lab Results   Component Value Date     08/14/2019    K 4.9 08/14/2019     08/14/2019    CO2 32 (H) 08/14/2019    BUN 18 08/14/2019    CREATININE 1.2 08/14/2019    CALCIUM 9.4 08/14/2019    ANIONGAP 9 08/14/2019    ESTGFRAFRICA >60 08/14/2019    EGFRNONAA >60 08/14/2019     Lab Results   Component Value Date    ALT 22 08/14/2019    AST 17 08/14/2019    ALKPHOS 65 08/14/2019    BILITOT 0.4 08/14/2019     Lab Results   Component Value Date     08/14/2019       Assessment:  Dalton Kent is a 54 y.o. male with the following diagnoses based on history, exam, and imaging:    Problem List Items Addressed This Visit     None      Visit Diagnoses     Cervical radiculopathy    -  Primary    Relevant Medications    baclofen (LIORESAL) 10 MG tablet    Other Relevant Orders    MRI Cervical Spine Without Contrast    S/P cervical spinal fusion        Relevant Medications    baclofen (LIORESAL) 10 MG tablet    Other Relevant Orders    MRI Cervical Spine Without Contrast    Myofascial pain         Relevant Medications    baclofen (LIORESAL) 10 MG tablet    Other Relevant Orders    MRI Cervical Spine Without Contrast    Neck pain, chronic        Relevant Medications    baclofen (LIORESAL) 10 MG tablet    Other Relevant Orders    MRI Cervical Spine Without Contrast    Situational anxiety        Relevant Medications    diazePAM (VALIUM) 5 MG tablet          This is a pleasant 54 y.o. gentleman with chronic neck and back pain.  He has had both neck and back surgeries but has persistent pain.     His back pain appears to be multifactorial in nature, with contributions from myofascial pain, lumbar spondylosis and possible discogenic pain.     His neck pain, also appears multifactorial. MRI with tear in trapezius, which may be contributing to the pain, as I do not think it is coming from the RTC based on exam. Muscular aspects of the pain and tenderness improved with TPI. Pain likely mostly radicular from cervical spine.     Treatment Plan: I discussed with the patient the following assessment and recommendations. The following is the plan the patient agreed upon:  - PT/OT/HEP: Encouraged him to start for PT, external for neck, and shoulder pain.  - Procedures:   - Scheduled for C7-T1 IL ANURAG after cervical MRI. I have explained the risks, benefits, and alternatives of the procedure in detail. The patient voices understanding and all questions have been answered. The patient agrees to proceed as planned.  - Consider future possible caudal epidural steroid injection, medial branch blocks above level of fusion in the lumbar spine, and even possible spinal cord stimulation in the future as well.  - Medications:   - Cont Lyrica to 150 mg twice daily.  Lyrica seems to be helping (PDI improved from 47 to 40) pain and may also help with his anxiety.  - Continue Cymbalta 60 mg daily.   - Start baclofen 10 mg TID. Titration provided in writing for patient.   - Imaging: Order cervical MRI today.   - Labs: Reviewed.   Medications are appropriately dosed for current hepatorenal function.    Follow Up: RTC after procedure.    Tori Prado M.D.  Interventional Pain Medicine / Physical Medicine & Rehabilitation    Disclaimer: This note was partly generated using dictation software which may occasionally result in transcription errors.

## 2020-02-07 ENCOUNTER — HOSPITAL ENCOUNTER (OUTPATIENT)
Dept: RADIOLOGY | Facility: HOSPITAL | Age: 55
Discharge: HOME | End: 2020-02-07
Attending: PHYSICAL MEDICINE & REHABILITATION
Payer: MEDICARE

## 2020-02-07 ENCOUNTER — TELEPHONE (OUTPATIENT)
Dept: PAIN MEDICINE | Facility: CLINIC | Age: 55
End: 2020-02-07

## 2020-02-07 DIAGNOSIS — M79.18 MYOFASCIAL PAIN: ICD-10-CM

## 2020-02-07 DIAGNOSIS — M54.2 NECK PAIN, CHRONIC: ICD-10-CM

## 2020-02-07 DIAGNOSIS — Z98.1 S/P CERVICAL SPINAL FUSION: ICD-10-CM

## 2020-02-07 DIAGNOSIS — M54.12 CERVICAL RADICULOPATHY: ICD-10-CM

## 2020-02-07 DIAGNOSIS — M54.12 CERVICAL RADICULOPATHY: Primary | ICD-10-CM

## 2020-02-07 DIAGNOSIS — G89.29 NECK PAIN, CHRONIC: ICD-10-CM

## 2020-02-07 PROCEDURE — 72141 MRI NECK SPINE W/O DYE: CPT | Mod: TC

## 2020-02-07 PROCEDURE — 72141 MRI CERVICAL SPINE WITHOUT CONTRAST: ICD-10-PCS | Mod: 26,,, | Performed by: RADIOLOGY

## 2020-02-07 PROCEDURE — 72141 MRI NECK SPINE W/O DYE: CPT | Mod: 26,,, | Performed by: RADIOLOGY

## 2020-02-13 ENCOUNTER — PATIENT MESSAGE (OUTPATIENT)
Dept: PSYCHIATRY | Facility: CLINIC | Age: 55
End: 2020-02-13

## 2020-02-13 DIAGNOSIS — E61.1 IRON DEFICIENCY: Primary | ICD-10-CM

## 2020-02-19 ENCOUNTER — OFFICE VISIT (OUTPATIENT)
Dept: PSYCHIATRY | Facility: CLINIC | Age: 55
End: 2020-02-19
Payer: COMMERCIAL

## 2020-02-19 VITALS
DIASTOLIC BLOOD PRESSURE: 90 MMHG | HEART RATE: 108 BPM | WEIGHT: 219 LBS | BODY MASS INDEX: 35.2 KG/M2 | RESPIRATION RATE: 18 BRPM | SYSTOLIC BLOOD PRESSURE: 156 MMHG | HEIGHT: 66 IN

## 2020-02-19 DIAGNOSIS — G47.33 OBSTRUCTIVE SLEEP APNEA (ADULT) (PEDIATRIC): ICD-10-CM

## 2020-02-19 DIAGNOSIS — F33.2 SEVERE RECURRENT MAJOR DEPRESSION WITHOUT PSYCHOTIC FEATURES: ICD-10-CM

## 2020-02-19 DIAGNOSIS — G25.81 RESTLESS LEGS SYNDROME: ICD-10-CM

## 2020-02-19 DIAGNOSIS — G47.52 REM SLEEP BEHAVIOR DISORDER: Primary | ICD-10-CM

## 2020-02-19 DIAGNOSIS — F41.9 ANXIETY: ICD-10-CM

## 2020-02-19 DIAGNOSIS — F10.20 ALCOHOL USE DISORDER, MODERATE, DEPENDENCE: ICD-10-CM

## 2020-02-19 DIAGNOSIS — R41.89 PSEUDODEMENTIA: ICD-10-CM

## 2020-02-19 PROCEDURE — 3077F SYST BP >= 140 MM HG: CPT | Mod: CPTII,S$GLB,, | Performed by: PSYCHIATRY & NEUROLOGY

## 2020-02-19 PROCEDURE — 3080F PR MOST RECENT DIASTOLIC BLOOD PRESSURE >= 90 MM HG: ICD-10-PCS | Mod: CPTII,S$GLB,, | Performed by: PSYCHIATRY & NEUROLOGY

## 2020-02-19 PROCEDURE — 99215 OFFICE O/P EST HI 40 MIN: CPT | Mod: S$GLB,,, | Performed by: PSYCHIATRY & NEUROLOGY

## 2020-02-19 PROCEDURE — 3008F BODY MASS INDEX DOCD: CPT | Mod: CPTII,S$GLB,, | Performed by: PSYCHIATRY & NEUROLOGY

## 2020-02-19 PROCEDURE — 3008F PR BODY MASS INDEX (BMI) DOCUMENTED: ICD-10-PCS | Mod: CPTII,S$GLB,, | Performed by: PSYCHIATRY & NEUROLOGY

## 2020-02-19 PROCEDURE — 99999 PR PBB SHADOW E&M-EST. PATIENT-LVL IV: CPT | Mod: PBBFAC,,, | Performed by: PSYCHIATRY & NEUROLOGY

## 2020-02-19 PROCEDURE — 3080F DIAST BP >= 90 MM HG: CPT | Mod: CPTII,S$GLB,, | Performed by: PSYCHIATRY & NEUROLOGY

## 2020-02-19 PROCEDURE — 99999 PR PBB SHADOW E&M-EST. PATIENT-LVL IV: ICD-10-PCS | Mod: PBBFAC,,, | Performed by: PSYCHIATRY & NEUROLOGY

## 2020-02-19 PROCEDURE — 99215 PR OFFICE/OUTPT VISIT, EST, LEVL V, 40-54 MIN: ICD-10-PCS | Mod: S$GLB,,, | Performed by: PSYCHIATRY & NEUROLOGY

## 2020-02-19 PROCEDURE — 3077F PR MOST RECENT SYSTOLIC BLOOD PRESSURE >= 140 MM HG: ICD-10-PCS | Mod: CPTII,S$GLB,, | Performed by: PSYCHIATRY & NEUROLOGY

## 2020-02-19 RX ORDER — NORTRIPTYLINE HYDROCHLORIDE 25 MG/1
25 CAPSULE ORAL DAILY
COMMUNITY
Start: 2020-02-01 | End: 2020-02-19 | Stop reason: ALTCHOICE

## 2020-02-19 RX ORDER — MIRTAZAPINE 45 MG/1
45 TABLET, FILM COATED ORAL NIGHTLY
Qty: 90 TABLET | Refills: 1
Start: 2020-02-19 | End: 2020-02-19 | Stop reason: SDUPTHER

## 2020-02-19 RX ORDER — MIRTAZAPINE 45 MG/1
45 TABLET, FILM COATED ORAL NIGHTLY
Qty: 90 TABLET | Refills: 1
Start: 2020-02-19 | End: 2020-03-16

## 2020-02-19 NOTE — PROGRESS NOTES
"Outpatient Psychiatry Follow-Up Visit (MD/NP)    2/19/2020    Clinical Status of Patient:  Outpatient (Ambulatory)    Chief Complaint:  Dalton Kent is a 54 y.o. male who presents today for follow-up of depression.  Met with patient.      Interval History and Content of Current Session:  Interim Events/Subjective Report/Content of Current Session:     The patient was seen and examined. His chart was reviewed. Reviewed notes by Tori Prado MD at 1/31/2020  2:03 PM     The patient has been compliant with treatment. He denied any side effects. He reports good tolerability and efficacy.     No new psychosocial stressors have occurred since his last appointment. The patient has a supportive wife- the marriage is stable. His 2 daughters are doing well. He is on SSI-disability. He tries to stay active around the house and fish when able. Finances are stable. Housing is stable.     No new medical issues have occurred since his last appointment. He had some medication changes since last seen. He is seeing pain speciality- baclofen has been started.     "Ok.. Just having trouble with sleep." He reports that symptoms adequately managed since his last appointment with noted improvement. Daytime fatigue remains problematic, and exacerbated.    Improved/controlled Symptoms of Depression: denied diminished mood or loss of interest/anhedonia; less irritability, +diminished energy, +change in sleep, no change in appetite, no diminished concentration or cognition or indecisiveness, no PMA/R, no excessive guilt or hopelessness or worthlessness, no suicidal ideations     Continued but less Changes in Sleep: some trouble with initiation/maintenance, no early morning awakening with inability to return to sleep, no hypersomnolence; pt reports that he still moves in his sleep and does not feel rested in the AM (sleep study in 7/2019 reports REM movement sleep disorder); he continues having sleep issues with snoring- he was " recently diagnose with ASAD and is now on CPAP; he continues to have REM movement issues which are primarily occurring during daytime naps. RLS has improved.      Denied Suicidal/Homicidal ideations: no active/passive ideations, organized plans, or future intentions     Denied Symptoms of nestor or hypomania: no elevated, expansive, or irritable mood with no increased energy or activity; with no inflated self-esteem or grandiosity, decreased need for sleep, increased rate of speech, FOI or racing thoughts, distractibility, increased goal directed activity or PMA, or risky/disinhibited behavior    Denied Symptoms of psychosis: no hallucinations, delusions, disorganized thinking, disorganized behavior or abnormal motor behavior, or negative symptoms    Improving Symptoms of LEANNE: less excessive anxiety/worry/fear,  with less restlessness, less fatigue, no poor concentration, less irritability, less muscle tension, less sleep disturbance; +causes functionally impairing distress; no panic attacks; without agoraphobia     Alcohol use: pt reports no usage since he was hospitalized in 7/2019; he had no lapses since last seen. He has had cravings to drink    Memory problems: continue unchanged (no change since stopping klonopin); possibly in part iatrogenic with noted polypharmacy     Previous medication trials include: Depakote, nortriptyline, prazosin, Lexapro,  Xanax, klonopin, ativan, remeron    Collateral from his wife reports that he has been having more sleep related pathology, particularly during daytime naps.     Psychotherapy:  · Target symptoms: depression, anxiety   · Why chosen therapy is appropriate versus another modality: relevant to diagnosis  · Outcome monitoring methods: self-report, observation  · Therapeutic intervention type: behavior modifying psychotherapy, supportive psychotherapy  · Topics discussed/themes: difficulty managing affect in interpersonal relationships, building skills sets for symptom  "management, symptom recognition  · The patient's response to the intervention is accepting. The patient's progress toward treatment goals is good.   · Duration of intervention: 5 minutes.    Review of Systems   Review of systems  General ROS: negative  Ophthalmic ROS: negative  ENT ROS: negative  Allergy and Immunology ROS: negative  Hematological and Lymphatic ROS: negative  Endocrine ROS: negative  Respiratory ROS: no cough, shortness of breath, or wheezing  Cardiovascular ROS: no chest pain or dyspnea on exertion  Gastrointestinal ROS: no abdominal pain, change in bowel habits, or black or bloody stools  Genito-Urinary ROS: no dysuria, trouble voiding, or hematuria  Musculoskeletal ROS: positive for chronic back pain and leg weakness   Neurological ROS: no TIA or stroke symptoms  negative  Dermatological ROS: negative      Past Medical, Family and Social History: The patient's past medical, family and social history have been reviewed and updated as appropriate within the electronic medical record - see encounter notes.    Compliance: yes    Side effects: None    Risk Parameters:  Patient reports no suicidal ideation  Patient reports no homicidal ideation  Patient reports no self-injurious behavior  Patient reports no violent behavior    Exam (detailed: at least 9 elements; comprehensive: all 15 elements)   Constitutional  Vitals:  Most recent vital signs, dated less than 90 days prior to this appointment, were reviewed.   Vitals:    02/19/20 1114 02/19/20 1121   BP: (!) 156/92 (!) 156/90   Pulse: 108 108   Resp: 18 18   Weight: 99.3 kg (219 lb 0.4 oz) 99.3 kg (219 lb 0.4 oz)   Height: 5' 6" (1.676 m) 5' 6" (1.676 m)     Body mass index is 35.35 kg/m².       General:  unremarkable, age appropriate     Musculoskeletal  Muscle Strength/Tone:  no spasicity, no rigidity   Gait & Station:  non-ataxic     Psychiatric  Speech:  no latency; no press   Mood & Affect:  steady, euthymic"ok"  congruent and appropriate "   Thought Process:  normal and logical   Associations:  intact   Thought Content:  normal, no suicidality, no homicidality, delusions, or paranoia   Insight:  intact, has awareness of illness   Judgement: behavior is adequate to circumstances   Orientation:  grossly intact, person, place, situation, time/date, day of week, month of year, year   Memory: intact for content of interview, able to remember recent events- yes, able to remember remote events- yes   Language: grossly intact, able to name, able to repeat   Attention Span & Concentration:  able to focus, completed tasks   Fund of Knowledge:  intact and appropriate to age and level of education, familiar with aspects of current personal life     Assessment and Diagnosis   Status/Progress: Based on the examination today, the patient's problem(s) is/are adequately but not ideally controlled.  New problems have been presented today.   Co-morbidities are not complicating management of the primary condition.  There are no active rule-out diagnoses for this patient at this time.     General Impression:     MDD, in partial remission  LEANNE  Pseudodementia/Memory loss  REM Movement Sleep disorders  Chronic Fatigue    Alcohol use disorder, in early full remission    RLS  ASAD  REMS   HTN  HLD  Chronic Headaches  GERD  Microcytic Anemia  Obese: chronic back pain      Intervention/Counseling/Treatment Plan     Medications:     Depression/Anxiety:  Conintue Cymbalta to 60 mg po q day in 1 week   Continue Remeron 45 mg po QHS- increase to 45 mg (Pt reportedly taking 30 mg per the chart)  Lyrica off-label for anxiety     Cognitive Impairment:  Likely pseudodemtnia per neurocognitive evaluation or in part iatrogenic; continuee to monitor     Anxiety:  Cymblata/remeron as above  Lyrica off-label for anxiety  Previously taperred off of clonazepam     REM movement sleep disorder  Consider re-trial of klonopin (caution given daytime fatigue and possibility of worsening  ASAD)  Remeron as above    ASAD:   -pt counseled; continue CPAP    Chronic Fatigue:  Likey secondary to ASAD  -consider trial of modafinil    Microcytosis:  Labs reviewed, secondary to iron deficiency anemia    Alcohol Use Disorder:  Pt counseled; encourage sustained remission    RLS:   Continue requip 1 mg po q HS- consider further optimizing  +iron deficiency- treated and improved    Hypothyroidism:   TSH normalized;   -continue synthroid 125 mcg po q AM-     Iron deficiency:  Stop Iron 325 mg po BID- levels normalized  -pt will take MVI with Iron   -recheck 6 months    Chronic Headaches:  Cymbalta as above    Discussed diagnosis, risks and benefits of proposed treatment vs alternative treatments vs no treatment, and potential side effects of these treatments. The patient expresses understanding of the above and displays the capacity to agree with this treatment given said understanding. Patient also agrees that, currently, the benefits outweigh the risks and would like to pursue treatment at this time.     Therapy:  Pt to continue with psychotherapy as scheduled/needed     Referrals:   Refer to sleep specialty for evaluation of several sleep disorder    Labs:  Reviewed labs from 12/11/19 with the patient; recheck Iron panel- levels normalized (reviewed labs form 2/18/20); TSH normalized    Approximately 40 minutes were spent face to face with the patient discussing/coounseling on diagnosis, treatment options and coordinating care.     Return to Clinic: 2 months, sooner if needed     Jimmy Bradshaw MD  Psychiatry

## 2020-02-21 ENCOUNTER — HOSPITAL ENCOUNTER (OUTPATIENT)
Facility: HOSPITAL | Age: 55
Discharge: HOME | End: 2020-02-21
Attending: PHYSICAL MEDICINE & REHABILITATION | Admitting: PHYSICAL MEDICINE & REHABILITATION
Payer: MEDICARE

## 2020-02-21 ENCOUNTER — HOSPITAL ENCOUNTER (OUTPATIENT)
Dept: RADIOLOGY | Facility: HOSPITAL | Age: 55
Discharge: HOME | End: 2020-02-21
Attending: PHYSICAL MEDICINE & REHABILITATION | Admitting: PHYSICAL MEDICINE & REHABILITATION
Payer: MEDICARE

## 2020-02-21 VITALS
OXYGEN SATURATION: 97 % | DIASTOLIC BLOOD PRESSURE: 88 MMHG | RESPIRATION RATE: 17 BRPM | HEART RATE: 75 BPM | SYSTOLIC BLOOD PRESSURE: 136 MMHG | TEMPERATURE: 96 F

## 2020-02-21 DIAGNOSIS — M54.12 CERVICAL RADICULOPATHY: ICD-10-CM

## 2020-02-21 DIAGNOSIS — G89.29 CHRONIC PAIN: ICD-10-CM

## 2020-02-21 DIAGNOSIS — M54.12 CERVICAL RADICULOPATHY: Primary | ICD-10-CM

## 2020-02-21 PROCEDURE — 99152 PR MOD CONSCIOUS SEDATION, SAME PHYS, 5+ YRS, FIRST 15 MIN: ICD-10-PCS | Mod: ,,, | Performed by: PHYSICAL MEDICINE & REHABILITATION

## 2020-02-21 PROCEDURE — 63600175 PHARM REV CODE 636 W HCPCS: Performed by: PHYSICAL MEDICINE & REHABILITATION

## 2020-02-21 PROCEDURE — 25000003 PHARM REV CODE 250: Performed by: PHYSICAL MEDICINE & REHABILITATION

## 2020-02-21 PROCEDURE — 62321 NJX INTERLAMINAR CRV/THRC: CPT | Performed by: PHYSICAL MEDICINE & REHABILITATION

## 2020-02-21 PROCEDURE — 99152 MOD SED SAME PHYS/QHP 5/>YRS: CPT | Mod: ,,, | Performed by: PHYSICAL MEDICINE & REHABILITATION

## 2020-02-21 PROCEDURE — 62321 PR INJ CERV/THORAC, W/GUIDANCE: ICD-10-PCS | Mod: ,,, | Performed by: PHYSICAL MEDICINE & REHABILITATION

## 2020-02-21 PROCEDURE — 25500020 PHARM REV CODE 255: Performed by: PHYSICAL MEDICINE & REHABILITATION

## 2020-02-21 PROCEDURE — 62321 NJX INTERLAMINAR CRV/THRC: CPT | Mod: ,,, | Performed by: PHYSICAL MEDICINE & REHABILITATION

## 2020-02-21 RX ORDER — MIDAZOLAM HYDROCHLORIDE 1 MG/ML
INJECTION, SOLUTION INTRAMUSCULAR; INTRAVENOUS
Status: DISCONTINUED | OUTPATIENT
Start: 2020-02-21 | End: 2020-02-21 | Stop reason: HOSPADM

## 2020-02-21 RX ORDER — LIDOCAINE HYDROCHLORIDE 10 MG/ML
INJECTION INFILTRATION; PERINEURAL
Status: DISCONTINUED | OUTPATIENT
Start: 2020-02-21 | End: 2020-02-21 | Stop reason: HOSPADM

## 2020-02-21 RX ORDER — FENTANYL CITRATE 50 UG/ML
INJECTION, SOLUTION INTRAMUSCULAR; INTRAVENOUS
Status: DISCONTINUED | OUTPATIENT
Start: 2020-02-21 | End: 2020-02-21 | Stop reason: HOSPADM

## 2020-02-21 RX ORDER — SODIUM CHLORIDE 9 MG/ML
500 INJECTION, SOLUTION INTRAVENOUS CONTINUOUS
Status: DISCONTINUED | OUTPATIENT
Start: 2020-02-21 | End: 2020-02-21 | Stop reason: HOSPADM

## 2020-02-21 RX ORDER — LIDOCAINE HYDROCHLORIDE 10 MG/ML
INJECTION, SOLUTION EPIDURAL; INFILTRATION; INTRACAUDAL; PERINEURAL
Status: DISCONTINUED | OUTPATIENT
Start: 2020-02-21 | End: 2020-02-21 | Stop reason: HOSPADM

## 2020-02-21 RX ORDER — DEXAMETHASONE SODIUM PHOSPHATE 10 MG/ML
INJECTION INTRAMUSCULAR; INTRAVENOUS
Status: DISCONTINUED | OUTPATIENT
Start: 2020-02-21 | End: 2020-02-21 | Stop reason: HOSPADM

## 2020-02-21 NOTE — DISCHARGE SUMMARY
[facility]  Discharge Note  Short Stay     Admit Date: 2/21/2020    Discharge Date: 2/21/2020     Attending Physician: Tori Prado M.D.    Diagnoses:  Active Hospital Problems    Diagnosis  POA    *Cervical radiculopathy [M54.12]  Yes      Resolved Hospital Problems   No resolved problems to display.     Discharged Condition: Good     Hospital Course: Patient was admitted for an outpatient interventional pain management procedure and tolerated the procedure well with no complications.     Final Diagnoses: Same as principal problem.     Disposition: Home or Self Care     Follow up/Patient Instructions:        Reconciled Medications:     Medication List      CHANGE how you take these medications    levothyroxine 125 MCG tablet  Commonly known as:  SYNTHROID  Take 1 tablet (125 mcg total) by mouth once daily.  What changed:  how much to take        CONTINUE taking these medications    amlodipine-benazepril 5-10 mg 5-10 mg per capsule  Commonly known as:  LOTREL  Take 1 capsule by mouth once daily.     baclofen 10 MG tablet  Commonly known as:  LIORESAL  Take 1 tablet (10 mg total) by mouth 3 (three) times daily as needed.     DULoxetine 60 MG capsule  Commonly known as:  CYMBALTA  TAKE 1 CAPSULE BY MOUTH EVERY DAY     fluticasone propionate 50 mcg/actuation nasal spray  Commonly known as:  FLONASE  1 TO 2 SPRAY(S) IN EACH NOSTRIL DAILY     MAGNESIUM ORAL  Take 500 mg by mouth once daily.     melatonin 5 mg Chew  Take 10 mg by mouth every evening.     metoprolol succinate 25 MG 24 hr tablet  Commonly known as:  TOPROL-XL  Take 25 mg by mouth once daily.     mirtazapine 45 MG tablet  Commonly known as:  REMERON  Take 1 tablet (45 mg total) by mouth every evening.     MULTI FOR HIM ORAL  Take by mouth.     pantoprazole 40 MG tablet  Commonly known as:  PROTONIX  Take 1 tablet (40 mg total) by mouth once daily.     pregabalin 150 MG capsule  Commonly known as:  LYRICA  Take 1 capsule (150 mg total) by mouth 2  (two) times daily.     rOPINIRole 1 MG tablet  Commonly known as:  REQUIP  Take 1 tablet (1 mg total) by mouth every evening.           Discharge Procedure Orders (must include Diet, Follow-up, Activity)   Ice to affected area   Order Comments: Limit to 20 minute intervals or until skin is numb to the touch.     No driving until:   Order Comments: Until following day     Notify your health care provider if you experience any of the following:  temperature >100.4     Notify your health care provider if you experience any of the following:  persistent nausea and vomiting or diarrhea     Notify your health care provider if you experience any of the following:  severe uncontrolled pain     Notify your health care provider if you experience any of the following:  redness, tenderness, or signs of infection (pain, swelling, redness, odor or green/yellow discharge around incision site)     Notify your health care provider if you experience any of the following:  difficulty breathing or increased cough     Notify your health care provider if you experience any of the following:  severe persistent headache     Notify your health care provider if you experience any of the following:  worsening rash     Notify your health care provider if you experience any of the following:  persistent dizziness, light-headedness, or visual disturbances     Notify your health care provider if you experience any of the following:  increased confusion or weakness     No dressing needed     Shower on day dressing removed (No bath)   Order Comments: Do not soak in bath/pool/hot tub day of procedure. Shower gregg Prado M.D.  Interventional Pain Medicine / Physical Medicine & Rehabilitation

## 2020-02-21 NOTE — OP NOTE
Cervical Interlaminar Epidural Steroid Injection Under Fluoroscopic Guidance:  I have reviewed the patient's medications, allergies and relevant histories prior to the procedure and no contraindications have been identified. The risks, benefits and alternatives to the procedure were discussed with the patient, and all questions regarding the procedure were answered to the patient's satisfaction. I personally obtained Dalton's consent prior to the start of the procedure and the signed consent can be found in the patient's chart.                                                         Time-out was taken to identify patient, procedure, laterality, and allergies prior to starting the procedure.       Date of Service: 02/21/2020  Procedure: C7-T1 cervical interlaminar epidural steroid injection under fluoroscopy.  Pre-Operative Diagnosis: Cervical Radiculopathy  Post-Operative Diagnosis: Cervical Radiculopathy    Physician: Tori Prado M.D.  Assistants: None    Medications Injected: Preservative-free dexamethasone 10 mg/mL and Xylocaine 1% MPF 1 mL  Local Anesthetic: Xylocaine 1% 10 mL.   Sedation Medications: Versed 2 mg, Fentanyl 50 mcg    Procedural Technique:  With the patient laying in a prone position with the neck in a slightly forward flexed position, the area was prepped and draped in the usual sterile fashion using ChloraPrep and a fenestrated drape.  The area was determined under fluoroscopic guidance.  Local anesthetic was utilized to anesthetize the skin and subcutaneous tissues in the area using a 25-gauge needle.  A 3.5 inch 18-gauge Touhy needle was introduced under fluoroscopic guidance to meet the lamina of T1.  The needle was then directed superior-medially and advanced using loss of resistance technique.  Once the tip of the needle was in the desired position, the contrast dye, Omnipaque, was injected to determine epidural placement and no vascular runoff.  Digital subtraction was also employed to  confirm that there was no vascular runoff.  The medications were then injected slowly. The needle was removed and bandage applied.     Estimated Blood Loss:  None.  Complications:  None.     Disposition: The patient tolerated the procedure well, and there were no apparent complications. Vital signs remained stable throughout the procedure. The patient was taken to the recovery area and monitored after the procedure. The patient was supplied with written discharge instructions for the procedure. If helpful, we can repeat as needed. The patient was discharged in a stable condition.    Follow-Up: We will see the patient back in 1-2 weeks or the patient may call to inform of status.

## 2020-02-21 NOTE — INTERVAL H&P NOTE
The patient has been examined and the H&P has been reviewed:        I concur with the findings and no changes have occurred since H&P was written.        Patient cleared for Anesthesia: MAC        Anesthesia/Surgery risks, benefits and alternative options discussed and understood by patient/family.      There are no hospital problems to display for this patient.

## 2020-02-22 ENCOUNTER — NURSE TRIAGE (OUTPATIENT)
Dept: ADMINISTRATIVE | Facility: CLINIC | Age: 55
End: 2020-02-22

## 2020-02-22 NOTE — TELEPHONE ENCOUNTER
Patient called in with thigh and groin pain on the right side that has been present for two days. Patient was advised per protocol and was advised to call back with any questions/concerns or worsening symptoms. The patient verbalized understanding.  Reason for Disposition   [1] Thigh or calf pain AND [2] only 1 side AND [3] present > 1 hour    Additional Information   Negative: Looks like a broken bone or dislocated joint (e.g., crooked or deformed)   Negative: Sounds like a life-threatening emergency to the triager   Negative: Followed a leg injury   Negative: Leg swelling is main symptom   Negative: Back pain radiating (shooting) into leg(s)   Negative: Knee pain is main symptom   Negative: Ankle pain is main symptom   Negative: Pregnant   Negative: Chest pain   Negative: Difficulty breathing   Negative: Entire foot is cool or blue in comparison to other side   Negative: Unable to walk   Negative: [1] Red area or streak AND [2] fever   Negative: [1] Swollen joint AND [2] fever   Negative: [1] Cast on leg or ankle AND [2] now increased pain   Negative: Patient sounds very sick or weak to the triager   Negative: [1] SEVERE pain (e.g., excruciating, unable to do any normal activities) AND [2] not improved after 2 hours of pain medicine    Protocols used: LEG PAIN-A-

## 2020-02-28 ENCOUNTER — PATIENT MESSAGE (OUTPATIENT)
Dept: PAIN MEDICINE | Facility: CLINIC | Age: 55
End: 2020-02-28

## 2020-02-28 ENCOUNTER — OFFICE VISIT (OUTPATIENT)
Dept: SLEEP MEDICINE | Facility: CLINIC | Age: 55
End: 2020-02-28
Payer: MEDICARE

## 2020-02-28 VITALS
DIASTOLIC BLOOD PRESSURE: 84 MMHG | BODY MASS INDEX: 35.29 KG/M2 | HEART RATE: 97 BPM | WEIGHT: 219.56 LBS | SYSTOLIC BLOOD PRESSURE: 138 MMHG | HEIGHT: 66 IN

## 2020-02-28 DIAGNOSIS — G47.52 REM SLEEP BEHAVIOR DISORDER: ICD-10-CM

## 2020-02-28 DIAGNOSIS — F33.2 SEVERE RECURRENT MAJOR DEPRESSION WITHOUT PSYCHOTIC FEATURES: Primary | ICD-10-CM

## 2020-02-28 DIAGNOSIS — G47.10 HYPERSOMNIA: ICD-10-CM

## 2020-02-28 DIAGNOSIS — I10 ESSENTIAL HYPERTENSION: ICD-10-CM

## 2020-02-28 DIAGNOSIS — G47.33 OBSTRUCTIVE SLEEP APNEA (ADULT) (PEDIATRIC): ICD-10-CM

## 2020-02-28 DIAGNOSIS — G47.52 RBD (REM BEHAVIORAL DISORDER): ICD-10-CM

## 2020-02-28 DIAGNOSIS — G47.61 PERIODIC LIMB MOVEMENT DISORDER (PLMD): ICD-10-CM

## 2020-02-28 PROCEDURE — 3075F SYST BP GE 130 - 139MM HG: CPT | Mod: CPTII,S$GLB,, | Performed by: INTERNAL MEDICINE

## 2020-02-28 PROCEDURE — 3008F BODY MASS INDEX DOCD: CPT | Mod: CPTII,S$GLB,, | Performed by: INTERNAL MEDICINE

## 2020-02-28 PROCEDURE — 99215 PR OFFICE/OUTPT VISIT, EST, LEVL V, 40-54 MIN: ICD-10-PCS | Mod: S$GLB,,, | Performed by: INTERNAL MEDICINE

## 2020-02-28 PROCEDURE — 3008F PR BODY MASS INDEX (BMI) DOCUMENTED: ICD-10-PCS | Mod: CPTII,S$GLB,, | Performed by: INTERNAL MEDICINE

## 2020-02-28 PROCEDURE — 99215 OFFICE O/P EST HI 40 MIN: CPT | Mod: S$GLB,,, | Performed by: INTERNAL MEDICINE

## 2020-02-28 PROCEDURE — 3079F PR MOST RECENT DIASTOLIC BLOOD PRESSURE 80-89 MM HG: ICD-10-PCS | Mod: CPTII,S$GLB,, | Performed by: INTERNAL MEDICINE

## 2020-02-28 PROCEDURE — 3079F DIAST BP 80-89 MM HG: CPT | Mod: CPTII,S$GLB,, | Performed by: INTERNAL MEDICINE

## 2020-02-28 PROCEDURE — 99999 PR PBB SHADOW E&M-EST. PATIENT-LVL III: CPT | Mod: PBBFAC,,, | Performed by: INTERNAL MEDICINE

## 2020-02-28 PROCEDURE — 3075F PR MOST RECENT SYSTOLIC BLOOD PRESS GE 130-139MM HG: ICD-10-PCS | Mod: CPTII,S$GLB,, | Performed by: INTERNAL MEDICINE

## 2020-02-28 PROCEDURE — 99999 PR PBB SHADOW E&M-EST. PATIENT-LVL III: ICD-10-PCS | Mod: PBBFAC,,, | Performed by: INTERNAL MEDICINE

## 2020-02-28 PROCEDURE — 99499 RISK ADDL DX/OHS AUDIT: ICD-10-PCS | Mod: S$GLB,,, | Performed by: INTERNAL MEDICINE

## 2020-02-28 PROCEDURE — 99499 UNLISTED E&M SERVICE: CPT | Mod: S$GLB,,, | Performed by: INTERNAL MEDICINE

## 2020-02-28 NOTE — PATIENT INSTRUCTIONS
Bedtime 11 pm, wake 7 am  30 minute nap around 1 pm.    Discontinue ropinirole  Reduce mirtazapine 15 mg  Discontinue pregabalin

## 2020-02-28 NOTE — PROGRESS NOTES
"Subjective:       Patient ID: Dalton Kent is a 54 y.o. male.    Chief Complaint: Sleeping Problem    HPI   I had the pleasure of seeing Dalton Kent today, who is a 54 y.o. male that presents with nocturnal behaviors.    Dalton Kent does not havea CDL.    Dalton Kent is nota shift worker.    Dalton Kent presents with nocturnal behaviors that has been going on for 4 years     Previous diagnosis of REM Sleep Behavior Disorder, but I do not have copy of this study.   No mention of these findings on last 3 tests.   Was on clonazepam until 1 year ago.   Hospitalized last summer as was taking clonazepam and drinking alcohol.   Has not consumed alcohol since last July.   He reports drinking about 2 beers daily prior to that.   He was found with noose, and admitted.       Bedtime when working ranges from NA to NA.   When not working, bedtime ranges from 1900 to 2000.   Sleep latency ranges from 30 to 60 minutes.   Average number of awakenings is 3-6 and return to sleep is quick, but thinks CPAP is now contributing to difficulty returning to sleep.   Wake up time when working is NA to NA.   When not working, wake up time is 0900 to 0900.   Patient does notrested upon awakening.    He feels like he is suffocating with CPAP.   He is using a full face mask.      He "fights" in his sleep.   His wife has awakened him occasionally and he reports dream consistent with behaviors noted.      Dalton Kent consumes approximately 1-2 beverages with caffeine are consumed daily.   An average of 0 beverages with alcohol are consumed   Medications taken for sleep currently: mirtazepine  Previous medications taken: unknown     Dalton Kent does experience daytime sleepiness.   Naps are taken about 7 times weekly, usually lasting 180 to 240 minutes.  Dalton currently does operate an automobile.  Dalton Kent does not experience drowsiness when " driving.   Patient does doze off when sedentary.   Dalton Kent does not have auxiliary symptoms of narcolepsy including sleep onset paralysis, hypnagogic hallucinations, sleep attacks and cataplexy    ESS 18.    CPAP used nightly and averages 8.3 hours.   Device use > 4 hours is 56.7%.  Estimated AHI 9.3.   Current setting 9 cm H20.          Dalton Kent does not not have symptoms of Restless Legs Syndrome. Nocturnal leg movements have been noticed.   The patient does not experience sleep related leg cramps.   There is a history of parasomnia including dream enactment behavior.   Behaviors recently recurred.   His wife notices that he has auditory hallucinations.        Current Outpatient Medications:     amlodipine-benazepril 5-10 mg (LOTREL) 5-10 mg per capsule, Take 1 capsule by mouth once daily., Disp: 90 capsule, Rfl: 1    baclofen (LIORESAL) 10 MG tablet, Take 1 tablet (10 mg total) by mouth 3 (three) times daily as needed., Disp: 90 tablet, Rfl: 2    DULoxetine (CYMBALTA) 60 MG capsule, TAKE 1 CAPSULE BY MOUTH EVERY DAY, Disp: 30 capsule, Rfl: 1    levothyroxine (SYNTHROID) 125 MCG tablet, Take 1 tablet (125 mcg total) by mouth once daily. (Patient taking differently: Take 112 mcg by mouth once daily. ), Disp: 90 tablet, Rfl: 1    MAGNESIUM ORAL, Take 500 mg by mouth once daily., Disp: , Rfl:     melatonin 5 mg Chew, Take 10 mg by mouth every evening. , Disp: , Rfl:     metoprolol succinate (TOPROL-XL) 25 MG 24 hr tablet, Take 25 mg by mouth once daily., Disp: , Rfl:     mirtazapine (REMERON) 45 MG tablet, Take 1 tablet (45 mg total) by mouth every evening., Disp: 90 tablet, Rfl: 1    Multivits/Iron Fum/FA/D3/Lycop (MULTI FOR HIM ORAL), Take by mouth., Disp: , Rfl:     pantoprazole (PROTONIX) 40 MG tablet, Take 1 tablet (40 mg total) by mouth once daily., Disp: 90 tablet, Rfl: 3    pregabalin (LYRICA) 150 MG capsule, Take 1 capsule (150 mg total) by mouth 2 (two) times  daily., Disp: 60 capsule, Rfl: 3    rOPINIRole (REQUIP) 1 MG tablet, Take 1 tablet (1 mg total) by mouth every evening., Disp: 90 tablet, Rfl: 1    fluticasone (FLONASE) 50 mcg/actuation nasal spray, 1 TO 2 SPRAY(S) IN EACH NOSTRIL DAILY, Disp: , Rfl: 2     Review of patient's allergies indicates:   Allergen Reactions    Gralise [gabapentin]      Depression    Norco [hydrocodone-acetaminophen] Itching    Percocet [oxycodone-acetaminophen] Itching         Past Medical History:   Diagnosis Date    Alcohol abuse     Anxiety     Depression     GERD (gastroesophageal reflux disease)     Headache     Hx of psychiatric care     Hypertension     Hypothyroid     Lyme disease     Restless leg syndrome     Sleep difficulties     Therapy     Uncontrolled REM sleep behavior disorder        Past Surgical History:   Procedure Laterality Date    ANAL FISTULOTOMY      ANAL SPHINCTEROTOMY      APPENDECTOMY      BACK SURGERY      CARPAL TUNNEL RELEASE      bilateral    COLONOSCOPY  2/2015    COLONOSCOPY N/A 8/29/2019    Procedure: COLONOSCOPY WITH BIOPSY;  Surgeon: Giuseppe Mart MD;  Location: [facility] ENDO;  Service: Endoscopy;  Laterality: N/A;    EPIDURAL STEROID INJECTION N/A 2/21/2020    Procedure: CERVICAL EPIDURAL STEROID INJECTION (C7-T1 INTERLAMINAR);  Surgeon: Tori Prado MD;  Location: [facility] OR;  Service: Pain Management;  Laterality: N/A;    HAND SURGERY Bilateral     NECK SURGERY      NOSE SURGERY      TRIGGER FINGER RELEASE         Family History   Problem Relation Age of Onset    Colon cancer Neg Hx     Colon polyps Neg Hx     Crohn's disease Neg Hx     Rectal cancer Neg Hx     Ulcerative colitis Neg Hx        Social History     Socioeconomic History    Marital status:      Spouse name: Josselyn Kent    Number of children: 2    Years of education: Not on file    Highest education level: Not on file   Occupational History    Not on file   Social Needs    Financial resource  strain: Not on file    Food insecurity:     Worry: Not on file     Inability: Not on file    Transportation needs:     Medical: Not on file     Non-medical: Not on file   Tobacco Use    Smoking status: Never Smoker    Smokeless tobacco: Never Used   Substance and Sexual Activity    Alcohol use: Yes     Comment: socially    Drug use: No    Sexual activity: Yes     Partners: Female   Lifestyle    Physical activity:     Days per week: Not on file     Minutes per session: Not on file    Stress: To some extent   Relationships    Social connections:     Talks on phone: Not on file     Gets together: Not on file     Attends Mandaeism service: Not on file     Active member of club or organization: Not on file     Attends meetings of clubs or organizations: Not on file     Relationship status: Not on file   Other Topics Concern    Patient feels they ought to cut down on drinking/drug use No    Patient annoyed by others criticizing their drinking/drug use No    Patient has felt bad or guilty about drinking/drug use No    Patient has had a drink/used drugs as an eye opener in the AM No   Social History Narrative    Not on file           Old medical records.    Vitals:    02/28/20 1129   BP: 138/84   Pulse: 97              The patient was given open opportunity to ask questions and/or express concerns about treatment plan.   All questions/concerns were discussed.   Driving precautions were provided.     Two patient identifiers used prior to evaluation.    Thank you for referring Dalton Kent for evaluation.             Review of Systems    Objective:      Physical Exam    Assessment:       1. Severe recurrent major depression without psychotic features    2. REM sleep behavior disorder    3. Obstructive sleep apnea (adult) (pediatric)    4. Essential hypertension    5. RBD (REM behavioral disorder)    6. Periodic limb movement disorder (PLMD)        Plan:       RBD:  Suspect true diagnosis but could  be mimic by ASAD (was severe in REM), alcohol/medication withdrawal or neurodegenerative disorder.     Would be cautious about using Remeron with RBD.   Would be reluctant to resume clonazepam with past history.   Continue  melatonin 10 mg at bedtime.         Memory loss:  Neuropsychiatric testing reviewed.   Recommend reduce medication use as much as possible.  Overlapping medications discontinued today.       Hypersomnia:  Medication effect, depression and possible neurodegenerative disorder    ASAD  Alternative mask prescribed.   Pressure changed 7-15 with repeat download.   Humidifier increased    PLMD-not per last studies  Discontinue ropinirole    Patient needs to establish PCP that can orchestrate medication recommendations as this patient is at high risk for adverse effect.     DATE ? 2/2019 12/2019 1/2020 2/2020       AHI ? 6.9 13.5         Sa02 JOHN PAUL   83         CPAP/BIPAP    9        AHI on PAP    0 9.3       CURRENT SETTING     9       PLMDI  0 0 0        PLMDAI  0 0 0        MSLT            MISC  GLO 0.2  REM AHI 4.6 GLO 2.4  REM AHI 39.1  changed 7-15       DME [facility]            76-minute visit. >50% spent counseling patient and coordination of care.

## 2020-02-28 NOTE — LETTER
February 28, 2020      Jimmy Bradshaw MD  [address] [place] [address]           [facility] [place] [address] [facility] [address]  [address]  [facility] [address]  Phone: [phone]          Patient: Dalton Kent   MR Number: [id number]   YOB: 1965   Date of Visit: 2/28/2020       Dear Dr. Jimmy Bradshaw:    Thank you for referring Dalton Kent to me for evaluation. Attached you will find relevant portions of my assessment and plan of care.    If you have questions, please do not hesitate to call me. I look forward to following Dalton Kent along with you.    Sincerely,    Soila Olivo MD    Enclosure  CC:  No Recipients    If you would like to receive this communication electronically, please contact [email or web address] or [phone] to request more information on [employer] access.    For providers and/or their staff who would like to refer a patient to [facility], please contact us through our one-stop-shop provider referral line, [facility], at [phone].    If you feel you have received this communication in error or would no longer like to receive these types of communications, please e-mail [email or web address]

## 2020-03-04 ENCOUNTER — PATIENT MESSAGE (OUTPATIENT)
Dept: SLEEP MEDICINE | Facility: CLINIC | Age: 55
End: 2020-03-04

## 2020-03-12 ENCOUNTER — PATIENT MESSAGE (OUTPATIENT)
Dept: PAIN MEDICINE | Facility: CLINIC | Age: 55
End: 2020-03-12

## 2020-03-15 NOTE — PROGRESS NOTES
[facility]    Chief Complaint:   Chief Complaint   Patient presents with    Shoulder Pain     Initial HPI (10/11/19): Dalton Kent is a 54 y.o. male referred by Dr. Velazquez for back and neck pain.    Back pain is worse. Back pain began ~ 10 years ago. He has had 2 back surgeries. Back pain is localized to the left over the lateral iliac crest. No radiation into legs. He is unable to describe the quality of the pain, other than feeling deep. Worse with walking, cutting grass, standing, and slight flexion. Pain alleviated by nothing in particular. Left leg gets weak with cutting grass or doing dishes. Denies leg numbness.     He has had neck surgery with Dr. Ontiveros in 2015. He doesn't have neck pain but is having right arm pain and shooting pain into his small, ring and middle fingers and also pain in his tricep area. Pain described as shooting and shocking in the arm. Nothing particularly aggravates it, but did start while fishing and worse at night time. Nothing helps the pain. He did therapy recently.     Onset: Years  Location: Shoulder, arm, lower back left side, hip pain   Radiation: left leg and right arm  Quality: Aching, burning, throbbing, grabbing, tingling, deep, superficial, electric   Exacerbating Factors: exercise, lifting, sitting, standing and walking for more than 20 minutes  Alleviating Factors: nothing  Associated Symptoms: denies night fever/night sweats, urinary incontinence, bowel incontinence, significant weight loss, significant motor weakness and loss of sensations    Severity: Currently: 4/10   Typical Range: 7-8/10     Exacerbation: 8/10     Interval History (03/16/2020):  Dalton Kent returns today for follow up.  At the last clinic visit, encouraged him to start PT for neck and shoulder pain, scheduled C7-T1 IL ANURAG, cont lyrica 150 mg BID, ordered Cervical MRI, cont cymbalta 60 mg daily, started baclofen 10 mg TID prn.     In the interim, he saw   [facility] on 2/19/20 and review of notes from Dr. Bradshaw show he cont cymbalta to 60 mg daily, continued remeron, and referred to sleep specialist. He saw the sleep specialists who DC-ed ropinirole, lyrica and reduced remeron to 15 mg, as well as adjusted CPAP .     He did not get into PT because he really wants to avoid this.     C7-T1 IL ANURAG provided some relief during the first week. Currently, the pain is stable. Pain mostly in the right shoulder in the area of the latissimus and described as deep to the scapula. He hasn't noticed increased pain since stopping the lyrica. Baclofen is not really helping. Denies any new pains since previous visit. The pain is unchanged in character or location. Denies any changes in bowel or bladder function. Denies any new weakness or new numbness since the most recent visit. Denies any fevers or recent infections/antibiotics. Denies any unexplained weight loss.     Current Pain Scales:   Severity: Currently: 4/10   Typical Range: 8-9/10        Pain Disability Index  Family/Home Responsibilities:: 8  Recreation:: 4  Social Activity:: 6  Occupation:: 8  Sexual Behavior:: 5  Self Care:: 6  Life-Support Activities:: 4  Pain Disability Index (PDI): 41    Previous Interventions:  - 2/21/20: C7-T1 IL ANURAG w/ minimal relief  - 12/6/19: Cervical TPIs  - 10/11/19: Lumbar TPIs: Did not think they were effective  - Previous spine injections with Dr. Jimmy Rosas in [place] prior to the back surgeries. Also with Dr. Honeycutt and Dr. Pulido.     Previous Therapies:  PT/OT: yes last was 3-4 years ago  Surgery: yes He has had C5-7 ACDF with Dr. Ontiveros in 2016. He has had 2 back surgeries. First back surgery was L5-S1 ALIF in [facility] with Dr. Sorin Thomas. He had above level L4-5 herniated disk and underwent second back surgery, L4-5 TLIF at [facility] with Dr. Isaias Ontiveros in May 2016. Right then left carpal tunnel release in 2017 w/ Dr. Marin Rosas  in May 2017.   Previous Medications:   - NSAIDS: Cant take because of stomach ulcers  - Muscle Relaxants: Xanax. Klonopin. Ativan.   - TCAs: Nortriptyline 25 mg  - SNRIs: Not tried  - Topicals: Bengay, biofreeze not effective  - Anticonvulsants: Gabapentin (Gralise) caused worsening depression, claustrophobia, trouble swallowing. Depakote. Lyrica.  - Opioids: None currently    Current Pain Medications:  1. Lyrica 150 mg BID - DC'ed  2. Baclofen 10 mg TID   3. Tylenol  4. Cymbalta 60 mg daily.    5. Remeron    Blood Thinners: None    Full Medication List:    Current Outpatient Medications:     acetaminophen (TYLENOL ARTHRITIS ORAL), Take by mouth., Disp: , Rfl:     amlodipine-benazepril 5-10 mg (LOTREL) 5-10 mg per capsule, Take 1 capsule by mouth once daily., Disp: 90 capsule, Rfl: 1    baclofen (LIORESAL) 10 MG tablet, Take 1 tablet (10 mg total) by mouth nightly as needed., Disp: 30 tablet, Rfl: 2    DULoxetine (CYMBALTA) 60 MG capsule, TAKE 1 CAPSULE BY MOUTH EVERY DAY, Disp: 30 capsule, Rfl: 1    fluticasone (FLONASE) 50 mcg/actuation nasal spray, 1 TO 2 SPRAY(S) IN EACH NOSTRIL DAILY, Disp: , Rfl: 2    levothyroxine (SYNTHROID) 125 MCG tablet, Take 1 tablet (125 mcg total) by mouth once daily. (Patient taking differently: Take 112 mcg by mouth once daily. ), Disp: 90 tablet, Rfl: 1    MAGNESIUM ORAL, Take 500 mg by mouth once daily., Disp: , Rfl:     melatonin 5 mg Chew, Take 10 mg by mouth every evening. , Disp: , Rfl:     metoprolol succinate (TOPROL-XL) 25 MG 24 hr tablet, Take 25 mg by mouth once daily., Disp: , Rfl:     Multivits/Iron Fum/FA/D3/Lycop (MULTI FOR HIM ORAL), Take by mouth., Disp: , Rfl:     pantoprazole (PROTONIX) 40 MG tablet, Take 1 tablet (40 mg total) by mouth once daily., Disp: 90 tablet, Rfl: 3     Review of Systems:  Review of Systems   Constitutional: Negative for fever and weight loss.   HENT: Positive for tinnitus. Negative for ear pain.    Eyes: Negative for pain and  "redness.   Respiratory: Negative for cough and shortness of breath.    Cardiovascular: Negative for chest pain and palpitations.   Gastrointestinal: Positive for heartburn. Negative for constipation.        GERD w/ NSAIDs   Genitourinary: Negative.         Denies urinary incontinence. Denies urine retention.    Musculoskeletal: Positive for back pain and neck pain.   Skin: Negative for itching and rash.   Neurological: Positive for tingling, weakness and headaches. Negative for seizures.   Endo/Heme/Allergies: Negative for environmental allergies. Does not bruise/bleed easily.   Psychiatric/Behavioral: Positive for depression. The patient is nervous/anxious and has insomnia.        Allergies:  Gralise [gabapentin]; Norco [hydrocodone-acetaminophen]; and Percocet [oxycodone-acetaminophen]     Medical History:   has a past medical history of Alcohol abuse, Anxiety, Depression, GERD (gastroesophageal reflux disease), Headache, psychiatric care, Hypertension, Hypothyroid, Lyme disease, Restless leg syndrome, Sleep difficulties, Therapy, and Uncontrolled REM sleep behavior disorder.    Surgical History:   has a past surgical history that includes Anal fistulotomy; Anal sphincterotomy; Appendectomy; Colonoscopy (2/2015); Back surgery; Neck surgery; Hand surgery (Bilateral); Carpal tunnel release; Trigger finger release; Nose surgery; Colonoscopy (N/A, 8/29/2019); and Epidural steroid injection (N/A, 2/21/2020).    Social History:   reports that he has never smoked. He has never used smokeless tobacco. He reports that he drinks alcohol. He reports that he does not use drugs.    Physical Exam:  /86   Pulse 85   Resp 10   Ht 5' 6" (1.676 m)   Wt 98.9 kg (218 lb 0.6 oz)   BMI 35.19 kg/m²   GEN: No acute distress. Calm, comfortable  HENT: Normocephalic, atraumatic, moist mucous membranes  EYE: Anicteric sclera, non-injected.   CV: Non-diaphoretic. Regular Rate. Radial Pulses 2+.  RESP: Breathing comfortably. Chest " expansion symmetric.  EXT: No clubbing, cyanosis.   SKIN: Warm, & dry to palpation.  PSYCH: Pleasant mood and appropriate affect. Recent and remote memory intact.   GAIT: Independent, normal ambulation  Neck Exam:       Inspection: No erythema, bruising. Poor posture, hunched shoulders up into ears      Palpation: (+) TTP of cervical paraspinals and trapezius on the right.       ROM: Limitation in lateral rotation to either side.       Provocative Maneuvers:  (-) Spurling's bilaterally  Shoulder Exam:       Inspection: No erythema, bruising, surgical incisions      Palpation: No TTP of AC joint, subacromial area.       ROM: Bilateral abduction limitation to about 120 degrees. Pain with internal rotation and abduction  Neurologic Exam:     Alert. Speech is fluent and appropriate.     Cranial Nerves: Extra-ocular movements intact. Pupils equal. No strabismus. Face Symmetric. Jaw opens midline. Uvula midline. Tongue midline. Shoulder shrug symmetric.       Strength: 5/5 throughout bilateral upper extremities     Sensation: Grossly intact to light touch in bilateral upper extremities     Reflexes: 2+ in bilateral patella, Unable to elicit bilateral triceps, biceps, brachioradialis     Tone: No abnormality appreciated in bilateral upper or lower extremities     No Clonus     (-) Rosas bilaterally    Imaging:  - MRI Cervical Spine 2/7/20:  Prior multilevel cervical fusion at C5-C6 and C6-C7.  Alignment is maintained.  No congenital spinal stenosis.  No abnormal signal change involving the visualized portion of the spinal cord.    At the C2-C3 and C3-C4 levels there are minimal uncinate process spurs.  There is no evidence of significant spinal canal or foraminal encroachment.    At the C4-C5 level there is minimal disc-osteophyte with mild effacement along the anterior subarachnoid space.  Mild facet degenerative changes.  No significant spinal canal or foraminal encroachment.    Prior fusions performed at C5-C6 and  C6-C7.  Alignment is maintained.  No evidence of a focal recurrent disc abnormality.    At the C7-T1 level there is a posterior disc-osteophyte with mild encroachment along the subarachnoid space and mild bilateral foraminal encroachment.    - X-ray Shoulder 12/11/19:  The acromioclavicular and glenohumeral joint spaces are intact.  No fracture or dislocation is seen.  Cervical fusion hardware is identified    - EMG/NCS BUE 10/28/19:  Normal EMG/NCS of the arms.     - X-ray C-spine 10/11/19:  Prior multilevel fusion at C5-C6 and C6-C7.  Fusion screws appear intact and alignment is maintained.  No significant change in alignment with flexion or extension.  There is no plain film evidence of fracture or acute subluxation.  No prevertebral soft tissue swelling.  Minimal degenerative spurring along the anterior inferior endplate of C4.  No disc space narrowing at C2, C3 or C4    - X-ray L-spine 10/11/19:  Prior multilevel fusion at L4-L5 and L5-S1.  Surgical fixation hardware appears intact.  Alignment is maintained.  There is no significant change in alignment with flexion or extension.  Mild degenerative change involving L1 through L3 without significant focal disc space narrowing.  There is no evidence of an acute fracture.  Alignment is maintained.  No evidence of spondylolysis or spondylolisthesis    - MRI L-spine 02/06/2019:  Spinal Alignment: Normal.  Vertebrae: Posterior fusion hardware at L4-5 and anterior fusion hardware at L5-S1 are unchanged, with associated disc spacer devices at L4-5 and L5-S1.  Susceptibility artifact is present surrounding the hardware.  There are associated postsurgical findings of left L4 hemilaminectomy.  Mild susceptibility artifact surrounds the hardware.  1.5 cm hemangioma is present in the posterosuperior portion of the T12 vertebral body.  Discs: Mild diffuse desiccation.  Disc spacer devices are present at L5-4 5 and L5-S1.  Cord: Normal cord signal. Conus terminates normally at  the L1-2 level.  Degenerative findings: Minimal circumferential disc bulge at L2-3 with minimal bilateral facet arthropathy and minimal facet effusions in conjunction with mild buckling of the ligamentum flavum does not result in significant compressive phenomena.  Mild circumferential disc bulge at L3-4 with mild bilateral facet arthropathy and buckling of the ligamentum flavum results in mild spinal canal stenosis and mild to moderate foraminal stenoses.  Spinal canal is posteriorly decompressed at L4-5; right neural foramen is widely patent while there is apparent moderate narrowing of the left neural foramen related to either postoperative granulation tissue along the inferior neural foramen or disc material.  Mild bilateral facet arthropathy at L5-S1 results in at most mild foraminal stenoses.  Paraspinal muscles & soft tissues: Normal    - EMG/NCS BLE 2/25/19:  Impression: Normal EMG of the legs, although his study was mildly limited due to inability to sample lumbar paraspinal muscles given his prior lumbar surgical scarring.     - Outside Shoulder MRI Right 2/22/19:  Impression:   1. Degenerative arthropathy at AC joint  2. Pericapsular soft tissue swelling and periosteal soft tissue swelling about the AC joint and distal clavicle. A low-grade partial tear of the trapezius muscle at the insertion upon the clavicle and a low-grade edema or strain of the anterior head of deltoid muscle  3. Suspect a sprain of the coracoclavicular ligaments, which are not torn  4. TEndinosis of the supraspinatus without a tear  5. Slight fraying of the labrum    - EMG/NCS (outside records) with bilateral C6 radiculopathy and bilateral carpal tunnel syndrome     Labs:  Lab Results   Component Value Date     08/14/2019    K 4.9 08/14/2019     08/14/2019    CO2 32 (H) 08/14/2019    BUN 18 08/14/2019    CREATININE 1.2 08/14/2019    CALCIUM 9.4 08/14/2019    ANIONGAP 9 08/14/2019    ESTGFRAFRICA >60 08/14/2019     EGFRNONAA >60 08/14/2019     Lab Results   Component Value Date    ALT 22 08/14/2019    AST 17 08/14/2019    ALKPHOS 65 08/14/2019    BILITOT 0.4 08/14/2019     Lab Results   Component Value Date     08/14/2019       Assessment:  Dalton Kent is a 54 y.o. male with the following diagnoses based on history, exam, and imaging:    Problem List Items Addressed This Visit        Neuro    Cervical radiculopathy    Relevant Medications    baclofen (LIORESAL) 10 MG tablet      Other Visit Diagnoses     Chronic right shoulder pain    -  Primary    Relevant Orders    Ambulatory referral/consult to Occupational Therapy    S/P cervical spinal fusion        Relevant Medications    baclofen (LIORESAL) 10 MG tablet    Myofascial pain        Relevant Medications    baclofen (LIORESAL) 10 MG tablet    Neck pain, chronic        Relevant Medications    baclofen (LIORESAL) 10 MG tablet    Strain of latissimus dorsi muscle, subsequent encounter        Relevant Orders    Ambulatory referral/consult to Occupational Therapy    Impingement syndrome of right shoulder region        Relevant Orders    Ambulatory referral/consult to Occupational Therapy          This is a pleasant 54 y.o. gentleman with chronic neck and back pain.  He has had both neck and back surgeries but has persistent pain.     His back pain appears to be multifactorial in nature, with contributions from myofascial pain, lumbar spondylosis and possible discogenic pain.     His neck pain, also appears multifactorial. MRI with tear in trapezius, which may be contributing to the pain, as I do not think it is coming from the RTC based on exam. Muscular aspects of the pain and tenderness improved with TPI. Pain likely mostly radicular from cervical spine.     Treatment Plan: I discussed with the patient the following assessment and recommendations. The following is the plan the patient agreed upon:  - PT/OT/HEP: Re-ordered PT, external for right shoulder  pain.  - Procedures:   - Consider shoulder injection in the future.   - Consider future possible caudal epidural steroid injection, medial branch blocks above level of fusion in the lumbar spine, and even possible spinal cord stimulation in the future as well.  - Medications:   - May reconsider Lyrica in the future  - Continue Cymbalta 60 mg daily.   - Change baclofen to 10 mg qHS. Titration provided in writing for patient.   - Imaging: Consider shoulder MRI in future  - Labs: Reviewed.  Medications are appropriately dosed for current hepatorenal function.    Follow Up: RTC in 3-4 months.    Tori Prado M.D.  Interventional Pain Medicine / Physical Medicine & Rehabilitation    Disclaimer: This note was partly generated using dictation software which may occasionally result in transcription errors.

## 2020-03-16 ENCOUNTER — OFFICE VISIT (OUTPATIENT)
Dept: PAIN MEDICINE | Facility: CLINIC | Age: 55
End: 2020-03-16
Payer: MEDICARE

## 2020-03-16 VITALS
DIASTOLIC BLOOD PRESSURE: 86 MMHG | HEART RATE: 85 BPM | WEIGHT: 218.06 LBS | SYSTOLIC BLOOD PRESSURE: 120 MMHG | HEIGHT: 66 IN | RESPIRATION RATE: 10 BRPM | BODY MASS INDEX: 35.04 KG/M2

## 2020-03-16 DIAGNOSIS — Z98.1 S/P CERVICAL SPINAL FUSION: ICD-10-CM

## 2020-03-16 DIAGNOSIS — G89.29 CHRONIC RIGHT SHOULDER PAIN: Primary | ICD-10-CM

## 2020-03-16 DIAGNOSIS — M79.18 MYOFASCIAL PAIN: ICD-10-CM

## 2020-03-16 DIAGNOSIS — M75.41 IMPINGEMENT SYNDROME OF RIGHT SHOULDER REGION: ICD-10-CM

## 2020-03-16 DIAGNOSIS — M25.511 CHRONIC RIGHT SHOULDER PAIN: Primary | ICD-10-CM

## 2020-03-16 DIAGNOSIS — M54.12 CERVICAL RADICULOPATHY: ICD-10-CM

## 2020-03-16 DIAGNOSIS — S29.012D STRAIN OF LATISSIMUS DORSI MUSCLE, SUBSEQUENT ENCOUNTER: ICD-10-CM

## 2020-03-16 DIAGNOSIS — G89.29 NECK PAIN, CHRONIC: ICD-10-CM

## 2020-03-16 DIAGNOSIS — M54.2 NECK PAIN, CHRONIC: ICD-10-CM

## 2020-03-16 PROCEDURE — 99214 PR OFFICE/OUTPT VISIT, EST, LEVL IV, 30-39 MIN: ICD-10-PCS | Mod: S$GLB,,, | Performed by: PHYSICAL MEDICINE & REHABILITATION

## 2020-03-16 PROCEDURE — 99999 PR PBB SHADOW E&M-EST. PATIENT-LVL III: CPT | Mod: PBBFAC,,, | Performed by: PHYSICAL MEDICINE & REHABILITATION

## 2020-03-16 PROCEDURE — 3008F PR BODY MASS INDEX (BMI) DOCUMENTED: ICD-10-PCS | Mod: CPTII,S$GLB,, | Performed by: PHYSICAL MEDICINE & REHABILITATION

## 2020-03-16 PROCEDURE — 3079F DIAST BP 80-89 MM HG: CPT | Mod: CPTII,S$GLB,, | Performed by: PHYSICAL MEDICINE & REHABILITATION

## 2020-03-16 PROCEDURE — 99214 OFFICE O/P EST MOD 30 MIN: CPT | Mod: S$GLB,,, | Performed by: PHYSICAL MEDICINE & REHABILITATION

## 2020-03-16 PROCEDURE — 3074F PR MOST RECENT SYSTOLIC BLOOD PRESSURE < 130 MM HG: ICD-10-PCS | Mod: CPTII,S$GLB,, | Performed by: PHYSICAL MEDICINE & REHABILITATION

## 2020-03-16 PROCEDURE — 3079F PR MOST RECENT DIASTOLIC BLOOD PRESSURE 80-89 MM HG: ICD-10-PCS | Mod: CPTII,S$GLB,, | Performed by: PHYSICAL MEDICINE & REHABILITATION

## 2020-03-16 PROCEDURE — 3008F BODY MASS INDEX DOCD: CPT | Mod: CPTII,S$GLB,, | Performed by: PHYSICAL MEDICINE & REHABILITATION

## 2020-03-16 PROCEDURE — 99999 PR PBB SHADOW E&M-EST. PATIENT-LVL III: ICD-10-PCS | Mod: PBBFAC,,, | Performed by: PHYSICAL MEDICINE & REHABILITATION

## 2020-03-16 PROCEDURE — 3074F SYST BP LT 130 MM HG: CPT | Mod: CPTII,S$GLB,, | Performed by: PHYSICAL MEDICINE & REHABILITATION

## 2020-03-16 RX ORDER — BACLOFEN 10 MG/1
10 TABLET ORAL NIGHTLY PRN
Qty: 30 TABLET | Refills: 2 | Status: SHIPPED | OUTPATIENT
Start: 2020-03-16 | End: 2020-04-23

## 2020-03-23 ENCOUNTER — TELEPHONE (OUTPATIENT)
Dept: PAIN MEDICINE | Facility: CLINIC | Age: 55
End: 2020-03-23

## 2020-03-23 NOTE — TELEPHONE ENCOUNTER
----- Message from Suellen Xiong sent at 3/20/2020  9:59 AM CDT -----  Contact: SPOUSE - SRIDEVI Kent  MRN: [id number]  : 1965  PCP: Olvin Elder  Home Phone      [phone]  Work Phone      Not on file.  Mobile          [phone]      MESSAGE: Patient was seen in the office on Monday and Dr. Prado is wanting patient to have physical therapy but it needs to have a prior authorization from the insurance company.  She states that she called the office Monday afternoon to let the nurse know this but she has not heard anything regarding this.  Has the authorization been started.          Phone: [phone]

## 2020-03-23 NOTE — TELEPHONE ENCOUNTER
Patient states that they received the authorization letter today by mail  For the PT and he has also started his PT today.

## 2020-03-25 ENCOUNTER — PATIENT MESSAGE (OUTPATIENT)
Dept: SLEEP MEDICINE | Facility: CLINIC | Age: 55
End: 2020-03-25

## 2020-03-30 ENCOUNTER — TELEPHONE (OUTPATIENT)
Dept: SLEEP MEDICINE | Facility: CLINIC | Age: 55
End: 2020-03-30

## 2020-03-30 ENCOUNTER — OFFICE VISIT (OUTPATIENT)
Dept: SLEEP MEDICINE | Facility: CLINIC | Age: 55
End: 2020-03-30
Payer: MEDICARE

## 2020-03-30 VITALS
DIASTOLIC BLOOD PRESSURE: 91 MMHG | WEIGHT: 217.13 LBS | HEIGHT: 66 IN | HEART RATE: 106 BPM | BODY MASS INDEX: 34.9 KG/M2 | SYSTOLIC BLOOD PRESSURE: 147 MMHG

## 2020-03-30 DIAGNOSIS — F33.2 SEVERE RECURRENT MAJOR DEPRESSION WITHOUT PSYCHOTIC FEATURES: Primary | ICD-10-CM

## 2020-03-30 DIAGNOSIS — G47.33 OBSTRUCTIVE SLEEP APNEA (ADULT) (PEDIATRIC): ICD-10-CM

## 2020-03-30 DIAGNOSIS — G47.52 REM SLEEP BEHAVIOR DISORDER: ICD-10-CM

## 2020-03-30 PROCEDURE — 3008F PR BODY MASS INDEX (BMI) DOCUMENTED: ICD-10-PCS | Mod: CPTII,S$GLB,, | Performed by: INTERNAL MEDICINE

## 2020-03-30 PROCEDURE — 99213 PR OFFICE/OUTPT VISIT, EST, LEVL III, 20-29 MIN: ICD-10-PCS | Mod: S$GLB,,, | Performed by: INTERNAL MEDICINE

## 2020-03-30 PROCEDURE — 3080F PR MOST RECENT DIASTOLIC BLOOD PRESSURE >= 90 MM HG: ICD-10-PCS | Mod: CPTII,S$GLB,, | Performed by: INTERNAL MEDICINE

## 2020-03-30 PROCEDURE — 3077F PR MOST RECENT SYSTOLIC BLOOD PRESSURE >= 140 MM HG: ICD-10-PCS | Mod: CPTII,S$GLB,, | Performed by: INTERNAL MEDICINE

## 2020-03-30 PROCEDURE — 3077F SYST BP >= 140 MM HG: CPT | Mod: CPTII,S$GLB,, | Performed by: INTERNAL MEDICINE

## 2020-03-30 PROCEDURE — 3080F DIAST BP >= 90 MM HG: CPT | Mod: CPTII,S$GLB,, | Performed by: INTERNAL MEDICINE

## 2020-03-30 PROCEDURE — 99999 PR PBB SHADOW E&M-EST. PATIENT-LVL III: ICD-10-PCS | Mod: PBBFAC,,, | Performed by: INTERNAL MEDICINE

## 2020-03-30 PROCEDURE — 3008F BODY MASS INDEX DOCD: CPT | Mod: CPTII,S$GLB,, | Performed by: INTERNAL MEDICINE

## 2020-03-30 PROCEDURE — 99213 OFFICE O/P EST LOW 20 MIN: CPT | Mod: S$GLB,,, | Performed by: INTERNAL MEDICINE

## 2020-03-30 PROCEDURE — 99999 PR PBB SHADOW E&M-EST. PATIENT-LVL III: CPT | Mod: PBBFAC,,, | Performed by: INTERNAL MEDICINE

## 2020-03-30 RX ORDER — CLONAZEPAM 0.25 MG/1
0.25 TABLET, ORALLY DISINTEGRATING ORAL NIGHTLY
Qty: 30 TABLET | Refills: 5 | Status: SHIPPED | OUTPATIENT
Start: 2020-03-30 | End: 2020-04-15 | Stop reason: DRUGHIGH

## 2020-03-30 NOTE — PROGRESS NOTES
"Subjective:       Patient ID: Dalton Kent is a 54 y.o. male.    Chief Complaint: No chief complaint on file.    HPI     Dalton Kent returns for PAP follow up for compliance documentation    Dalton Kent has a history of mild Obstructive Sleep Apnea diagnosed in 12/2019 (AHI 13.9).    Device is 1-2 month(s) old.   Current setting 7-15 cm H20.   [facility].      Dalton Kent is using CPAP 100% of nights and averages 6 hours and 44 minutes.   Device use greater than 4 hours is 100%.   Patient does not have problems with the pressure setting.   Mask interface issues are experienced.   A nasal mask interface is used.   The patient does experience side effects from therapy including congestion.   The patient experiences the following benefits of therapy:  reduced morbidity, reduced accident risk, reduced mortality and reduced cardiovascular benefit   There are not equipment issues.   Has noticed nocturnal behaviors slightly improved.   Mean pressure 11.2, average peak pressure 15 and 90th percentile pressure 13.8.   Average AHI 13.9 (central=obstructive).   AHI has been in normal range almost for past week.       Patient reports nightly "fighting" in sleep.   Melatonin has not been effective.   Has stopped Remeron and Cymbalta, and is seeing psychiatrist.       Scheduled Meds:  Continuous Infusions:  PRN Meds:.           Importance of PAP compliance discussed.   Care and use of equipment discussed.   Download and relevant sleep studies reviewed with patient    Discussed therapeutic goals for positive airway pressure therapy(CPAP or BiPAP).  Ideal is usage 100% of nights for at least 6 hours per night.  Minimum usage is 70% of night for at least 4 hours per night used    The patient was given open opportunity to ask questions and/or express concerns about treatment plan.   All questions/concerns were discussed.            Review of Systems    Objective:      Physical " Exam    Assessment:       1. Severe recurrent major depression without psychotic features    2. REM sleep behavior disorder    3. Obstructive sleep apnea (adult) (pediatric)        Plan:       Clonazepam 0.5 mg at night.   Side effects including depression.   16-minute visit. >50% spent counseling patient and coordination of care.  Follow uip 2 months         DATE ? 2/2019 12/2019 1/2020 2/2020           AHI ? 6.9 13.5               Sa02 JOHN PAUL     83               CPAP/BIPAP       9             AHI on PAP       0 9.3           CURRENT SETTING         9           PLMDI   0 0 0             PLMDAI   0 0 0             MSLT                     MISC   GLO 0.2  REM AHI 4.6 GLO 2.4  REM AHI 39.1   changed 7-15           DME [facility]

## 2020-04-01 ENCOUNTER — PATIENT MESSAGE (OUTPATIENT)
Dept: SLEEP MEDICINE | Facility: CLINIC | Age: 55
End: 2020-04-01

## 2020-04-04 ENCOUNTER — HOSPITAL ENCOUNTER (EMERGENCY)
Facility: HOSPITAL | Age: 55
Discharge: HOME | End: 2020-04-04
Attending: EMERGENCY MEDICINE
Payer: MEDICARE

## 2020-04-04 VITALS
HEART RATE: 80 BPM | RESPIRATION RATE: 17 BRPM | OXYGEN SATURATION: 99 % | DIASTOLIC BLOOD PRESSURE: 78 MMHG | WEIGHT: 207.13 LBS | TEMPERATURE: 97 F | SYSTOLIC BLOOD PRESSURE: 145 MMHG | BODY MASS INDEX: 33.43 KG/M2

## 2020-04-04 DIAGNOSIS — M10.9 ACUTE GOUT INVOLVING TOE OF LEFT FOOT, UNSPECIFIED CAUSE: Primary | ICD-10-CM

## 2020-04-04 DIAGNOSIS — M79.676 TOE PAIN: ICD-10-CM

## 2020-04-04 LAB
ALBUMIN SERPL BCP-MCNC: 4.2 G/DL (ref 3.5–5.2)
ALP SERPL-CCNC: 87 U/L (ref 55–135)
ALT SERPL W/O P-5'-P-CCNC: 44 U/L (ref 10–44)
ANION GAP SERPL CALC-SCNC: 9 MMOL/L (ref 8–16)
AST SERPL-CCNC: 29 U/L (ref 10–40)
BASOPHILS # BLD AUTO: 0.03 K/UL (ref 0–0.2)
BASOPHILS NFR BLD: 0.5 % (ref 0–1.9)
BILIRUB SERPL-MCNC: 0.6 MG/DL (ref 0.1–1)
BUN SERPL-MCNC: 5 MG/DL (ref 6–20)
CALCIUM SERPL-MCNC: 9.9 MG/DL (ref 8.7–10.5)
CHLORIDE SERPL-SCNC: 105 MMOL/L (ref 95–110)
CO2 SERPL-SCNC: 28 MMOL/L (ref 23–29)
CREAT SERPL-MCNC: 1.1 MG/DL (ref 0.5–1.4)
DIFFERENTIAL METHOD: NORMAL
EOSINOPHIL # BLD AUTO: 0.1 K/UL (ref 0–0.5)
EOSINOPHIL NFR BLD: 1.8 % (ref 0–8)
ERYTHROCYTE [DISTWIDTH] IN BLOOD BY AUTOMATED COUNT: 11.8 % (ref 11.5–14.5)
EST. GFR  (AFRICAN AMERICAN): >60 ML/MIN/1.73 M^2
EST. GFR  (NON AFRICAN AMERICAN): >60 ML/MIN/1.73 M^2
GLUCOSE SERPL-MCNC: 124 MG/DL (ref 70–110)
HCT VFR BLD AUTO: 45.1 % (ref 40–54)
HGB BLD-MCNC: 15.6 G/DL (ref 14–18)
IMM GRANULOCYTES # BLD AUTO: 0.01 K/UL (ref 0–0.04)
IMM GRANULOCYTES NFR BLD AUTO: 0.2 % (ref 0–0.5)
LYMPHOCYTES # BLD AUTO: 2.2 K/UL (ref 1–4.8)
LYMPHOCYTES NFR BLD: 32.5 % (ref 18–48)
MCH RBC QN AUTO: 30.8 PG (ref 27–31)
MCHC RBC AUTO-ENTMCNC: 34.6 G/DL (ref 32–36)
MCV RBC AUTO: 89 FL (ref 82–98)
MONOCYTES # BLD AUTO: 0.6 K/UL (ref 0.3–1)
MONOCYTES NFR BLD: 9.5 % (ref 4–15)
NEUTROPHILS # BLD AUTO: 3.7 K/UL (ref 1.8–7.7)
NEUTROPHILS NFR BLD: 55.5 % (ref 38–73)
NRBC BLD-RTO: 0 /100 WBC
PLATELET # BLD AUTO: 332 K/UL (ref 150–350)
PMV BLD AUTO: 10.2 FL (ref 9.2–12.9)
POTASSIUM SERPL-SCNC: 3.9 MMOL/L (ref 3.5–5.1)
PROT SERPL-MCNC: 7.2 G/DL (ref 6–8.4)
RBC # BLD AUTO: 5.06 M/UL (ref 4.6–6.2)
SODIUM SERPL-SCNC: 142 MMOL/L (ref 136–145)
URATE SERPL-MCNC: 9.8 MG/DL (ref 3.4–7)
WBC # BLD AUTO: 6.62 K/UL (ref 3.9–12.7)

## 2020-04-04 PROCEDURE — 63600175 PHARM REV CODE 636 W HCPCS: Performed by: NURSE PRACTITIONER

## 2020-04-04 PROCEDURE — 84550 ASSAY OF BLOOD/URIC ACID: CPT

## 2020-04-04 PROCEDURE — 85025 COMPLETE CBC W/AUTO DIFF WBC: CPT

## 2020-04-04 PROCEDURE — 25000003 PHARM REV CODE 250: Performed by: NURSE PRACTITIONER

## 2020-04-04 PROCEDURE — 36415 COLL VENOUS BLD VENIPUNCTURE: CPT

## 2020-04-04 PROCEDURE — 80053 COMPREHEN METABOLIC PANEL: CPT

## 2020-04-04 PROCEDURE — 99284 EMERGENCY DEPT VISIT MOD MDM: CPT | Mod: 25

## 2020-04-04 PROCEDURE — 96372 THER/PROPH/DIAG INJ SC/IM: CPT | Mod: 59

## 2020-04-04 RX ORDER — KETOROLAC TROMETHAMINE 30 MG/ML
30 INJECTION, SOLUTION INTRAMUSCULAR; INTRAVENOUS
Status: COMPLETED | OUTPATIENT
Start: 2020-04-04 | End: 2020-04-04

## 2020-04-04 RX ORDER — METHYLPREDNISOLONE SOD SUCC 125 MG
125 VIAL (EA) INJECTION
Status: COMPLETED | OUTPATIENT
Start: 2020-04-04 | End: 2020-04-04

## 2020-04-04 RX ORDER — COLCHICINE 0.6 MG/1
1.2 TABLET, FILM COATED ORAL
Status: COMPLETED | OUTPATIENT
Start: 2020-04-04 | End: 2020-04-04

## 2020-04-04 RX ORDER — INDOMETHACIN 25 MG/1
25 CAPSULE ORAL
Qty: 21 CAPSULE | Refills: 0 | Status: SHIPPED | OUTPATIENT
Start: 2020-04-04 | End: 2020-05-19 | Stop reason: ALTCHOICE

## 2020-04-04 RX ADMIN — KETOROLAC TROMETHAMINE 30 MG: 30 INJECTION, SOLUTION INTRAMUSCULAR; INTRAVENOUS at 12:04

## 2020-04-04 RX ADMIN — METHYLPREDNISOLONE SODIUM SUCCINATE 125 MG: 125 INJECTION, POWDER, FOR SOLUTION INTRAMUSCULAR; INTRAVENOUS at 12:04

## 2020-04-04 RX ADMIN — COLCHICINE 1.2 MG: 0.6 TABLET, FILM COATED ORAL at 12:04

## 2020-04-04 NOTE — DISCHARGE INSTRUCTIONS
Treating Gout Attacks     Raising the joint above the level of your heart can help reduce gout symptoms.     Gout is a disease that affects the joints. It is caused by excess uric acid in your blood stream that may lead to crystals forming in your joints. Left untreated, it can lead to painful foot and joint deformities and even kidney problems. But, by treating gout early, you can relieve pain and help prevent future problems. Gout can usually be treated with medication and proper diet. In severe cases, surgery may be needed.  Gout attacks are painful and often happen more than once. Taking medications may reduce pain and prevent attacks in the future. There are also some things you can do at home to relieve symptoms.  Medications for gout  Your healthcare provider may prescribe a daily medication to reduce levels of uric acid. Reducing your uric acid levels may help prevent gout attacks. Allopurinol is one commonly used medication taken daily to reduce uric acid levels. Other medications can help relieve pain and swelling during an acute attack. Medicines such as NSAIDs (nonsteroidal anti-inflammatory medicines), steroids, and colchicine may be prescribed for intermittent use to relieve an acute gout attack. Be sure to take your medication as directed.  What you can do  Below are some things you can do at home to relieve gout symptoms. Your healthcare provider may have other tips.  · Rest the painful joint as much as you can.  · Raise the painful joint so it is at a level higher than your heart.  · Use ice for 10 minutes every 1-2 hours as possible.  How can I prevent gout?  With a little effort, you may be able to prevent gout attacks in the future. Here are some things you can do:  · Avoid foods high in purines  ¨ Certain meats (red meat, processed meat, turkey)  ¨ Organ meats (kidney, liver, sweetbread)  ¨ Shellfish (lobster, crab, shrimp, scallop, mussel)  ¨ Certain fish (anchovy, sardine, herring,  mackerel)  · Take any medications prescribed by your healthcare provider.  · Lose weight if you need to.  · Reduce high fructose corn syrup in meals and drinks.  · Reduce or eliminate consumption of alcohol, particularly beer, but also red wine and spirits.  · Control blood pressure, diabetes, and cholesterol.  · Drink plenty of water to help flush uric acid from your body.  Date Last Reviewed: 2/1/2016  © 5666-1618 [employer]. [address] [place] [address], [place] [address]. All rights reserved. This information is not intended as a substitute for professional medical care. Always follow your healthcare professional's instructions.          Gout    Gout is an inflammation of a joint due to a build-up of gout crystals in the joint fluid. This occurs when there is an excess of uric acid (a normal waste product) in the body. Uric acid builds up in the body when the kidneys are unable to filter enough of it from the blood. This may occur with age. It is also associated with kidney disease. Gout occurs more often in people with obesity, diabetes, high blood pressure, or high levels of fats in the blood. It may run in families. Gout tends to come and go. A flare up of gout is called an attack. Drinking alcohol or eating certain foods (such as shellfish or foods with additives such as high-fructose corn syrup) may increase uric acid levels in the blood and cause a gout attack.  During a gout attack, the affected joint may become a hot, red, swollen and painful. If you have had one attack of gout, you are likely to have another. An attack of gout can be treated with medicine. If these attacks become frequent, a daily medicine may be prescribed to help the kidneys remove uric acid from the body.  Home care  During a gout attack:  · Rest painful joints. If gout affects the joints of your foot or leg, you may want to use crutches for the first few days to keep from bearing weight on the affected joint.  · When  sitting or lying down, raise the painful joint to a level higher than your heart.  · Apply an ice pack (ice cubes in a plastic bag wrapped in a thin towel) over the injured area for 20 minutes every 1 to 2 hours the first day for pain relief. Continue this 3 to 4 times a day for swelling and pain.  · Avoid alcohol and foods listed below (see Preventing attacks) during a gout attack. Drink extra fluid to help flush the uric acid through your kidneys.  · If you were prescribed a medicine to treat gout, take it as your healthcare provider has instructed. Don't skip doses.  · Take anti-inflammatory medicine as directed.   · If pain medicines have been prescribed, take them exactly as directed.    Preventing attacks  · Minimize or avoid alcohol use. Excess alcohol intake can cause a gout attack.  · Limit these foods and beverages:  ¨ Organ meats, such as kidneys and liver  ¨ Certain seafoods (anchovies, sardines, shrimp, scallops, herring, mackerel)  ¨ Wild game, meat extracts and meat gravies  ¨ Foods and beverages sweetened with high-fructose corn syrup, such as sodas  · Eat a healthy diet including low-fat and nonfat dairy, whole grains, and vegetables.  · If you are overweight, talk to your healthcare provider about a weight reduction plan. Avoid fasting or extreme low calorie diets (less than 900 calories per day). This will increase uric acid levels in the body.  · If you have diabetes or high blood pressure, work with your doctor to manage these conditions.  · Protect the joint from injury. Trauma can trigger a gout attack.  Follow-up care  Follow up with your healthcare provider, or as advised.   When to seek medical advice  Call your healthcare provider if you have any of the following:  · Fever over 100.4°F (38.ºC) with worsening joint pain  · Increasing redness around the joint  · Pain developing in another joint  · Repeated vomiting, abdominal pain, or blood in the vomit or stool (black or red color)  Date  Last Reviewed: 3/1/2017  © 4268-0843 [employer]. [address] [place] [address], [place] [address]. All rights reserved. This information is not intended as a substitute for professional medical care. Always follow your healthcare professional's instructions.          Gout Diet  Gout is a painful condition caused by an excess of uric acid, a waste product made by the body. Uric acid forms crystals that collect in the joints. The immune response to these crystals brings on symptoms of joint pain and swelling. This is called a gout attack. Often, medications and diet changes are combined to manage gout. Below are some guidelines for changing your diet to help you manage gout and prevent attacks. Your health care provider will help you determine the best eating plan for you.     Eating to manage gout  Weight loss for those who are overweight may help reduce gout attacks.  Eat less of these foods  Eating too many foods containing purines may raise the levels of uric acid in your body. This raises your risk for a gout attack. Try to limit these foods and drinks:  · Alcohol, such as beer and red wine. You may be told to avoid alcohol completely.  · Soft drinks that contain sugar or high fructose corn syrup  · Certain fish, including anchovies, sardines, fish eggs, and herring  · Shellfish  · Certain meats, such as red meat, hot dogs, luncheon meats, and turkey  · Organ meats, such as liver, kidneys, and sweetbreads  · Legumes, such as dried beans and peas  · Other high fat foods such as gravy, whole milk, and high fat cheeses  · Vegetables such as asparagus, cauliflower, spinach, and mushrooms used to be thought to contribute to an increased risk for a gout attack, but recent studies show that high purine vegetables don't increase the risk for a gout attack.  Eat more of these foods  Other foods may be helpful for people with gout. Add some of these foods to your diet:  · Cherries contain chemicals that may lower  uric acid.  · Omega fatty acids. These are found in some fatty fish such as salmon, certain oils (flax, olive, or nut), and nuts themselves. Omega fatty acids may help prevent inflammation due to gout.  · Dairy products that are low-fat or fat-free, such as cheese and yogurt  · Complex carbohydrate foods, including whole grains, brown rice, oats, and beans  · Coffee, in moderation  · Water, approximately 64 ounces per day  Follow-up care  Follow up with your healthcare provider as advised.  When to seek medical advice  Call your healthcare provider right away if any of these occur:  · Return of gout symptoms, usually at night:  · Severe pain, swelling, and heat in a joint, especially the base of the big toe  · Affected joint is hard to move  · Skin of the affected joint is purple or red  · Fever of 100.4°F (38°C) or higher  · Pain that doesn't get better even with prescribed medicine   Date Last Reviewed: 1/12/2016  © 1882-3563 [employer]. [address] [place] [address]. All rights reserved. This information is not intended as a substitute for professional medical care. Always follow your healthcare professional's instructions.      **Follow up with PCP in 24-48 hours. Return to ER with worsening of symptoms. Monitor for worsening symptoms including signs of infection.     **Our goal in the emergency department is to always give you outstanding care and exceptional service. You may receive a survey by mail or e-mail in the next week regarding your experience in our ED. We would greatly appreciate your completing and returning the survey. Your feedback provides us with a way to recognize our staff who give very good care and it helps us learn how to improve when your experience was below our aspiration of excellence.

## 2020-04-04 NOTE — ED TRIAGE NOTES
Pt seen one week ago by PCP  And given a shot that helped for about 24 hrs and xr neg for any breaks, but still has pain with intermittent black and blue color per Pt report.'

## 2020-04-15 ENCOUNTER — PATIENT MESSAGE (OUTPATIENT)
Dept: SLEEP MEDICINE | Facility: CLINIC | Age: 55
End: 2020-04-15

## 2020-04-15 RX ORDER — CLONAZEPAM 0.5 MG/1
0.5 TABLET, ORALLY DISINTEGRATING ORAL NIGHTLY
Qty: 90 TABLET | Refills: 1 | Status: SHIPPED | OUTPATIENT
Start: 2020-04-15 | End: 2020-10-12 | Stop reason: SDUPTHER

## 2020-04-20 ENCOUNTER — PATIENT MESSAGE (OUTPATIENT)
Dept: PAIN MEDICINE | Facility: CLINIC | Age: 55
End: 2020-04-20

## 2020-04-23 DIAGNOSIS — M79.18 MYOFASCIAL PAIN: ICD-10-CM

## 2020-04-23 DIAGNOSIS — G89.29 NECK PAIN, CHRONIC: ICD-10-CM

## 2020-04-23 DIAGNOSIS — M54.12 CERVICAL RADICULOPATHY: ICD-10-CM

## 2020-04-23 DIAGNOSIS — M54.2 NECK PAIN, CHRONIC: ICD-10-CM

## 2020-04-23 DIAGNOSIS — Z98.1 S/P CERVICAL SPINAL FUSION: ICD-10-CM

## 2020-04-23 RX ORDER — BACLOFEN 10 MG/1
TABLET ORAL
Qty: 270 TABLET | Refills: 0 | Status: SHIPPED | OUTPATIENT
Start: 2020-04-23 | End: 2020-05-19 | Stop reason: ALTCHOICE

## 2020-04-29 ENCOUNTER — PATIENT MESSAGE (OUTPATIENT)
Dept: PAIN MEDICINE | Facility: CLINIC | Age: 55
End: 2020-04-29

## 2020-04-29 NOTE — TELEPHONE ENCOUNTER
"Offered an appt to pt. Pt states that he wanted something for the pain right now. Pt states "not to worry about it."   "

## 2020-05-27 ENCOUNTER — PATIENT MESSAGE (OUTPATIENT)
Dept: PAIN MEDICINE | Facility: CLINIC | Age: 55
End: 2020-05-27

## 2020-05-27 NOTE — TELEPHONE ENCOUNTER
Explained the current situation about Dr Prado. Offered him appt in [place] and offered soonest appt with Dr Riggins. Also, explained that he can go to his PCP if the pain is intolerable. Pt rather see Dr. Riggins.

## 2020-06-15 ENCOUNTER — OFFICE VISIT (OUTPATIENT)
Dept: PAIN MEDICINE | Facility: CLINIC | Age: 55
End: 2020-06-15
Attending: PAIN MEDICINE
Payer: MEDICARE

## 2020-06-15 VITALS
DIASTOLIC BLOOD PRESSURE: 78 MMHG | HEIGHT: 66 IN | HEART RATE: 110 BPM | RESPIRATION RATE: 12 BRPM | WEIGHT: 197.31 LBS | SYSTOLIC BLOOD PRESSURE: 120 MMHG | BODY MASS INDEX: 31.71 KG/M2

## 2020-06-15 DIAGNOSIS — M51.36 DDD (DEGENERATIVE DISC DISEASE), LUMBAR: Primary | ICD-10-CM

## 2020-06-15 DIAGNOSIS — M79.18 MYOFASCIAL PAIN: ICD-10-CM

## 2020-06-15 DIAGNOSIS — Z98.1 S/P LUMBAR FUSION: ICD-10-CM

## 2020-06-15 DIAGNOSIS — M47.816 LUMBAR SPONDYLOSIS: ICD-10-CM

## 2020-06-15 PROBLEM — M51.369 DDD (DEGENERATIVE DISC DISEASE), LUMBAR: Status: ACTIVE | Noted: 2020-06-15

## 2020-06-15 PROCEDURE — 99214 OFFICE O/P EST MOD 30 MIN: CPT | Mod: 25,S$GLB,, | Performed by: PAIN MEDICINE

## 2020-06-15 PROCEDURE — 99214 PR OFFICE/OUTPT VISIT, EST, LEVL IV, 30-39 MIN: ICD-10-PCS | Mod: 25,S$GLB,, | Performed by: PAIN MEDICINE

## 2020-06-15 PROCEDURE — 3008F BODY MASS INDEX DOCD: CPT | Mod: CPTII,S$GLB,, | Performed by: PAIN MEDICINE

## 2020-06-15 PROCEDURE — 3074F PR MOST RECENT SYSTOLIC BLOOD PRESSURE < 130 MM HG: ICD-10-PCS | Mod: CPTII,S$GLB,, | Performed by: PAIN MEDICINE

## 2020-06-15 PROCEDURE — 3078F PR MOST RECENT DIASTOLIC BLOOD PRESSURE < 80 MM HG: ICD-10-PCS | Mod: CPTII,S$GLB,, | Performed by: PAIN MEDICINE

## 2020-06-15 PROCEDURE — 3008F PR BODY MASS INDEX (BMI) DOCUMENTED: ICD-10-PCS | Mod: CPTII,S$GLB,, | Performed by: PAIN MEDICINE

## 2020-06-15 PROCEDURE — 99999 PR PBB SHADOW E&M-EST. PATIENT-LVL III: CPT | Mod: PBBFAC,,, | Performed by: PAIN MEDICINE

## 2020-06-15 PROCEDURE — 20553 PR INJECT TRIGGER POINTS, > 3: ICD-10-PCS | Mod: S$GLB,,, | Performed by: PAIN MEDICINE

## 2020-06-15 PROCEDURE — 20553 NJX 1/MLT TRIGGER POINTS 3/>: CPT | Mod: S$GLB,,, | Performed by: PAIN MEDICINE

## 2020-06-15 PROCEDURE — 3074F SYST BP LT 130 MM HG: CPT | Mod: CPTII,S$GLB,, | Performed by: PAIN MEDICINE

## 2020-06-15 PROCEDURE — 3078F DIAST BP <80 MM HG: CPT | Mod: CPTII,S$GLB,, | Performed by: PAIN MEDICINE

## 2020-06-15 PROCEDURE — 99999 PR PBB SHADOW E&M-EST. PATIENT-LVL III: ICD-10-PCS | Mod: PBBFAC,,, | Performed by: PAIN MEDICINE

## 2020-06-15 RX ORDER — BETAMETHASONE SODIUM PHOSPHATE AND BETAMETHASONE ACETATE 3; 3 MG/ML; MG/ML
3 INJECTION, SUSPENSION INTRA-ARTICULAR; INTRALESIONAL; INTRAMUSCULAR; SOFT TISSUE
Status: COMPLETED | OUTPATIENT
Start: 2020-06-15 | End: 2020-06-15

## 2020-06-15 RX ORDER — LEVOTHYROXINE SODIUM 112 UG/1
TABLET ORAL
COMMUNITY
Start: 2020-04-02 | End: 2020-06-22 | Stop reason: ALTCHOICE

## 2020-06-15 RX ADMIN — BETAMETHASONE SODIUM PHOSPHATE AND BETAMETHASONE ACETATE 3 MG: 3; 3 INJECTION, SUSPENSION INTRA-ARTICULAR; INTRALESIONAL; INTRAMUSCULAR; SOFT TISSUE at 12:06

## 2020-06-15 NOTE — LETTER
Judy 15, 2020      Tori Prado MD  [address]  [address]  [facility] [address]           [facility]  [address] [facility] [address]  Phone: [phone]  Fax: [phone]          Patient: Dalton Kent   MR Number: [id number]   YOB: 1965   Date of Visit: 6/15/2020       Dear Dr. Tori Prado:    Thank you for referring Dalton Kent to me for evaluation. Attached you will find relevant portions of my assessment and plan of care.    If you have questions, please do not hesitate to call me. I look forward to following Dalton Kent along with you.    Sincerely,    Jimmy Riggins Jr., MD    Enclosure  CC:  No Recipients    If you would like to receive this communication electronically, please contact [email or web address] or [phone] to request more information on [employer] Link access.    For providers and/or their staff who would like to refer a patient to [facility], please contact us through our one-stop-shop provider referral line, [facility], at [phone].    If you feel you have received this communication in error or would no longer like to receive these types of communications, please e-mail [email or web address]

## 2020-07-02 ENCOUNTER — OFFICE VISIT (OUTPATIENT)
Dept: PSYCHIATRY | Facility: CLINIC | Age: 55
End: 2020-07-02
Payer: COMMERCIAL

## 2020-07-02 VITALS
HEIGHT: 66 IN | TEMPERATURE: 99 F | SYSTOLIC BLOOD PRESSURE: 130 MMHG | WEIGHT: 193 LBS | DIASTOLIC BLOOD PRESSURE: 90 MMHG | BODY MASS INDEX: 31.02 KG/M2 | RESPIRATION RATE: 20 BRPM | HEART RATE: 92 BPM

## 2020-07-02 DIAGNOSIS — G47.52 REM SLEEP BEHAVIOR DISORDER: ICD-10-CM

## 2020-07-02 DIAGNOSIS — G47.33 OBSTRUCTIVE SLEEP APNEA (ADULT) (PEDIATRIC): Primary | ICD-10-CM

## 2020-07-02 DIAGNOSIS — R41.89 PSEUDODEMENTIA: ICD-10-CM

## 2020-07-02 DIAGNOSIS — F41.9 ANXIETY: ICD-10-CM

## 2020-07-02 DIAGNOSIS — F33.2 SEVERE RECURRENT MAJOR DEPRESSION WITHOUT PSYCHOTIC FEATURES: ICD-10-CM

## 2020-07-02 PROCEDURE — 3075F SYST BP GE 130 - 139MM HG: CPT | Mod: CPTII,S$GLB,, | Performed by: PSYCHIATRY & NEUROLOGY

## 2020-07-02 PROCEDURE — 3075F PR MOST RECENT SYSTOLIC BLOOD PRESS GE 130-139MM HG: ICD-10-PCS | Mod: CPTII,S$GLB,, | Performed by: PSYCHIATRY & NEUROLOGY

## 2020-07-02 PROCEDURE — 3080F PR MOST RECENT DIASTOLIC BLOOD PRESSURE >= 90 MM HG: ICD-10-PCS | Mod: CPTII,S$GLB,, | Performed by: PSYCHIATRY & NEUROLOGY

## 2020-07-02 PROCEDURE — 90833 PR PSYCHOTHERAPY W/PATIENT W/E&M, 30 MIN (ADD ON): ICD-10-PCS | Mod: S$GLB,,, | Performed by: PSYCHIATRY & NEUROLOGY

## 2020-07-02 PROCEDURE — 99999 PR PBB SHADOW E&M-EST. PATIENT-LVL III: CPT | Mod: PBBFAC,,, | Performed by: PSYCHIATRY & NEUROLOGY

## 2020-07-02 PROCEDURE — 99999 PR PBB SHADOW E&M-EST. PATIENT-LVL III: ICD-10-PCS | Mod: PBBFAC,,, | Performed by: PSYCHIATRY & NEUROLOGY

## 2020-07-02 PROCEDURE — 3080F DIAST BP >= 90 MM HG: CPT | Mod: CPTII,S$GLB,, | Performed by: PSYCHIATRY & NEUROLOGY

## 2020-07-02 PROCEDURE — 99214 OFFICE O/P EST MOD 30 MIN: CPT | Mod: S$GLB,,, | Performed by: PSYCHIATRY & NEUROLOGY

## 2020-07-02 PROCEDURE — 3008F PR BODY MASS INDEX (BMI) DOCUMENTED: ICD-10-PCS | Mod: CPTII,S$GLB,, | Performed by: PSYCHIATRY & NEUROLOGY

## 2020-07-02 PROCEDURE — 99214 PR OFFICE/OUTPT VISIT, EST, LEVL IV, 30-39 MIN: ICD-10-PCS | Mod: S$GLB,,, | Performed by: PSYCHIATRY & NEUROLOGY

## 2020-07-02 PROCEDURE — 3008F BODY MASS INDEX DOCD: CPT | Mod: CPTII,S$GLB,, | Performed by: PSYCHIATRY & NEUROLOGY

## 2020-07-02 PROCEDURE — 90833 PSYTX W PT W E/M 30 MIN: CPT | Mod: S$GLB,,, | Performed by: PSYCHIATRY & NEUROLOGY

## 2020-07-02 NOTE — PROGRESS NOTES
"Outpatient Psychiatry Follow-Up Visit (MD/NP)    7/2/2020    Clinical Status of Patient:  Outpatient (Ambulatory)    Chief Complaint:  Dalton Kent is a 54 y.o. male who presents today for follow-up of depression.  Met with patient.      Interval History and Content of Current Session:  Interim Events/Subjective Report/Content of Current Session:     The patient was seen and examined. His chart was reviewed. Reviewed notes by Tori Prado MD at 2/21/2020 11:17 AM; Soila Olivo MD at 2/28/2020  1:18 PM; Soila Olivo MD at 2/29/2020 11:04 AM; Tori Prado MD at 3/16/2020  3:31 PM; Soila Olivo MD at 3/30/2020  1:01 PM; Maria A Valladares NP at 4/4/2020 11:48 AM; Janis Miner NP at 5/19/2020  3:25 PM; Jimmy Riggins Jr., MD at 6/15/2020 12:49 PM; and Janis Miner NP at 6/22/2020 10:03 AM.    The patient has been partially compliant with treatment. He denied any side effects. He reports good tolerability and efficacy. He had several med changes by [facility].    New psychosocial stressors have occurred since his last appointment. The patient has a supportive wife- the marriage is stable. His 2 daughters are doing well. He is on SSI-disability. He tries to stay active around the house and fish when able. Finances are stable. Housing is stable. He has been coping with COVID related stressors well    +new medical issues have occurred since his last appointment. He had many medication changes since last seen. He is seeing pain speciality- baclofen has been started. He was diagnosed with Gout since last seen. He also resumed testosterone therapy.     "I'm doing better." He reports that symptoms of depression/anxiety are doing better overall with improved sleep issues.  Chronic fatigue remains problematic. He does not want to start any new medications at this time.     Improved/controlled Symptoms of Depression: denied diminished mood or loss of interest/anhedonia; less irritability, +diminished " energy, less change in sleep, no change in appetite, no diminished concentration or cognition or indecisiveness, no PMA/R, no excessive guilt or hopelessness or worthlessness, no suicidal ideations     Improved Changes in Sleep: some but less  trouble with initiation/maintenance, no early morning awakening with inability to return to sleep, no hypersomnolence; he reports improvement with klonopin and management per [facility]     Denied Suicidal/Homicidal ideations: no active/passive ideations, organized plans, or future intentions     Denied Symptoms of nestor or hypomania: no elevated, expansive, or irritable mood with no increased energy or activity; with no inflated self-esteem or grandiosity, decreased need for sleep, increased rate of speech, FOI or racing thoughts, distractibility, increased goal directed activity or PMA, or risky/disinhibited behavior    Denied Symptoms of psychosis: no hallucinations, delusions, disorganized thinking, disorganized behavior or abnormal motor behavior, or negative symptoms    Improving Symptoms of LEANNE: less excessive anxiety/worry/fear,  with less restlessness, less fatigue, no poor concentration, less irritability, less muscle tension, less sleep disturbance; +causes functionally impairing distress; no panic attacks; without agoraphobia     Alcohol use: pt reports no usage since he was hospitalized in 7/2019; he had no lapses since last seen. He has had cravings to drink    Memory problems: continue unchanged (no change since stopping klonopin); possibly in part iatrogenic with noted polypharmacy     Previous medication trials include: Depakote, nortriptyline, prazosin, Lexapro,  Xanax, klonopin, ativan, remeron        Psychotherapy:  · Target symptoms: depression, anxiety   · Why chosen therapy is appropriate versus another modality: relevant to diagnosis  · Outcome monitoring methods: self-report, observation  · Therapeutic intervention type: behavior modifying  "psychotherapy, supportive psychotherapy  · Topics discussed/themes: difficulty managing affect in interpersonal relationships, building skills sets for symptom management, symptom recognition  · The patient's response to the intervention is accepting. The patient's progress toward treatment goals is good.   · Duration of intervention: 16 minutes.  · Session primarily focused on behavioral activation and psycho-education    Review of Systems   Review of systems  General ROS: negative  Ophthalmic ROS: negative  ENT ROS: negative  Allergy and Immunology ROS: negative  Hematological and Lymphatic ROS: negative  Endocrine ROS: negative  Respiratory ROS: no cough, shortness of breath, or wheezing  Cardiovascular ROS: no chest pain or dyspnea on exertion  Gastrointestinal ROS: no abdominal pain, change in bowel habits, or black or bloody stools  Genito-Urinary ROS: no dysuria, trouble voiding, or hematuria  Musculoskeletal ROS: positive for chronic back pain and leg weakness   Neurological ROS: no TIA or stroke symptoms  negative  Dermatological ROS: negative      Past Medical, Family and Social History: The patient's past medical, family and social history have been reviewed and updated as appropriate within the electronic medical record - see encounter notes.    Compliance: yes    Side effects: None    Risk Parameters:  Patient reports no suicidal ideation  Patient reports no homicidal ideation  Patient reports no self-injurious behavior  Patient reports no violent behavior    Exam (detailed: at least 9 elements; comprehensive: all 15 elements)   Constitutional  Vitals:  Most recent vital signs, dated less than 90 days prior to this appointment, were reviewed.   Vitals:    07/02/20 1018   BP: (!) 130/90   Pulse: 92   Resp: 20   Temp: 98.7 °F (37.1 °C)   Weight: 87.5 kg (193 lb 0.2 oz)   Height: 5' 6" (1.676 m)     Body mass index is 31.15 kg/m².       General:  unremarkable, age appropriate, well nourished, casually " "dressed, neatly groomed, obese     Musculoskeletal  Muscle Strength/Tone:  no spasicity, no rigidity   Gait & Station:  non-ataxic     Psychiatric  Speech:  no latency; no press   Mood & Affect:  steady, euthymic"ok"  congruent and appropriate   Thought Process:  normal and logical   Associations:  intact   Thought Content:  normal, no suicidality, no homicidality, delusions, or paranoia   Insight:  intact, has awareness of illness   Judgement: behavior is adequate to circumstances   Orientation:  grossly intact, person, place, situation, time/date, day of week, month of year, year   Memory: intact for content of interview, able to remember recent events- yes, able to remember remote events- yes   Language: grossly intact, able to name, able to repeat   Attention Span & Concentration:  able to focus, completed tasks   Fund of Knowledge:  intact and appropriate to age and level of education, familiar with aspects of current personal life     Assessment and Diagnosis   Status/Progress: Based on the examination today, the patient's problem(s) is/are adequately but not ideally controlled.  New problems have been presented today.   Co-morbidities are not complicating management of the primary condition.  There are no active rule-out diagnoses for this patient at this time.     General Impression:     MDD, in partial remission  LEANNE  Pseudodementia/Memory loss  REM Movement Sleep disorders  Chronic Fatigue    Alcohol use disorder, in early full remission    RLS  ASAD  REMS   HTN  HLD  Chronic Headaches  GERD  Microcytic Anemia  Obese: chronic back pain  GOUT  Testosterone deficiency       Intervention/Counseling/Treatment Plan     Medications:     Depression/Anxiety:  Stop Cymbalta- pt no longer taking  Stop Remeron- pt no longer taking    -pt does not want to start any new medications at thi stime     Cognitive Impairment:  Likely pseudodemtnia per neurocognitive evaluation or in part iatrogenic; continuee to " monitor     Anxiety:  Clonazepam as below     REM movement sleep disorder  Clonopin 0.5 mg po q HS- managed by sleep medicine   -continue Melatonin 10 mg po q HS    ASAD:   -pt counseled; continue CPAP; sleep medicine fiollowing    Chronic Fatigue:  Likey secondary to ASAD  -consider trial of modafinil if needed- discuss with sleep specialty  -may be hormonal as well- ot restarted Testosterone tx    Microcytosis:  Labs reviewed, secondary to iron deficiency anemia    Alcohol Use Disorder:  Pt counseled; encourage sustained remission    RLS:   Klonopin as above    Hypothyroidism:   TSH last WNL   -continue synthroid 125 mcg po q AM    Iron deficiency:  -recheck in 1 month    Chronic Headaches:  Cymbalta as above    Discussed diagnosis, risks and benefits of proposed treatment vs alternative treatments vs no treatment, and potential side effects of these treatments. The patient expresses understanding of the above and displays the capacity to agree with this treatment given said understanding. Patient also agrees that, currently, the benefits outweigh the risks and would like to pursue treatment at this time.     Therapy:  Pt to continue with psychotherapy as scheduled/needed     Referrals:   Refer to sleep specialty for evaluation of several sleep disorder    Labs:  Reviewed labs from 4/4/20 with the patient    Return to Clinic: as needed     Jimmy Bradshaw MD  Psychiatry

## 2020-08-09 NOTE — PROGRESS NOTES
[facility]    Chief Complaint:   Chief Complaint   Patient presents with    Back Pain    Neck Pain     Initial HPI (10/11/19): Dalton Kent is a 54 y.o. male referred by Dr. Velazquez for back and neck pain.    Back pain is worse. Back pain began ~ 10 years ago. He has had 2 back surgeries. Back pain is localized to the left over the lateral iliac crest. No radiation into legs. He is unable to describe the quality of the pain, other than feeling deep. Worse with walking, cutting grass, standing, and slight flexion. Pain alleviated by nothing in particular. Left leg gets weak with cutting grass or doing dishes. Denies leg numbness.     He has had neck surgery with Dr. Ontiveros in 2015. He doesn't have neck pain but is having right arm pain and shooting pain into his small, ring and middle fingers and also pain in his tricep area. Pain described as shooting and shocking in the arm. Nothing particularly aggravates it, but did start while fishing and worse at night time. Nothing helps the pain. He did therapy recently.     Onset: Years  Location: Shoulder, arm, lower back left side, hip pain   Radiation: left leg and right arm  Quality: Aching, burning, throbbing, grabbing, tingling, deep, superficial, electric   Exacerbating Factors: exercise, lifting, sitting, standing and walking for more than 20 minutes  Alleviating Factors: nothing  Associated Symptoms: denies night fever/night sweats, urinary incontinence, bowel incontinence, significant weight loss, significant motor weakness and loss of sensations    Severity: Currently: 4/10   Typical Range: 7-8/10     Exacerbation: 8/10     Interval History (08/10/2020):  Dalton Kent returns to clinic, at the last clinic visit he underwent lumbar TPIs.    Left Lumbar TPIs provided relief after about a week. Currently he is doing pretty well in regards to the back pain.     Neck pain is the most troublesome. He has been working in his shed and  looking up a lot. Neck pain localized in the midline around C7, which is unchanged. His right shoulder has not been too bad. He has been sleeping better.     Since his last visit, he has f/u-ed w/ Psych (Dr. Bradshaw). He is doing better overall in regards to depression/anxiety/sleep, but chronic fatigue remains a problem.     Current Pain Scales:   Severity: Currently: 5/10   Typical Range: 4-9/10        Pain Disability Index  Family/Home Responsibilities:: 9  Recreation:: 7  Social Activity:: 5  Occupation:: 10  Sexual Behavior:: 7  Self Care:: 8  Life-Support Activities:: 0  Pain Disability Index (PDI): 46    Previous Interventions:  - 2/21/20: C7-T1 IL ANURAG w/ minimal relief  - 12/6/19: Cervical TPIs  - 10/11/19: Lumbar TPIs: Did not think they were effective  - Previous spine injections with Dr. Jimmy Rosas in [place] prior to the back surgeries. Also with Dr. Honeycutt and Dr. Pulido.     Previous Therapies:  PT/OT: yes last was 3-4 years ago  Surgery: yes He has had C5-7 ACDF with Dr. Ontiveros in 2016. He has had 2 back surgeries. First back surgery was L5-S1 ALIF in [facility] with Dr. Sorin Thomas. He had above level L4-5 herniated disk and underwent second back surgery, L4-5 TLIF at [facility] with Dr. Isaias Ontiveros in May 2016. Right then left carpal tunnel release in 2017 w/ Dr. Marin Rosas in May 2017.   Previous Medications:   - NSAIDS: Cant take because of stomach ulcers  - Muscle Relaxants: Xanax. Klonopin. Ativan.   - TCAs: Nortriptyline 25 mg  - SNRIs: Not tried  - Topicals: Bengay, biofreeze not effective  - Anticonvulsants: Gabapentin (Gralise) caused worsening depression, claustrophobia, trouble swallowing. Depakote. Lyrica.  - Opioids: None currently    Current Pain Medications  1. Clonazepam (for anxiety)  2. Tylenol 500 mg, two tablets, 1-2x per day.     Blood Thinners: None    Full Medication List:    Current Outpatient Medications:     allopurinoL (ZYLOPRIM)  300 MG tablet, Take 1 tablet (300 mg total) by mouth once daily., Disp: 90 tablet, Rfl: 3    amlodipine-benazepril 5-10 mg (LOTREL) 5-10 mg per capsule, Take 1 capsule by mouth once daily., Disp: 90 capsule, Rfl: 1    clonazePAM (KLONOPIN) 0.5 MG disintegrating tablet, Take 1 tablet (0.5 mg total) by mouth every evening., Disp: 90 tablet, Rfl: 1    MELATONIN ORAL, Take 10 mg by mouth every evening. , Disp: , Rfl:     metoprolol succinate (TOPROL-XL) 25 MG 24 hr tablet, Take 25 mg by mouth once daily., Disp: , Rfl:     pantoprazole (PROTONIX) 40 MG tablet, Take 1 tablet (40 mg total) by mouth once daily., Disp: 90 tablet, Rfl: 3    rosuvastatin (CRESTOR) 5 MG tablet, , Disp: , Rfl:     SYNTHROID 112 mcg tablet, Take 112 mg by mouth once daily., Disp: , Rfl:     testosterone (ANDROGEL) 1 % (50 mg/5 gram) GlPk, Apply 5 g topically once daily., Disp: 90 packet, Rfl: 3     Review of Systems:  Review of Systems   Constitutional: Negative for fever and weight loss.   HENT: Positive for tinnitus. Negative for ear pain.    Eyes: Negative for pain and redness.   Respiratory: Negative for cough and shortness of breath.    Cardiovascular: Negative for chest pain and palpitations.   Gastrointestinal: Positive for heartburn. Negative for constipation.        GERD w/ NSAIDs   Genitourinary: Negative.         Denies urinary incontinence. Denies urine retention.    Musculoskeletal: Positive for back pain, myalgias and neck pain.   Skin: Negative for itching and rash.   Neurological: Positive for tingling, weakness and headaches. Negative for seizures.   Endo/Heme/Allergies: Negative for environmental allergies. Does not bruise/bleed easily.   Psychiatric/Behavioral: Positive for depression. The patient is nervous/anxious and has insomnia.        Allergies:  Gralise [gabapentin], Norco [hydrocodone-acetaminophen], Percocet [oxycodone-acetaminophen], and Statins-hmg-coa reductase inhibitors     Medical History:   has a past  "medical history of Alcohol abuse, Anxiety, Depression, GERD (gastroesophageal reflux disease), Headache, psychiatric care, Hypertension, Hypothyroid, Lyme disease, Restless leg syndrome, Sleep difficulties, Therapy, and Uncontrolled REM sleep behavior disorder.    Surgical History:   has a past surgical history that includes Anal fistulotomy; Anal sphincterotomy; Appendectomy; Colonoscopy (2/2015); Back surgery; Neck surgery; Hand surgery (Bilateral); Carpal tunnel release; Trigger finger release; Nose surgery; Colonoscopy (N/A, 8/29/2019); and Epidural steroid injection (N/A, 2/21/2020).    Social History:   reports that he has quit smoking. He has never used smokeless tobacco. He reports previous alcohol use. He reports that he does not use drugs.    Physical Exam:  /84   Pulse 91   Temp 97.3 °F (36.3 °C)   Resp 16   Ht 5' 6" (1.676 m)   Wt 89.5 kg (197 lb 5 oz)   SpO2 98%   BMI 31.85 kg/m²   GEN: No acute distress. Calm, comfortable  HENT: Normocephalic, atraumatic, moist mucous membranes  EYE: Anicteric sclera, non-injected.   CV: Non-diaphoretic. Regular Rate. Radial Pulses 2+.  RESP: Breathing comfortably. Chest expansion symmetric.  EXT: No clubbing, cyanosis.   SKIN: Warm, & dry to palpation.  PSYCH: Pleasant mood and appropriate affect. Recent and remote memory intact.   GAIT: Independent, normal ambulation  Lumbar Spine Exam:       Inspection: No erythema, bruising. No surgical incisions      Palpation: (+) TTP of lumbar paraspinals bilaterally       ROM: Limited in flexion, extension, lateral bending.       (+) Facet loading bilaterally  Neck Exam:       Inspection: No erythema, bruising. Poor posture, hunched shoulders up into ears      Palpation: (+) TTP of cervical paraspinals and trapezius on the right.       ROM: Limitation in lateral rotation to either side.       Provocative Maneuvers:  (-) Spurling's bilaterally  Shoulder Exam:       Inspection: No erythema, bruising, surgical " incisions      Palpation: No TTP of AC joint, subacromial area.       ROM: Bilateral abduction limitation to about 120 degrees. Pain with internal rotation and abduction  Neurologic Exam:     Alert. Speech is fluent and appropriate.     Strength: 5/5 throughout bilateral upper & lower extremities     Sensation: Grossly intact to light touch in bilateral upper & lower extremities     Reflexes: 2+ in bilateral patella, Unable to elicit bilateral triceps, biceps, brachioradialis     Tone: No abnormality appreciated in bilateral upper or lower extremities     No Clonus     (-) Rosas bilaterall    Imaging:  - MRI Cervical Spine 2/7/20:  Prior multilevel cervical fusion at C5-C6 and C6-C7.  Alignment is maintained.  No congenital spinal stenosis.  No abnormal signal change involving the visualized portion of the spinal cord.  At the C2-C3 and C3-C4 levels there are minimal uncinate process spurs.  There is no evidence of significant spinal canal or foraminal encroachment.  At the C4-C5 level there is minimal disc-osteophyte with mild effacement along the anterior subarachnoid space.  Mild facet degenerative changes.  No significant spinal canal or foraminal encroachment.  Prior fusions performed at C5-C6 and C6-C7.  Alignment is maintained.  No evidence of a focal recurrent disc abnormality.  At the C7-T1 level there is a posterior disc-osteophyte with mild encroachment along the subarachnoid space and mild bilateral foraminal encroachment.    - X-ray Shoulder 12/11/19:  The acromioclavicular and glenohumeral joint spaces are intact.  No fracture or dislocation is seen.  Cervical fusion hardware is identified    - EMG/NCS BUE 10/28/19:  Normal EMG/NCS of the arms.     - X-ray C-spine 10/11/19:  Prior multilevel fusion at C5-C6 and C6-C7.  Fusion screws appear intact and alignment is maintained.  No significant change in alignment with flexion or extension.  There is no plain film evidence of fracture or acute  subluxation.  No prevertebral soft tissue swelling.  Minimal degenerative spurring along the anterior inferior endplate of C4.  No disc space narrowing at C2, C3 or C4    - X-ray L-spine 10/11/19:  Prior multilevel fusion at L4-L5 and L5-S1.  Surgical fixation hardware appears intact.  Alignment is maintained.  There is no significant change in alignment with flexion or extension.  Mild degenerative change involving L1 through L3 without significant focal disc space narrowing.  There is no evidence of an acute fracture.  Alignment is maintained.  No evidence of spondylolysis or spondylolisthesis    - Outside Shoulder MRI Right 2/22/19:  Impression:   1. Degenerative arthropathy at AC joint  2. Pericapsular soft tissue swelling and periosteal soft tissue swelling about the AC joint and distal clavicle. A low-grade partial tear of the trapezius muscle at the insertion upon the clavicle and a low-grade edema or strain of the anterior head of deltoid muscle  3. Suspect a sprain of the coracoclavicular ligaments, which are not torn  4. TEndinosis of the supraspinatus without a tear  5. Slight fraying of the labrum     - MRI L-spine 02/06/2019:  Spinal Alignment: Normal.  Vertebrae: Posterior fusion hardware at L4-5 and anterior fusion hardware at L5-S1 are unchanged, with associated disc spacer devices at L4-5 and L5-S1.  Susceptibility artifact is present surrounding the hardware.  There are associated postsurgical findings of left L4 hemilaminectomy.  Mild susceptibility artifact surrounds the hardware.  1.5 cm hemangioma is present in the posterosuperior portion of the T12 vertebral body.  Discs: Mild diffuse desiccation.  Disc spacer devices are present at L5-4 5 and L5-S1.  Cord: Normal cord signal. Conus terminates normally at the L1-2 level.  Degenerative findings: Minimal circumferential disc bulge at L2-3 with minimal bilateral facet arthropathy and minimal facet effusions in conjunction with mild buckling of  the ligamentum flavum does not result in significant compressive phenomena.  Mild circumferential disc bulge at L3-4 with mild bilateral facet arthropathy and buckling of the ligamentum flavum results in mild spinal canal stenosis and mild to moderate foraminal stenoses.  Spinal canal is posteriorly decompressed at L4-5; right neural foramen is widely patent while there is apparent moderate narrowing of the left neural foramen related to either postoperative granulation tissue along the inferior neural foramen or disc material.  Mild bilateral facet arthropathy at L5-S1 results in at most mild foraminal stenoses.  Paraspinal muscles & soft tissues: Normal    - EMG/NCS BLE 2/25/19:  Impression: Normal EMG of the legs, although his study was mildly limited due to inability to sample lumbar paraspinal muscles given his prior lumbar surgical scarring.     - Outside Shoulder MRI Right 2/22/19:  Impression:   1. Degenerative arthropathy at AC joint  2. Pericapsular soft tissue swelling and periosteal soft tissue swelling about the AC joint and distal clavicle. A low-grade partial tear of the trapezius muscle at the insertion upon the clavicle and a low-grade edema or strain of the anterior head of deltoid muscle  3. Suspect a sprain of the coracoclavicular ligaments, which are not torn  4. TEndinosis of the supraspinatus without a tear  5. Slight fraying of the labrum    - EMG/NCS (outside records) with bilateral C6 radiculopathy and bilateral carpal tunnel syndrome     Labs:  Lab Results   Component Value Date     04/04/2020    K 3.9 04/04/2020     04/04/2020    CO2 28 04/04/2020    BUN 5 (L) 04/04/2020    CREATININE 1.1 04/04/2020    CALCIUM 9.9 04/04/2020    ANIONGAP 9 04/04/2020    ESTGFRAFRICA >60 04/04/2020    EGFRNONAA >60 04/04/2020     Lab Results   Component Value Date    ALT 44 04/04/2020    AST 29 04/04/2020    ALKPHOS 87 04/04/2020    BILITOT 0.6 04/04/2020     Lab Results   Component Value Date      04/04/2020       Assessment:  Dalton Kent is a 54 y.o. male with the following diagnoses based on history, exam, and imaging:    Problem List Items Addressed This Visit        Neuro    Lumbar spondylosis       Orthopedic    S/P lumbar fusion    Myofascial pain      Other Visit Diagnoses     S/P cervical spinal fusion    -  Primary    Neck pain, chronic        Chronic right shoulder pain              This is a pleasant 54 y.o. gentleman with anxiety, depression, chronic fatigue, insomnia, hypogonadism, hypoTH, GERD presenting with:    - Chronic back pain: He has prior fusion at L4-5 and L5-S1. His back pain appears to be multifactorial in nature, with contributions from myofascial pain, lumbar spondylosis. X-ray with degenerative changes above the level of the fusion. Myofascial pain improved w/ TPIs.    - Chronic neck pain: Prior fusions performed at C5-C6 and C6-C7.  His neck pain, also appears multifactorial. MRI with tear in trapezius, which may be contributing to the pain. Muscular aspects of the pain and tenderness improve with TPIs.     - Chronic right shoulder pain: in the area of the latissimus and described as deep to the scapula, may be subscapularis vs lattisimus pain.     Treatment Plan: I discussed with the patient the following assessment and recommendations. The following is the plan the patient agreed upon:  - PT/OT/HEP:  No further PT at this time  - Procedures: Consider shoulder injection in the future.   - Consider future possible caudal epidural steroid injection, medial branch blocks above level of fusion in the lumbar spine, and even possible spinal cord stimulation in the future as well.  - Medications: May reconsider Lyrica in the future  - Imaging: Consider shoulder MRI in future  - Labs: Reviewed.  Medications are appropriately dosed for current hepatorenal function.    Follow Up: RTC as needed    Disclaimer: This note was partly generated using dictation software  which may occasionally result in transcription errors.

## 2020-08-10 ENCOUNTER — OFFICE VISIT (OUTPATIENT)
Dept: PAIN MEDICINE | Facility: CLINIC | Age: 55
End: 2020-08-10
Payer: MEDICARE

## 2020-08-10 VITALS
HEART RATE: 91 BPM | OXYGEN SATURATION: 98 % | TEMPERATURE: 97 F | BODY MASS INDEX: 31.71 KG/M2 | SYSTOLIC BLOOD PRESSURE: 118 MMHG | HEIGHT: 66 IN | DIASTOLIC BLOOD PRESSURE: 84 MMHG | RESPIRATION RATE: 16 BRPM | WEIGHT: 197.31 LBS

## 2020-08-10 DIAGNOSIS — M47.816 LUMBAR SPONDYLOSIS: ICD-10-CM

## 2020-08-10 DIAGNOSIS — G89.29 CHRONIC RIGHT SHOULDER PAIN: ICD-10-CM

## 2020-08-10 DIAGNOSIS — M54.2 NECK PAIN, CHRONIC: ICD-10-CM

## 2020-08-10 DIAGNOSIS — Z98.1 S/P LUMBAR FUSION: ICD-10-CM

## 2020-08-10 DIAGNOSIS — M79.18 MYOFASCIAL PAIN: ICD-10-CM

## 2020-08-10 DIAGNOSIS — G89.29 NECK PAIN, CHRONIC: ICD-10-CM

## 2020-08-10 DIAGNOSIS — Z98.1 S/P CERVICAL SPINAL FUSION: Primary | ICD-10-CM

## 2020-08-10 DIAGNOSIS — M25.511 CHRONIC RIGHT SHOULDER PAIN: ICD-10-CM

## 2020-08-10 PROCEDURE — 99999 PR PBB SHADOW E&M-EST. PATIENT-LVL III: CPT | Mod: PBBFAC,,, | Performed by: PHYSICAL MEDICINE & REHABILITATION

## 2020-08-10 PROCEDURE — 3008F PR BODY MASS INDEX (BMI) DOCUMENTED: ICD-10-PCS | Mod: CPTII,S$GLB,, | Performed by: PHYSICAL MEDICINE & REHABILITATION

## 2020-08-10 PROCEDURE — 3074F SYST BP LT 130 MM HG: CPT | Mod: CPTII,S$GLB,, | Performed by: PHYSICAL MEDICINE & REHABILITATION

## 2020-08-10 PROCEDURE — 99999 PR PBB SHADOW E&M-EST. PATIENT-LVL III: ICD-10-PCS | Mod: PBBFAC,,, | Performed by: PHYSICAL MEDICINE & REHABILITATION

## 2020-08-10 PROCEDURE — 3079F DIAST BP 80-89 MM HG: CPT | Mod: CPTII,S$GLB,, | Performed by: PHYSICAL MEDICINE & REHABILITATION

## 2020-08-10 PROCEDURE — 99213 OFFICE O/P EST LOW 20 MIN: CPT | Mod: S$GLB,,, | Performed by: PHYSICAL MEDICINE & REHABILITATION

## 2020-08-10 PROCEDURE — 3079F PR MOST RECENT DIASTOLIC BLOOD PRESSURE 80-89 MM HG: ICD-10-PCS | Mod: CPTII,S$GLB,, | Performed by: PHYSICAL MEDICINE & REHABILITATION

## 2020-08-10 PROCEDURE — 3008F BODY MASS INDEX DOCD: CPT | Mod: CPTII,S$GLB,, | Performed by: PHYSICAL MEDICINE & REHABILITATION

## 2020-08-10 PROCEDURE — 99213 PR OFFICE/OUTPT VISIT, EST, LEVL III, 20-29 MIN: ICD-10-PCS | Mod: S$GLB,,, | Performed by: PHYSICAL MEDICINE & REHABILITATION

## 2020-08-10 PROCEDURE — 3074F PR MOST RECENT SYSTOLIC BLOOD PRESSURE < 130 MM HG: ICD-10-PCS | Mod: CPTII,S$GLB,, | Performed by: PHYSICAL MEDICINE & REHABILITATION

## 2020-08-10 RX ORDER — ROSUVASTATIN CALCIUM 5 MG/1
5 TABLET, COATED ORAL
COMMUNITY
Start: 2020-08-09 | End: 2021-11-10

## 2020-09-25 ENCOUNTER — PATIENT MESSAGE (OUTPATIENT)
Dept: SLEEP MEDICINE | Facility: CLINIC | Age: 55
End: 2020-09-25

## 2020-10-02 ENCOUNTER — TELEPHONE (OUTPATIENT)
Dept: SLEEP MEDICINE | Facility: CLINIC | Age: 55
End: 2020-10-02

## 2020-10-09 ENCOUNTER — TELEPHONE (OUTPATIENT)
Dept: SLEEP MEDICINE | Facility: CLINIC | Age: 55
End: 2020-10-09

## 2020-10-12 ENCOUNTER — OFFICE VISIT (OUTPATIENT)
Dept: SLEEP MEDICINE | Facility: CLINIC | Age: 55
End: 2020-10-12
Payer: MEDICARE

## 2020-10-12 VITALS
BODY MASS INDEX: 31.5 KG/M2 | WEIGHT: 196 LBS | DIASTOLIC BLOOD PRESSURE: 90 MMHG | HEIGHT: 66 IN | SYSTOLIC BLOOD PRESSURE: 138 MMHG | HEART RATE: 84 BPM

## 2020-10-12 DIAGNOSIS — G47.61 PERIODIC LIMB MOVEMENT DISORDER (PLMD): ICD-10-CM

## 2020-10-12 DIAGNOSIS — G47.33 OBSTRUCTIVE SLEEP APNEA (ADULT) (PEDIATRIC): ICD-10-CM

## 2020-10-12 DIAGNOSIS — I10 ESSENTIAL HYPERTENSION: ICD-10-CM

## 2020-10-12 DIAGNOSIS — G47.52 REM SLEEP BEHAVIOR DISORDER: Primary | ICD-10-CM

## 2020-10-12 PROCEDURE — 3075F SYST BP GE 130 - 139MM HG: CPT | Mod: CPTII,S$GLB,, | Performed by: INTERNAL MEDICINE

## 2020-10-12 PROCEDURE — 3008F PR BODY MASS INDEX (BMI) DOCUMENTED: ICD-10-PCS | Mod: CPTII,S$GLB,, | Performed by: INTERNAL MEDICINE

## 2020-10-12 PROCEDURE — 3080F DIAST BP >= 90 MM HG: CPT | Mod: CPTII,S$GLB,, | Performed by: INTERNAL MEDICINE

## 2020-10-12 PROCEDURE — 3075F PR MOST RECENT SYSTOLIC BLOOD PRESS GE 130-139MM HG: ICD-10-PCS | Mod: CPTII,S$GLB,, | Performed by: INTERNAL MEDICINE

## 2020-10-12 PROCEDURE — 3008F BODY MASS INDEX DOCD: CPT | Mod: CPTII,S$GLB,, | Performed by: INTERNAL MEDICINE

## 2020-10-12 PROCEDURE — 3080F PR MOST RECENT DIASTOLIC BLOOD PRESSURE >= 90 MM HG: ICD-10-PCS | Mod: CPTII,S$GLB,, | Performed by: INTERNAL MEDICINE

## 2020-10-12 PROCEDURE — 99999 PR PBB SHADOW E&M-EST. PATIENT-LVL III: CPT | Mod: PBBFAC,,, | Performed by: INTERNAL MEDICINE

## 2020-10-12 PROCEDURE — 99213 PR OFFICE/OUTPT VISIT, EST, LEVL III, 20-29 MIN: ICD-10-PCS | Mod: S$GLB,,, | Performed by: INTERNAL MEDICINE

## 2020-10-12 PROCEDURE — 99213 OFFICE O/P EST LOW 20 MIN: CPT | Mod: S$GLB,,, | Performed by: INTERNAL MEDICINE

## 2020-10-12 PROCEDURE — 99999 PR PBB SHADOW E&M-EST. PATIENT-LVL III: ICD-10-PCS | Mod: PBBFAC,,, | Performed by: INTERNAL MEDICINE

## 2020-10-12 RX ORDER — CLONAZEPAM 0.5 MG/1
0.5 TABLET ORAL NIGHTLY
Qty: 90 TABLET | Refills: 1 | Status: SHIPPED | OUTPATIENT
Start: 2020-10-12 | End: 2021-04-12 | Stop reason: SDUPTHER

## 2020-10-12 NOTE — PROGRESS NOTES
Subjective:       Patient ID: Dalton Kent is a 54 y.o. male.    Chief Complaint: Sleeping Problem  Dalton Kent returns for PAP follow up for compliance documentation    Dalton Kent has a history of  Obstructive Sleep Apnea diagnosed in see below.   The last PAP titration was see below.   Device is 6 month(s) old.   Current setting 7-15 cm H20.   DME is [facility].      Dalton Kent is using CPAP 96.7% of nights and averages 4 hours and 54 minutes.   Device use greater than 4 hours is 88.9*%.   Patient does not have problems with the pressure setting.   Mask interface issues are not experienced.   A nasal mask interface is used.   The patient does not experience side effects from therapy.   The patient experiences the following benefits of therapy:  more rested, reduced morbidity, reduced accident risk, reduced mortality and reduced cardiovascular risk   There are not equipment issues.   Mean pressure 7.7, average peak pressure 10.6 and 90th percentile pressure 9.   Estimated AHI 2.6.      Started on clonazepam for RBD.   Not experiencing side effects from medication.  No dream enactment behavior.           Importance of PAP compliance discussed.   Care and use of equipment discussed.   Download and relevant sleep studies reviewed with patient    Discussed therapeutic goals for positive airway pressure therapy(CPAP or BiPAP).  Ideal is usage 100% of nights for at least 6 hours per night.  Minimum usage is 70% of night for at least 4 hours per night used    The patient was given open opportunity to ask questions and/or express concerns about treatment plan.   All questions/concerns were discussed.              Past Medical History:   Diagnosis Date    Alcohol abuse     Anxiety     Depression     GERD (gastroesophageal reflux disease)     Headache     Hx of psychiatric care     Hypertension     Hypothyroid     Lyme disease     Restless leg syndrome     Sleep  difficulties     Therapy     Uncontrolled REM sleep behavior disorder      Past Surgical History:   Procedure Laterality Date    ANAL FISTULOTOMY      ANAL SPHINCTEROTOMY      APPENDECTOMY      BACK SURGERY      CARPAL TUNNEL RELEASE      bilateral    COLONOSCOPY  2/2015    COLONOSCOPY N/A 8/29/2019    Procedure: COLONOSCOPY WITH BIOPSY;  Surgeon: Giuseppe [place] MD;  Location: [facility];  Service: Endoscopy;  Laterality: N/A;    EPIDURAL STEROID INJECTION N/A 2/21/2020    Procedure: CERVICAL EPIDURAL STEROID INJECTION (C7-T1 INTERLAMINAR);  Surgeon: Tori Prado MD;  Location: [facility];  Service: Pain Management;  Laterality: N/A;    HAND SURGERY Bilateral     NECK SURGERY      NOSE SURGERY      TRIGGER FINGER RELEASE       Family History   Problem Relation Age of Onset    Colon cancer Neg Hx     Colon polyps Neg Hx     Crohn's disease Neg Hx     Rectal cancer Neg Hx     Ulcerative colitis Neg Hx      Social History     Socioeconomic History    Marital status:      Spouse name: Josselyn Kent    Number of children: 2    Years of education: Not on file    Highest education level: Not on file   Occupational History    Not on file   Social Needs    Financial resource strain: Not on file    Food insecurity     Worry: Not on file     Inability: Not on file    Transportation needs     Medical: Not on file     Non-medical: Not on file   Tobacco Use    Smoking status: Former Smoker    Smokeless tobacco: Never Used   Substance and Sexual Activity    Alcohol use: Not Currently     Comment: socially    Drug use: No    Sexual activity: Yes     Partners: Female   Lifestyle    Physical activity     Days per week: Not on file     Minutes per session: Not on file    Stress: To some extent   Relationships    Social connections     Talks on phone: Not on file     Gets together: Not on file     Attends [employer] service: Not on file     Active member of club or organization: Not on file      Attends meetings of clubs or organizations: Not on file     Relationship status: Not on file   Other Topics Concern    Patient feels they ought to cut down on drinking/drug use No    Patient annoyed by others criticizing their drinking/drug use No    Patient has felt bad or guilty about drinking/drug use No    Patient has had a drink/used drugs as an eye opener in the AM No   Social History Narrative    Not on file       Current Outpatient Medications   Medication Sig Dispense Refill    allopurinoL (ZYLOPRIM) 300 MG tablet Take 1 tablet (300 mg total) by mouth once daily. 90 tablet 3    amlodipine-benazepril 5-10 mg (LOTREL) 5-10 mg per capsule Take 1 capsule by mouth once daily. 90 capsule 1    clonazePAM (KLONOPIN) 0.5 MG disintegrating tablet Take 1 tablet (0.5 mg total) by mouth every evening. 90 tablet 1    MELATONIN ORAL Take 10 mg by mouth every evening.       metoprolol succinate (TOPROL-XL) 25 MG 24 hr tablet Take 25 mg by mouth once daily.      pantoprazole (PROTONIX) 40 MG tablet Take 1 tablet (40 mg total) by mouth once daily. 90 tablet 3    rosuvastatin (CRESTOR) 5 MG tablet       SYNTHROID 112 mcg tablet Take 112 mg by mouth once daily.      testosterone (ANDROGEL) 1 % (50 mg/5 gram) GlPk Apply 5 g topically once daily. 90 packet 3     No current facility-administered medications for this visit.      Review of patient's allergies indicates:   Allergen Reactions    Gralise [gabapentin]      Depression    Norco [hydrocodone-acetaminophen] Itching    Percocet [oxycodone-acetaminophen] Itching    Statins-hmg-coa reductase inhibitors        Review of Systems    Objective:      There were no vitals filed for this visit.  Physical Exam    Lab Review:   CBC:   Lab Results   Component Value Date    WBC 6.62 04/04/2020    RBC 5.06 04/04/2020    HGB 15.6 04/04/2020    HCT 45.1 04/04/2020     04/04/2020     BMP:   Lab Results   Component Value Date     (H) 04/04/2020      04/04/2020    K 3.9 04/04/2020     04/04/2020    CO2 28 04/04/2020    BUN 5 (L) 04/04/2020    CREATININE 1.1 04/04/2020    CALCIUM 9.9 04/04/2020     Diagnostics Review: Echo: Reviewed     Assessment:       1. REM sleep behavior disorder    2. Obstructive sleep apnea (adult) (pediatric)    3. Essential hypertension    4. Periodic limb movement disorder (PLMD)        Plan:       Continue clonazepam.   Call if symptoms worsen.   Follow up 1 year.         DATE ? 2/2019 12/2019 1/2020 2/2020           AHI ? 6.9 13.5               Sa02 JOHN PAUL     81               CPAP/BIPAP       9             AHI on PAP       0 9.3           CURRENT SETTING         9           PLMDI   0 0 0             PLMDAI   0 0 0             MSLT                     MISC   GLO 0.2  REM AHI 4.6 GLO 2.4  REM AHI 39.1   changed 7-15           DME [facility]                   16-minute visit. >50% spent counseling patient and coordination of care.

## 2020-10-15 PROBLEM — R41.3 MEMORY LOSS: Chronic | Status: ACTIVE | Noted: 2019-01-30

## 2020-10-15 PROBLEM — E78.2 MIXED HYPERLIPIDEMIA: Chronic | Status: ACTIVE | Noted: 2019-08-21

## 2020-10-15 PROBLEM — M47.816 LUMBAR SPONDYLOSIS: Chronic | Status: ACTIVE | Noted: 2020-06-15

## 2020-10-15 PROBLEM — K21.9 GASTROESOPHAGEAL REFLUX DISEASE WITHOUT ESOPHAGITIS: Chronic | Status: ACTIVE | Noted: 2019-03-27

## 2020-10-15 PROBLEM — F33.2 SEVERE RECURRENT MAJOR DEPRESSION WITHOUT PSYCHOTIC FEATURES: Chronic | Status: ACTIVE | Noted: 2019-07-02

## 2020-10-28 ENCOUNTER — OFFICE VISIT (OUTPATIENT)
Dept: NEUROLOGY | Facility: CLINIC | Age: 55
End: 2020-10-28
Payer: MEDICARE

## 2020-10-28 ENCOUNTER — PATIENT MESSAGE (OUTPATIENT)
Dept: NEUROLOGY | Facility: CLINIC | Age: 55
End: 2020-10-28

## 2020-10-28 VITALS
BODY MASS INDEX: 30.59 KG/M2 | SYSTOLIC BLOOD PRESSURE: 134 MMHG | HEART RATE: 98 BPM | DIASTOLIC BLOOD PRESSURE: 92 MMHG | RESPIRATION RATE: 16 BRPM | WEIGHT: 194.88 LBS | HEIGHT: 67 IN | TEMPERATURE: 98 F

## 2020-10-28 DIAGNOSIS — R51.9 HEADACHE, UNSPECIFIED HEADACHE TYPE: ICD-10-CM

## 2020-10-28 DIAGNOSIS — R51.9 HEADACHE, UNSPECIFIED HEADACHE TYPE: Primary | ICD-10-CM

## 2020-10-28 PROCEDURE — 3080F DIAST BP >= 90 MM HG: CPT | Mod: CPTII,S$GLB,, | Performed by: NURSE PRACTITIONER

## 2020-10-28 PROCEDURE — 3075F PR MOST RECENT SYSTOLIC BLOOD PRESS GE 130-139MM HG: ICD-10-PCS | Mod: CPTII,S$GLB,, | Performed by: NURSE PRACTITIONER

## 2020-10-28 PROCEDURE — 3080F PR MOST RECENT DIASTOLIC BLOOD PRESSURE >= 90 MM HG: ICD-10-PCS | Mod: CPTII,S$GLB,, | Performed by: NURSE PRACTITIONER

## 2020-10-28 PROCEDURE — 3008F BODY MASS INDEX DOCD: CPT | Mod: CPTII,S$GLB,, | Performed by: NURSE PRACTITIONER

## 2020-10-28 PROCEDURE — 99214 PR OFFICE/OUTPT VISIT, EST, LEVL IV, 30-39 MIN: ICD-10-PCS | Mod: S$GLB,,, | Performed by: NURSE PRACTITIONER

## 2020-10-28 PROCEDURE — 3008F PR BODY MASS INDEX (BMI) DOCUMENTED: ICD-10-PCS | Mod: CPTII,S$GLB,, | Performed by: NURSE PRACTITIONER

## 2020-10-28 PROCEDURE — 99999 PR PBB SHADOW E&M-EST. PATIENT-LVL IV: CPT | Mod: PBBFAC,,, | Performed by: NURSE PRACTITIONER

## 2020-10-28 PROCEDURE — 3075F SYST BP GE 130 - 139MM HG: CPT | Mod: CPTII,S$GLB,, | Performed by: NURSE PRACTITIONER

## 2020-10-28 PROCEDURE — 99999 PR PBB SHADOW E&M-EST. PATIENT-LVL IV: ICD-10-PCS | Mod: PBBFAC,,, | Performed by: NURSE PRACTITIONER

## 2020-10-28 PROCEDURE — 99214 OFFICE O/P EST MOD 30 MIN: CPT | Mod: S$GLB,,, | Performed by: NURSE PRACTITIONER

## 2020-10-28 RX ORDER — NORTRIPTYLINE HYDROCHLORIDE 10 MG/1
10 CAPSULE ORAL NIGHTLY
Qty: 60 CAPSULE | Refills: 3 | Status: SHIPPED | OUTPATIENT
Start: 2020-10-28 | End: 2020-10-28 | Stop reason: SDUPTHER

## 2020-10-28 NOTE — TELEPHONE ENCOUNTER
[facility] day [facility]. Pended.

## 2020-10-28 NOTE — PROGRESS NOTES
Subjective:      Chief Complaint:  Headache and Memory Loss      History of Present Illness  Dalton Kent is a 54 y.o. male with memory loss, headaches, and lower back and leg pain. Neuropsych testing in 2019 showed severe cognitive impairment, but this did not rise to the level of dementia-Pseudodementia is suspected, secondary to anxiety/depression. 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 He has HTN, hypothyroidism, hypogonadism, GERD, and RLS and REM sleep behavior disorder. Questionable diagnosis of prior Lyme disease. History of pancreatitis, bilateral CTR, L-spine surgery and C-spine surgery in 2016. He was previously followed by Dr. Honeycutt at [facility] [place]. He is medically disabled from his spinal issues.    He presents today for a follow up visit. He has worsening headaches, memory loss, and agitation recently. He would like to restart his last headache preventative, Pamelor. This was stopped by Psychiatry prior to switch to Remeron, which was then stopped by sleep medicine.    Headaches located in a band like distribution, and occur with stress/agitation. He is not sleeping well at night currently.     He is seeing Dr. Prado for pain management.     He was referred to Cardiology previously for presyncopal complaints. Toprol was started for tachycardia on 7 day holter monitor. He is no longer having any presyncopal complaints. Prior he felt as if this would occur when yawning, sneezing, and with bowel movements.    I have reviewed all of this patient's past medical and surgical histories as well as family and social histories and active allergies and medications as documented in the electronic medical record.    Review of Systems  Review of Systems   Constitutional: Positive for fatigue.   HENT: Negative for hearing loss, sinus pressure, sinus pain, tinnitus, trouble swallowing and voice change.    Eyes: Negative for visual disturbance.   Respiratory: Negative for shortness of breath.    Cardiovascular: Negative  for leg swelling.   Gastrointestinal: Negative for abdominal pain.   Musculoskeletal: Positive for arthralgias and back pain. Negative for gait problem.   Skin: Negative for rash.   Neurological: Negative for dizziness, tremors, seizures, facial asymmetry, speech difficulty, weakness, light-headedness, numbness and headaches.        Presyncope   Hematological: Does not bruise/bleed easily.   Psychiatric/Behavioral: Positive for decreased concentration and sleep disturbance. Negative for agitation, behavioral problems, confusion, dysphoric mood, hallucinations, self-injury and suicidal ideas. The patient is not nervous/anxious and is not hyperactive.         Memory loss       Objective:       Vitals:    10/28/20 0850   BP: (!) 134/92   Pulse: 98   Resp: 16   Temp: 98.2 °F (36.8 °C)     Exam:  Gen Appearance, well developed/nourished in no apparent distress, but agitated during visit  CV: 2+ distal pulses with no edema or swelling  Neuro:  MS: Awake, alert, oriented to place, person, time, situation. Sustains attention. Recent memory mildly impaired/remote memory intact, Language is full to spontaneous speech/repetition/naming/comprehension. Fund of Knowledge is full. Able to name current President  CN: Optic discs are flat with normal vasculature, PERRL, Extraoccular movements and visual fields are full. Normal facial sensation and strength, Hearing symmetric, Tongue and Palate are midline and strong. Shoulder Shrug symmetric and strong.  Motor: Normal bulk, tone, no abnormal movements. 5/5 strength bilateral upper/lower extremities with 1+ reflexes with muted plantar response  Sensory: symmetric to light touch, pain, temp, and vibration/proprioception. Romberg positive  Cerebellar: Finger-nose,Heal-shin, Rapid alternating movements intact  Gait: Normal stance, no ataxia  MSK Survey: no pain with ROM to RUE and no tenderness today with exam.     Imaging:   MRI Brain 1/2019:   FINDINGS:  Intracranial compartment:  Ventricles and sulci are normal in size for age without evidence of hydrocephalus.  No extra-axial blood or fluid collections. Minimal burden of patchy T2 hyperintense foci scattered in the supratentorial periventricular and subcortical white matter most likely relate to sequelae of chronic microvascular ischemic disease.  The brain parenchyma appears normal. No mass lesion, acute hemorrhage, edema or acute infarct.  Normal vascular flow voids are preserved.    Skull/extracranial contents (limited evaluation): Bone marrow signal intensity is normal.      Impression       No acute abnormality.     MRA Brain 1/2019:   FINDINGS:  Anterior intracranial circulation: No high-grade focal stenosis, occlusion, aneurysm, or vascular malformation.    Posterior intracranial circulation: No high-grade focal stenosis, occlusion, aneurysm, or vascular malformation.      Impression       Normal MRA of the [place]     1/2019 MRI L-spine:   FINDINGS:  Spinal Alignment: Normal.    Vertebrae: Posterior fusion hardware at L4-5 and anterior fusion hardware at L5-S1 are unchanged, with associated disc spacer devices at L4-5 and L5-S1.  Susceptibility artifact is present surrounding the hardware.  There are associated postsurgical findings of left L4 hemilaminectomy.  Mild susceptibility artifact surrounds the hardware.  1.5 cm hemangioma is present in the posterosuperior portion of the T12 vertebral body.    Discs: Mild diffuse desiccation.  Disc spacer devices are present at L5-4 5 and L5-S1.    Cord: Normal cord signal. Conus terminates normally at the L1-2 level.    Degenerative findings: Minimal circumferential disc bulge at L2-3 with minimal bilateral facet arthropathy and minimal facet effusions in conjunction with mild buckling of the ligamentum flavum does not result in significant compressive phenomena.  Mild circumferential disc bulge at L3-4 with mild bilateral facet arthropathy and buckling of the ligamentum flavum  results in mild spinal canal stenosis and mild to moderate foraminal stenoses.  Spinal canal is posteriorly decompressed at L4-5; right neural foramen is widely patent while there is apparent moderate narrowing of the left neural foramen related to either postoperative granulation tissue along the inferior neural foramen or disc material.  Mild bilateral facet arthropathy at L5-S1 results in at most mild foraminal stenoses.    Paraspinal muscles & soft tissues: Normal.      Impression       Unchanged postoperative findings of L4-S1 fusion with left L4 hemilaminectomy.  There is apparent moderate narrowing of the left neural foramen at L4-5 related to either postoperative granulation tissue along the inferior foramen or disc material.    Additional mild disc and facet degeneration above the fused levels resulting in mild spinal canal stenosis and mild to moderate foraminal stenoses at L3-4.       2/2019 ROSANNA's:  · Normal resting ROSANNA's.    EMG BLE 2/2019:   Normal EMG of the legs, though study was mildly limited, due to inability to sample lumbar paraspinal muscles, given his prior lumbar surgical scarring.     EMG BUE 10/2019:   Impression:  Abnormal Study secondary to the Presence of:    Normal EMG/NCS of the arms.   Note EMG/NCS is limited in evaluation of central (ex. myelopathic) lesions. Clinical and imaging correlation suggested.      Labs:   Will obtain most recent labs from PCP    Assessment:   Dalton Kent is a 54 y.o. male who presents for an initial visit for evaluation for memory loss, headaches, blurred vision, and lower back and leg pain. He has HTN, hypothyroidism, hypogonadism, GERD, and RLS and REM sleep behavior disorder. Questionable diagnosis of prior Lyme disease. [place] [facility] [place]   [place]:   I recommend:   1. Increased tension distribution headaches and worsening memory likely related to increased mood issues. Neuro exam unremarkable today.   -Agitation  "evidence during visit today.   -Advised to see Dr. Bradshaw for further managemen of mood issues.   -Note Psych admit earlier last year for suicidal ideation with ETOH abuse, which he has stopped since then.   2. Restart low dose Pamelor for headache prevention.   His Pamelor was stopped prior-this was used for headache prevention, then was changed to Remeron per Psychiatry. Remeron was then stopped by sleep medicine.   3. He is taking Klonopin for REM sleep behavior disorder per sleep medicine, who manages his ASAD as well.   4. Will monitor for development of neurodegenerative disorder, given his REM sleep behavior issues and his memory loss.   5. He saw Cardiology prior for presyncopal complaints.   -Presyncopal complaints resolved after starting Metoprolol for tachycardia on 7 day holter monitor. MRI Brain and MRA unremarkable for IC and vascular causes.  -He had tachycardia since his last visit, which was believed to be related to oversupplementation for his thyroid disease.  6. Continue to follow with Dr. Prado/pain management for spinal pain complaints and right shoulder pain.   -EMG BUE unremarkable for radiculopathy, and X-rays of right shoulder were unremarkable.  -He saw Dr. Javed for his shoulder complaints prior.   -There is moderate foraminal narrowing at Left L4/5, which can explain his lumbar complaints; however, EMG BLE 2/2019 was unremarkable for radiculopathy; mildly limited study, due to prior surgical scarring above the lumbar paraspinal muscles.   -Note prior L-spine surgery in 2016 per Dr. Geovanny Ontiveros, whom he declines seeing again.     FU 3 months    CC: Dr. Jimmy Bradshaw    "note not shared"        "

## 2020-10-29 RX ORDER — NORTRIPTYLINE HYDROCHLORIDE 10 MG/1
10 CAPSULE ORAL NIGHTLY
Qty: 180 CAPSULE | Refills: 1 | Status: SHIPPED | OUTPATIENT
Start: 2020-10-29 | End: 2021-01-07

## 2020-10-31 ENCOUNTER — PATIENT MESSAGE (OUTPATIENT)
Dept: SLEEP MEDICINE | Facility: CLINIC | Age: 55
End: 2020-10-31

## 2020-11-02 ENCOUNTER — PATIENT MESSAGE (OUTPATIENT)
Dept: SLEEP MEDICINE | Facility: CLINIC | Age: 55
End: 2020-11-02

## 2020-12-18 ENCOUNTER — PATIENT MESSAGE (OUTPATIENT)
Dept: PAIN MEDICINE | Facility: CLINIC | Age: 55
End: 2020-12-18

## 2021-01-07 ENCOUNTER — OFFICE VISIT (OUTPATIENT)
Dept: PSYCHIATRY | Facility: CLINIC | Age: 56
End: 2021-01-07
Payer: COMMERCIAL

## 2021-01-07 DIAGNOSIS — R51.9 HEADACHE, UNSPECIFIED HEADACHE TYPE: ICD-10-CM

## 2021-01-07 PROCEDURE — 99213 OFFICE O/P EST LOW 20 MIN: CPT | Mod: 95,,, | Performed by: PSYCHIATRY & NEUROLOGY

## 2021-01-07 PROCEDURE — 90833 PSYTX W PT W E/M 30 MIN: CPT | Mod: 95,,, | Performed by: PSYCHIATRY & NEUROLOGY

## 2021-01-07 PROCEDURE — 90833 PR PSYCHOTHERAPY W/PATIENT W/E&M, 30 MIN (ADD ON): ICD-10-PCS | Mod: 95,,, | Performed by: PSYCHIATRY & NEUROLOGY

## 2021-01-07 PROCEDURE — 99213 PR OFFICE/OUTPT VISIT, EST, LEVL III, 20-29 MIN: ICD-10-PCS | Mod: 95,,, | Performed by: PSYCHIATRY & NEUROLOGY

## 2021-01-07 RX ORDER — NORTRIPTYLINE HYDROCHLORIDE 25 MG/1
25 CAPSULE ORAL NIGHTLY
Qty: 90 CAPSULE | Refills: 1 | Status: SHIPPED | OUTPATIENT
Start: 2021-01-07 | End: 2021-05-31

## 2021-01-28 ENCOUNTER — OFFICE VISIT (OUTPATIENT)
Dept: NEUROLOGY | Facility: CLINIC | Age: 56
End: 2021-01-28
Payer: MEDICARE

## 2021-01-28 VITALS
BODY MASS INDEX: 30.99 KG/M2 | DIASTOLIC BLOOD PRESSURE: 82 MMHG | SYSTOLIC BLOOD PRESSURE: 118 MMHG | RESPIRATION RATE: 18 BRPM | HEART RATE: 94 BPM | TEMPERATURE: 98 F | WEIGHT: 192.81 LBS | HEIGHT: 66 IN

## 2021-01-28 DIAGNOSIS — G25.81 RESTLESS LEGS SYNDROME: ICD-10-CM

## 2021-01-28 DIAGNOSIS — F33.2 SEVERE RECURRENT MAJOR DEPRESSION WITHOUT PSYCHOTIC FEATURES: Chronic | ICD-10-CM

## 2021-01-28 DIAGNOSIS — R41.3 MEMORY LOSS: Chronic | ICD-10-CM

## 2021-01-28 DIAGNOSIS — R41.89 PSEUDODEMENTIA: ICD-10-CM

## 2021-01-28 DIAGNOSIS — R51.9 HEADACHE, UNSPECIFIED HEADACHE TYPE: Primary | ICD-10-CM

## 2021-01-28 DIAGNOSIS — G47.52 REM SLEEP BEHAVIOR DISORDER: ICD-10-CM

## 2021-01-28 PROCEDURE — 99214 PR OFFICE/OUTPT VISIT, EST, LEVL IV, 30-39 MIN: ICD-10-PCS | Mod: S$GLB,,, | Performed by: NURSE PRACTITIONER

## 2021-01-28 PROCEDURE — 99214 OFFICE O/P EST MOD 30 MIN: CPT | Mod: S$GLB,,, | Performed by: NURSE PRACTITIONER

## 2021-01-28 PROCEDURE — 1126F AMNT PAIN NOTED NONE PRSNT: CPT | Mod: S$GLB,,, | Performed by: NURSE PRACTITIONER

## 2021-01-28 PROCEDURE — 99999 PR PBB SHADOW E&M-EST. PATIENT-LVL IV: ICD-10-PCS | Mod: PBBFAC,,, | Performed by: NURSE PRACTITIONER

## 2021-01-28 PROCEDURE — 3079F PR MOST RECENT DIASTOLIC BLOOD PRESSURE 80-89 MM HG: ICD-10-PCS | Mod: CPTII,S$GLB,, | Performed by: NURSE PRACTITIONER

## 2021-01-28 PROCEDURE — 3008F BODY MASS INDEX DOCD: CPT | Mod: CPTII,S$GLB,, | Performed by: NURSE PRACTITIONER

## 2021-01-28 PROCEDURE — 1126F PR PAIN SEVERITY QUANTIFIED, NO PAIN PRESENT: ICD-10-PCS | Mod: S$GLB,,, | Performed by: NURSE PRACTITIONER

## 2021-01-28 PROCEDURE — 3008F PR BODY MASS INDEX (BMI) DOCUMENTED: ICD-10-PCS | Mod: CPTII,S$GLB,, | Performed by: NURSE PRACTITIONER

## 2021-01-28 PROCEDURE — 3074F SYST BP LT 130 MM HG: CPT | Mod: CPTII,S$GLB,, | Performed by: NURSE PRACTITIONER

## 2021-01-28 PROCEDURE — 3074F PR MOST RECENT SYSTOLIC BLOOD PRESSURE < 130 MM HG: ICD-10-PCS | Mod: CPTII,S$GLB,, | Performed by: NURSE PRACTITIONER

## 2021-01-28 PROCEDURE — 3079F DIAST BP 80-89 MM HG: CPT | Mod: CPTII,S$GLB,, | Performed by: NURSE PRACTITIONER

## 2021-01-28 PROCEDURE — 99999 PR PBB SHADOW E&M-EST. PATIENT-LVL IV: CPT | Mod: PBBFAC,,, | Performed by: NURSE PRACTITIONER

## 2021-01-28 RX ORDER — BENAZEPRIL HYDROCHLORIDE 10 MG/1
1 TABLET ORAL DAILY
COMMUNITY
Start: 2021-01-25 | End: 2023-12-06 | Stop reason: SDUPTHER

## 2021-01-28 RX ORDER — AMLODIPINE BESYLATE 2.5 MG/1
1 TABLET ORAL DAILY
COMMUNITY
Start: 2021-01-25 | End: 2021-02-18

## 2021-03-22 ENCOUNTER — OFFICE VISIT (OUTPATIENT)
Dept: PAIN MEDICINE | Facility: CLINIC | Age: 56
End: 2021-03-22
Payer: MEDICARE

## 2021-03-22 VITALS
WEIGHT: 194.13 LBS | BODY MASS INDEX: 31.2 KG/M2 | TEMPERATURE: 97 F | DIASTOLIC BLOOD PRESSURE: 62 MMHG | OXYGEN SATURATION: 98 % | RESPIRATION RATE: 16 BRPM | HEIGHT: 66 IN | HEART RATE: 70 BPM | SYSTOLIC BLOOD PRESSURE: 118 MMHG

## 2021-03-22 DIAGNOSIS — G89.29 CHRONIC LEFT-SIDED LOW BACK PAIN WITH LEFT-SIDED SCIATICA: ICD-10-CM

## 2021-03-22 DIAGNOSIS — M54.16 LUMBAR RADICULOPATHY: Primary | ICD-10-CM

## 2021-03-22 DIAGNOSIS — Z98.1 S/P LUMBAR FUSION: ICD-10-CM

## 2021-03-22 DIAGNOSIS — M54.42 CHRONIC LEFT-SIDED LOW BACK PAIN WITH LEFT-SIDED SCIATICA: ICD-10-CM

## 2021-03-22 PROCEDURE — 99214 PR OFFICE/OUTPT VISIT, EST, LEVL IV, 30-39 MIN: ICD-10-PCS | Mod: S$GLB,,, | Performed by: PHYSICAL MEDICINE & REHABILITATION

## 2021-03-22 PROCEDURE — 99999 PR PBB SHADOW E&M-EST. PATIENT-LVL V: CPT | Mod: PBBFAC,,, | Performed by: PHYSICAL MEDICINE & REHABILITATION

## 2021-03-22 PROCEDURE — 3074F PR MOST RECENT SYSTOLIC BLOOD PRESSURE < 130 MM HG: ICD-10-PCS | Mod: CPTII,S$GLB,, | Performed by: PHYSICAL MEDICINE & REHABILITATION

## 2021-03-22 PROCEDURE — 3078F DIAST BP <80 MM HG: CPT | Mod: CPTII,S$GLB,, | Performed by: PHYSICAL MEDICINE & REHABILITATION

## 2021-03-22 PROCEDURE — 3008F BODY MASS INDEX DOCD: CPT | Mod: CPTII,S$GLB,, | Performed by: PHYSICAL MEDICINE & REHABILITATION

## 2021-03-22 PROCEDURE — 99999 PR PBB SHADOW E&M-EST. PATIENT-LVL V: ICD-10-PCS | Mod: PBBFAC,,, | Performed by: PHYSICAL MEDICINE & REHABILITATION

## 2021-03-22 PROCEDURE — 3008F PR BODY MASS INDEX (BMI) DOCUMENTED: ICD-10-PCS | Mod: CPTII,S$GLB,, | Performed by: PHYSICAL MEDICINE & REHABILITATION

## 2021-03-22 PROCEDURE — 1125F PR PAIN SEVERITY QUANTIFIED, PAIN PRESENT: ICD-10-PCS | Mod: S$GLB,,, | Performed by: PHYSICAL MEDICINE & REHABILITATION

## 2021-03-22 PROCEDURE — 3078F PR MOST RECENT DIASTOLIC BLOOD PRESSURE < 80 MM HG: ICD-10-PCS | Mod: CPTII,S$GLB,, | Performed by: PHYSICAL MEDICINE & REHABILITATION

## 2021-03-22 PROCEDURE — 1125F AMNT PAIN NOTED PAIN PRSNT: CPT | Mod: S$GLB,,, | Performed by: PHYSICAL MEDICINE & REHABILITATION

## 2021-03-22 PROCEDURE — 3074F SYST BP LT 130 MM HG: CPT | Mod: CPTII,S$GLB,, | Performed by: PHYSICAL MEDICINE & REHABILITATION

## 2021-03-22 PROCEDURE — 99214 OFFICE O/P EST MOD 30 MIN: CPT | Mod: S$GLB,,, | Performed by: PHYSICAL MEDICINE & REHABILITATION

## 2021-03-22 RX ORDER — HYDROCORTISONE 25 MG/G
CREAM TOPICAL
COMMUNITY
Start: 2021-02-11 | End: 2021-06-22

## 2021-03-22 RX ORDER — BACLOFEN 10 MG/1
10 TABLET ORAL 2 TIMES DAILY PRN
Qty: 60 TABLET | Refills: 2 | Status: SHIPPED | OUTPATIENT
Start: 2021-03-22 | End: 2021-05-31

## 2021-03-22 RX ORDER — KETOCONAZOLE 20 MG/G
CREAM TOPICAL
COMMUNITY
Start: 2021-03-17 | End: 2021-06-22

## 2021-03-23 ENCOUNTER — HOSPITAL ENCOUNTER (OUTPATIENT)
Dept: PREADMISSION TESTING | Facility: HOSPITAL | Age: 56
Discharge: HOME | End: 2021-03-23
Attending: PHYSICAL MEDICINE & REHABILITATION
Payer: MEDICARE

## 2021-03-23 DIAGNOSIS — Z01.818 PRE-OP TESTING: ICD-10-CM

## 2021-03-23 PROCEDURE — U0003 INFECTIOUS AGENT DETECTION BY NUCLEIC ACID (DNA OR RNA); SEVERE ACUTE RESPIRATORY SYNDROME CORONAVIRUS 2 (SARS-COV-2) (CORONAVIRUS DISEASE [COVID-19]), AMPLIFIED PROBE TECHNIQUE, MAKING USE OF HIGH THROUGHPUT TECHNOLOGIES AS DESCRIBED BY CMS-2020-01-R: HCPCS | Performed by: PHYSICAL MEDICINE & REHABILITATION

## 2021-03-23 PROCEDURE — U0005 INFEC AGEN DETEC AMPLI PROBE: HCPCS | Performed by: PHYSICAL MEDICINE & REHABILITATION

## 2021-03-24 LAB — SARS-COV-2 RNA RESP QL NAA+PROBE: NOT DETECTED

## 2021-03-26 ENCOUNTER — HOSPITAL ENCOUNTER (OUTPATIENT)
Dept: RADIOLOGY | Facility: HOSPITAL | Age: 56
Discharge: HOME | End: 2021-03-26
Attending: PHYSICAL MEDICINE & REHABILITATION
Payer: MEDICARE

## 2021-03-26 ENCOUNTER — HOSPITAL ENCOUNTER (OUTPATIENT)
Facility: HOSPITAL | Age: 56
Discharge: HOME | End: 2021-03-26
Attending: PHYSICAL MEDICINE & REHABILITATION | Admitting: PHYSICAL MEDICINE & REHABILITATION
Payer: MEDICARE

## 2021-03-26 VITALS
RESPIRATION RATE: 17 BRPM | OXYGEN SATURATION: 97 % | DIASTOLIC BLOOD PRESSURE: 74 MMHG | SYSTOLIC BLOOD PRESSURE: 128 MMHG | HEART RATE: 66 BPM | TEMPERATURE: 97 F

## 2021-03-26 DIAGNOSIS — M54.16 LUMBAR RADICULOPATHY: Primary | ICD-10-CM

## 2021-03-26 DIAGNOSIS — R52 PAIN: ICD-10-CM

## 2021-03-26 DIAGNOSIS — M54.16 LUMBAR RADICULOPATHY: ICD-10-CM

## 2021-03-26 PROCEDURE — 63600175 PHARM REV CODE 636 W HCPCS: Performed by: PHYSICAL MEDICINE & REHABILITATION

## 2021-03-26 PROCEDURE — 64483 PR EPIDURAL INJ, ANES/STEROID, TRANSFORAMINAL, LUMB/SACR, SNGL LEVL: ICD-10-PCS | Mod: LT,,, | Performed by: PHYSICAL MEDICINE & REHABILITATION

## 2021-03-26 PROCEDURE — 64483 NJX AA&/STRD TFRM EPI L/S 1: CPT | Mod: LT,,, | Performed by: PHYSICAL MEDICINE & REHABILITATION

## 2021-03-26 PROCEDURE — 25000003 PHARM REV CODE 250: Performed by: PHYSICAL MEDICINE & REHABILITATION

## 2021-03-26 PROCEDURE — 64483 NJX AA&/STRD TFRM EPI L/S 1: CPT | Mod: LT | Performed by: PHYSICAL MEDICINE & REHABILITATION

## 2021-03-26 PROCEDURE — 25500020 PHARM REV CODE 255: Performed by: PHYSICAL MEDICINE & REHABILITATION

## 2021-03-26 RX ORDER — SODIUM CHLORIDE 9 MG/ML
INJECTION, SOLUTION INTRAVENOUS CONTINUOUS
Status: DISCONTINUED | OUTPATIENT
Start: 2021-03-26 | End: 2021-04-19

## 2021-03-26 RX ORDER — FENTANYL CITRATE 50 UG/ML
INJECTION, SOLUTION INTRAMUSCULAR; INTRAVENOUS
Status: DISCONTINUED | OUTPATIENT
Start: 2021-03-26 | End: 2021-03-26 | Stop reason: HOSPADM

## 2021-03-26 RX ORDER — MIDAZOLAM HYDROCHLORIDE 1 MG/ML
INJECTION INTRAMUSCULAR; INTRAVENOUS
Status: DISCONTINUED
Start: 2021-03-26 | End: 2021-03-26 | Stop reason: HOSPADM

## 2021-03-26 RX ORDER — LIDOCAINE HYDROCHLORIDE 10 MG/ML
INJECTION, SOLUTION EPIDURAL; INFILTRATION; INTRACAUDAL; PERINEURAL
Status: DISCONTINUED | OUTPATIENT
Start: 2021-03-26 | End: 2021-03-26 | Stop reason: HOSPADM

## 2021-03-26 RX ORDER — LIDOCAINE HYDROCHLORIDE 10 MG/ML
INJECTION, SOLUTION EPIDURAL; INFILTRATION; INTRACAUDAL; PERINEURAL
Status: DISCONTINUED
Start: 2021-03-26 | End: 2021-03-26 | Stop reason: HOSPADM

## 2021-03-26 RX ORDER — LIDOCAINE HYDROCHLORIDE 10 MG/ML
INJECTION INFILTRATION; PERINEURAL
Status: DISCONTINUED | OUTPATIENT
Start: 2021-03-26 | End: 2021-03-26 | Stop reason: HOSPADM

## 2021-03-26 RX ORDER — FENTANYL CITRATE 50 UG/ML
INJECTION, SOLUTION INTRAMUSCULAR; INTRAVENOUS
Status: DISCONTINUED
Start: 2021-03-26 | End: 2021-03-26 | Stop reason: HOSPADM

## 2021-03-26 RX ORDER — DEXAMETHASONE SODIUM PHOSPHATE 10 MG/ML
INJECTION INTRAMUSCULAR; INTRAVENOUS
Status: DISCONTINUED | OUTPATIENT
Start: 2021-03-26 | End: 2021-03-26 | Stop reason: HOSPADM

## 2021-03-26 RX ORDER — DEXAMETHASONE SODIUM PHOSPHATE 10 MG/ML
INJECTION INTRAMUSCULAR; INTRAVENOUS
Status: DISCONTINUED
Start: 2021-03-26 | End: 2021-03-26 | Stop reason: HOSPADM

## 2021-03-26 RX ORDER — MIDAZOLAM HYDROCHLORIDE 1 MG/ML
INJECTION, SOLUTION INTRAMUSCULAR; INTRAVENOUS
Status: DISCONTINUED | OUTPATIENT
Start: 2021-03-26 | End: 2021-03-26 | Stop reason: HOSPADM

## 2021-03-26 RX ORDER — LIDOCAINE HYDROCHLORIDE 10 MG/ML
INJECTION INFILTRATION; PERINEURAL
Status: DISCONTINUED
Start: 2021-03-26 | End: 2021-03-26 | Stop reason: HOSPADM

## 2021-04-11 ENCOUNTER — PATIENT MESSAGE (OUTPATIENT)
Dept: SLEEP MEDICINE | Facility: CLINIC | Age: 56
End: 2021-04-11

## 2021-04-12 RX ORDER — CLONAZEPAM 0.5 MG/1
0.5 TABLET ORAL NIGHTLY
Qty: 90 TABLET | Refills: 1 | Status: SHIPPED | OUTPATIENT
Start: 2021-04-12 | End: 2021-04-21

## 2021-04-19 ENCOUNTER — OFFICE VISIT (OUTPATIENT)
Dept: PAIN MEDICINE | Facility: CLINIC | Age: 56
End: 2021-04-19
Payer: MEDICARE

## 2021-04-19 ENCOUNTER — TELEPHONE (OUTPATIENT)
Dept: PAIN MEDICINE | Facility: CLINIC | Age: 56
End: 2021-04-19

## 2021-04-19 VITALS
RESPIRATION RATE: 16 BRPM | OXYGEN SATURATION: 100 % | HEIGHT: 66 IN | HEART RATE: 80 BPM | BODY MASS INDEX: 31.3 KG/M2 | SYSTOLIC BLOOD PRESSURE: 142 MMHG | DIASTOLIC BLOOD PRESSURE: 78 MMHG | WEIGHT: 194.75 LBS

## 2021-04-19 DIAGNOSIS — M96.1 POSTLAMINECTOMY SYNDROME OF LUMBAR REGION: ICD-10-CM

## 2021-04-19 DIAGNOSIS — M46.1 SACROILIITIS: ICD-10-CM

## 2021-04-19 DIAGNOSIS — M54.50 CHRONIC LEFT-SIDED LOW BACK PAIN WITHOUT SCIATICA: Primary | ICD-10-CM

## 2021-04-19 DIAGNOSIS — M53.3 SACROILIAC JOINT DYSFUNCTION: ICD-10-CM

## 2021-04-19 DIAGNOSIS — Z98.1 S/P LUMBAR FUSION: ICD-10-CM

## 2021-04-19 DIAGNOSIS — G89.29 CHRONIC LEFT-SIDED LOW BACK PAIN WITHOUT SCIATICA: Primary | ICD-10-CM

## 2021-04-19 DIAGNOSIS — M70.62 TROCHANTERIC BURSITIS OF LEFT HIP: ICD-10-CM

## 2021-04-19 DIAGNOSIS — M70.60 TROCHANTERIC BURSITIS, UNSPECIFIED LATERALITY: Primary | ICD-10-CM

## 2021-04-19 PROCEDURE — 1125F AMNT PAIN NOTED PAIN PRSNT: CPT | Mod: S$GLB,,, | Performed by: PHYSICAL MEDICINE & REHABILITATION

## 2021-04-19 PROCEDURE — 99214 PR OFFICE/OUTPT VISIT, EST, LEVL IV, 30-39 MIN: ICD-10-PCS | Mod: S$GLB,,, | Performed by: PHYSICAL MEDICINE & REHABILITATION

## 2021-04-19 PROCEDURE — 3008F PR BODY MASS INDEX (BMI) DOCUMENTED: ICD-10-PCS | Mod: CPTII,S$GLB,, | Performed by: PHYSICAL MEDICINE & REHABILITATION

## 2021-04-19 PROCEDURE — 99214 OFFICE O/P EST MOD 30 MIN: CPT | Mod: S$GLB,,, | Performed by: PHYSICAL MEDICINE & REHABILITATION

## 2021-04-19 PROCEDURE — 1125F PR PAIN SEVERITY QUANTIFIED, PAIN PRESENT: ICD-10-PCS | Mod: S$GLB,,, | Performed by: PHYSICAL MEDICINE & REHABILITATION

## 2021-04-19 PROCEDURE — 99999 PR PBB SHADOW E&M-EST. PATIENT-LVL IV: ICD-10-PCS | Mod: PBBFAC,,, | Performed by: PHYSICAL MEDICINE & REHABILITATION

## 2021-04-19 PROCEDURE — 3008F BODY MASS INDEX DOCD: CPT | Mod: CPTII,S$GLB,, | Performed by: PHYSICAL MEDICINE & REHABILITATION

## 2021-04-19 PROCEDURE — 99999 PR PBB SHADOW E&M-EST. PATIENT-LVL IV: CPT | Mod: PBBFAC,,, | Performed by: PHYSICAL MEDICINE & REHABILITATION

## 2021-04-19 RX ORDER — AMLODIPINE BESYLATE 2.5 MG/1
2.5 TABLET ORAL DAILY
COMMUNITY
Start: 2021-04-14 | End: 2021-04-19

## 2021-04-20 ENCOUNTER — PATIENT MESSAGE (OUTPATIENT)
Dept: SLEEP MEDICINE | Facility: CLINIC | Age: 56
End: 2021-04-20

## 2021-04-21 ENCOUNTER — PATIENT MESSAGE (OUTPATIENT)
Dept: SLEEP MEDICINE | Facility: CLINIC | Age: 56
End: 2021-04-21

## 2021-04-21 RX ORDER — CLONAZEPAM 1 MG/1
1 TABLET ORAL NIGHTLY
Qty: 90 TABLET | Refills: 1 | Status: SHIPPED | OUTPATIENT
Start: 2021-04-21 | End: 2021-06-24 | Stop reason: SDUPTHER

## 2021-04-21 RX ORDER — CLONAZEPAM 1 MG/1
1 TABLET ORAL NIGHTLY
Qty: 30 TABLET | Refills: 3 | Status: SHIPPED | OUTPATIENT
Start: 2021-04-21 | End: 2021-04-21 | Stop reason: SDUPTHER

## 2021-05-11 ENCOUNTER — HOSPITAL ENCOUNTER (OUTPATIENT)
Dept: PREADMISSION TESTING | Facility: HOSPITAL | Age: 56
Discharge: HOME | End: 2021-05-11
Attending: PHYSICAL MEDICINE & REHABILITATION
Payer: MEDICARE

## 2021-05-11 DIAGNOSIS — Z01.818 PRE-OP TESTING: ICD-10-CM

## 2021-05-11 PROCEDURE — U0003 INFECTIOUS AGENT DETECTION BY NUCLEIC ACID (DNA OR RNA); SEVERE ACUTE RESPIRATORY SYNDROME CORONAVIRUS 2 (SARS-COV-2) (CORONAVIRUS DISEASE [COVID-19]), AMPLIFIED PROBE TECHNIQUE, MAKING USE OF HIGH THROUGHPUT TECHNOLOGIES AS DESCRIBED BY CMS-2020-01-R: HCPCS | Performed by: PHYSICAL MEDICINE & REHABILITATION

## 2021-05-11 PROCEDURE — U0005 INFEC AGEN DETEC AMPLI PROBE: HCPCS | Performed by: PHYSICAL MEDICINE & REHABILITATION

## 2021-05-12 LAB — SARS-COV-2 RNA RESP QL NAA+PROBE: NOT DETECTED

## 2021-05-14 ENCOUNTER — HOSPITAL ENCOUNTER (OUTPATIENT)
Facility: HOSPITAL | Age: 56
Discharge: HOME | End: 2021-05-14
Attending: PHYSICAL MEDICINE & REHABILITATION | Admitting: PHYSICAL MEDICINE & REHABILITATION
Payer: MEDICARE

## 2021-05-14 ENCOUNTER — HOSPITAL ENCOUNTER (OUTPATIENT)
Dept: RADIOLOGY | Facility: HOSPITAL | Age: 56
Discharge: HOME | End: 2021-05-14
Attending: PHYSICAL MEDICINE & REHABILITATION
Payer: MEDICARE

## 2021-05-14 VITALS
DIASTOLIC BLOOD PRESSURE: 76 MMHG | TEMPERATURE: 97 F | HEART RATE: 62 BPM | SYSTOLIC BLOOD PRESSURE: 142 MMHG | RESPIRATION RATE: 16 BRPM | OXYGEN SATURATION: 95 %

## 2021-05-14 DIAGNOSIS — M70.62 TROCHANTERIC BURSITIS OF LEFT HIP: ICD-10-CM

## 2021-05-14 DIAGNOSIS — M46.1 SACROILIITIS: ICD-10-CM

## 2021-05-14 DIAGNOSIS — M46.1 SACROILIITIS: Primary | ICD-10-CM

## 2021-05-14 DIAGNOSIS — M70.60 TROCHANTERIC BURSITIS, UNSPECIFIED LATERALITY: ICD-10-CM

## 2021-05-14 DIAGNOSIS — M53.3 SACROILIAC JOINT PAIN: ICD-10-CM

## 2021-05-14 PROCEDURE — 25000003 PHARM REV CODE 250: Performed by: PHYSICAL MEDICINE & REHABILITATION

## 2021-05-14 PROCEDURE — 20610 DRAIN/INJ JOINT/BURSA W/O US: CPT | Mod: LT | Performed by: PHYSICAL MEDICINE & REHABILITATION

## 2021-05-14 PROCEDURE — 27096 INJECT SACROILIAC JOINT: CPT | Mod: LT | Performed by: PHYSICAL MEDICINE & REHABILITATION

## 2021-05-14 PROCEDURE — 27096 INJECT SACROILIAC JOINT: CPT | Mod: LT,,, | Performed by: PHYSICAL MEDICINE & REHABILITATION

## 2021-05-14 PROCEDURE — 27096 PR INJECTION,SACROILIAC JOINT: ICD-10-PCS | Mod: LT,,, | Performed by: PHYSICAL MEDICINE & REHABILITATION

## 2021-05-14 PROCEDURE — 25500020 PHARM REV CODE 255: Performed by: PHYSICAL MEDICINE & REHABILITATION

## 2021-05-14 PROCEDURE — 20610 PR DRAIN/INJECT LARGE JOINT/BURSA: ICD-10-PCS | Mod: 59,LT,, | Performed by: PHYSICAL MEDICINE & REHABILITATION

## 2021-05-14 PROCEDURE — 63600175 PHARM REV CODE 636 W HCPCS: Performed by: PHYSICAL MEDICINE & REHABILITATION

## 2021-05-14 PROCEDURE — 20610 DRAIN/INJ JOINT/BURSA W/O US: CPT | Mod: 59,LT,, | Performed by: PHYSICAL MEDICINE & REHABILITATION

## 2021-05-14 RX ORDER — LIDOCAINE HYDROCHLORIDE 10 MG/ML
INJECTION INFILTRATION; PERINEURAL
Status: DISCONTINUED
Start: 2021-05-14 | End: 2021-05-14 | Stop reason: HOSPADM

## 2021-05-14 RX ORDER — MIDAZOLAM HYDROCHLORIDE 1 MG/ML
INJECTION INTRAMUSCULAR; INTRAVENOUS
Status: DISCONTINUED
Start: 2021-05-14 | End: 2021-05-14 | Stop reason: WASHOUT

## 2021-05-14 RX ORDER — METHYLPREDNISOLONE ACETATE 40 MG/ML
INJECTION, SUSPENSION INTRA-ARTICULAR; INTRALESIONAL; INTRAMUSCULAR; SOFT TISSUE
Status: DISCONTINUED | OUTPATIENT
Start: 2021-05-14 | End: 2021-05-14 | Stop reason: HOSPADM

## 2021-05-14 RX ORDER — METHYLPREDNISOLONE ACETATE 40 MG/ML
INJECTION, SUSPENSION INTRA-ARTICULAR; INTRALESIONAL; INTRAMUSCULAR; SOFT TISSUE
Status: DISCONTINUED
Start: 2021-05-14 | End: 2021-05-14 | Stop reason: HOSPADM

## 2021-05-14 RX ORDER — BUPIVACAINE HYDROCHLORIDE 2.5 MG/ML
INJECTION, SOLUTION EPIDURAL; INFILTRATION; INTRACAUDAL
Status: DISCONTINUED | OUTPATIENT
Start: 2021-05-14 | End: 2021-05-14 | Stop reason: HOSPADM

## 2021-05-14 RX ORDER — BUPIVACAINE HYDROCHLORIDE 2.5 MG/ML
INJECTION, SOLUTION EPIDURAL; INFILTRATION; INTRACAUDAL
Status: DISCONTINUED
Start: 2021-05-14 | End: 2021-05-14 | Stop reason: HOSPADM

## 2021-05-14 RX ORDER — LIDOCAINE HYDROCHLORIDE 10 MG/ML
INJECTION INFILTRATION; PERINEURAL
Status: DISCONTINUED | OUTPATIENT
Start: 2021-05-14 | End: 2021-05-14 | Stop reason: HOSPADM

## 2021-05-14 RX ORDER — FENTANYL CITRATE 50 UG/ML
INJECTION, SOLUTION INTRAMUSCULAR; INTRAVENOUS
Status: DISCONTINUED
Start: 2021-05-14 | End: 2021-05-14 | Stop reason: WASHOUT

## 2021-05-25 NOTE — TELEPHONE ENCOUNTER
He did not have a stroke or any strokes. He has white matter changes of microvascular changes, which are likely related to his history of headaches and HTN. This is not affecting him functionally and does not explain his memory loss, which is related to his severe depression.    Show Asc Variables: Yes

## 2021-05-31 ENCOUNTER — OFFICE VISIT (OUTPATIENT)
Dept: PAIN MEDICINE | Facility: CLINIC | Age: 56
End: 2021-05-31
Payer: MEDICARE

## 2021-05-31 VITALS
WEIGHT: 194.31 LBS | SYSTOLIC BLOOD PRESSURE: 122 MMHG | BODY MASS INDEX: 31.23 KG/M2 | HEIGHT: 66 IN | RESPIRATION RATE: 14 BRPM | DIASTOLIC BLOOD PRESSURE: 78 MMHG

## 2021-05-31 DIAGNOSIS — M53.3 SACROILIAC JOINT DYSFUNCTION: ICD-10-CM

## 2021-05-31 DIAGNOSIS — M70.62 TROCHANTERIC BURSITIS OF LEFT HIP: ICD-10-CM

## 2021-05-31 DIAGNOSIS — M79.18 MYOFASCIAL PAIN: Primary | ICD-10-CM

## 2021-05-31 DIAGNOSIS — M47.816 LUMBAR SPONDYLOSIS: ICD-10-CM

## 2021-05-31 DIAGNOSIS — M96.1 POSTLAMINECTOMY SYNDROME OF LUMBAR REGION: ICD-10-CM

## 2021-05-31 DIAGNOSIS — Z98.1 HISTORY OF LUMBAR SPINAL FUSION: ICD-10-CM

## 2021-05-31 DIAGNOSIS — R51.9 HEADACHE, UNSPECIFIED HEADACHE TYPE: ICD-10-CM

## 2021-05-31 PROCEDURE — 20552 NJX 1/MLT TRIGGER POINT 1/2: CPT | Mod: S$GLB,,, | Performed by: PHYSICAL MEDICINE & REHABILITATION

## 2021-05-31 PROCEDURE — 1125F AMNT PAIN NOTED PAIN PRSNT: CPT | Mod: S$GLB,,, | Performed by: PHYSICAL MEDICINE & REHABILITATION

## 2021-05-31 PROCEDURE — 3008F PR BODY MASS INDEX (BMI) DOCUMENTED: ICD-10-PCS | Mod: CPTII,S$GLB,, | Performed by: PHYSICAL MEDICINE & REHABILITATION

## 2021-05-31 PROCEDURE — 99999 PR PBB SHADOW E&M-EST. PATIENT-LVL III: CPT | Mod: PBBFAC,,, | Performed by: PHYSICAL MEDICINE & REHABILITATION

## 2021-05-31 PROCEDURE — 1125F PR PAIN SEVERITY QUANTIFIED, PAIN PRESENT: ICD-10-PCS | Mod: S$GLB,,, | Performed by: PHYSICAL MEDICINE & REHABILITATION

## 2021-05-31 PROCEDURE — 3008F BODY MASS INDEX DOCD: CPT | Mod: CPTII,S$GLB,, | Performed by: PHYSICAL MEDICINE & REHABILITATION

## 2021-05-31 PROCEDURE — 20552 TRIGGER POINT INJECTION: ICD-10-PCS | Mod: S$GLB,,, | Performed by: PHYSICAL MEDICINE & REHABILITATION

## 2021-05-31 PROCEDURE — 99214 PR OFFICE/OUTPT VISIT, EST, LEVL IV, 30-39 MIN: ICD-10-PCS | Mod: 25,S$GLB,, | Performed by: PHYSICAL MEDICINE & REHABILITATION

## 2021-05-31 PROCEDURE — 99999 PR PBB SHADOW E&M-EST. PATIENT-LVL III: ICD-10-PCS | Mod: PBBFAC,,, | Performed by: PHYSICAL MEDICINE & REHABILITATION

## 2021-05-31 PROCEDURE — 99214 OFFICE O/P EST MOD 30 MIN: CPT | Mod: 25,S$GLB,, | Performed by: PHYSICAL MEDICINE & REHABILITATION

## 2021-05-31 RX ORDER — NORTRIPTYLINE HYDROCHLORIDE 10 MG/1
10 CAPSULE ORAL NIGHTLY
Qty: 30 CAPSULE | Refills: 0 | Status: SHIPPED | OUTPATIENT
Start: 2021-05-31 | End: 2021-06-22

## 2021-06-24 RX ORDER — CLONAZEPAM 1 MG/1
1 TABLET ORAL NIGHTLY
Qty: 90 TABLET | Refills: 1 | Status: SHIPPED | OUTPATIENT
Start: 2021-06-24 | End: 2021-08-18

## 2021-07-13 ENCOUNTER — PATIENT MESSAGE (OUTPATIENT)
Dept: SLEEP MEDICINE | Facility: CLINIC | Age: 56
End: 2021-07-13

## 2021-07-15 PROBLEM — I10 ESSENTIAL HYPERTENSION: Chronic | Status: ACTIVE | Noted: 2017-06-15

## 2021-07-15 PROBLEM — E03.9 HYPOTHYROIDISM IN ADULT: Chronic | Status: ACTIVE | Noted: 2017-06-15

## 2021-07-15 PROBLEM — R74.8 ELEVATED LIVER ENZYMES: Chronic | Status: ACTIVE | Noted: 2017-06-16

## 2021-07-15 PROBLEM — E29.1 HYPOGONADISM IN MALE: Chronic | Status: ACTIVE | Noted: 2017-06-15

## 2021-08-19 ENCOUNTER — OFFICE VISIT (OUTPATIENT)
Dept: PSYCHIATRY | Facility: CLINIC | Age: 56
End: 2021-08-19
Payer: COMMERCIAL

## 2021-08-19 VITALS
OXYGEN SATURATION: 98 % | SYSTOLIC BLOOD PRESSURE: 110 MMHG | HEART RATE: 82 BPM | WEIGHT: 204.13 LBS | DIASTOLIC BLOOD PRESSURE: 80 MMHG | BODY MASS INDEX: 32.81 KG/M2 | HEIGHT: 66 IN

## 2021-08-19 DIAGNOSIS — R41.89 PSEUDODEMENTIA: ICD-10-CM

## 2021-08-19 DIAGNOSIS — G25.81 RESTLESS LEGS SYNDROME: Primary | ICD-10-CM

## 2021-08-19 DIAGNOSIS — F33.2 SEVERE RECURRENT MAJOR DEPRESSION WITHOUT PSYCHOTIC FEATURES: Chronic | ICD-10-CM

## 2021-08-19 DIAGNOSIS — F41.9 ANXIETY: ICD-10-CM

## 2021-08-19 DIAGNOSIS — G89.29 OTHER CHRONIC PAIN: ICD-10-CM

## 2021-08-19 PROCEDURE — 3079F DIAST BP 80-89 MM HG: CPT | Mod: CPTII,S$GLB,, | Performed by: PSYCHIATRY & NEUROLOGY

## 2021-08-19 PROCEDURE — 99214 OFFICE O/P EST MOD 30 MIN: CPT | Mod: S$GLB,,, | Performed by: PSYCHIATRY & NEUROLOGY

## 2021-08-19 PROCEDURE — 99999 PR PBB SHADOW E&M-EST. PATIENT-LVL III: CPT | Mod: PBBFAC,,, | Performed by: PSYCHIATRY & NEUROLOGY

## 2021-08-19 PROCEDURE — 90833 PSYTX W PT W E/M 30 MIN: CPT | Mod: S$GLB,,, | Performed by: PSYCHIATRY & NEUROLOGY

## 2021-08-19 PROCEDURE — 1126F PR PAIN SEVERITY QUANTIFIED, NO PAIN PRESENT: ICD-10-PCS | Mod: CPTII,S$GLB,, | Performed by: PSYCHIATRY & NEUROLOGY

## 2021-08-19 PROCEDURE — 99214 PR OFFICE/OUTPT VISIT, EST, LEVL IV, 30-39 MIN: ICD-10-PCS | Mod: S$GLB,,, | Performed by: PSYCHIATRY & NEUROLOGY

## 2021-08-19 PROCEDURE — 3074F PR MOST RECENT SYSTOLIC BLOOD PRESSURE < 130 MM HG: ICD-10-PCS | Mod: CPTII,S$GLB,, | Performed by: PSYCHIATRY & NEUROLOGY

## 2021-08-19 PROCEDURE — 1160F PR REVIEW ALL MEDS BY PRESCRIBER/CLIN PHARMACIST DOCUMENTED: ICD-10-PCS | Mod: CPTII,S$GLB,, | Performed by: PSYCHIATRY & NEUROLOGY

## 2021-08-19 PROCEDURE — 3008F PR BODY MASS INDEX (BMI) DOCUMENTED: ICD-10-PCS | Mod: CPTII,S$GLB,, | Performed by: PSYCHIATRY & NEUROLOGY

## 2021-08-19 PROCEDURE — 90833 PR PSYCHOTHERAPY W/PATIENT W/E&M, 30 MIN (ADD ON): ICD-10-PCS | Mod: S$GLB,,, | Performed by: PSYCHIATRY & NEUROLOGY

## 2021-08-19 PROCEDURE — 1160F RVW MEDS BY RX/DR IN RCRD: CPT | Mod: CPTII,S$GLB,, | Performed by: PSYCHIATRY & NEUROLOGY

## 2021-08-19 PROCEDURE — 3074F SYST BP LT 130 MM HG: CPT | Mod: CPTII,S$GLB,, | Performed by: PSYCHIATRY & NEUROLOGY

## 2021-08-19 PROCEDURE — 3079F PR MOST RECENT DIASTOLIC BLOOD PRESSURE 80-89 MM HG: ICD-10-PCS | Mod: CPTII,S$GLB,, | Performed by: PSYCHIATRY & NEUROLOGY

## 2021-08-19 PROCEDURE — 1159F PR MEDICATION LIST DOCUMENTED IN MEDICAL RECORD: ICD-10-PCS | Mod: CPTII,S$GLB,, | Performed by: PSYCHIATRY & NEUROLOGY

## 2021-08-19 PROCEDURE — 1159F MED LIST DOCD IN RCRD: CPT | Mod: CPTII,S$GLB,, | Performed by: PSYCHIATRY & NEUROLOGY

## 2021-08-19 PROCEDURE — 99999 PR PBB SHADOW E&M-EST. PATIENT-LVL III: ICD-10-PCS | Mod: PBBFAC,,, | Performed by: PSYCHIATRY & NEUROLOGY

## 2021-08-19 PROCEDURE — 1126F AMNT PAIN NOTED NONE PRSNT: CPT | Mod: CPTII,S$GLB,, | Performed by: PSYCHIATRY & NEUROLOGY

## 2021-08-19 PROCEDURE — 3008F BODY MASS INDEX DOCD: CPT | Mod: CPTII,S$GLB,, | Performed by: PSYCHIATRY & NEUROLOGY

## 2021-08-19 RX ORDER — DULOXETIN HYDROCHLORIDE 60 MG/1
60 CAPSULE, DELAYED RELEASE ORAL DAILY
Qty: 90 CAPSULE | Refills: 3 | Status: SHIPPED | OUTPATIENT
Start: 2021-08-19 | End: 2022-02-14

## 2021-11-11 ENCOUNTER — PATIENT MESSAGE (OUTPATIENT)
Dept: PAIN MEDICINE | Facility: CLINIC | Age: 56
End: 2021-11-11
Payer: MEDICARE

## 2022-01-05 ENCOUNTER — PATIENT MESSAGE (OUTPATIENT)
Dept: PAIN MEDICINE | Facility: CLINIC | Age: 57
End: 2022-01-05
Payer: MEDICARE

## 2022-02-14 PROBLEM — R73.9 HYPERGLYCEMIA: Status: ACTIVE | Noted: 2022-02-14

## 2022-02-14 PROBLEM — K76.0 FATTY LIVER: Status: ACTIVE | Noted: 2022-02-14

## 2022-02-23 ENCOUNTER — PATIENT MESSAGE (OUTPATIENT)
Dept: PAIN MEDICINE | Facility: CLINIC | Age: 57
End: 2022-02-23
Payer: MEDICARE

## 2022-02-25 ENCOUNTER — PATIENT MESSAGE (OUTPATIENT)
Dept: PAIN MEDICINE | Facility: CLINIC | Age: 57
End: 2022-02-25
Payer: MEDICARE

## 2022-03-25 NOTE — PROGRESS NOTES
[facility]    Chief Complaint:   Chief Complaint   Patient presents with    Low-back Pain     Initial HPI (10/11/19): Dalton Kent is a 56 y.o. male referred by Dr. Velazquez for back and neck pain.    Back pain is worse. Back pain began ~ 10 years ago. He has had 2 back surgeries. Back pain is localized to the left over the lateral iliac crest. No radiation into legs. He is unable to describe the quality of the pain, other than feeling deep. Worse with walking, cutting grass, standing, and slight flexion. Pain alleviated by nothing in particular. Left leg gets weak with cutting grass or doing dishes. Denies leg numbness.     He has had neck surgery with Dr. Ontiveros in 2015. He doesn't have neck pain but is having right arm pain and shooting pain into his small, ring and middle fingers and also pain in his tricep area. Pain described as shooting and shocking in the arm. Nothing particularly aggravates it, but did start while fishing and worse at night time. Nothing helps the pain. He did therapy recently.     Onset: Years  Location: Shoulder, arm, lower back left side, hip pain   Radiation: left leg and right arm  Quality: Aching, burning, throbbing, grabbing, tingling, deep, superficial, electric   Exacerbating Factors: exercise, lifting, sitting, standing and walking for more than 20 minutes  Alleviating Factors: nothing  Associated Symptoms: denies night fever/night sweats, urinary incontinence, bowel incontinence, significant weight loss, significant motor weakness and loss of sensations    Severity: Currently: 4/10   Typical Range: 7-8/10     Exacerbation: 8/10     Interval History (03/28/2022):  Datlon Kent returns today for follow up.  At the last clinic visit, performed lumbar trigger point injections with bupivacaine only.  We tapered his baclofen and decreased pain lower to 10 mg at night and then discontinued it after that.    Since he was previously seeing, he requested  discharge from our clinic.  He attempted to see an outside provider for possible spinal cord stimulator, but states that that provider only wanted to start him on medical marijuana and did not want to trial a spinal cord stimulator.    Currently, the back pain is worse.  He localizes the pain midline lumbar spine around the area of his prior scar.  He states the pain is radiating into the left groin and down the left leg to the foot at this time.  No change in bowel or bladder function, & no new weakness or numbness is reported.    He denies any of his shoulder pain that he was having previously, as the last trigger point injection for added significant relief for him in that area.    Current Pain Scales:  Current: 8/10              Typical Range: 8-10/10               Pain Disability Index  Family/Home Responsibilities:: 8  Recreation:: 8  Social Activity:: 5  Occupation:: 10  Sexual Behavior:: 6  Self Care:: 8  Life-Support Activities:: 4  Pain Disability Index (PDI): 49    Previous Interventions:  - 5/14/21: Left SIJ and GTB CSI w/ 90% relief  - 3/26/21: Left L3 & L4 TF ANURAG w/ 50% relief  - 2/21/20: C7-T1 IL ANURAG w/ minimal relief  - 12/6/19: Cervical TPIs  - 10/11/19: Lumbar TPIs: Did not think they were effective  - Previous spine injections with Dr. Jimmy Rosas in [place] prior to the back surgeries. Also with Dr. Honeycutt and Dr. Pulido.     Previous Therapies:  PT/OT: yes last was 3-4 years ago  Surgery: yes He has had C5-7 ACDF with Dr. Ontiveros in 2016. He has had 2 back surgeries. First back surgery was L5-S1 ALIF in [facility] with Dr. Sorin Thomas. He had above level L4-5 herniated disk and underwent second back surgery, L4-5 TLIF at [facility] with Dr. Isaias Ontiveros in May 2016. Right then left carpal tunnel release in 2017 w/ Dr. Marin Rosas in May 2017.   Previous Medications:   - NSAIDS: Cant take because of stomach ulcers  - Muscle Relaxants: Xanax. Klonopin. Ativan.    - TCAs: Nortriptyline 25 mg  - SNRIs: Not tried  - Topicals: Bengay, biofreeze not effective  - Anticonvulsants: Gabapentin (Gralise) caused worsening depression, claustrophobia, trouble swallowing. Depakote. Lyrica.  - Opioids: None currently    Current Pain Medications  1. Lyrica 50 mg b.i.d.  2. Tylenol 500 mg, two tablets, 1-2x per day  3. Naltrexone 50 mg daily  4. Elavil 25 mg qHS      Blood Thinners: None    Full Medication List:    Current Outpatient Medications:     allopurinoL (ZYLOPRIM) 300 MG tablet, Take 1 tablet (300 mg total) by mouth once daily., Disp: 90 tablet, Rfl: 3    amitriptyline (ELAVIL) 25 MG tablet, Take 25 mg by mouth every evening., Disp: , Rfl:     benazepriL (LOTENSIN) 10 MG tablet, Take 1 tablet by mouth once daily., Disp: , Rfl:     buPROPion (WELLBUTRIN SR) 100 MG TBSR 12 hr tablet, Take 100 mg by mouth once daily., Disp: , Rfl:     cloNIDine (CATAPRES) 0.1 MG tablet, Take 1 tablet (0.1 mg total) by mouth nightly., Disp: 90 tablet, Rfl: 3    EScitalopram oxalate (LEXAPRO) 10 MG tablet, Take 10 mg by mouth once daily., Disp: , Rfl:     ipratropium (ATROVENT) 21 mcg (0.03 %) nasal spray, USE 2 SPRAYS NASALLY TWICE  DAILY, Disp: 30 mL, Rfl: 7    lamoTRIgine (LAMICTAL) 100 MG tablet, Take 100 mg by mouth nightly., Disp: , Rfl:     MELATONIN ORAL, Take 10 mg by mouth every evening. , Disp: , Rfl:     metoprolol succinate (TOPROL-XL) 50 MG 24 hr tablet, Take 50 mg by mouth 2 (two) times daily. , Disp: , Rfl:     naltrexone (DEPADE) 50 mg tablet, Take 50 mg by mouth once daily., Disp: , Rfl:     pantoprazole (PROTONIX) 40 MG tablet, Take 1 tablet (40 mg total) by mouth once daily., Disp: 90 tablet, Rfl: 3    prazosin (MINIPRESS) 2 MG Cap, Take 2 mg by mouth nightly., Disp: , Rfl:     pregabalin (LYRICA) 50 MG capsule, Take 1 capsule (50 mg total) by mouth 2 (two) times daily., Disp: 180 capsule, Rfl: 3    rosuvastatin (CRESTOR) 10 MG tablet, Take 1 tablet (10 mg total) by  mouth every other day. Monday,wed, fri, Disp: 45 tablet, Rfl: 3    SYNTHROID 112 mcg tablet, Take 112 mg by mouth once daily., Disp: , Rfl:     thiamine 100 MG tablet, Take 1 tablet (100 mg total) by mouth once daily., Disp: 30 tablet, Rfl: 5    vitamins-lipotropics Tab, Take 1 tablet by mouth 2 (two) times a day., Disp: 180 tablet, Rfl: 3    clotrimazole-betamethasone 1-0.05% (LOTRISONE) cream, Apply topically 2 (two) times daily. (Patient not taking: Reported on 3/28/2022), Disp: 45 g, Rfl: 0    fexofenadine (ALLEGRA) 180 MG tablet, Take 1 tablet (180 mg total) by mouth once daily. (Patient not taking: Reported on 3/28/2022), Disp: 90 tablet, Rfl: 3    HAIR,SKIN AND NAILS tablet, Take 1 tablet by mouth 2 (two) times daily., Disp: , Rfl:     indomethacin (INDOCIN) 50 MG capsule, Take 1 capsule (50 mg total) by mouth 3 (three) times daily as needed (Pain)., Disp: 90 capsule, Rfl: 0    prazosin (MINIPRESS) 1 MG Cap, Take 1 mg by mouth once daily. 1-2 capsules at bedtimes for nightmares, Disp: , Rfl:     valACYclovir (VALTREX) 500 MG tablet, Take 1 tablet (500 mg total) by mouth 2 (two) times daily. (Patient not taking: Reported on 3/28/2022), Disp: 180 tablet, Rfl: 3     Review of Systems:  Review of Systems   Constitutional: Negative for fever and weight loss.   HENT: Positive for tinnitus. Negative for ear pain.    Eyes: Negative for pain and redness.   Respiratory: Negative for cough and shortness of breath.    Cardiovascular: Negative for chest pain and palpitations.   Gastrointestinal: Positive for heartburn. Negative for constipation.        GERD w/ NSAIDs   Genitourinary: Negative.         Denies urinary incontinence. Denies urine retention.    Musculoskeletal: Positive for back pain, myalgias and neck pain.   Skin: Negative for itching and rash.   Neurological: Positive for tingling, weakness and headaches. Negative for seizures.   Endo/Heme/Allergies: Negative for environmental allergies. Does not  "bruise/bleed easily.   Psychiatric/Behavioral: Positive for depression. The patient is nervous/anxious and has insomnia.        Allergies:  Gralise [gabapentin], Norco [hydrocodone-acetaminophen], Percocet [oxycodone-acetaminophen], and Statins-hmg-coa reductase inhibitors     Medical History:   has a past medical history of Alcohol abuse, Anxiety, Depression, GERD (gastroesophageal reflux disease), Headache, psychiatric care, Hypertension, Hypothyroid, Lyme disease, Restless leg syndrome, Sleep difficulties, Therapy, and Uncontrolled REM sleep behavior disorder.    Surgical History:   has a past surgical history that includes Anal fistulotomy; Anal sphincterotomy; Appendectomy; Colonoscopy (2/2015); Back surgery; Neck surgery; Hand surgery (Bilateral); Carpal tunnel release; Trigger finger release; Nose surgery; Colonoscopy (N/A, 8/29/2019); Epidural steroid injection (N/A, 2/21/2020); Transforaminal epidural injection of steroid (Left, 3/26/2021); Injection of anesthetic agent into sacroiliac joint (Left, 5/14/2021); and Injection of steroid (Left, 5/14/2021).    Social History:   reports that he has quit smoking. He has never used smokeless tobacco. He reports previous alcohol use. He reports that he does not use drugs.    Physical Exam:  /78 (BP Location: Right arm, Patient Position: Sitting, BP Method: Medium (Manual))   Pulse 82   Resp 20   Ht 5' 6" (1.676 m)   Wt 94 kg (207 lb 3.7 oz)   BMI 33.45 kg/m²   GEN: No acute distress. Calm, comfortable  HENT: Normocephalic, atraumatic, moist mucous membranes  EYE: Anicteric sclera, non-injected.   CV: Non-diaphoretic. Regular Rate. Radial Pulses 2+.  RESP: Breathing comfortably. Chest expansion symmetric.  EXT: No clubbing, cyanosis.   SKIN: Warm, & dry to palpation.  PSYCH: Pleasant mood and appropriate affect. Recent and remote memory intact.   GAIT: Independent, normal ambulation  Lumbar Spine Exam:       Inspection: No erythema, bruising. Healed " incision.       Palpation: (+) TTP of lumbar paraspinals bilaterally and midline spinous processes      ROM: Limited in flexion, extension, lateral bending.       (+) Facet loading bilaterally  Neurologic Exam:     Alert. Speech is fluent and appropriate.     Strength: 4/5 in bilateral hip flexion, left knee extension, bilateral ADF, otherwise 5/5 throughout bilateral lower extremities (patient tremulous on testing and may reflex weakness due to pain)     Sensation: Grossly intact to light touch in bilateral lower extremities     Reflexes: 2+ in bilateral patella, Unable to elicit bilateral triceps, biceps, brachioradialis     Tone: No abnormality appreciated in bilateral upper or lower extremities     No Clonus    Imaging:   - MRI Cervical Spine 2/7/20:  Prior multilevel cervical fusion at C5-C6 and C6-C7.  Alignment is maintained.  No congenital spinal stenosis.  No abnormal signal change involving the visualized portion of the spinal cord.  At the C2-C3 and C3-C4 levels there are minimal uncinate process spurs.  There is no evidence of significant spinal canal or foraminal encroachment.  At the C4-C5 level there is minimal disc-osteophyte with mild effacement along the anterior subarachnoid space.  Mild facet degenerative changes.  No significant spinal canal or foraminal encroachment.  Prior fusions performed at C5-C6 and C6-C7.  Alignment is maintained.  No evidence of a focal recurrent disc abnormality.  At the C7-T1 level there is a posterior disc-osteophyte with mild encroachment along the subarachnoid space and mild bilateral foraminal encroachment.    - X-ray Shoulder 12/11/19:  The acromioclavicular and glenohumeral joint spaces are intact.  No fracture or dislocation is seen.  Cervical fusion hardware is identified    - EMG/NCS BUE 10/28/19:  Normal EMG/NCS of the arms.     - X-ray C-spine 10/11/19:  Prior multilevel fusion at C5-C6 and C6-C7.  Fusion screws appear intact and alignment is maintained.  No  significant change in alignment with flexion or extension.  There is no plain film evidence of fracture or acute subluxation.  No prevertebral soft tissue swelling.  Minimal degenerative spurring along the anterior inferior endplate of C4.  No disc space narrowing at C2, C3 or C4    - X-ray L-spine 10/11/19:  Prior multilevel fusion at L4-L5 and L5-S1.  Surgical fixation hardware appears intact.  Alignment is maintained.  There is no significant change in alignment with flexion or extension.  Mild degenerative change involving L1 through L3 without significant focal disc space narrowing.  There is no evidence of an acute fracture.  Alignment is maintained.  No evidence of spondylolysis or spondylolisthesis    - Outside Shoulder MRI Right 2/22/19:  Impression:   1. Degenerative arthropathy at AC joint  2. Pericapsular soft tissue swelling and periosteal soft tissue swelling about the AC joint and distal clavicle. A low-grade partial tear of the trapezius muscle at the insertion upon the clavicle and a low-grade edema or strain of the anterior head of deltoid muscle  3. Suspect a sprain of the coracoclavicular ligaments, which are not torn  4. TEndinosis of the supraspinatus without a tear  5. Slight fraying of the labrum     - MRI L-spine 02/06/2019:  Spinal Alignment: Normal.  Vertebrae: Posterior fusion hardware at L4-5 and anterior fusion hardware at L5-S1 are unchanged, with associated disc spacer devices at L4-5 and L5-S1.  Susceptibility artifact is present surrounding the hardware.  There are associated postsurgical findings of left L4 hemilaminectomy.  Mild susceptibility artifact surrounds the hardware.  1.5 cm hemangioma is present in the posterosuperior portion of the T12 vertebral body.  Discs: Mild diffuse desiccation.  Disc spacer devices are present at L5-4 5 and L5-S1.  Cord: Normal cord signal. Conus terminates normally at the L1-2 level.  Degenerative findings: Minimal circumferential disc bulge at  L2-3 with minimal bilateral facet arthropathy and minimal facet effusions in conjunction with mild buckling of the ligamentum flavum does not result in significant compressive phenomena.  Mild circumferential disc bulge at L3-4 with mild bilateral facet arthropathy and buckling of the ligamentum flavum results in mild spinal canal stenosis and mild to moderate foraminal stenoses.  Spinal canal is posteriorly decompressed at L4-5; right neural foramen is widely patent while there is apparent moderate narrowing of the left neural foramen related to either postoperative granulation tissue along the inferior neural foramen or disc material.  Mild bilateral facet arthropathy at L5-S1 results in at most mild foraminal stenoses.  Paraspinal muscles & soft tissues: Normal    - EMG/NCS BLE 2/25/19:  Impression: Normal EMG of the legs, although his study was mildly limited due to inability to sample lumbar paraspinal muscles given his prior lumbar surgical scarring.     - Outside Shoulder MRI Right 2/22/19:  Impression:   1. Degenerative arthropathy at AC joint  2. Pericapsular soft tissue swelling and periosteal soft tissue swelling about the AC joint and distal clavicle. A low-grade partial tear of the trapezius muscle at the insertion upon the clavicle and a low-grade edema or strain of the anterior head of deltoid muscle  3. Suspect a sprain of the coracoclavicular ligaments, which are not torn  4. TEndinosis of the supraspinatus without a tear  5. Slight fraying of the labrum    - EMG/NCS (outside records) with bilateral C6 radiculopathy and bilateral carpal tunnel syndrome     Labs:  Lab Results   Component Value Date     02/10/2022    K 4.0 02/10/2022     02/10/2022    CO2 33 (H) 02/10/2022    BUN 9 02/10/2022    CREATININE 0.91 02/10/2022    CALCIUM 9.0 02/10/2022    ANIONGAP 9 04/04/2020    ESTGFRAFRICA >60 02/10/2022    EGFRNONAA >60 02/10/2022     Lab Results   Component Value Date    ALT 37 02/10/2022     AST 37 02/10/2022    ALKPHOS 76 02/10/2022    BILITOT 0.5 02/10/2022     Lab Results   Component Value Date     09/24/2021       Assessment:  Dalton Kent is a 56 y.o. male with the following diagnoses based on history, exam, and imaging:    Problem List Items Addressed This Visit        Neuro    Chronic pain    Relevant Medications    indomethacin (INDOCIN) 50 MG capsule    Other Relevant Orders    MRI Thoracic Spine Without Contrast    Ambulatory referral/consult to Psychology    Lumbar radiculopathy    Relevant Medications    indomethacin (INDOCIN) 50 MG capsule       Orthopedic    Myofascial pain - Primary    Relevant Medications    indomethacin (INDOCIN) 50 MG capsule      Other Visit Diagnoses     Sacroiliac joint dysfunction        Relevant Medications    indomethacin (INDOCIN) 50 MG capsule    Postlaminectomy syndrome of lumbar region        Relevant Medications    indomethacin (INDOCIN) 50 MG capsule    Other Relevant Orders    Ambulatory referral/consult to Psychology    Failed back surgical syndrome        Relevant Medications    indomethacin (INDOCIN) 50 MG capsule    Other Relevant Orders    MRI Thoracic Spine Without Contrast    Ambulatory referral/consult to Psychology    MRI Lumbar Spine W WO Contrast    Lumbar radiculopathy, chronic        Relevant Medications    indomethacin (INDOCIN) 50 MG capsule    Other Relevant Orders    MRI Lumbar Spine W WO Contrast          This is a pleasant 56 y.o. gentleman with anxiety, depression, chronic fatigue, insomnia, hypogonadism, hypoTH, GERD presenting with:    - Chronic back pain: He has prior fusion at L4-5 and L5-S1. His back pain appears to be multifactorial in nature, with contributions from myofascial pain, lumbar spondylosis. X-ray with degenerative changes above the level of the fusion. Myofascial pain improves w/ TPIs. He is having more left radicular leg pain, again that previously improved with ANURAG.  He has having no SI J pain  today.  However, he is having more weakness in his legs on testing today than previously.  - Left trochanteric bursitis: Improved with CSI  - Chronic neck pain: Prior fusions performed at C5-C6 and C6-C7.  His neck pain, also appears multifactorial. MRI with tear in trapezius, which may be contributing to the pain. Muscular aspects of the pain and tenderness improve with TPIs.   - Chronic right shoulder pain: in the area of the latissimus and described as deep to the scapula, may be subscapularis vs lattisimus pain.  This has improved after trigger point injection.  - Comorbid: Elevated AST/ALT. Anxiety and depression. H/o EtOH. GERD. HypoTH. ASAD.    Treatment Plan: I discussed with the patient the following assessment and recommendations. The following is the plan the patient agreed upon:  - PT/OT/HEP:  Encouraged cont HEP and regular walking program   - Procedures: Schedule Caudal ANURAG   - Discussed possible SCS   - Can repeat left SIJ and left GTB injection as needed  - Medications: Trial indomethacin 50 mg TID prn. Risks of NSAIDs discussed  - Imaging: Update lumbar MRI with and without contrast due to worsening leg weakness. Order thoracic MRI for pre-op SCS planning  - Labs: Reviewed.  Monitor liver enzymes.   - Consult psych for pre-op SCS testing    Follow Up: RTC 2-3 weeks after ANURAG    Disclaimer: This note was partly generated using dictation software which may occasionally result in transcription errors.

## 2022-03-28 ENCOUNTER — OFFICE VISIT (OUTPATIENT)
Dept: PAIN MEDICINE | Facility: CLINIC | Age: 57
End: 2022-03-28
Payer: MEDICARE

## 2022-03-28 ENCOUNTER — TELEPHONE (OUTPATIENT)
Dept: PAIN MEDICINE | Facility: CLINIC | Age: 57
End: 2022-03-28
Payer: MEDICARE

## 2022-03-28 VITALS
WEIGHT: 207.25 LBS | SYSTOLIC BLOOD PRESSURE: 130 MMHG | HEIGHT: 66 IN | DIASTOLIC BLOOD PRESSURE: 78 MMHG | HEART RATE: 82 BPM | RESPIRATION RATE: 20 BRPM | BODY MASS INDEX: 33.31 KG/M2

## 2022-03-28 DIAGNOSIS — G89.4 CHRONIC PAIN SYNDROME: ICD-10-CM

## 2022-03-28 DIAGNOSIS — M54.16 LUMBAR RADICULOPATHY, CHRONIC: ICD-10-CM

## 2022-03-28 DIAGNOSIS — M53.3 SACROILIAC JOINT DYSFUNCTION: ICD-10-CM

## 2022-03-28 DIAGNOSIS — M54.16 LUMBAR RADICULOPATHY: ICD-10-CM

## 2022-03-28 DIAGNOSIS — M54.16 LUMBAR RADICULOPATHY: Primary | ICD-10-CM

## 2022-03-28 DIAGNOSIS — M96.1 FAILED BACK SURGICAL SYNDROME: ICD-10-CM

## 2022-03-28 DIAGNOSIS — M79.18 MYOFASCIAL PAIN: Primary | ICD-10-CM

## 2022-03-28 DIAGNOSIS — M96.1 POSTLAMINECTOMY SYNDROME OF LUMBAR REGION: ICD-10-CM

## 2022-03-28 PROCEDURE — 99214 OFFICE O/P EST MOD 30 MIN: CPT | Mod: S$GLB,,, | Performed by: PHYSICAL MEDICINE & REHABILITATION

## 2022-03-28 PROCEDURE — 3044F HG A1C LEVEL LT 7.0%: CPT | Mod: CPTII,S$GLB,, | Performed by: PHYSICAL MEDICINE & REHABILITATION

## 2022-03-28 PROCEDURE — 99214 PR OFFICE/OUTPT VISIT, EST, LEVL IV, 30-39 MIN: ICD-10-PCS | Mod: S$GLB,,, | Performed by: PHYSICAL MEDICINE & REHABILITATION

## 2022-03-28 PROCEDURE — 3044F PR MOST RECENT HEMOGLOBIN A1C LEVEL <7.0%: ICD-10-PCS | Mod: CPTII,S$GLB,, | Performed by: PHYSICAL MEDICINE & REHABILITATION

## 2022-03-28 PROCEDURE — 99999 PR PBB SHADOW E&M-EST. PATIENT-LVL V: ICD-10-PCS | Mod: PBBFAC,,, | Performed by: PHYSICAL MEDICINE & REHABILITATION

## 2022-03-28 PROCEDURE — 3075F SYST BP GE 130 - 139MM HG: CPT | Mod: CPTII,S$GLB,, | Performed by: PHYSICAL MEDICINE & REHABILITATION

## 2022-03-28 PROCEDURE — 1159F MED LIST DOCD IN RCRD: CPT | Mod: CPTII,S$GLB,, | Performed by: PHYSICAL MEDICINE & REHABILITATION

## 2022-03-28 PROCEDURE — 3008F BODY MASS INDEX DOCD: CPT | Mod: CPTII,S$GLB,, | Performed by: PHYSICAL MEDICINE & REHABILITATION

## 2022-03-28 PROCEDURE — 99999 PR PBB SHADOW E&M-EST. PATIENT-LVL V: CPT | Mod: PBBFAC,,, | Performed by: PHYSICAL MEDICINE & REHABILITATION

## 2022-03-28 PROCEDURE — 3078F DIAST BP <80 MM HG: CPT | Mod: CPTII,S$GLB,, | Performed by: PHYSICAL MEDICINE & REHABILITATION

## 2022-03-28 PROCEDURE — 1159F PR MEDICATION LIST DOCUMENTED IN MEDICAL RECORD: ICD-10-PCS | Mod: CPTII,S$GLB,, | Performed by: PHYSICAL MEDICINE & REHABILITATION

## 2022-03-28 PROCEDURE — 3075F PR MOST RECENT SYSTOLIC BLOOD PRESS GE 130-139MM HG: ICD-10-PCS | Mod: CPTII,S$GLB,, | Performed by: PHYSICAL MEDICINE & REHABILITATION

## 2022-03-28 PROCEDURE — 3078F PR MOST RECENT DIASTOLIC BLOOD PRESSURE < 80 MM HG: ICD-10-PCS | Mod: CPTII,S$GLB,, | Performed by: PHYSICAL MEDICINE & REHABILITATION

## 2022-03-28 PROCEDURE — 4010F ACE/ARB THERAPY RXD/TAKEN: CPT | Mod: CPTII,S$GLB,, | Performed by: PHYSICAL MEDICINE & REHABILITATION

## 2022-03-28 PROCEDURE — 3008F PR BODY MASS INDEX (BMI) DOCUMENTED: ICD-10-PCS | Mod: CPTII,S$GLB,, | Performed by: PHYSICAL MEDICINE & REHABILITATION

## 2022-03-28 PROCEDURE — 4010F PR ACE/ARB THEARPY RXD/TAKEN: ICD-10-PCS | Mod: CPTII,S$GLB,, | Performed by: PHYSICAL MEDICINE & REHABILITATION

## 2022-03-28 RX ORDER — PRAZOSIN HYDROCHLORIDE 2 MG/1
2 CAPSULE ORAL NIGHTLY
COMMUNITY
Start: 2022-03-09 | End: 2022-12-21 | Stop reason: ALTCHOICE

## 2022-03-28 RX ORDER — INDOMETHACIN 50 MG/1
50 CAPSULE ORAL 3 TIMES DAILY PRN
Qty: 90 CAPSULE | Refills: 0 | Status: SHIPPED | OUTPATIENT
Start: 2022-03-28 | End: 2022-05-25

## 2022-03-28 RX ORDER — BUPROPION HYDROCHLORIDE 100 MG/1
100 TABLET, EXTENDED RELEASE ORAL DAILY
COMMUNITY
Start: 2022-03-09 | End: 2022-12-21 | Stop reason: ALTCHOICE

## 2022-03-28 NOTE — TELEPHONE ENCOUNTER
Caudal ANURAG scheduled 04/29/2022. Patient has had Covid vaccinations. Advised that pre admit would contact patient with time of procedure. Advised patient to contact office if he starts any type of antibiotics or new blood thinners. Voiced understanding.

## 2022-04-05 ENCOUNTER — HOSPITAL ENCOUNTER (OUTPATIENT)
Dept: RADIOLOGY | Facility: HOSPITAL | Age: 57
Discharge: HOME | End: 2022-04-05
Attending: PHYSICAL MEDICINE & REHABILITATION
Payer: MEDICARE

## 2022-04-05 DIAGNOSIS — G89.4 CHRONIC PAIN SYNDROME: ICD-10-CM

## 2022-04-05 DIAGNOSIS — M96.1 FAILED BACK SURGICAL SYNDROME: ICD-10-CM

## 2022-04-05 DIAGNOSIS — M54.16 LUMBAR RADICULOPATHY, CHRONIC: ICD-10-CM

## 2022-04-05 PROCEDURE — 72146 MRI CHEST SPINE W/O DYE: CPT | Mod: 26,,, | Performed by: RADIOLOGY

## 2022-04-05 PROCEDURE — 72146 MRI CHEST SPINE W/O DYE: CPT | Mod: TC

## 2022-04-05 PROCEDURE — 72158 MRI LUMBAR SPINE W/O & W/DYE: CPT | Mod: 26,,, | Performed by: RADIOLOGY

## 2022-04-05 PROCEDURE — 72146 MRI THORACIC SPINE WITHOUT CONTRAST: ICD-10-PCS | Mod: 26,,, | Performed by: RADIOLOGY

## 2022-04-05 PROCEDURE — 72158 MRI LUMBAR SPINE W WO CONTRAST: ICD-10-PCS | Mod: 26,,, | Performed by: RADIOLOGY

## 2022-04-05 PROCEDURE — A9585 GADOBUTROL INJECTION: HCPCS | Performed by: PHYSICAL MEDICINE & REHABILITATION

## 2022-04-05 PROCEDURE — 72158 MRI LUMBAR SPINE W/O & W/DYE: CPT | Mod: TC

## 2022-04-05 PROCEDURE — 25500020 PHARM REV CODE 255: Performed by: PHYSICAL MEDICINE & REHABILITATION

## 2022-04-05 RX ORDER — GADOBUTROL 604.72 MG/ML
9 INJECTION INTRAVENOUS
Status: COMPLETED | OUTPATIENT
Start: 2022-04-05 | End: 2022-04-05

## 2022-04-05 RX ADMIN — GADOBUTROL 9 ML: 604.72 INJECTION INTRAVENOUS at 12:04

## 2022-04-28 NOTE — PRE-PROCEDURE INSTRUCTIONS
Patient scheduled for pain management procedure with Dr. Tori Prado tomorrow, 4/29/22.  Patient instructed to be here at 9:45 am.  No solid food after midnight; liquids OK in the a.m.  All medications to be taken in the morning.  Must have transportation home.  Patient verbalizes understanding.

## 2022-04-29 ENCOUNTER — HOSPITAL ENCOUNTER (OUTPATIENT)
Facility: HOSPITAL | Age: 57
Discharge: HOME | End: 2022-04-29
Attending: PHYSICAL MEDICINE & REHABILITATION | Admitting: PHYSICAL MEDICINE & REHABILITATION
Payer: MEDICARE

## 2022-04-29 ENCOUNTER — HOSPITAL ENCOUNTER (OUTPATIENT)
Dept: RADIOLOGY | Facility: HOSPITAL | Age: 57
Discharge: HOME | End: 2022-04-29
Attending: PHYSICAL MEDICINE & REHABILITATION
Payer: MEDICARE

## 2022-04-29 VITALS
OXYGEN SATURATION: 96 % | RESPIRATION RATE: 18 BRPM | SYSTOLIC BLOOD PRESSURE: 157 MMHG | HEART RATE: 64 BPM | DIASTOLIC BLOOD PRESSURE: 77 MMHG

## 2022-04-29 DIAGNOSIS — G89.29 CHRONIC PAIN: ICD-10-CM

## 2022-04-29 DIAGNOSIS — M54.16 LUMBAR RADICULOPATHY: Primary | ICD-10-CM

## 2022-04-29 DIAGNOSIS — M54.16 LUMBAR RADICULOPATHY: ICD-10-CM

## 2022-04-29 PROCEDURE — 25500020 PHARM REV CODE 255: Performed by: PHYSICAL MEDICINE & REHABILITATION

## 2022-04-29 PROCEDURE — 62323 NJX INTERLAMINAR LMBR/SAC: CPT | Mod: ,,, | Performed by: PHYSICAL MEDICINE & REHABILITATION

## 2022-04-29 PROCEDURE — 25000003 PHARM REV CODE 250: Performed by: PHYSICAL MEDICINE & REHABILITATION

## 2022-04-29 PROCEDURE — 63600175 PHARM REV CODE 636 W HCPCS: Performed by: PHYSICAL MEDICINE & REHABILITATION

## 2022-04-29 PROCEDURE — 62323 NJX INTERLAMINAR LMBR/SAC: CPT | Performed by: PHYSICAL MEDICINE & REHABILITATION

## 2022-04-29 PROCEDURE — 62323 PR INJ LUMBAR/SACRAL, W/IMAGING GUIDANCE: ICD-10-PCS | Mod: ,,, | Performed by: PHYSICAL MEDICINE & REHABILITATION

## 2022-04-29 RX ORDER — LIDOCAINE HYDROCHLORIDE 10 MG/ML
INJECTION, SOLUTION EPIDURAL; INFILTRATION; INTRACAUDAL; PERINEURAL
Status: DISCONTINUED | OUTPATIENT
Start: 2022-04-29 | End: 2022-04-29 | Stop reason: HOSPADM

## 2022-04-29 RX ORDER — LIDOCAINE HYDROCHLORIDE 10 MG/ML
INJECTION, SOLUTION EPIDURAL; INFILTRATION; INTRACAUDAL; PERINEURAL
Status: DISCONTINUED
Start: 2022-04-29 | End: 2022-04-29 | Stop reason: HOSPADM

## 2022-04-29 RX ORDER — LIDOCAINE HYDROCHLORIDE 10 MG/ML
INJECTION INFILTRATION; PERINEURAL
Status: DISCONTINUED | OUTPATIENT
Start: 2022-04-29 | End: 2022-04-29 | Stop reason: HOSPADM

## 2022-04-29 RX ORDER — LIDOCAINE HYDROCHLORIDE 10 MG/ML
INJECTION INFILTRATION; PERINEURAL
Status: DISCONTINUED
Start: 2022-04-29 | End: 2022-04-29 | Stop reason: HOSPADM

## 2022-04-29 RX ORDER — DEXAMETHASONE SODIUM PHOSPHATE 10 MG/ML
INJECTION INTRAMUSCULAR; INTRAVENOUS
Status: DISCONTINUED | OUTPATIENT
Start: 2022-04-29 | End: 2022-04-29 | Stop reason: HOSPADM

## 2022-04-29 RX ORDER — DEXAMETHASONE SODIUM PHOSPHATE 10 MG/ML
INJECTION INTRAMUSCULAR; INTRAVENOUS
Status: DISCONTINUED
Start: 2022-04-29 | End: 2022-04-29 | Stop reason: HOSPADM

## 2022-04-29 NOTE — H&P
HPI  Patient presenting for Procedure(s) (LRB):  CAUDAL EPIDURAL STEROID INJECTION (N/A)     Patient on Anti-coagulation No    No health changes since previous encounter. No changes in pain since procedure was scheduled at previous visit. Denies any fevers or infections.     No current facility-administered medications on file prior to encounter.     Current Outpatient Medications on File Prior to Encounter   Medication Sig Dispense Refill    amitriptyline (ELAVIL) 25 MG tablet Take 25 mg by mouth every evening.      benazepriL (LOTENSIN) 10 MG tablet Take 1 tablet by mouth once daily.      buPROPion (WELLBUTRIN SR) 100 MG TBSR 12 hr tablet Take 100 mg by mouth once daily.      EScitalopram oxalate (LEXAPRO) 10 MG tablet Take 10 mg by mouth once daily.      indomethacin (INDOCIN) 50 MG capsule Take 1 capsule (50 mg total) by mouth 3 (three) times daily as needed (Pain). 90 capsule 0    lamoTRIgine (LAMICTAL) 100 MG tablet Take 100 mg by mouth nightly.      MELATONIN ORAL Take 10 mg by mouth every evening.       metoprolol succinate (TOPROL-XL) 50 MG 24 hr tablet Take 50 mg by mouth 2 (two) times daily.       naltrexone (DEPADE) 50 mg tablet Take 50 mg by mouth once daily.      pantoprazole (PROTONIX) 40 MG tablet Take 1 tablet (40 mg total) by mouth once daily. 90 tablet 3    prazosin (MINIPRESS) 2 MG Cap Take 2 mg by mouth nightly.      pregabalin (LYRICA) 50 MG capsule Take 1 capsule (50 mg total) by mouth 2 (two) times daily. 180 capsule 3    SYNTHROID 112 mcg tablet Take 112 mg by mouth once daily.      thiamine 100 MG tablet Take 1 tablet (100 mg total) by mouth once daily. 30 tablet 5    vitamins-lipotropics Tab Take 1 tablet by mouth 2 (two) times a day. 180 tablet 3    allopurinoL (ZYLOPRIM) 300 MG tablet Take 1 tablet (300 mg total) by mouth once daily. 90 tablet 3    ipratropium (ATROVENT) 21 mcg (0.03 %) nasal spray USE 2 SPRAYS NASALLY TWICE  DAILY 30 mL 7    rosuvastatin (CRESTOR) 10  MG tablet Take 1 tablet (10 mg total) by mouth every other day. Monday,wed, fri 45 tablet 3     Past Medical History:   Diagnosis Date    Alcohol abuse     Anxiety     Depression     GERD (gastroesophageal reflux disease)     Headache     Hx of psychiatric care     Hypertension     Hypothyroid     Lyme disease     Restless leg syndrome     Sleep difficulties     Therapy     Uncontrolled REM sleep behavior disorder      Past Surgical History:   Procedure Laterality Date    ANAL FISTULOTOMY      ANAL SPHINCTEROTOMY      APPENDECTOMY      BACK SURGERY      CARPAL TUNNEL RELEASE      bilateral    COLONOSCOPY  2/2015    COLONOSCOPY N/A 8/29/2019    Procedure: COLONOSCOPY WITH BIOPSY;  Surgeon: Giuseppe Mart MD;  Location: [facility] ENDO;  Service: Endoscopy;  Laterality: N/A;    EPIDURAL STEROID INJECTION N/A 2/21/2020    Procedure: CERVICAL EPIDURAL STEROID INJECTION (C7-T1 INTERLAMINAR);  Surgeon: Tori Prado MD;  Location: [facility] OR;  Service: Pain Management;  Laterality: N/A;    HAND SURGERY Bilateral     INJECTION OF ANESTHETIC AGENT INTO SACROILIAC JOINT Left 5/14/2021    Procedure: SACROILIAC JOINT INJECTION;  Surgeon: Tori Prado MD;  Location: [facility] OR;  Service: Pain Management;  Laterality: Left;    INJECTION OF STEROID Left 5/14/2021    Procedure: GREATER TROCHANTERIC BURSA INJECTION;  Surgeon: Tori Prado MD;  Location: [facility] OR;  Service: Pain Management;  Laterality: Left;    NECK SURGERY      NOSE SURGERY      TRANSFORAMINAL EPIDURAL INJECTION OF STEROID Left 3/26/2021    Procedure: TRANSFORAMINAL EPIDURAL STEROID INJECTION (L3);  Surgeon: Tori Prado MD;  Location: [facility] OR;  Service: Pain Management;  Laterality: Left;    TRIGGER FINGER RELEASE       Review of patient's allergies indicates:   Allergen Reactions    Gralise [gabapentin]      Depression    Norco [hydrocodone-acetaminophen] Itching    Percocet [oxycodone-acetaminophen] Itching    Statins-hmg-coa  reductase inhibitors       Current Facility-Administered Medications   Medication    dexAMETHasone sodium phos (PF) 10 mg/mL injection    iohexoL (OMNIPAQUE 300) 300 mg iodine/mL injection    LIDOcaine (PF) 10 mg/ml (1%) 10 mg/mL (1 %) injection    LIDOcaine HCL 10 mg/ml (1%) 10 mg/mL (1 %) injection       PMHx, PSHx, Allergies, Medications reviewed in epic    ROS negative except pain complaints in HPI    OBJECTIVE:    /73 (BP Location: Left arm, Patient Position: Lying)   Pulse (!) 59   Resp 18   SpO2 99%     PHYSICAL EXAMINATION:    GENERAL: Well appearing, in no acute distress, alert and oriented.  PSYCH:  Mood and affect appropriate.  SKIN: Skin color, texture, turgor normal in procedure area, no rashes or lesions which will impact the procedure.  CV: RRR with palpation of the radial artery.  PULM: No evidence of respiratory difficulty, symmetric chest rise. Clear to auscultation.  NEURO: Alert. Oriented. Speech fluent and appropriate. Moving all extremities.    Plan:    Proceed with procedure as planned Procedure(s) (LRB):  CAUDAL EPIDURAL STEROID INJECTION (N/A)    Tori Prado  04/29/2022

## 2022-04-29 NOTE — DISCHARGE SUMMARY
[facility]  Discharge Note  Short Stay     Admit Date: 4/29/2022    Discharge Date: 4/29/2022     Attending Physician: Tori Prado M.D.    Diagnoses:  Active Hospital Problems    Diagnosis  POA    *Lumbar radiculopathy [M54.16]  Yes      Resolved Hospital Problems   No resolved problems to display.     Discharged Condition: Good     Hospital Course: Patient was admitted for an outpatient interventional pain management procedure and tolerated the procedure well with no complications.     Final Diagnoses: Same as principal problem.     Disposition: Home or Self Care     Follow up/Patient Instructions:   Follow-up in 1-2 weeks unless otherwise instructed. May return sooner as needed.       Reconciled Medications:     Medication List      CHANGE how you take these medications    prazosin 2 MG Cap  Commonly known as: MINIPRESS  Take 2 mg by mouth nightly.  What changed: Another medication with the same name was removed. Continue taking this medication, and follow the directions you see here.        CONTINUE taking these medications    allopurinoL 300 MG tablet  Commonly known as: ZYLOPRIM  Take 1 tablet (300 mg total) by mouth once daily.     amitriptyline 25 MG tablet  Commonly known as: ELAVIL  Take 25 mg by mouth every evening.     benazepriL 10 MG tablet  Commonly known as: LOTENSIN  Take 1 tablet by mouth once daily.     buPROPion 100 MG TBSR 12 hr tablet  Commonly known as: WELLBUTRIN SR  Take 100 mg by mouth once daily.     cloNIDine 0.1 MG tablet  Commonly known as: CATAPRES  TAKE 1 TABLET BY MOUTH AT  NIGHT     CREON 3,000-9,500- 15,000 unit Cpdr  Generic drug: lipase-protease-amylase  Take by mouth 3 (three) times daily.     dicyclomine 20 mg tablet  Commonly known as: BENTYL  Take 1 tablet (20 mg total) by mouth every 6 (six) hours as needed (abdominal cramping).     EScitalopram oxalate 10 MG tablet  Commonly known as: LEXAPRO  Take 10 mg by mouth once daily.     gabapentin 300 MG  capsule  Commonly known as: NEURONTIN  Take 300 mg by mouth nightly.     indomethacin 50 MG capsule  Commonly known as: INDOCIN  Take 1 capsule (50 mg total) by mouth 3 (three) times daily as needed (Pain).     ipratropium 21 mcg (0.03 %) nasal spray  Commonly known as: ATROVENT  USE 2 SPRAYS NASALLY TWICE  DAILY     lamoTRIgine 100 MG tablet  Commonly known as: LAMICTAL  Take 100 mg by mouth nightly.     MELATONIN ORAL  Take 10 mg by mouth every evening.     metoprolol succinate 50 MG 24 hr tablet  Commonly known as: TOPROL-XL  Take 50 mg by mouth 2 (two) times daily.     naltrexone 50 mg tablet  Commonly known as: DEPADE  Take 50 mg by mouth once daily.     pantoprazole 40 MG tablet  Commonly known as: PROTONIX  Take 1 tablet (40 mg total) by mouth once daily.     pregabalin 50 MG capsule  Commonly known as: LYRICA  Take 1 capsule (50 mg total) by mouth 2 (two) times daily.     rosuvastatin 10 MG tablet  Commonly known as: CRESTOR  Take 1 tablet (10 mg total) by mouth every other day. Monday,wed, fri     SYNTHROID 112 MCG tablet  Generic drug: levothyroxine  Take 112 mg by mouth once daily.     thiamine 100 MG tablet  Take 1 tablet (100 mg total) by mouth once daily.     vitamins-lipotropics Tab  Take 1 tablet by mouth 2 (two) times a day.           Discharge Procedure Orders (must include Diet, Follow-up, Activity)   Ice to affected area   Order Comments: 20 minutes of ice or until area numb to the touch if area is sore 2-3 times per day as needed     No driving until:   Order Comments: Until following day     No dressing needed     Notify your health care provider if you experience any of the following:  temperature >100.4     Notify your health care provider if you experience any of the following:  persistent nausea and vomiting or diarrhea     Notify your health care provider if you experience any of the following:  severe uncontrolled pain     Notify your health care provider if you experience any of the  following:  redness, tenderness, or signs of infection (pain, swelling, redness, odor or green/yellow discharge around incision site)     Notify your health care provider if you experience any of the following:  difficulty breathing or increased cough     Notify your health care provider if you experience any of the following:  severe persistent headache     Notify your health care provider if you experience any of the following:  worsening rash     Notify your health care provider if you experience any of the following:  persistent dizziness, light-headedness, or visual disturbances     Notify your health care provider if you experience any of the following:  increased confusion or weakness     Shower on day dressing removed (No bath)       Tori Prado M.D.  Interventional Pain Medicine / Physical Medicine & Rehabilitation

## 2022-04-29 NOTE — DISCHARGE INSTRUCTIONS
DIET: You may resume your normal diet today.    BATHING: You may resume your normal bathing.          You may shower, no hot water directly on site for 24 hours.    DRESSING: You may remove your bandage today.    ACTIVITY LEVEL: You may resume your normal activities 24 hours after your  procedure.    If you have received sedation or an anesthetic, you may feel sleepy for several hours. Rest until you are more awake. Gradually resume your normal activities tomorrow.    If you have received sedation or an anesthetic, do not drive or operate heavy machinery for at least 24 hours.    MEDICATION: You may resume your normal medications today.    You will receive instructions for any pain prescriptions. Pain medications should be taken only as directed.    SPECIAL INSTRUCTIONS: No heat to the injection site for 24 hours including: bath or shower, heating pad, moist heat, hot tubs.    Use ice pack to injection site for any pain or discomfort. Apply ice pack to 20 minutes then remove for 20 minutes before re-applying to site.    WHEN TO CALL DOCTOR: Redness or swelling around injection site    Fever of 101F    Drainage (pus) from the injection site    For any continuous bleeding (some dried blood over the incision is normal).    FOLLOW UP: Follow up phone call will be made by office.    FOR EMERGENCIES: If any unusual problems or difficulties occur during clinic hours, call [phone] or [phone].

## 2022-04-29 NOTE — OP NOTE
Caudal Epidural Steroid Injection Under Fluoroscopic Guidance:  I have reviewed the patient's medications, allergies and relevant histories prior to the procedure and no contraindications have been identified. The risks, benefits and alternatives to the procedure were discussed with the patient, and all questions regarding the procedure were answered to the patient's satisfaction. I personally obtained Dalton's consent prior to the start of the procedure and the signed consent can be found in the patient's chart.                                                         Time-out was taken to identify patient, procedure, laterality, and allergies prior to starting the procedure.       Date of Service: 04/29/2022  Procedure: Caudal epidural steroid injection under fluoroscopy with insertion of flexible catheter  Pre-Operative Diagnosis: Lumbar Radiculopathy  Post-Operative Diagnosis: Lumbar Radiculopathy    Physician: Tori Prado M.D.  Assistants: None    Medications Injected: Preservative-free dexamethasone 10mg/mL, and preservative free Lidocaine 1% MPF 5mL and 4 of normal saline.  Local Anesthetic: Xylocaine 1% 10 mL.   Sedation Medications: None    Procedural Technique: Laying in the prone position, the patient was prepped and draped in the usual sterile fashion using ChloraPrep and fenestrated drape.  Appropriate anatomic landmarks were determined including the superior LS-spine and sacral hiatus.  Local anesthetic was given by raising a wheel and going down to the periosteum.  A 3.5in 16-gauge spinal needle was introduced thru the sacral hiatus.  The flexible catheter threaded through to the desired levels. Omnipaque was injected after negative aspiration to confirm placement in the appropriate area and that there was no vascular runoff.  The medication was then injected slowly.  Needle was removed and bandage applied.     Estimated Blood Loss:  None.  Complications:  None.     Disposition: The patient tolerated  the procedure well, and there were no apparent complications. Vital signs remained stable throughout the procedure. The patient was taken to the recovery area and monitored after the procedure. The patient was given written discharge instructions for the procedure. If helpful, we can repeat as needed. The patient was discharged in a stable condition.    Follow-Up: We will see the patient back in 1-2 weeks or the patient may call to inform of status.

## 2022-05-20 NOTE — PROGRESS NOTES
[facility]    Chief Complaint:   Chief Complaint   Patient presents with    Low-back Pain     Initial HPI (10/11/19): Dalton Kent is a 56 y.o. male referred by Dr. Velazquez for back and neck pain.    Back pain is worse. Back pain began ~ 10 years ago. He has had 2 back surgeries. Back pain is localized to the left over the lateral iliac crest. No radiation into legs. He is unable to describe the quality of the pain, other than feeling deep. Worse with walking, cutting grass, standing, and slight flexion. Pain alleviated by nothing in particular. Left leg gets weak with cutting grass or doing dishes. Denies leg numbness.     He has had neck surgery with Dr. Ontiveros in 2015. He doesn't have neck pain but is having right arm pain and shooting pain into his small, ring and middle fingers and also pain in his tricep area. Pain described as shooting and shocking in the arm. Nothing particularly aggravates it, but did start while fishing and worse at night time. Nothing helps the pain. He did therapy recently.     Onset: Years  Location: Shoulder, arm, lower back left side, hip pain   Radiation: left leg and right arm  Quality: Aching, burning, throbbing, grabbing, tingling, deep, superficial, electric   Exacerbating Factors: exercise, lifting, sitting, standing and walking for more than 20 minutes  Alleviating Factors: nothing  Associated Symptoms: denies night fever/night sweats, urinary incontinence, bowel incontinence, significant weight loss, significant motor weakness and loss of sensations    Severity: Currently: 4/10   Typical Range: 7-8/10     Exacerbation: 8/10     Interval History (05/20/2022):  Dalton Kent returns today for follow up.  At the last clinic visit, scheduled ANURAG    CAUDAL EPIDURAL STEROID INJECTION WITH CATHETER provided 0% relief.     Currently, the back pain is stable. Pain primarily in the left low back and midline low back with minimal radiation into the legs.  No change in the location or quality of the pain since the most recent visit is reported.  No significant interval events or traumas. No change in bowel or bladder function, & no new weakness or numbness is reported.     We discussed SCS stim trial and his main goals are being able to stand longer, be able to bend over and do chores around the house.    He has been taking the indomethacin which helps. He is taking gabapentin 300 mg at night and this helps his legs.     Current Pain Scales:  Current: 5/10              Typical Range: 5-9/10               Pain Disability Index  Family/Home Responsibilities:: 9  Recreation:: 9  Social Activity:: 9  Occupation:: 9  Sexual Behavior:: 9  Self Care:: 9  Life-Support Activities:: 1  Pain Disability Index (PDI): 55    Previous Interventions:  - 4/29/22: Caudal ANURAG w/ 0% relief.  - 5/14/21: Left SIJ and GTB CSI w/ 90% relief  - 3/26/21: Left L3 & L4 TF ANURAG w/ 50% relief  - 2/21/20: C7-T1 IL ANURAG w/ minimal relief  - 12/6/19: Cervical TPIs  - 10/11/19: Lumbar TPIs: Did not think they were effective  - Previous spine injections with Dr. Jimmy Rosas in [place] prior to the back surgeries. Also with Dr. Honeycutt and Dr. Pulido.     Previous Therapies:  PT/OT: yes last was 3-4 years ago  Surgery: yes He has had C5-7 ACDF with Dr. Ontiveros in 2016. He has had 2 back surgeries. First back surgery was L5-S1 ALIF in [facility] with Dr. Sorin Thomas. He had above level L4-5 herniated disk and underwent second back surgery, L4-5 TLIF at [facility] with Dr. Isaias Ontiveros in May 2016. Right then left carpal tunnel release in 2017 w/ Dr. Marin Rosas in May 2017.   Previous Medications:   - NSAIDS: Cant take because of stomach ulcers  - Muscle Relaxants: Xanax. Klonopin. Ativan.   - TCAs: Nortriptyline 25 mg  - SNRIs: Not tried  - Topicals: Bengay, biofreeze not effective  - Anticonvulsants: Gabapentin (Gralise) caused worsening depression, claustrophobia,  trouble swallowing. Depakote. Lyrica.  - Opioids: None currently    Current Pain Medications  1. Lyrica 50 mg b.i.d.  2. Tylenol 500 mg, two tablets, 1-2x per day  3. Naltrexone 50 mg daily  4. Elavil 25 mg qHS  5. Gabapentin 300 mg qHS  6. Indomethacin       Blood Thinners: None    Full Medication List:    Current Outpatient Medications:     allopurinoL (ZYLOPRIM) 300 MG tablet, Take 1 tablet (300 mg total) by mouth once daily., Disp: 90 tablet, Rfl: 3    amitriptyline (ELAVIL) 25 MG tablet, Take 25 mg by mouth every evening., Disp: , Rfl:     benazepriL (LOTENSIN) 10 MG tablet, Take 1 tablet by mouth once daily., Disp: , Rfl:     buPROPion (WELLBUTRIN SR) 100 MG TBSR 12 hr tablet, Take 100 mg by mouth once daily., Disp: , Rfl:     cloNIDine (CATAPRES) 0.1 MG tablet, TAKE 1 TABLET BY MOUTH AT  NIGHT, Disp: 90 tablet, Rfl: 3    CREON 3,000-9,500- 15,000 unit CpDR, TAKE 1 CAPSULE BY MOUTH THREE TIMES A DAY BEFORE MEALS, Disp: 90 capsule, Rfl: 0    dicyclomine (BENTYL) 20 mg tablet, Take 20 mg by mouth every 6 (six) hours., Disp: , Rfl:     EScitalopram oxalate (LEXAPRO) 10 MG tablet, Take 10 mg by mouth once daily., Disp: , Rfl:     gabapentin (NEURONTIN) 300 MG capsule, Take 300 mg by mouth nightly., Disp: , Rfl:     indomethacin (INDOCIN) 50 MG capsule, Take 1 capsule (50 mg total) by mouth 3 (three) times daily as needed (Pain)., Disp: 90 capsule, Rfl: 0    ipratropium (ATROVENT) 21 mcg (0.03 %) nasal spray, USE 2 SPRAYS NASALLY TWICE  DAILY, Disp: 30 mL, Rfl: 7    lamoTRIgine (LAMICTAL) 100 MG tablet, Take 100 mg by mouth nightly., Disp: , Rfl:     MELATONIN ORAL, Take 10 mg by mouth every evening. , Disp: , Rfl:     metoprolol succinate (TOPROL-XL) 50 MG 24 hr tablet, Take 50 mg by mouth 2 (two) times daily. , Disp: , Rfl:     naltrexone (DEPADE) 50 mg tablet, Take 50 mg by mouth once daily., Disp: , Rfl:     pantoprazole (PROTONIX) 40 MG tablet, Take 1 tablet (40 mg total) by mouth once daily.,  Disp: 90 tablet, Rfl: 3    prazosin (MINIPRESS) 2 MG Cap, Take 2 mg by mouth nightly., Disp: , Rfl:     pregabalin (LYRICA) 50 MG capsule, Take 1 capsule (50 mg total) by mouth 2 (two) times daily., Disp: 180 capsule, Rfl: 3    rosuvastatin (CRESTOR) 10 MG tablet, Take 1 tablet (10 mg total) by mouth every other day. Monday,wed, fri, Disp: 45 tablet, Rfl: 3    SYNTHROID 112 mcg tablet, Take 112 mg by mouth once daily., Disp: , Rfl:     thiamine 100 MG tablet, Take 1 tablet (100 mg total) by mouth once daily., Disp: 30 tablet, Rfl: 5    vitamins-lipotropics Tab, Take 1 tablet by mouth 2 (two) times a day., Disp: 180 tablet, Rfl: 3     Review of Systems:  Review of Systems   Constitutional: Negative for fever and weight loss.   HENT: Positive for tinnitus. Negative for ear pain.    Eyes: Negative for pain and redness.   Respiratory: Negative for cough and shortness of breath.    Cardiovascular: Negative for chest pain and palpitations.   Gastrointestinal: Positive for heartburn. Negative for constipation.        GERD w/ NSAIDs   Genitourinary: Negative.         Denies urinary incontinence. Denies urine retention.    Musculoskeletal: Positive for back pain, myalgias and neck pain.   Skin: Negative for itching and rash.   Neurological: Positive for tingling, weakness and headaches. Negative for seizures.   Endo/Heme/Allergies: Negative for environmental allergies. Does not bruise/bleed easily.   Psychiatric/Behavioral: Positive for depression. The patient is nervous/anxious and has insomnia.        Allergies:  Gralise [gabapentin], Norco [hydrocodone-acetaminophen], Percocet [oxycodone-acetaminophen], and Statins-hmg-coa reductase inhibitors     Medical History:   has a past medical history of Alcohol abuse, Anxiety, Depression, GERD (gastroesophageal reflux disease), Headache, psychiatric care, Hypertension, Hypothyroid, Lyme disease, Restless leg syndrome, Sleep difficulties, Therapy, and Uncontrolled REM sleep  "behavior disorder.    Surgical History:   has a past surgical history that includes Anal fistulotomy; Anal sphincterotomy; Appendectomy; Colonoscopy (2/2015); Back surgery; Neck surgery; Hand surgery (Bilateral); Carpal tunnel release; Trigger finger release; Nose surgery; Colonoscopy (N/A, 8/29/2019); Epidural steroid injection (N/A, 2/21/2020); Transforaminal epidural injection of steroid (Left, 3/26/2021); Injection of anesthetic agent into sacroiliac joint (Left, 5/14/2021); Injection of steroid (Left, 5/14/2021); Caudal epidural steroid injection (N/A, 4/29/2022); and Colonoscopy (N/A, 5/4/2022).    Social History:   reports that he has quit smoking. He has never used smokeless tobacco. He reports previous alcohol use. He reports that he does not use drugs.    Physical Exam:  BP (!) 140/82 (BP Location: Left arm, Patient Position: Sitting, BP Method: Medium (Manual))   Pulse 72   Resp 20   Ht 5' 6" (1.676 m)   Wt 95.7 kg (210 lb 13.9 oz)   BMI 34.04 kg/m²   GEN: No acute distress. Calm, comfortable  HENT: Normocephalic, atraumatic, moist mucous membranes  EYE: Anicteric sclera, non-injected.   CV: Non-diaphoretic. Regular Rate. Radial Pulses 2+.  RESP: Breathing comfortably. Chest expansion symmetric.  EXT: No clubbing, cyanosis.   SKIN: Warm, & dry to palpation.  PSYCH: Pleasant mood and appropriate affect. Recent and remote memory intact.   GAIT: Independent, normal ambulation  Lumbar Spine Exam:       Inspection: No erythema, bruising. Healed incision.       Palpation: (+) TTP of lumbar paraspinals bilaterally and midline spinous processes      ROM: Limited in flexion, extension, lateral bending.       (+) Facet loading bilaterally  Neurologic Exam:     Alert. Speech is fluent and appropriate.     Strength: 4/5 in bilateral hip flexion, left knee extension, bilateral ADF, otherwise 5/5 throughout bilateral lower extremities (patient tremulous on testing and may reflex weakness due to " pain)     Sensation: Grossly intact to light touch in bilateral lower extremities     Reflexes: 2+ in bilateral patella, Unable to elicit bilateral triceps, biceps, brachioradialis     Tone: No abnormality appreciated in bilateral upper or lower extremities     No Clonus    Imaging:   - MRI Thoracic spine 4/5/22:  There are focal areas of signal alteration involving the thoracic vertebral bodies greatest at T8 and T12.  Findings are most compatible with focal areas of fatty infiltration versus hemangiomas.  No evidence of an acute thoracic spine abnormality.  No congenital spinal stenosis.  No abnormal signal change involving the visualized portion of the spinal cord which terminates at approximately the L1 level.  There is no evidence of a focal disc abnormality.  No evidence of significant spinal canal encroachment or foraminal encroachment.  Impression:  Probable focal fat versus hemangiomas within thoracic vertebral bodies greatest at T8 and T12.  No focal disc abnormality.  No significant spinal canal or foraminal encroachment.     - MRI Lumbar spine 4/5/22:  MRI examination of the lumbar spine with and without contrast dated April 5, 2022.  Multilayered prior lumbar spine surgery and fusion with pedicle screws at L4 and L5 with posterior fusion rods in place.  Anterior fusion and screw plate with screws at L5-S1.  No evidence of an acute abnormality.  Alignment is maintained.  Focal fatty change versus hemangioma within T12.  The visualized portion of the spinal cord is normal in signal intensity and terminates at approximately the L1 level.  At the T11-T12, T12-L1 and L1-L2 levels there is no evidence of a focal disc abnormality.  There are minimal facet degenerative changes.  No significant spinal canal or foraminal encroachment.  The L2-L3 level there is a minimal disc bulge and mild facet degenerative changes.  No significant spinal canal or foraminal encroachment.  At the L3-L4 level there is a  broad-based disc bulge with advanced bilateral facet degenerative changes resulting in mild spinal canal encroachment and bilateral foraminal encroachment appearing similar to the previous study.  At the L4-L5 level there are postsurgical changes.  There is signal alteration along the left lateral aspect of the thecal sac extending towards the foramen which appears similar to the previous study.  Post contrast enhanced images reveal evidence of enhancement compatible with postsurgical scarring and fibrosis.  Mild left-sided foraminal encroachment.  Hypertrophic facet degenerative changes.  At the L5-S1 level there are stable postoperative changes.  No evidence of a recurrent disc abnormality.  No central canal stenosis.  Mild bilateral foraminal encroachment    - MRI Cervical Spine 2/7/20:  Prior multilevel cervical fusion at C5-C6 and C6-C7.  Alignment is maintained.  No congenital spinal stenosis.  No abnormal signal change involving the visualized portion of the spinal cord.  At the C2-C3 and C3-C4 levels there are minimal uncinate process spurs.  There is no evidence of significant spinal canal or foraminal encroachment.  At the C4-C5 level there is minimal disc-osteophyte with mild effacement along the anterior subarachnoid space.  Mild facet degenerative changes.  No significant spinal canal or foraminal encroachment.  Prior fusions performed at C5-C6 and C6-C7.  Alignment is maintained.  No evidence of a focal recurrent disc abnormality.  At the C7-T1 level there is a posterior disc-osteophyte with mild encroachment along the subarachnoid space and mild bilateral foraminal encroachment.    - X-ray Shoulder 12/11/19:  The acromioclavicular and glenohumeral joint spaces are intact.  No fracture or dislocation is seen.  Cervical fusion hardware is identified    - EMG/NCS BUE 10/28/19:  Normal EMG/NCS of the arms.     - X-ray C-spine 10/11/19:  Prior multilevel fusion at C5-C6 and C6-C7.  Fusion screws appear  intact and alignment is maintained.  No significant change in alignment with flexion or extension.  There is no plain film evidence of fracture or acute subluxation.  No prevertebral soft tissue swelling.  Minimal degenerative spurring along the anterior inferior endplate of C4.  No disc space narrowing at C2, C3 or C4    - X-ray L-spine 10/11/19:  Prior multilevel fusion at L4-L5 and L5-S1.  Surgical fixation hardware appears intact.  Alignment is maintained.  There is no significant change in alignment with flexion or extension.  Mild degenerative change involving L1 through L3 without significant focal disc space narrowing.  There is no evidence of an acute fracture.  Alignment is maintained.  No evidence of spondylolysis or spondylolisthesis    - Outside Shoulder MRI Right 2/22/19:  Impression:   1. Degenerative arthropathy at AC joint  2. Pericapsular soft tissue swelling and periosteal soft tissue swelling about the AC joint and distal clavicle. A low-grade partial tear of the trapezius muscle at the insertion upon the clavicle and a low-grade edema or strain of the anterior head of deltoid muscle  3. Suspect a sprain of the coracoclavicular ligaments, which are not torn  4. TEndinosis of the supraspinatus without a tear  5. Slight fraying of the labrum     - EMG/NCS BLE 2/25/19:  Impression: Normal EMG of the legs, although his study was mildly limited due to inability to sample lumbar paraspinal muscles given his prior lumbar surgical scarring.     - Outside Shoulder MRI Right 2/22/19:  Impression:   1. Degenerative arthropathy at AC joint  2. Pericapsular soft tissue swelling and periosteal soft tissue swelling about the AC joint and distal clavicle. A low-grade partial tear of the trapezius muscle at the insertion upon the clavicle and a low-grade edema or strain of the anterior head of deltoid muscle  3. Suspect a sprain of the coracoclavicular ligaments, which are not torn  4. TEndinosis of the  supraspinatus without a tear  5. Slight fraying of the labrum    - EMG/NCS (outside records) with bilateral C6 radiculopathy and bilateral carpal tunnel syndrome     Labs:  Lab Results   Component Value Date     05/12/2022    K 4.9 05/12/2022     05/12/2022    CO2 29 05/12/2022    BUN 14 05/12/2022    CREATININE 1.10 05/12/2022    CALCIUM 9.3 05/12/2022    ANIONGAP 4 (L) 05/12/2022    ESTGFRAFRICA >60 05/12/2022    EGFRNONAA >60 05/12/2022     Lab Results   Component Value Date    ALT 23 05/12/2022    AST 28 05/12/2022    ALKPHOS 85 05/12/2022    BILITOT 0.3 05/12/2022     Lab Results   Component Value Date     05/12/2022       Assessment:  Dalton Kent is a 56 y.o. male with the following diagnoses based on history, exam, and imaging:    Problem List Items Addressed This Visit        Neuro    Lumbar spondylosis (Chronic)    Chronic pain - Primary    Relevant Orders    Ambulatory referral/consult to Psychology    Lumbar radiculopathy       Orthopedic    Myofascial pain      Other Visit Diagnoses     Failed back surgical syndrome        Relevant Orders    Ambulatory referral/consult to Psychology    History of lumbar spinal fusion        Postlaminectomy syndrome of lumbar region              This is a pleasant 56 y.o. gentleman presenting with:    - Chronic back pain: He has prior fusion at L4-5 and L5-S1 with postsurgical scarring and fibrosis at L4-5 level. His back pain appears to be multifactorial in nature, with contributions from myofascial pain, lumbar spondylosis and due to scar tissue. X-ray with degenerative changes above the level of the fusion. Myofascial pain improves w/ TPIs. He was having more left radicular leg pain, but seems improved with caudal ANURAG on 4/29/22.  - Left trochanteric bursitis: Improved with CSI previously  - Chronic neck pain: Prior fusions performed at C5-C6 and C6-C7.  His neck pain, also appears multifactorial. MRI with tear in trapezius, which may be  contributing to the pain. Muscular aspects of the pain and tenderness improve with TPIs.   - Chronic right shoulder pain: in the area of the latissimus and described as deep to the scapula, may be subscapularis vs lattisimus pain.  This has improved after trigger point injection.  - Comorbid: Elevated AST/ALT. Anxiety and depression. H/o EtOH. GERD. HypoTH. ASAD.    Treatment Plan: I discussed with the patient the following assessment and recommendations. The following is the plan the patient agreed upon:  - PT/OT/HEP:  Encouraged cont HEP and regular walking program   - Procedures: Schedule SCS trial with Brock after psych approval.    - Can repeat left SIJ and left GTB injection as needed  - Medications: Cont indomethacin 50 mg TID prn. Risks of NSAIDs discussed  - Imaging: Reviewed.   - Labs: Reviewed.  Monitor liver enzymes.   - Consult psych for pre-op SCS testing    Follow Up: RTC 1 week after SCS trial    Disclaimer: This note was partly generated using dictation software which may occasionally result in transcription errors.

## 2022-05-23 ENCOUNTER — OFFICE VISIT (OUTPATIENT)
Dept: PAIN MEDICINE | Facility: CLINIC | Age: 57
End: 2022-05-23
Payer: MEDICARE

## 2022-05-23 VITALS
DIASTOLIC BLOOD PRESSURE: 82 MMHG | HEIGHT: 66 IN | WEIGHT: 210.88 LBS | RESPIRATION RATE: 20 BRPM | SYSTOLIC BLOOD PRESSURE: 140 MMHG | HEART RATE: 72 BPM | BODY MASS INDEX: 33.89 KG/M2

## 2022-05-23 DIAGNOSIS — M79.18 MYOFASCIAL PAIN: ICD-10-CM

## 2022-05-23 DIAGNOSIS — M54.16 LUMBAR RADICULOPATHY: ICD-10-CM

## 2022-05-23 DIAGNOSIS — M96.1 POSTLAMINECTOMY SYNDROME OF LUMBAR REGION: ICD-10-CM

## 2022-05-23 DIAGNOSIS — M47.816 LUMBAR SPONDYLOSIS: ICD-10-CM

## 2022-05-23 DIAGNOSIS — G89.4 CHRONIC PAIN SYNDROME: Primary | ICD-10-CM

## 2022-05-23 DIAGNOSIS — M96.1 FAILED BACK SURGICAL SYNDROME: ICD-10-CM

## 2022-05-23 DIAGNOSIS — Z98.1 HISTORY OF LUMBAR SPINAL FUSION: ICD-10-CM

## 2022-05-23 PROCEDURE — 3044F PR MOST RECENT HEMOGLOBIN A1C LEVEL <7.0%: ICD-10-PCS | Mod: CPTII,S$GLB,, | Performed by: PHYSICAL MEDICINE & REHABILITATION

## 2022-05-23 PROCEDURE — 3077F SYST BP >= 140 MM HG: CPT | Mod: CPTII,S$GLB,, | Performed by: PHYSICAL MEDICINE & REHABILITATION

## 2022-05-23 PROCEDURE — 1160F PR REVIEW ALL MEDS BY PRESCRIBER/CLIN PHARMACIST DOCUMENTED: ICD-10-PCS | Mod: CPTII,S$GLB,, | Performed by: PHYSICAL MEDICINE & REHABILITATION

## 2022-05-23 PROCEDURE — 3079F PR MOST RECENT DIASTOLIC BLOOD PRESSURE 80-89 MM HG: ICD-10-PCS | Mod: CPTII,S$GLB,, | Performed by: PHYSICAL MEDICINE & REHABILITATION

## 2022-05-23 PROCEDURE — 4010F PR ACE/ARB THEARPY RXD/TAKEN: ICD-10-PCS | Mod: CPTII,S$GLB,, | Performed by: PHYSICAL MEDICINE & REHABILITATION

## 2022-05-23 PROCEDURE — 99999 PR PBB SHADOW E&M-EST. PATIENT-LVL V: ICD-10-PCS | Mod: PBBFAC,,, | Performed by: PHYSICAL MEDICINE & REHABILITATION

## 2022-05-23 PROCEDURE — 3077F PR MOST RECENT SYSTOLIC BLOOD PRESSURE >= 140 MM HG: ICD-10-PCS | Mod: CPTII,S$GLB,, | Performed by: PHYSICAL MEDICINE & REHABILITATION

## 2022-05-23 PROCEDURE — 99214 PR OFFICE/OUTPT VISIT, EST, LEVL IV, 30-39 MIN: ICD-10-PCS | Mod: S$GLB,,, | Performed by: PHYSICAL MEDICINE & REHABILITATION

## 2022-05-23 PROCEDURE — 1159F PR MEDICATION LIST DOCUMENTED IN MEDICAL RECORD: ICD-10-PCS | Mod: CPTII,S$GLB,, | Performed by: PHYSICAL MEDICINE & REHABILITATION

## 2022-05-23 PROCEDURE — 1160F RVW MEDS BY RX/DR IN RCRD: CPT | Mod: CPTII,S$GLB,, | Performed by: PHYSICAL MEDICINE & REHABILITATION

## 2022-05-23 PROCEDURE — 3044F HG A1C LEVEL LT 7.0%: CPT | Mod: CPTII,S$GLB,, | Performed by: PHYSICAL MEDICINE & REHABILITATION

## 2022-05-23 PROCEDURE — 3079F DIAST BP 80-89 MM HG: CPT | Mod: CPTII,S$GLB,, | Performed by: PHYSICAL MEDICINE & REHABILITATION

## 2022-05-23 PROCEDURE — 3008F PR BODY MASS INDEX (BMI) DOCUMENTED: ICD-10-PCS | Mod: CPTII,S$GLB,, | Performed by: PHYSICAL MEDICINE & REHABILITATION

## 2022-05-23 PROCEDURE — 4010F ACE/ARB THERAPY RXD/TAKEN: CPT | Mod: CPTII,S$GLB,, | Performed by: PHYSICAL MEDICINE & REHABILITATION

## 2022-05-23 PROCEDURE — 99999 PR PBB SHADOW E&M-EST. PATIENT-LVL V: CPT | Mod: PBBFAC,,, | Performed by: PHYSICAL MEDICINE & REHABILITATION

## 2022-05-23 PROCEDURE — 3008F BODY MASS INDEX DOCD: CPT | Mod: CPTII,S$GLB,, | Performed by: PHYSICAL MEDICINE & REHABILITATION

## 2022-05-23 PROCEDURE — 1159F MED LIST DOCD IN RCRD: CPT | Mod: CPTII,S$GLB,, | Performed by: PHYSICAL MEDICINE & REHABILITATION

## 2022-05-23 PROCEDURE — 99214 OFFICE O/P EST MOD 30 MIN: CPT | Mod: S$GLB,,, | Performed by: PHYSICAL MEDICINE & REHABILITATION

## 2022-05-23 RX ORDER — DICYCLOMINE HYDROCHLORIDE 20 MG/1
20 TABLET ORAL EVERY 6 HOURS
COMMUNITY
End: 2022-05-25

## 2022-05-24 PROBLEM — R09.81 SINUS CONGESTION: Status: RESOLVED | Noted: 2019-03-27 | Resolved: 2022-05-24

## 2022-05-25 ENCOUNTER — TELEPHONE (OUTPATIENT)
Dept: PAIN MEDICINE | Facility: CLINIC | Age: 57
End: 2022-05-25
Payer: MEDICARE

## 2022-05-25 PROBLEM — K85.90 ACUTE PANCREATITIS: Status: RESOLVED | Noted: 2017-06-15 | Resolved: 2022-05-25

## 2022-05-25 PROBLEM — M51.369 DDD (DEGENERATIVE DISC DISEASE), LUMBAR: Status: RESOLVED | Noted: 2020-06-15 | Resolved: 2022-05-25

## 2022-05-25 PROBLEM — G47.52 REM SLEEP BEHAVIOR DISORDER: Status: RESOLVED | Noted: 2019-06-19 | Resolved: 2022-05-25

## 2022-05-25 PROBLEM — R41.3 MEMORY LOSS: Chronic | Status: RESOLVED | Noted: 2019-01-30 | Resolved: 2022-05-25

## 2022-05-25 PROBLEM — R74.8 ELEVATED LIVER ENZYMES: Chronic | Status: RESOLVED | Noted: 2017-06-16 | Resolved: 2022-05-25

## 2022-05-25 PROBLEM — G25.81 RESTLESS LEGS SYNDROME: Status: RESOLVED | Noted: 2019-03-27 | Resolved: 2022-05-25

## 2022-05-25 PROBLEM — G89.29 CHRONIC PAIN: Status: RESOLVED | Noted: 2020-02-21 | Resolved: 2022-05-25

## 2022-05-25 PROBLEM — M54.50 LUMBAR PAIN: Status: RESOLVED | Noted: 2019-01-30 | Resolved: 2022-05-25

## 2022-05-25 PROBLEM — E66.811 OBESITY (BMI 30.0-34.9): Status: RESOLVED | Noted: 2019-01-30 | Resolved: 2022-05-25

## 2022-05-25 PROBLEM — R41.89 PSEUDODEMENTIA: Status: RESOLVED | Noted: 2019-06-19 | Resolved: 2022-05-25

## 2022-05-25 PROBLEM — M79.18 MYOFASCIAL PAIN: Status: RESOLVED | Noted: 2020-06-15 | Resolved: 2022-05-25

## 2022-05-25 PROBLEM — M51.36 DDD (DEGENERATIVE DISC DISEASE), LUMBAR: Status: RESOLVED | Noted: 2020-06-15 | Resolved: 2022-05-25

## 2022-05-25 PROBLEM — Z98.1 S/P LUMBAR FUSION: Status: RESOLVED | Noted: 2020-06-15 | Resolved: 2022-05-25

## 2022-05-25 PROBLEM — R52 PAIN: Status: RESOLVED | Noted: 2021-03-26 | Resolved: 2022-05-25

## 2022-05-25 PROBLEM — M79.605 LEFT LEG PAIN: Status: RESOLVED | Noted: 2019-01-30 | Resolved: 2022-05-25

## 2022-05-25 PROBLEM — M48.061 LUMBAR FORAMINAL STENOSIS: Status: RESOLVED | Noted: 2019-03-27 | Resolved: 2022-05-25

## 2022-05-25 PROBLEM — M70.60 TROCHANTERIC BURSITIS: Status: RESOLVED | Noted: 2021-05-14 | Resolved: 2022-05-25

## 2022-05-25 PROBLEM — M54.12 CERVICAL RADICULOPATHY: Status: RESOLVED | Noted: 2020-02-21 | Resolved: 2022-05-25

## 2022-05-25 PROBLEM — E66.9 OBESITY (BMI 30.0-34.9): Status: RESOLVED | Noted: 2019-01-30 | Resolved: 2022-05-25

## 2022-05-25 PROBLEM — M46.1 SACROILIITIS: Status: RESOLVED | Noted: 2021-05-14 | Resolved: 2022-05-25

## 2022-05-25 PROBLEM — F32.9 REACTIVE DEPRESSION: Status: RESOLVED | Noted: 2022-05-25 | Resolved: 2022-05-25

## 2022-05-25 PROBLEM — M47.816 LUMBAR SPONDYLOSIS: Chronic | Status: RESOLVED | Noted: 2020-06-15 | Resolved: 2022-05-25

## 2022-05-25 PROBLEM — F32.9 REACTIVE DEPRESSION: Status: ACTIVE | Noted: 2022-05-25

## 2022-05-25 PROBLEM — R55 PRE-SYNCOPE: Status: RESOLVED | Noted: 2019-01-30 | Resolved: 2022-05-25

## 2022-05-25 PROBLEM — R51.9 HEADACHE, UNSPECIFIED HEADACHE TYPE: Status: RESOLVED | Noted: 2019-01-30 | Resolved: 2022-05-25

## 2022-05-25 NOTE — TELEPHONE ENCOUNTER
Patient needs preop psych evaluation prior to scheduling Spinal stimulator.  Referral for preop psych evaluation faxed to [phone] per patient request. Confirmation received.

## 2022-06-08 ENCOUNTER — PATIENT MESSAGE (OUTPATIENT)
Dept: PAIN MEDICINE | Facility: CLINIC | Age: 57
End: 2022-06-08
Payer: MEDICARE

## 2022-06-16 ENCOUNTER — DOCUMENTATION ONLY (OUTPATIENT)
Dept: PAIN MEDICINE | Facility: CLINIC | Age: 57
End: 2022-06-16
Payer: MEDICARE

## 2022-06-16 DIAGNOSIS — M96.1 FAILED BACK SURGICAL SYNDROME: Primary | ICD-10-CM

## 2022-06-16 DIAGNOSIS — G89.4 CHRONIC PAIN SYNDROME: ICD-10-CM

## 2022-06-16 NOTE — PROGRESS NOTES
Discussed psych eval with patient's wife. It sounds like their psychiatrist close to home is not familiar with pre-op SCS psych testing. I will refer to Dr. Russell.

## 2022-06-30 ENCOUNTER — DOCUMENTATION ONLY (OUTPATIENT)
Dept: PSYCHIATRY | Facility: CLINIC | Age: 57
End: 2022-06-30
Payer: MEDICARE

## 2022-06-30 ENCOUNTER — PATIENT MESSAGE (OUTPATIENT)
Dept: PSYCHIATRY | Facility: CLINIC | Age: 57
End: 2022-06-30
Payer: MEDICARE

## 2022-06-30 ENCOUNTER — OFFICE VISIT (OUTPATIENT)
Dept: PSYCHIATRY | Facility: CLINIC | Age: 57
End: 2022-06-30
Payer: COMMERCIAL

## 2022-06-30 DIAGNOSIS — F33.0 MAJOR DEPRESSIVE DISORDER, RECURRENT, MILD: ICD-10-CM

## 2022-06-30 DIAGNOSIS — G47.00 INSOMNIA, UNSPECIFIED TYPE: ICD-10-CM

## 2022-06-30 DIAGNOSIS — M96.1 FAILED BACK SURGICAL SYNDROME: ICD-10-CM

## 2022-06-30 DIAGNOSIS — Z01.818 PREOPERATIVE EVALUATION TO RULE OUT SURGICAL CONTRAINDICATION: Primary | ICD-10-CM

## 2022-06-30 DIAGNOSIS — G89.4 CHRONIC PAIN SYNDROME: ICD-10-CM

## 2022-06-30 DIAGNOSIS — F41.9 ANXIETY: ICD-10-CM

## 2022-06-30 PROBLEM — F33.1 MAJOR DEPRESSIVE DISORDER, RECURRENT, MODERATE: Status: ACTIVE | Noted: 2019-07-02

## 2022-06-30 PROCEDURE — 90791 PR PSYCHIATRIC DIAGNOSTIC EVALUATION: ICD-10-PCS | Mod: 95,,, | Performed by: PSYCHOLOGIST

## 2022-06-30 PROCEDURE — 96131 PR PSYCHOLOGIC TEST EVAL SVCS, EA ADDTL HR: ICD-10-PCS | Mod: 95,,, | Performed by: PSYCHOLOGIST

## 2022-06-30 PROCEDURE — 96138 PSYCL/NRPSYC TECH 1ST: CPT | Mod: 95,,, | Performed by: PSYCHOLOGIST

## 2022-06-30 PROCEDURE — 4010F ACE/ARB THERAPY RXD/TAKEN: CPT | Mod: CPTII,95,, | Performed by: PSYCHOLOGIST

## 2022-06-30 PROCEDURE — 4010F PR ACE/ARB THEARPY RXD/TAKEN: ICD-10-PCS | Mod: CPTII,95,, | Performed by: PSYCHOLOGIST

## 2022-06-30 PROCEDURE — 96130 PSYCL TST EVAL PHYS/QHP 1ST: CPT | Mod: 95,,, | Performed by: PSYCHOLOGIST

## 2022-06-30 PROCEDURE — 96138 PR PSYCH/NEUROPSYCH TEST ADMIN/SCORING, BY TECH, 2+ TESTS, 1ST 30 MIN: ICD-10-PCS | Mod: 95,,, | Performed by: PSYCHOLOGIST

## 2022-06-30 PROCEDURE — 3044F PR MOST RECENT HEMOGLOBIN A1C LEVEL <7.0%: ICD-10-PCS | Mod: CPTII,95,, | Performed by: PSYCHOLOGIST

## 2022-06-30 PROCEDURE — 90791 PSYCH DIAGNOSTIC EVALUATION: CPT | Mod: 95,,, | Performed by: PSYCHOLOGIST

## 2022-06-30 PROCEDURE — 96131 PSYCL TST EVAL PHYS/QHP EA: CPT | Mod: 95,,, | Performed by: PSYCHOLOGIST

## 2022-06-30 PROCEDURE — 96139 PR PSYCH/NEUROPSYCH TEST ADMIN/SCORING, BY TECH, 2+ TESTS, EA ADDTL 30 MIN: ICD-10-PCS | Mod: 95,,, | Performed by: PSYCHOLOGIST

## 2022-06-30 PROCEDURE — 96130 PR PSYCHOLOGIC TEST EVAL SVCS, 1ST HR: ICD-10-PCS | Mod: 95,,, | Performed by: PSYCHOLOGIST

## 2022-06-30 PROCEDURE — 96139 PSYCL/NRPSYC TST TECH EA: CPT | Mod: 95,,, | Performed by: PSYCHOLOGIST

## 2022-06-30 PROCEDURE — 3044F HG A1C LEVEL LT 7.0%: CPT | Mod: CPTII,95,, | Performed by: PSYCHOLOGIST
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
## 2022-06-30 NOTE — PSYCH TESTING
[facility] [address] [place]  [address]  [phone]    REPORT OF PRESURGICAL PSYCHOLOGICAL EVALUATION    NAME: Dalton Kent  MRN: [id number]  : 1965    REFERRED BY:  Tori Prado M.D.    REASON FOR REFERRAL:  Psychological Evaluation prior to surgical implantation of a spinal cord stimulator (SCS) to address chronic pain    EVALUATED BY:  Porsche Russell, Ph.D., Clinical Psychologist  EMILIA Pineda, Psychometrician    DATES OF EVALUATION:  2022, 2022    EVALUATION PROCEDURES AND TIMES:  Conducted by Psychologist (2 hours, 15 minutes):  Integration of patient data, interpretation of standardized test results and clinical data, clinical decision-making, treatment planning and report, and interactive feedback to the patient  CPT Codes:  63410 - 1 hour; 41368 - 1 hour  Conducted by Technician (1 hour, 15 minutes):  Psychological test administration and scoring by technician, two or more tests, any method:  [place] Multiphasic Personality Inventory - 3 (MMPI-3); Pain and Impairment Relationship Scale (PAIRS); [place] Cid Pain Catastrophizing Scale (PCS)  CPT Codes:  32848 - 30 minutes; 21057 - 30 minutes, 29952 - 30 minutes    EVALUATION FINDINGS:  The diagnostic interview revealed that Mr. Dalton Kent is a 56-year-old male referred for Psychological Evaluation prior to surgical implantation of a spinal cord stimulator (SCS) to address chronic pain in his back.  His pain has been present for over 10 years without sufficient relief despite multiple pain management treatments.  His activities are greatly limited.  Mr. Kent is now interested in a trial of the SCS.  He understood risks of surgery and indicated no significant concerns.  He has reasonable expectations for SCS and will consider other treatment options in the future if it is ineffective.    Regarding mental health, Mr. Kent acknowledged a history of inpatient and outpatient  psychiatric treatment for issues related to depression, anxiety, and insomnia.  While suicidal ideation and alcohol abuse were noted in his medical record, he would not endorse these issues.  His overall reports related to mental health were somewhat inconsistent, possibly due to psychological naivete or other factors.  He exhibited a tendency to downplay mental health concerns but highlight medical and cognitive problems.  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  Even when discussing depressive symptoms in the past he noted, I felt normal actually.  Mr. Kent firmly denied current psychiatric problems such as depression, anxiety, suicidal ideation, and alcohol use (he has abstained for the past six months).  He notes great success with his current psychotropic medication prescribed by his psychiatrist, and is hopeful for improvements in overall mood and sleep with reductions in pain.  He has never attended psychotherapy and is disinterested in treatment.  He has multiple protective factors (good social support, engagement in recreation, spiritual support, financial stability) that helps him to manage stress and pain.    The medical record also revealed the following diagnoses relevant to this evaluation:  Chronic pain syndrome; Failed back surgical syndrome; History of lumbar spinal fusion; Postlaminectomy syndrome of lumbar region; Lumbar spondylosis; Myofascial pain; Lumbar radiculopathy.    TEST DATA:  All tests were administered according to standardized procedures and were selected based on the reason for referral.  Effort on all tests was satisfactory to produce interpretable results.  When asked about his impression of testing he noted, I probably could have answered questions different ways than I did.  It could have gone both ways at times.  I interpreted one thing in different ways.    MMPI-3.  The MMPI-3  provides an assessment of personality and psychopathology with specific evaluation of psychosocial risk factors associated with outcomes of spinal cord stimulation.  Mr. Kent produced an interpretable MMPI-3 profile despite evidence of potential under-reporting and over-reporting.  Any absence of problems in the test results does not rule out symptoms or the possibility that certain treatment approaches could prove helpful for optimizing the candidates outcomes.  Test results regarding somatic and cognitive scales may be present in individuals with substantial problems who report credible symptoms; however, in the absence of corroborating information it may reflect exaggeration or an attempt to portray great distress.  This profile should be interpreted with these issues in mind.   TEST RESULTS.  Mr. Kent reports emotional distress, behavioral problems, somatic/cognitive issues, and interpersonal issues.  There are no indications of disordered thinking in his profile.   Emotional.  He reports significant emotional distress including negative emotional experiences, and is at risk for suicidal ideation even though he did not endorse any items.  He reports heightened fearfulness, anxiety, and stress.  He notes getting easily angry and upset with others.  He engages in compulsive behaviors such as repeated checking or rigidity.  He reports depressive symptoms such as sadness, helplessness, self-doubt, and a lack of positive emotional experiences.   Behavioral.  He reports engaging in physically aggressive, violent behavior.  He likely has a history of problematic impulsive behavior and aggression towards others.   Somatic/Cognitive.  He reports multiple complaints including head pain, poor health, and feeling weak and incapacitated.  He reports questionable cognitive difficulties including memory and concentration problems.  He likely experiences worsened somatic and cognitive symptoms when  stressed.   Interpersonal.  He dislikes others and being around them.  He does not enjoy social events and avoids social situations.  GROUP COMPARISONS.  Compared to spine surgery candidates, Mr. Kent endorsed greater emotional distress (dysfunctional negative emotions, demoralization, low positive emotions, fearfulness, anger, helplessness, stress, compulsivity, self-doubt, worry, inefficacy, anxiety), disordered thinking (persecutory beliefs, unusual experiences), preoccupation with poor health, cognitive problems, behavioral problems (impulsivity, aggression, cynicism), and interpersonal issues (disaffiliativeness, social avoidance, introversion).  Results that are bolded are at a clinically significant level and included in Test Results above.  RISK ASSESSMENTS.  The following likelihoods are based on test results and research, and are correlational rather than causational.  Mr. Mcallister profile indicates moderate to high risks for adverse postsurgical outcomes due to elevations on multiple scales (emotional distress, demoralization, low positive emotions, stress, worry, anger, self-doubt, cognitive concerns).  Post-surgery, Mr. Kent is more likely to endorse higher levels of pain, greater interference of pain with his life, greater perceptions of disability, unmet expectations from SCS, and overall dissatisfaction with SCS and surgery.    PAIRS and PCS.  The PAIRS and PCS reveal beliefs and attitudes related to pain that may impact outcomes of spinal cord stimulation.  Elevated scores indicate the need to query the ability to cope with the pain experience, high levels of emotional distress when pain occurs, and a negative mental set and persistent attention brought to bear during the experience of pain.    The PAIRS indicated significant complications related to perceptions of impairment with a total score of 84 (a score over 75 is clinically significant).  These results DO suggest a tendency  toward negative beliefs and attitudes about the ability to function and enjoy life despite discomfort from pain, which is more likely to contribute to greater functional impairments.  The PCS indicated minimal catastrophizing of pain with a total score of 39, which is in the 91st percentile (a score over 30 is in the 75th percentile and is clinically significant).  Her subscale scores indicated significant Rumination (80th percentile), significant Magnification (86th percentile), and significant Helplessness (95th percentile).  These results DO suggest a tendency toward elevated pain perception and a tendency to focus on one's pain, increased perception of the seriousness of pain, and pessimism toward one's ability to cope with the pain experience.  Catastrophic thinking is a risk factor for chronicity.      FEEDBACK.  Mr. Kent was provided with test results, and offered the opportunity to respond to feedback and clarify results if needed.  As mentioned previously, he acknowledged responding to items inconsistently (which validity scales from the MMPI-3 showed as well).  He was observed to respond inconsistently during the clinical interview as well, which may be due to psychological naivete or other factors.  He also noted endorsing items as true based on incidence rather than overall functioning in the present.  Mr. Kent acknowledged multiple scale elevations as being true for various periods of his life.  He has a history of depression, anxiety, anger, impulsivity, suicidal ideation, alcohol abuse, and memory complaints.  He understood that his current test profile indicates a severity of symptoms that is associated with moderate to high risks for negative outcomes with SCS.  He does not believe these results are an accurate portrayal of his current functioning.  Based on his presentation in the clinical interview and recent reports in his medical record, it is likely that his results are inflated.   Still, he was encouraged to be mindful of the risks associated with these elevations and to implement strategies to mitigate and prevent them in the future.  He was open to feedback and recommendations without defensiveness.    DIAGNOSTIC IMPRESSIONS:    ICD-10-CM ICD-9-CM   1. Preoperative evaluation to rule out surgical contraindication  Z01.818 V72.83   2. Failed back surgical syndrome  M96.1 722.80   3. Chronic pain syndrome  G89.4 338.4   4. Major depressive disorder, recurrent, mild  F33.0 296.31   5. Anxiety  F41.9 300.00   6. Insomnia, unspecified type  G47.00 780.52       SUMMARY AND RECOMMENDATIONS:  Mr. Kent has a long history of severe pain and is pursuing the spinal cord stimulator (SCS) to improve pain and quality of life.  Test results should be considered interpretable despite evidence of inconsistent responding and under- and over-reporting.  His testing response style is comparable to the inconsistency seen in his reports in the clinical interview and medical record.  He was observed to demonstrate some psychological naivete as well as a tendency to highlight medical and cognitive concerns over mental health.      Mr. Mcallister test results indicated significant and pervasive distress across multiple domains that would typically represent potential contraindications to surgery.  His profile indicated significant depression, anxiety, anger, impulsivity, social avoidance, and maladaptive pain coping that is associated with moderate to high risks for adverse postsurgical outcomes.  However, it does appear that Mr. Mcallister response style and inconsistency likely inflated test results to be more representative of his past rather than current functioning.  He has demonstrated stability in past appointments with providers and in the clinical interview.  While these testing risks may be magnified, they should still be taken into consideration due to past distress and instability.    In the  clinical interview, Mr. Kent endorsed a history of inpatient psychiatric hospitalization and outpatient pharmacotherapy.  His medical record indicated a history of depression, anxiety, suicidal ideation, alcohol abuse, insomnia, and cognitive complaints.  While he denied some of these issues (like suicidal ideation and alcohol abuse), he acknowledged they were noted by his providers in his chart.  Currently, Mr. Kent reports mild depression, minimal anxiety, and insomnia.  He reports no suicidal ideation or alcohol abuse (he has abstained for six months).  He notes his medical health (blood pressure, heart rate) and psychiatric symptoms have improved with psychotropic medication changes by his psychiatrist three months ago.  He denied a need for psychotherapy and is disinterested in treatment.    Based on this evaluation, Mr. Kent may be considered a suitable candidate despite moderate risks associated with his testing profile and history.  There were no absolute contraindications at this time as there was an absence of severe depression and anxiety, substance abuse, suicidal ideation, serious cognitive concerns, psychotic symptoms, and major psychosocial stressors that would interfere with treatment.  Test results appeared inflated due to his response style, especially since there was no evidence of current severe psychopathology. He has multiple protective factors (pharmacotherapy, social support, engagement in recreation, spiritual support, financial stability) that should help him maintain stability and mitigate risks.  He is motivated to reduce pain with SCS, and is willing to use it as a tool to aid in pain management.  He will likely benefit from a pain-focused treatment rather than psychologically focused intervention.    Still, Mr. Kent and his treatment team should be mindful of risks shown in this evaluation.  He will be provided with test results and strategies to use to promote  successful outcomes.  He should be encouraged to seek psychotherapy if desired in the future, but this should not be required (due to lack of motivation and benefit).  His treatment team should be aware of his inconsistency in responding, and make efforts to standardize responses across visits.  They should also recognize his tendency to focus attention on medical/physical over mental health, and remind him to use strategies sent as follow-up after this evaluation.              Interpretation and report and coding were completed on 06/30/2022.                  Porsche Russell, PhD  Clinical Psychologist

## 2022-06-30 NOTE — LETTER
June 30, 2022        Tori Prado MD  [address]  [address]  [facility] [address]             [facility] [address]  [address]  [facility] [address]  Phone: [phone]   Patient: Dalton Kent   MR Number: [id number]   YOB: 1965   Date of Visit: 6/30/2022       Dear Dr. Prado:    Thank you for referring Dalton Kent to me for evaluation. Below are the relevant portions of my assessment and plan of care.    Please see Psych Testing in Epic as needed.  This was a challenging case and I hope you will reach out to me to discuss if you need any clarification.  Overall, Mr. Kent may be considered a suitable candidate despite moderate risks associated with his testing profile and history.  Part of the challenge in this evaluation was his significant psychiatric history, inconsistency in responding, and lack of insight regarding mental health while over-highlighting medical/cognitive concerns.  Still, his overall presentation and recent reports do indicate a greater level of stability than his testing suggested (which may be due to inconsistency, psychological naivete, or other factors).  There were no absolute contraindications at this time as there was an absence of severe depression and anxiety, substance abuse, suicidal ideation, serious cognitive concerns, psychotic symptoms, and major psychosocial stressors that would interfere with treatment.  He does have multiple protective factors to help him maintain stability and mitigate risks, even though he has limited adaptive coping.  He seems motivated to reduce pain via SCS, and will likely engage better with a pain-focused treatment rather than psychologically focused intervention (he was very disinterested in therapy).  Therefore, it is possible for him to benefit from SCS despite risks shown above.  I will leave it to your discretion as to whether you want to proceed with SCS.  Please be mindful of  these issues and remind him to use strategies sent to him from this evaluation.       If you have questions, please do not hesitate to call me.    Sincerely,      Porsche Russell, PhD

## 2022-06-30 NOTE — PROGRESS NOTES
Overall, Mr. Kent may be considered a suitable candidate despite moderate risks associated with his testing profile and history.  Part of the challenge in this evaluation was his significant psychiatric history, inconsistency in responding, and lack of insight regarding mental health while over-highlighting medical/cognitive concerns.  Still, his overall presentation and recent reports do indicate a greater level of stability than his testing suggested (which may be due to inconsistency, psychological naivete, or other factors).  There were no absolute contraindications at this time as there was an absence of severe depression and anxiety, substance abuse, suicidal ideation, serious cognitive concerns, psychotic symptoms, and major psychosocial stressors that would interfere with treatment.  He does have multiple protective factors to help him maintain stability and mitigate risks, even though he has limited adaptive coping.  He seems motivated to reduce pain via SCS, and will likely engage better with a pain-focused treatment rather than psychologically focused intervention (he was very disinterested in therapy).  Therefore, it is possible for him to benefit from SCS despite risks shown above.

## 2022-07-06 ENCOUNTER — PATIENT MESSAGE (OUTPATIENT)
Dept: PAIN MEDICINE | Facility: CLINIC | Age: 57
End: 2022-07-06
Payer: MEDICARE

## 2022-07-13 NOTE — PROGRESS NOTES
Pain Medicine  Telemedicine Virtual Visit    The patient location is: [place]  The chief complaint leading to consultation is: Back pain  Visit type: Virtual visit with synchronous audio and video  Total time spent with patient: 20 mins  Each patient to whom he or she provides medical services by telemedicine is:  (1) informed of the relationship between the physician and patient and the respective role of any other health care provider with respect to management of the patient; and (2) notified that he or she may decline to receive medical services by telemedicine and may withdraw from such care at any time.      Chief Complaint:   Chief Complaint   Patient presents with    Low-back Pain     Initial HPI (10/11/19): Dalton Kent is a 56 y.o. male referred by Dr. Velazquez for back and neck pain.    Back pain is worse. Back pain began ~ 10 years ago. He has had 2 back surgeries. Back pain is localized to the left over the lateral iliac crest. No radiation into legs. He is unable to describe the quality of the pain, other than feeling deep. Worse with walking, cutting grass, standing, and slight flexion. Pain alleviated by nothing in particular. Left leg gets weak with cutting grass or doing dishes. Denies leg numbness.     He has had neck surgery with Dr. Ontiveros in 2015. He doesn't have neck pain but is having right arm pain and shooting pain into his small, ring and middle fingers and also pain in his tricep area. Pain described as shooting and shocking in the arm. Nothing particularly aggravates it, but did start while fishing and worse at night time. Nothing helps the pain. He did therapy recently.     Onset: Years  Location: Shoulder, arm, lower back left side, hip pain   Radiation: left leg and right arm  Quality: Aching, burning, throbbing, grabbing, tingling, deep, superficial, electric   Exacerbating Factors: exercise, lifting, sitting, standing and walking for more than 20 minutes  Alleviating  Factors: nothing  Associated Symptoms: denies night fever/night sweats, urinary incontinence, bowel incontinence, significant weight loss, significant motor weakness and loss of sensations    Severity: Currently: 4/10   Typical Range: 7-8/10     Exacerbation: 8/10     Interval History (07/14/2022):  Dalton Kent returns today for follow up.  At the last clinic visit, referred to psych for SCS pre-psych testing.     Currently, the back pain is worse.  He slipped out of bed and is feeling more pain down the left leg again like previous. Otherwise, no change in the location or quality of the pain since the most recent visit is reported.  No significant interval events or traumas. No change in bowel or bladder function, & no new weakness or numbness is reported. No new musculoskeletal pain complaints.    Current Pain Scales:  Current: 3/10              Typical Range: 8-10/10                      Previous Interventions:  - 4/29/22: Caudal ANURAG w/ 0% relief.  - 5/14/21: Left SIJ and GTB CSI w/ 90% relief  - 3/26/21: Left L3 & L4 TF ANURAG w/ 50% relief  - 2/21/20: C7-T1 IL ANURAG w/ minimal relief  - 12/6/19: Cervical TPIs  - 10/11/19: Lumbar TPIs: Did not think they were effective  - Previous spine injections with Dr. Jimmy Rosas in [place] prior to the back surgeries. Also with Dr. Honeycutt and Dr. Pulido.     Previous Therapies:  PT/OT: yes last was 3-4 years ago  Surgery: yes He has had C5-7 ACDF with Dr. Ontiveros in 2016. He has had 2 back surgeries. First back surgery was L5-S1 ALIF in [facility] with Dr. Sorin Thomas. He had above level L4-5 herniated disk and underwent second back surgery, L4-5 TLIF at [facility] with Dr. Isaias Ontiveros in May 2016. Right then left carpal tunnel release in 2017 w/ Dr. Marin Rosas in May 2017.   Previous Medications:   - NSAIDS: Cant take because of stomach ulcers  - Muscle Relaxants: Xanax. Klonopin. Ativan.   - TCAs: Nortriptyline 25 mg  -  SNRIs: Not tried  - Topicals: Bengay, biofreeze not effective  - Anticonvulsants: Gabapentin (Gralise) caused worsening depression, claustrophobia, trouble swallowing. Depakote. Lyrica.  - Opioids: None currently    Current Pain Medications  1. Lyrica 50 mg b.i.d.  2. Tylenol 500 mg, two tablets, 1-2x per day  3. Naltrexone 50 mg daily  4. Elavil 25 mg qHS  5. Gabapentin 300 mg qHS  6. Indomethacin       Blood Thinners: None    Full Medication List:    Current Outpatient Medications:     allopurinoL (ZYLOPRIM) 300 MG tablet, TAKE 1 TABLET BY MOUTH ONCE DAILY, Disp: 90 tablet, Rfl: 3    amitriptyline (ELAVIL) 25 MG tablet, Take 25 mg by mouth every evening., Disp: , Rfl:     benazepriL (LOTENSIN) 10 MG tablet, Take 1 tablet by mouth once daily., Disp: , Rfl:     buPROPion (WELLBUTRIN SR) 100 MG TBSR 12 hr tablet, Take 100 mg by mouth once daily., Disp: , Rfl:     cloNIDine (CATAPRES) 0.1 MG tablet, TAKE 1 TABLET BY MOUTH AT  NIGHT, Disp: 90 tablet, Rfl: 3    EScitalopram oxalate (LEXAPRO) 10 MG tablet, Take 10 mg by mouth once daily., Disp: , Rfl:     gabapentin (NEURONTIN) 300 MG capsule, Take 300 mg by mouth nightly., Disp: , Rfl:     ipratropium (ATROVENT) 21 mcg (0.03 %) nasal spray, USE 2 SPRAYS NASALLY TWICE  DAILY, Disp: 30 mL, Rfl: 7    lamoTRIgine (LAMICTAL) 100 MG tablet, Take 100 mg by mouth nightly., Disp: , Rfl:     lipase-protease-amylase (CREON) 3,000-9,500- 15,000 unit CpDR, TAKE 1 CAPSULE BY MOUTH THREE TIMES A DAY BEFORE MEALS, Disp: 270 capsule, Rfl: 3    MELATONIN ORAL, Take 10 mg by mouth every evening. , Disp: , Rfl:     metoprolol succinate (TOPROL-XL) 50 MG 24 hr tablet, Take 50 mg by mouth 2 (two) times daily. , Disp: , Rfl:     naltrexone (DEPADE) 50 mg tablet, Take 50 mg by mouth once daily., Disp: , Rfl:     pantoprazole (PROTONIX) 40 MG tablet, Take 1 tablet (40 mg total) by mouth once daily., Disp: 90 tablet, Rfl: 3    prazosin (MINIPRESS) 2 MG Cap, Take 2 mg by mouth  nightly., Disp: , Rfl:     rosuvastatin (CRESTOR) 10 MG tablet, Take 1 tablet (10 mg total) by mouth every other day. Monday,wed, fri, Disp: 45 tablet, Rfl: 3    SYNTHROID 112 mcg tablet, Take 112 mg by mouth once daily., Disp: , Rfl:     thiamine 100 MG tablet, Take 1 tablet (100 mg total) by mouth once daily., Disp: 30 tablet, Rfl: 5    vitamins-lipotropics Tab, Take 1 tablet by mouth 2 (two) times a day., Disp: 180 tablet, Rfl: 3     Review of Systems:  Review of Systems   Constitutional: Negative for fever and weight loss.   HENT: Positive for tinnitus. Negative for ear pain.    Eyes: Negative for pain and redness.   Respiratory: Negative for cough and shortness of breath.    Cardiovascular: Negative for chest pain and palpitations.   Gastrointestinal: Positive for heartburn. Negative for constipation.        GERD w/ NSAIDs   Genitourinary: Negative.         Denies urinary incontinence. Denies urine retention.    Musculoskeletal: Positive for back pain, myalgias and neck pain.   Skin: Negative for itching and rash.   Neurological: Positive for tingling, weakness and headaches. Negative for seizures.   Endo/Heme/Allergies: Negative for environmental allergies. Does not bruise/bleed easily.   Psychiatric/Behavioral: Positive for depression. The patient is nervous/anxious and has insomnia.        Allergies:  Gralise [gabapentin], Norco [hydrocodone-acetaminophen], Percocet [oxycodone-acetaminophen], and Statins-hmg-coa reductase inhibitors     Medical History:   has a past medical history of Alcohol abuse, Anxiety, Depression, GERD (gastroesophageal reflux disease), Headache, psychiatric care, Hypertension, Hypothyroid, Lyme disease, Restless leg syndrome, Sleep difficulties, Therapy, and Uncontrolled REM sleep behavior disorder.    Surgical History:   has a past surgical history that includes Anal fistulotomy; Anal sphincterotomy; Appendectomy; Colonoscopy (2/2015); Back surgery; Neck surgery; Hand surgery  (Bilateral); Carpal tunnel release; Trigger finger release; Nose surgery; Colonoscopy (N/A, 8/29/2019); Epidural steroid injection (N/A, 2/21/2020); Transforaminal epidural injection of steroid (Left, 3/26/2021); Injection of anesthetic agent into sacroiliac joint (Left, 5/14/2021); Injection of steroid (Left, 5/14/2021); Caudal epidural steroid injection (N/A, 4/29/2022); and Colonoscopy (N/A, 5/4/2022).    Social History:   reports that he has quit smoking. He has never used smokeless tobacco. He reports previous alcohol use. He reports that he does not use drugs.    Physical Exam:  There were no vitals taken for this visit.  GEN: No acute distress. Calm, comfortable  HENT: Normocephalic, atraumatic, moist mucous membranes  EYE: Anicteric sclera, non-injected.   CV: Non-diaphoretic.   RESP: Breathing comfortably. Chest expansion symmetric.  PSYCH: Pleasant mood and appropriate affect. Recent and remote memory intact.     Imaging:   - MRI Thoracic spine 4/5/22:  There are focal areas of signal alteration involving the thoracic vertebral bodies greatest at T8 and T12.  Findings are most compatible with focal areas of fatty infiltration versus hemangiomas.  No evidence of an acute thoracic spine abnormality.  No congenital spinal stenosis.  No abnormal signal change involving the visualized portion of the spinal cord which terminates at approximately the L1 level.  There is no evidence of a focal disc abnormality.  No evidence of significant spinal canal encroachment or foraminal encroachment.  Impression:  Probable focal fat versus hemangiomas within thoracic vertebral bodies greatest at T8 and T12.  No focal disc abnormality.  No significant spinal canal or foraminal encroachment.     - MRI Lumbar spine 4/5/22:  MRI examination of the lumbar spine with and without contrast dated April 5, 2022.  Multilayered prior lumbar spine surgery and fusion with pedicle screws at L4 and L5 with posterior fusion rods in place.   Anterior fusion and screw plate with screws at L5-S1.  No evidence of an acute abnormality.  Alignment is maintained.  Focal fatty change versus hemangioma within T12.  The visualized portion of the spinal cord is normal in signal intensity and terminates at approximately the L1 level.  At the T11-T12, T12-L1 and L1-L2 levels there is no evidence of a focal disc abnormality.  There are minimal facet degenerative changes.  No significant spinal canal or foraminal encroachment.  The L2-L3 level there is a minimal disc bulge and mild facet degenerative changes.  No significant spinal canal or foraminal encroachment.  At the L3-L4 level there is a broad-based disc bulge with advanced bilateral facet degenerative changes resulting in mild spinal canal encroachment and bilateral foraminal encroachment appearing similar to the previous study.  At the L4-L5 level there are postsurgical changes.  There is signal alteration along the left lateral aspect of the thecal sac extending towards the foramen which appears similar to the previous study.  Post contrast enhanced images reveal evidence of enhancement compatible with postsurgical scarring and fibrosis.  Mild left-sided foraminal encroachment.  Hypertrophic facet degenerative changes.  At the L5-S1 level there are stable postoperative changes.  No evidence of a recurrent disc abnormality.  No central canal stenosis.  Mild bilateral foraminal encroachment    - MRI Cervical Spine 2/7/20:  Prior multilevel cervical fusion at C5-C6 and C6-C7.  Alignment is maintained.  No congenital spinal stenosis.  No abnormal signal change involving the visualized portion of the spinal cord.  At the C2-C3 and C3-C4 levels there are minimal uncinate process spurs.  There is no evidence of significant spinal canal or foraminal encroachment.  At the C4-C5 level there is minimal disc-osteophyte with mild effacement along the anterior subarachnoid space.  Mild facet degenerative changes.  No  significant spinal canal or foraminal encroachment.  Prior fusions performed at C5-C6 and C6-C7.  Alignment is maintained.  No evidence of a focal recurrent disc abnormality.  At the C7-T1 level there is a posterior disc-osteophyte with mild encroachment along the subarachnoid space and mild bilateral foraminal encroachment.    - X-ray Shoulder 12/11/19:  The acromioclavicular and glenohumeral joint spaces are intact.  No fracture or dislocation is seen.  Cervical fusion hardware is identified    - EMG/NCS BUE 10/28/19:  Normal EMG/NCS of the arms.     - X-ray C-spine 10/11/19:  Prior multilevel fusion at C5-C6 and C6-C7.  Fusion screws appear intact and alignment is maintained.  No significant change in alignment with flexion or extension.  There is no plain film evidence of fracture or acute subluxation.  No prevertebral soft tissue swelling.  Minimal degenerative spurring along the anterior inferior endplate of C4.  No disc space narrowing at C2, C3 or C4    - X-ray L-spine 10/11/19:  Prior multilevel fusion at L4-L5 and L5-S1.  Surgical fixation hardware appears intact.  Alignment is maintained.  There is no significant change in alignment with flexion or extension.  Mild degenerative change involving L1 through L3 without significant focal disc space narrowing.  There is no evidence of an acute fracture.  Alignment is maintained.  No evidence of spondylolysis or spondylolisthesis    - Outside Shoulder MRI Right 2/22/19:  Impression:   1. Degenerative arthropathy at AC joint  2. Pericapsular soft tissue swelling and periosteal soft tissue swelling about the AC joint and distal clavicle. A low-grade partial tear of the trapezius muscle at the insertion upon the clavicle and a low-grade edema or strain of the anterior head of deltoid muscle  3. Suspect a sprain of the coracoclavicular ligaments, which are not torn  4. TEndinosis of the supraspinatus without a tear  5. Slight fraying of the labrum     - EMG/NCS BLE  2/25/19:  Impression: Normal EMG of the legs, although his study was mildly limited due to inability to sample lumbar paraspinal muscles given his prior lumbar surgical scarring.     - Outside Shoulder MRI Right 2/22/19:  Impression:   1. Degenerative arthropathy at AC joint  2. Pericapsular soft tissue swelling and periosteal soft tissue swelling about the AC joint and distal clavicle. A low-grade partial tear of the trapezius muscle at the insertion upon the clavicle and a low-grade edema or strain of the anterior head of deltoid muscle  3. Suspect a sprain of the coracoclavicular ligaments, which are not torn  4. TEndinosis of the supraspinatus without a tear  5. Slight fraying of the labrum    - EMG/NCS (outside records) with bilateral C6 radiculopathy and bilateral carpal tunnel syndrome     Labs:  Lab Results   Component Value Date     05/12/2022    K 4.9 05/12/2022     05/12/2022    CO2 29 05/12/2022    BUN 14 05/12/2022    CREATININE 1.10 05/12/2022    CALCIUM 9.3 05/12/2022    ANIONGAP 4 (L) 05/12/2022    ESTGFRAFRICA >60 05/12/2022    EGFRNONAA >60 05/12/2022     Lab Results   Component Value Date    ALT 23 05/12/2022    AST 28 05/12/2022    ALKPHOS 85 05/12/2022    BILITOT 0.3 05/12/2022     Lab Results   Component Value Date     05/12/2022       Assessment:  Dalton Kent is a 56 y.o. male with the following diagnoses based on history, exam, and imaging:    Problem List Items Addressed This Visit    None     Visit Diagnoses     Failed back surgical syndrome    -  Primary    Relevant Orders    Ambulatory referral/consult to Psychology    Chronic pain syndrome        Relevant Orders    Ambulatory referral/consult to Psychology    History of lumbar spinal fusion        Relevant Orders    Ambulatory referral/consult to Psychology    Postlaminectomy syndrome of lumbar region              This is a pleasant 56 y.o. gentleman presenting with:    - Chronic back pain: He has prior  fusion at L4-5 and L5-S1 with postsurgical scarring and fibrosis at L4-5 level. His back pain appears to be multifactorial in nature, with contributions from myofascial pain, lumbar spondylosis and due to scar tissue. X-ray with degenerative changes above the level of the fusion. Myofascial pain improves w/ TPIs. He was having more left radicular leg pain, but seems improved with caudal ANURAG on 4/29/22.  - Chronic Pain Syndrome: Psych testing reveals moderate to high risks for adverse postsurgical outcomes due to elevations on multiple scales (emotional distress, demoralization, low positive emotions, stress, worry, anger, self-doubt, cognitive concerns).  I will send to Dr. Thompson for multi-modal approach for his pain in addition to trial of SCS.   - Left trochanteric bursitis: Improved with CSI previously  - Chronic neck pain: Prior fusions performed at C5-C6 and C6-C7.  His neck pain, also appears multifactorial. MRI with tear in trapezius, which may be contributing to the pain. Muscular aspects of the pain and tenderness improve with TPIs.   - Chronic right shoulder pain: in the area of the latissimus and described as deep to the scapula, may be subscapularis vs lattisimus pain.  This has improved after trigger point injection.  - Comorbid: Anxiety and depression. H/o EtOH. GERD. HypoTH. ASAD.    Treatment Plan: I discussed with the patient the following assessment and recommendations. The following is the plan the patient agreed upon:  - PT/OT/HEP:  Encouraged cont HEP and regular walking program   - Procedures: Schedule SCS trial with Brock. Will have him hold indomethacin or other NSAIDs prior to procedure.    - Can repeat left SIJ and left GTB injection as needed  - Medications: No changes  - Imaging: Reviewed.   - Labs: Reviewed.  Monitor liver enzymes.   - Consult psychology, Dr. Thompson, for multi-modal approach to his chronic pain    Follow Up: RTC 1 week after SCS trial for lead removal    Disclaimer: This  note was partly generated using dictation software which may occasionally result in transcription errors.

## 2022-07-14 ENCOUNTER — OFFICE VISIT (OUTPATIENT)
Dept: PAIN MEDICINE | Facility: CLINIC | Age: 57
End: 2022-07-14
Payer: MEDICARE

## 2022-07-14 DIAGNOSIS — Z98.1 HISTORY OF LUMBAR SPINAL FUSION: ICD-10-CM

## 2022-07-14 DIAGNOSIS — M96.1 POSTLAMINECTOMY SYNDROME OF LUMBAR REGION: ICD-10-CM

## 2022-07-14 DIAGNOSIS — G89.4 CHRONIC PAIN SYNDROME: ICD-10-CM

## 2022-07-14 DIAGNOSIS — M96.1 FAILED BACK SURGICAL SYNDROME: Primary | ICD-10-CM

## 2022-07-14 PROCEDURE — 99214 OFFICE O/P EST MOD 30 MIN: CPT | Mod: 95,,, | Performed by: PHYSICAL MEDICINE & REHABILITATION

## 2022-07-14 PROCEDURE — 3044F PR MOST RECENT HEMOGLOBIN A1C LEVEL <7.0%: ICD-10-PCS | Mod: CPTII,95,, | Performed by: PHYSICAL MEDICINE & REHABILITATION

## 2022-07-14 PROCEDURE — 1160F PR REVIEW ALL MEDS BY PRESCRIBER/CLIN PHARMACIST DOCUMENTED: ICD-10-PCS | Mod: CPTII,95,, | Performed by: PHYSICAL MEDICINE & REHABILITATION

## 2022-07-14 PROCEDURE — 4010F ACE/ARB THERAPY RXD/TAKEN: CPT | Mod: CPTII,95,, | Performed by: PHYSICAL MEDICINE & REHABILITATION

## 2022-07-14 PROCEDURE — 1160F RVW MEDS BY RX/DR IN RCRD: CPT | Mod: CPTII,95,, | Performed by: PHYSICAL MEDICINE & REHABILITATION

## 2022-07-14 PROCEDURE — 3044F HG A1C LEVEL LT 7.0%: CPT | Mod: CPTII,95,, | Performed by: PHYSICAL MEDICINE & REHABILITATION

## 2022-07-14 PROCEDURE — 1159F PR MEDICATION LIST DOCUMENTED IN MEDICAL RECORD: ICD-10-PCS | Mod: CPTII,95,, | Performed by: PHYSICAL MEDICINE & REHABILITATION

## 2022-07-14 PROCEDURE — 1159F MED LIST DOCD IN RCRD: CPT | Mod: CPTII,95,, | Performed by: PHYSICAL MEDICINE & REHABILITATION

## 2022-07-14 PROCEDURE — 99214 PR OFFICE/OUTPT VISIT, EST, LEVL IV, 30-39 MIN: ICD-10-PCS | Mod: 95,,, | Performed by: PHYSICAL MEDICINE & REHABILITATION

## 2022-07-14 PROCEDURE — 4010F PR ACE/ARB THEARPY RXD/TAKEN: ICD-10-PCS | Mod: CPTII,95,, | Performed by: PHYSICAL MEDICINE & REHABILITATION

## 2022-07-14 NOTE — PATIENT INSTRUCTIONS
I am referring you to a Psychiatrist who specializes in pain and helping us find other mechanisms to manage your pain. Given the sensitive nature of Psychiatry visits, you will need to call to schedule your own appointment as our referral coordinator can not schedule this for you. The number is [phone]. Her name is Dr. Thompson. She is located in [place], but can see you virtually from anywhere in the [facility].

## 2022-07-18 ENCOUNTER — PATIENT MESSAGE (OUTPATIENT)
Dept: PAIN MEDICINE | Facility: CLINIC | Age: 57
End: 2022-07-18
Payer: MEDICARE

## 2022-07-19 ENCOUNTER — TELEPHONE (OUTPATIENT)
Dept: PAIN MEDICINE | Facility: CLINIC | Age: 57
End: 2022-07-19
Payer: MEDICARE

## 2022-07-19 DIAGNOSIS — M54.50 LOW BACK PAIN, UNSPECIFIED BACK PAIN LATERALITY, UNSPECIFIED CHRONICITY, UNSPECIFIED WHETHER SCIATICA PRESENT: ICD-10-CM

## 2022-07-19 DIAGNOSIS — M96.1 FAILED BACK SURGICAL SYNDROME: Primary | ICD-10-CM

## 2022-07-19 NOTE — TELEPHONE ENCOUNTER
----- Message from Tori Prado MD sent at 7/14/2022 10:25 AM CDT -----  Please schedule SCS trial for 1 hour in procedure area with Brock. Please contact Brock montero, Kenny Aquino, [phone] to inform him of date and time of procedure. Will need f/u 7 days after procedure for lead removal.     Thanks,  LETICIA

## 2022-07-19 NOTE — TELEPHONE ENCOUNTER
SCS Trial scheduled 08/19/2022. Attempted to reach Kenny with [facility]. Patient has had Covid vaccinations. Advised that pre admit would contact patient with time of procedure. Advised patient to contact office if he starts any type of antibiotics or new blood thinners. Voiced understanding.

## 2022-08-03 NOTE — PROGRESS NOTES
Outpatient Psychiatry Initial Visit  08/04/2022    Location: Patient presents at home ([place], [place]) for a Telemedicine Video Initial Evaluation session.     Reason for encounter: Referral from Dr. Tori Prado    Mental Status Exam     Pt was alert and oriented to date, time and place. Pt was a good historian throughout the evaluation.     General: Pt presented as well nourished, casually dressed, neatly groomed with good hygiene. Pt had good eye contact with evaluator and was cooperative.     Speech: Pt's speech was normal with good articulation.     Thought process is logical, coherent, sequential, organized, and goal-oriented.    Attention span, memory, and concentration appear intact.     Psychomotor activity: Unable to assess due to Telemedicine session.     Judgement/Insight: Pt's insight and judgment are intact.     Intellectual functioning: Consistent with educational level and within normal limits.     Mood and Affect: Pt presents with depressed mood and sad affect.       Chief Complaint: Chronic Pain    History of Presenting Illness:  Pt has pain in his lower back radiating to left leg. He has had 2 spinal surgeries. 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 He had 2 bulging discs and now has scar tissue. He notes the surgeries helped somewhat to alleviate the pain but he continues to experience pain. Pt has had epidural injections but the pain worsened with the last injection. Pt notes he has  Attended PT and received myofacial injections. His pain affects his mood. He's currently attending outpt therapy to address sxs of depression and anxiety. He notes these sxs are managed well. Pt denies a trauma hx. He is not working and is receiving disability benefits. He notes he has been dx with Dementia and suffers memory loss. On most days his pain is at 8/10. His pain ranges from 3/10 - 10/10. His pain worsens with prolonged standing, bending, sitting and laying  down. His pain is also affected by the cold. 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 His pain decreases with short periods of sitting. Pt sleeps on his side. He reports pain medications do not help to alleviate his pain. In terms of psychotropic medications he is currently prescribed amitriptyline (ELAVIL) 25 MG tablet, buPROPion (WELLBUTRIN SR) 100 MG TBSR 12 hr tablet, EScitalopram oxalate (LEXAPRO) 10 MG tablet, and lamoTRIgine (LAMICTAL) 100 MG tablet. He has a hx of passive S/I without plan or intent 3 years ago when he experienced significant depressive sxs and was abusing alcohol. He denies current S/I, H/I, plan or intent. He denies hallucinations or delusions. He denies current substance abuse.     Depression symptoms: Pt denies current related symptoms.      Anxiety symptoms:  Pt denies symptoms consistent with LEANNE, phobias, OCD or trauma.    María/Hypomania Symptoms: Pt denied current related symptoms.     Psychosis Symptoms: Pt denied current or history of related symptoms.    Attention/Concentration Symptoms: Pt denies dx of ADHD. He notes he has been dx with Dementia and suffers memory loss.     Disordered Eating/Body Image Concerns: Pt denied related sxs.     Suicidal Ideation and Risk: Pt denied current symptoms. He experienced passive S/I without plan or intent 3 years ago. He denies any intention or desire to self harm.     Homicidal/Violent Ideation and Risk: Pt denied current or history of related symptoms.    Prior Psychiatric Treatment/Hospitalizations:   Prior Inpt Psych Admissions:  Yes - three years ago due to passive S/I  Prior suicide attempts: No  Prior hx self harm: No  Prior psychotherapy: Currently in therapy  Prior psychological testing: Yes - Dementia testing    Suicide Risk Assessment  Protective Factors: Family, pet, denies current S/I. No weapons in the house  Risk Factors: Past S/I without plan or intent.       Current psychiatric medication:    amitriptyline (ELAVIL) 25 MG tablet  buPROPion (WELLBUTRIN SR) 100 MG TBSR 12 hr tablet  EScitalopram oxalate (LEXAPRO) 10 MG tablet  lamoTRIgine (LAMICTAL) 100 MG tablet    Prior psychiatric medication trials: Various medications (pt cannot recall the names of his medications)    Family Psychiatric History:    Suicide: No  Substance use: No  Anxiety: No  Depression: No    Tobacco, Alcohol, and Drug Use:  Tobacco: No - former smoker  Alcohol: No. He used to abuse alcohol.  Drug use: No  Caffeine: Yes - soda on average 6 per day      SOCIAL HISTORY:    Relationships and Support Network:  Marital Status:  since 1983 (wife Josselyn)  Children: 2 adult daughters  Resides in: [place], [place]     Employment and Education:  Employment Status/Info: Disabled   History: None noted  Education: Some college courses     Legal History:  Past Charges/Incarcerations:  Past arrest about 5 years ago due to DUI  Pending charges: No     Trauma History:  Pt denies    Medical history:   Hyperglycemia  Fatty liver  Lumbar radiculopathy  Mixed hyperlipidemia  Gastroesophageal reflux disease without esophagitis  Obstructive sleep apnea (adult) (pediatric)  Essential hypertension  Hypothyroidism  Hypogonadism in male  Restless Leg Syndrome  Pseudodementia    Clinical Assessment :     Summary: Pt is a 56 year old, ,  male presenting with sxs associated with chronic pain syndrome and major depressive disorder recurrent mild. He denies anxiety sxs. Pt is currently attending psychotherapy which is helping to manage his sxs. He was referred to this clinician for pain psychology tx. He notes his pain affects his mood. Pt has pain in his lower back radiating to left leg. He has had 2 spinal surgeries. 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 He had 2 bulging discs and now has scar tissue. He notes the surgeries helped somewhat to alleviate the pain but he continues to  experience pain. Pt has had epidural injections but the pain worsened with the last injection. Pt has attended PT and received myofacial injections. Pt denies a trauma hx. He is not working and is receiving disability benefits. He has been dx with Dementia and suffers memory loss. On most days his pain is at 8/10. His pain ranges from 3/10 - 10/10. His pain worsens with prolonged standing, bending, sitting and laying down. His pain is also affected by the cold. 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 His pain decreases with short periods of sitting. Pt sleeps on his side. He reports pain medications do not help to alleviate his pain. In terms of psychotropic medications he is currently prescribed amitriptyline (ELAVIL) 25 MG tablet, buPROPion (WELLBUTRIN SR) 100 MG TBSR 12 hr tablet, EScitalopram oxalate (LEXAPRO) 10 MG tablet, and lamoTRIgine (LAMICTAL) 100 MG tablet. He has a hx of passive S/I without plan or intent 3 years ago when he experienced significant depressive sxs. He denies current S/I, H/I, plan or intent. He denies hallucinations or delusions. He denies substance abuse but has a hx of alcohol abuse. Pt has a support network with his wife and 2 adult daughters. He presents with good insight and judgment. He reports his is motivated towards tx as he would like to receive a spinal stimulator. Pt would benefit from supportive psychotherapy sessions focused on pain psychology.      Diagnosis(es):   Major Depressive Disorder, recurrent, mild  Chronic Pain Syndrome      Plan      Goal #1: Pt to learn pain tools and techniques  Goal #2: Pt to learn relaxation tools and techniques    Pt is to attend supportive psychotherapy sessions.     This author reviewed limits to confidentiality and this author's collaboration with pt's physician. Pt indicated understanding and denied any questions.    Return to Clinic: 1-2 weeks  Counseling time: 30 mins  Total time: 30 mins    -Call to  report any worsening of symptoms or problems associated with medication.  - Pt instructed to go to ER if thoughts of harming self or others arise.     -Supportive therapy and psychoeducation provided  -Pt instructed to call clinic, 911 or go to nearest emergency room if sxs worsen or pt is in   crisis. The pt expresses understanding.     Each patient to whom he or she provides medical services by telemedicine is:  (1) informed of the relationship between the physician and patient and the respective role of any other health care provider with respect to management of the patient; and (2) notified that he or she may decline to receive medical services by telemedicine and may withdraw from such care at any time.

## 2022-08-04 ENCOUNTER — OFFICE VISIT (OUTPATIENT)
Dept: PSYCHIATRY | Facility: CLINIC | Age: 57
End: 2022-08-04
Payer: COMMERCIAL

## 2022-08-04 DIAGNOSIS — M96.1 FAILED BACK SURGICAL SYNDROME: ICD-10-CM

## 2022-08-04 DIAGNOSIS — Z98.1 HISTORY OF LUMBAR SPINAL FUSION: ICD-10-CM

## 2022-08-04 DIAGNOSIS — G89.4 CHRONIC PAIN SYNDROME: ICD-10-CM

## 2022-08-04 DIAGNOSIS — F33.0 MAJOR DEPRESSIVE DISORDER, RECURRENT, MILD: Primary | ICD-10-CM

## 2022-08-04 PROCEDURE — 3044F HG A1C LEVEL LT 7.0%: CPT | Mod: CPTII,95,, | Performed by: PSYCHOLOGIST

## 2022-08-04 PROCEDURE — 4010F ACE/ARB THERAPY RXD/TAKEN: CPT | Mod: CPTII,95,, | Performed by: PSYCHOLOGIST

## 2022-08-04 PROCEDURE — 1160F PR REVIEW ALL MEDS BY PRESCRIBER/CLIN PHARMACIST DOCUMENTED: ICD-10-PCS | Mod: CPTII,95,, | Performed by: PSYCHOLOGIST

## 2022-08-04 PROCEDURE — 4010F PR ACE/ARB THEARPY RXD/TAKEN: ICD-10-PCS | Mod: CPTII,95,, | Performed by: PSYCHOLOGIST

## 2022-08-04 PROCEDURE — 90791 PR PSYCHIATRIC DIAGNOSTIC EVALUATION: ICD-10-PCS | Mod: 95,,, | Performed by: PSYCHOLOGIST

## 2022-08-04 PROCEDURE — 1160F RVW MEDS BY RX/DR IN RCRD: CPT | Mod: CPTII,95,, | Performed by: PSYCHOLOGIST

## 2022-08-04 PROCEDURE — 1159F PR MEDICATION LIST DOCUMENTED IN MEDICAL RECORD: ICD-10-PCS | Mod: CPTII,95,, | Performed by: PSYCHOLOGIST

## 2022-08-04 PROCEDURE — 3044F PR MOST RECENT HEMOGLOBIN A1C LEVEL <7.0%: ICD-10-PCS | Mod: CPTII,95,, | Performed by: PSYCHOLOGIST

## 2022-08-04 PROCEDURE — 90791 PSYCH DIAGNOSTIC EVALUATION: CPT | Mod: 95,,, | Performed by: PSYCHOLOGIST

## 2022-08-04 PROCEDURE — 1159F MED LIST DOCD IN RCRD: CPT | Mod: CPTII,95,, | Performed by: PSYCHOLOGIST

## 2022-08-17 PROBLEM — R53.1 WEAKNESS GENERALIZED: Status: ACTIVE | Noted: 2022-08-17

## 2022-08-19 ENCOUNTER — HOSPITAL ENCOUNTER (OUTPATIENT)
Facility: HOSPITAL | Age: 57
Discharge: HOME | End: 2022-08-19
Attending: PHYSICAL MEDICINE & REHABILITATION | Admitting: PHYSICAL MEDICINE & REHABILITATION
Payer: MEDICARE

## 2022-08-19 ENCOUNTER — HOSPITAL ENCOUNTER (OUTPATIENT)
Dept: RADIOLOGY | Facility: HOSPITAL | Age: 57
Discharge: HOME | End: 2022-08-19
Attending: PHYSICAL MEDICINE & REHABILITATION
Payer: MEDICARE

## 2022-08-19 VITALS
DIASTOLIC BLOOD PRESSURE: 73 MMHG | OXYGEN SATURATION: 98 % | TEMPERATURE: 97 F | SYSTOLIC BLOOD PRESSURE: 121 MMHG | HEART RATE: 59 BPM | RESPIRATION RATE: 16 BRPM

## 2022-08-19 DIAGNOSIS — M96.1 FAILED BACK SURGICAL SYNDROME: ICD-10-CM

## 2022-08-19 DIAGNOSIS — M54.50 LOW BACK PAIN, UNSPECIFIED BACK PAIN LATERALITY, UNSPECIFIED CHRONICITY, UNSPECIFIED WHETHER SCIATICA PRESENT: ICD-10-CM

## 2022-08-19 DIAGNOSIS — M54.16 LUMBAR RADICULOPATHY: ICD-10-CM

## 2022-08-19 DIAGNOSIS — R52 PAIN: ICD-10-CM

## 2022-08-19 DIAGNOSIS — G89.4 CHRONIC PAIN SYNDROME: ICD-10-CM

## 2022-08-19 DIAGNOSIS — M96.1 FAILED BACK SURGICAL SYNDROME: Primary | ICD-10-CM

## 2022-08-19 PROCEDURE — 36000707: Performed by: PHYSICAL MEDICINE & REHABILITATION

## 2022-08-19 PROCEDURE — 36000706: Performed by: PHYSICAL MEDICINE & REHABILITATION

## 2022-08-19 PROCEDURE — C1897 LEAD, NEUROSTIM TEST KIT: HCPCS | Performed by: PHYSICAL MEDICINE & REHABILITATION

## 2022-08-19 PROCEDURE — 25000003 PHARM REV CODE 250: Performed by: PHYSICAL MEDICINE & REHABILITATION

## 2022-08-19 PROCEDURE — 63600175 PHARM REV CODE 636 W HCPCS: Performed by: PHYSICAL MEDICINE & REHABILITATION

## 2022-08-19 PROCEDURE — 63650 PR PERCUT IMPLNT NEUROELECT,EPIDURAL: ICD-10-PCS | Mod: ,,, | Performed by: PHYSICAL MEDICINE & REHABILITATION

## 2022-08-19 PROCEDURE — 63650 IMPLANT NEUROELECTRODES: CPT | Mod: ,,, | Performed by: PHYSICAL MEDICINE & REHABILITATION

## 2022-08-19 DEVICE — KIT TRIAL LEAD 50CM 5MM: Type: IMPLANTABLE DEVICE | Site: BACK | Status: FUNCTIONAL

## 2022-08-19 RX ORDER — LIDOCAINE HYDROCHLORIDE 10 MG/ML
INJECTION INFILTRATION; PERINEURAL
Status: DISCONTINUED
Start: 2022-08-19 | End: 2022-08-19 | Stop reason: HOSPADM

## 2022-08-19 RX ORDER — BUPIVACAINE HYDROCHLORIDE 2.5 MG/ML
INJECTION, SOLUTION EPIDURAL; INFILTRATION; INTRACAUDAL
Status: DISCONTINUED
Start: 2022-08-19 | End: 2022-08-19 | Stop reason: HOSPADM

## 2022-08-19 RX ORDER — CEFAZOLIN SODIUM 2 G/50ML
2 SOLUTION INTRAVENOUS ONCE
Status: DISCONTINUED | OUTPATIENT
Start: 2022-08-19 | End: 2022-08-19 | Stop reason: HOSPADM

## 2022-08-19 RX ORDER — LIDOCAINE HYDROCHLORIDE 10 MG/ML
INJECTION INFILTRATION; PERINEURAL
Status: DISCONTINUED | OUTPATIENT
Start: 2022-08-19 | End: 2022-08-19 | Stop reason: HOSPADM

## 2022-08-19 RX ORDER — MIDAZOLAM HYDROCHLORIDE 1 MG/ML
INJECTION INTRAMUSCULAR; INTRAVENOUS
Status: DISCONTINUED
Start: 2022-08-19 | End: 2022-08-19 | Stop reason: HOSPADM

## 2022-08-19 RX ORDER — CEFAZOLIN SODIUM 2 G/50ML
SOLUTION INTRAVENOUS CONTINUOUS
Status: COMPLETED | OUTPATIENT
Start: 2022-08-19 | End: 2022-08-19

## 2022-08-19 RX ORDER — SODIUM CHLORIDE 9 MG/ML
500 INJECTION, SOLUTION INTRAVENOUS CONTINUOUS
Status: DISCONTINUED | OUTPATIENT
Start: 2022-08-19 | End: 2022-08-19 | Stop reason: HOSPADM

## 2022-08-19 RX ORDER — MIDAZOLAM HYDROCHLORIDE 1 MG/ML
INJECTION, SOLUTION INTRAMUSCULAR; INTRAVENOUS
Status: DISCONTINUED | OUTPATIENT
Start: 2022-08-19 | End: 2022-08-19 | Stop reason: HOSPADM

## 2022-08-19 RX ORDER — FENTANYL CITRATE 50 UG/ML
INJECTION, SOLUTION INTRAMUSCULAR; INTRAVENOUS
Status: DISCONTINUED | OUTPATIENT
Start: 2022-08-19 | End: 2022-08-19 | Stop reason: HOSPADM

## 2022-08-19 RX ORDER — CEFOXITIN 2 G/1
INJECTION, POWDER, FOR SOLUTION INTRAVENOUS
Status: DISCONTINUED
Start: 2022-08-19 | End: 2022-08-19 | Stop reason: WASHOUT

## 2022-08-19 RX ORDER — FENTANYL CITRATE 50 UG/ML
INJECTION, SOLUTION INTRAMUSCULAR; INTRAVENOUS
Status: DISCONTINUED
Start: 2022-08-19 | End: 2022-08-19 | Stop reason: HOSPADM

## 2022-08-19 NOTE — OP NOTE
Spinal Cord Stimulator Trial    Time-out taken to identify patient and procedure side prior to starting the procedure.       I attest that I have reviewed the patient's home medications prior to the procedure and no contraindication have been identified. I  re-evaluated the patient after the patient was positioned for the procedure in the procedure room immediately before the procedural time-out. The vital signs are current and represent the current state of the patient which has not significantly changed since the preprocedure assessment.    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 Failed back surgical syndrome    2. Lumbar radiculopathy    3. Pain    4. 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 Failed back surgical syndrome    2. Lumbar radiculopathy    3. Pain    4. Chronic pain syndrome        Procedure:  1. Insertion of Nevro Spinal Cord Stimulator Leads x 2    2. Initial Programming of Device    3. Flouroscopic Needle Guidance    Surgeon:  Tori Prado M.D.    Anesthesia: Versed 2 mg IV, Fentanyl 100 mcg IV. Under my direct observation, intravenous moderate sedation was administered during the course of this procedure, with continuous hemodynamic monitoring including blood pressure, EKG, and pulse oximetry. Please see RN documentation of total intraservice time for moderate sedation.   (See nurse documentation and case log for sedation time)    Op Note:   Informed consent was obtained after explaining the risks, benefits and alternatives to the procedure.  The patient was taken to the operating room and placed in the prone  position.  Routine monitors were applied and anesthesia was initiated.  The patient remained conversant throughout the procedure.  The patients back and buttocks were prepped in a sterile fashion using alcohol and a Chloraprep solution.  Sterile drapes were applied.  Fluoroscopy was used to examine the patients spine.     The T12-L1  interspace was identified.  Through a 1% local lidocaine skin wheel a 22 gauge 3 ½ spinal needle was used to anesthetize the deeper tissue down to the ipsilateral lamina.   A 14 gauge Touhy needle was then advanced to contact the right L1 lamina.  It was walked off in a superior medial direction until it entered the epidural space using loss of resistance to saline. The spinal cord stimulator lead was advanced through the Touhy needle and directed to rest the tip at the T8 vertebral body.  The same procedure was repeated in detail for the left side to insert a second lead within the epidural space to reside adjacent to the first lead, but with the tip of the lead at the T9 vertebral body.  Once the leads were within the proper positioning, needles were withdrawn leaving the leads in place and were then secured to the patients skin using Stay-Fix adhesive bandages and tegaderm.  The patients back was cleaned.  The patient was allowed to fully recover from anesthesia and taken to recovery room in good condition.    There were no complications to the procedure.  Final stimulation programming and testing of the device was done in the recovery room by the device representative under the guidance of Dr. Prado.    Complications:  None    Disposition:  To home in good condition.

## 2022-08-19 NOTE — H&P
HPI  Patient presenting for Procedure(s) (LRB):  SPINAL CORD NEUROSTIMULATOR TRIAL (N/A)     Patient on Anti-coagulation No    No health changes since previous encounter. No changes in pain since procedure was scheduled at previous visit. Denies any fevers or infections.     No current facility-administered medications on file prior to encounter.     Current Outpatient Medications on File Prior to Encounter   Medication Sig Dispense Refill    allopurinoL (ZYLOPRIM) 300 MG tablet TAKE 1 TABLET BY MOUTH ONCE DAILY 90 tablet 3    amitriptyline (ELAVIL) 25 MG tablet Take 25 mg by mouth every evening.      benazepriL (LOTENSIN) 10 MG tablet Take 1 tablet by mouth once daily.      buPROPion (WELLBUTRIN SR) 100 MG TBSR 12 hr tablet Take 100 mg by mouth once daily.      cloNIDine (CATAPRES) 0.1 MG tablet TAKE 1 TABLET BY MOUTH AT  NIGHT 90 tablet 3    EScitalopram oxalate (LEXAPRO) 10 MG tablet Take 10 mg by mouth once daily.      gabapentin (NEURONTIN) 300 MG capsule Take 300 mg by mouth nightly.      ipratropium (ATROVENT) 21 mcg (0.03 %) nasal spray USE 2 SPRAYS NASALLY TWICE  DAILY 30 mL 7    lamoTRIgine (LAMICTAL) 100 MG tablet Take 100 mg by mouth nightly.      MELATONIN ORAL Take 10 mg by mouth every evening.       metoprolol succinate (TOPROL-XL) 50 MG 24 hr tablet Take 50 mg by mouth 2 (two) times daily.       pantoprazole (PROTONIX) 40 MG tablet Take 1 tablet (40 mg total) by mouth once daily. 90 tablet 3    prazosin (MINIPRESS) 2 MG Cap Take 2 mg by mouth nightly.      SYNTHROID 112 mcg tablet Take 112 mg by mouth once daily.      thiamine 100 MG tablet Take 1 tablet (100 mg total) by mouth once daily. 30 tablet 5    vitamins-lipotropics Tab Take 1 tablet by mouth 2 (two) times a day. 180 tablet 3    rosuvastatin (CRESTOR) 10 MG tablet Take 1 tablet (10 mg total) by mouth every other day. Monday,wed, fri 45 tablet 3     Past Medical History:   Diagnosis Date    Alcohol abuse     Anxiety      Depression     GERD (gastroesophageal reflux disease)     Headache     Hx of psychiatric care     Hypertension     Hypothyroid     Lyme disease     Restless leg syndrome     Sleep difficulties     Therapy     Uncontrolled REM sleep behavior disorder     Weakness generalized 8/17/2022     Past Surgical History:   Procedure Laterality Date    ANAL FISTULOTOMY      ANAL SPHINCTEROTOMY      APPENDECTOMY      BACK SURGERY      CARPAL TUNNEL RELEASE      bilateral    CAUDAL EPIDURAL STEROID INJECTION N/A 4/29/2022    Procedure: CAUDAL EPIDURAL STEROID INJECTION WITH CATHETER;  Surgeon: Tori Prado MD;  Location: [facility] OR;  Service: Pain Management;  Laterality: N/A;    COLONOSCOPY  2/2015    COLONOSCOPY N/A 8/29/2019    Procedure: COLONOSCOPY WITH BIOPSY;  Surgeon: Giuseppe Mart MD;  Location: [facility];  Service: Endoscopy;  Laterality: N/A;    COLONOSCOPY N/A 5/4/2022    Procedure: COLONOSCOPY;  Surgeon: Galina Sterling MD;  Location: [facility] ENDO;  Service: Endoscopy;  Laterality: N/A;    EPIDURAL STEROID INJECTION N/A 2/21/2020    Procedure: CERVICAL EPIDURAL STEROID INJECTION (C7-T1 INTERLAMINAR);  Surgeon: Tori Prado MD;  Location: [facility] OR;  Service: Pain Management;  Laterality: N/A;    HAND SURGERY Bilateral     INJECTION OF ANESTHETIC AGENT INTO SACROILIAC JOINT Left 5/14/2021    Procedure: SACROILIAC JOINT INJECTION;  Surgeon: Tori Prado MD;  Location: [facility] OR;  Service: Pain Management;  Laterality: Left;    INJECTION OF STEROID Left 5/14/2021    Procedure: GREATER TROCHANTERIC BURSA INJECTION;  Surgeon: Tori Prado MD;  Location: [facility] OR;  Service: Pain Management;  Laterality: Left;    NECK SURGERY      NOSE SURGERY      TRANSFORAMINAL EPIDURAL INJECTION OF STEROID Left 3/26/2021    Procedure: TRANSFORAMINAL EPIDURAL STEROID INJECTION (L3);  Surgeon: Tori Prado MD;  Location: [facility] OR;  Service: Pain Management;  Laterality: Left;    TRIGGER FINGER RELEASE        Review of patient's allergies indicates:   Allergen Reactions    Gralise [gabapentin]      Depression    Norco [hydrocodone-acetaminophen] Itching    Percocet [oxycodone-acetaminophen] Itching    Statins-hmg-coa reductase inhibitors       Current Facility-Administered Medications   Medication    0.9%  NaCl infusion    BUPivacaine (PF) 0.25% (2.5 mg/ml) (MARCAINE) 0.25 % (2.5 mg/mL) injection    cefazolin (ANCEF) 2 gram in dextrose 5% 50 mL IVPB (premix)    fentaNYL (SUBLIMAZE) 50 mcg/mL injection    LIDOcaine HCL 10 mg/ml (1%) 10 mg/mL (1 %) injection    midazolam (VERSED) 1 mg/mL injection       PMHx, PSHx, Allergies, Medications reviewed in epic    ROS negative except pain complaints in HPI    OBJECTIVE:    /66 (BP Location: Left arm, Patient Position: Lying)   Pulse 60   Temp 97 °F (36.1 °C) (Tympanic)   Resp 18   SpO2 99%     PHYSICAL EXAMINATION:    GENERAL: Well appearing, in no acute distress, alert and oriented.  PSYCH:  Mood and affect appropriate.  SKIN: Skin color, texture, turgor normal in procedure area, no rashes or lesions which will impact the procedure.  CV: RRR with palpation of the radial artery.  PULM: No evidence of respiratory difficulty, symmetric chest rise. Clear to auscultation.  NEURO: Alert. Oriented. Speech fluent and appropriate. Moving all extremities.    Plan:    Proceed with procedure as planned Procedure(s) (LRB):  SPINAL CORD NEUROSTIMULATOR TRIAL (N/A)    Tori Prado  08/19/2022

## 2022-08-19 NOTE — DISCHARGE INSTRUCTIONS
DIET: You may resume your normal diet today.    BATHING: You may resume your normal bathing.          You may shower, no hot water directly on site for 24 hours.    DRESSING: You may remove your bandage today.    ACTIVITY LEVEL: You may resume your normal activities 24 hours after your  procedure.    If you have received sedation or an anesthetic, you may feel sleepy for several hours. Rest until you are more awake. Gradually resume your normal activities tomorrow.    If you have received sedation or an anesthetic, do not drive or operate heavy machinery for at least 24 hours.    MEDICATION: You may resume your normal medications today.    You will receive instructions for any pain prescriptions. Pain medications should be taken only as directed.    SPECIAL INSTRUCTIONS: No heat to the injection site for 24 hours including: bath or shower, heating pad, moist heat, hot tubs.    Use ice pack to injection site for any pain or discomfort. Apply ice pack to 20 minutes then remove for 20 minutes before re-applying to site.    WHEN TO CALL DOCTOR: Redness or swelling around injection site    Fever of 101F    Drainage (pus) from the injection site    For any continuous bleeding (some dried blood over the incision is normal).    FOLLOW UP: Follow up phone call will be made by office.    FOR EMERGENCIES: If any unusual problems or difficulties occur during clinic hours, call [phone] or [phone].

## 2022-08-19 NOTE — DISCHARGE SUMMARY
[facility]  Discharge Note  Short Stay     Admit Date: 8/19/2022    Discharge Date: 8/19/2022     Attending Physician: Tori Prado M.D.    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     Discharged Condition: Good     Hospital Course: Patient was admitted for an outpatient interventional pain management procedure and tolerated the procedure well with no complications.     Final Diagnoses: Same as principal problem.     Disposition: Home or Self Care     Follow up/Patient Instructions:   Follow-up in 1 week unless otherwise instructed. May return sooner as needed.       Reconciled Medications:     Medication List      CONTINUE taking these medications    allopurinoL 300 MG tablet  Commonly known as: ZYLOPRIM  TAKE 1 TABLET BY MOUTH ONCE DAILY     amitriptyline 25 MG tablet  Commonly known as: ELAVIL  Take 25 mg by mouth every evening.     benazepriL 10 MG tablet  Commonly known as: LOTENSIN  Take 1 tablet by mouth once daily.     buPROPion 100 MG TBSR 12 hr tablet  Commonly known as: WELLBUTRIN SR  Take 100 mg by mouth once daily.     cloNIDine 0.1 MG tablet  Commonly known as: CATAPRES  TAKE 1 TABLET BY MOUTH AT  NIGHT     EScitalopram oxalate 10 MG tablet  Commonly known as: LEXAPRO  Take 10 mg by mouth once daily.     gabapentin 300 MG capsule  Commonly known as: NEURONTIN  Take 300 mg by mouth nightly.     ipratropium 21 mcg (0.03 %) nasal spray  Commonly known as: ATROVENT  USE 2 SPRAYS NASALLY TWICE  DAILY     lamoTRIgine 100 MG tablet  Commonly known as: LAMICTAL  Take 100 mg by mouth nightly.     MELATONIN ORAL  Take 10 mg by mouth every evening.     metoprolol succinate 50 MG 24 hr tablet  Commonly known as: TOPROL-XL  Take 50 mg by mouth 2 (two) times daily.     pantoprazole 40 MG tablet  Commonly known as: PROTONIX  Take 1 tablet  (40 mg total) by mouth once daily.     prazosin 2 MG Cap  Commonly known as: MINIPRESS  Take 2 mg by mouth nightly.     rosuvastatin 10 MG tablet  Commonly known as: CRESTOR  Take 1 tablet (10 mg total) by mouth every other day. Monday,wed, fri     SYNTHROID 112 MCG tablet  Generic drug: levothyroxine  Take 112 mg by mouth once daily.     thiamine 100 MG tablet  Take 1 tablet (100 mg total) by mouth once daily.     vitamins-lipotropics Tab  Take 1 tablet by mouth 2 (two) times a day.           Discharge Procedure Orders (must include Diet, Follow-up, Activity)   Ice to affected area   Order Comments: 20 minutes of ice or until area numb to the touch if area is sore 2-3 times per day as needed     No driving until:   Order Comments: Until following day     No dressing needed     Notify your health care provider if you experience any of the following:  temperature >100.4     Notify your health care provider if you experience any of the following:  persistent nausea and vomiting or diarrhea     Notify your health care provider if you experience any of the following:  severe uncontrolled pain     Notify your health care provider if you experience any of the following:  redness, tenderness, or signs of infection (pain, swelling, redness, odor or green/yellow discharge around incision site)     Notify your health care provider if you experience any of the following:  difficulty breathing or increased cough     Notify your health care provider if you experience any of the following:  severe persistent headache     Notify your health care provider if you experience any of the following:  worsening rash     Notify your health care provider if you experience any of the following:  persistent dizziness, light-headedness, or visual disturbances     Notify your health care provider if you experience any of the following:  increased confusion or weakness     Shower on day dressing removed (No bath)       Tori Prado  M.D.  Interventional Pain Medicine / Physical Medicine & Rehabilitation

## 2022-08-26 ENCOUNTER — OFFICE VISIT (OUTPATIENT)
Dept: PAIN MEDICINE | Facility: CLINIC | Age: 57
End: 2022-08-26
Payer: MEDICARE

## 2022-08-26 VITALS
DIASTOLIC BLOOD PRESSURE: 72 MMHG | HEIGHT: 66 IN | BODY MASS INDEX: 33.11 KG/M2 | OXYGEN SATURATION: 98 % | HEART RATE: 60 BPM | WEIGHT: 206 LBS | SYSTOLIC BLOOD PRESSURE: 128 MMHG | RESPIRATION RATE: 18 BRPM

## 2022-08-26 DIAGNOSIS — G89.4 CHRONIC PAIN SYNDROME: ICD-10-CM

## 2022-08-26 DIAGNOSIS — M96.1 FAILED BACK SURGICAL SYNDROME: Primary | ICD-10-CM

## 2022-08-26 PROCEDURE — 3044F HG A1C LEVEL LT 7.0%: CPT | Mod: CPTII,S$GLB,, | Performed by: PHYSICAL MEDICINE & REHABILITATION

## 2022-08-26 PROCEDURE — 1160F PR REVIEW ALL MEDS BY PRESCRIBER/CLIN PHARMACIST DOCUMENTED: ICD-10-PCS | Mod: CPTII,S$GLB,, | Performed by: PHYSICAL MEDICINE & REHABILITATION

## 2022-08-26 PROCEDURE — 99499 UNLISTED E&M SERVICE: CPT | Mod: S$GLB,,, | Performed by: PHYSICAL MEDICINE & REHABILITATION

## 2022-08-26 PROCEDURE — 3008F PR BODY MASS INDEX (BMI) DOCUMENTED: ICD-10-PCS | Mod: CPTII,S$GLB,, | Performed by: PHYSICAL MEDICINE & REHABILITATION

## 2022-08-26 PROCEDURE — 3008F BODY MASS INDEX DOCD: CPT | Mod: CPTII,S$GLB,, | Performed by: PHYSICAL MEDICINE & REHABILITATION

## 2022-08-26 PROCEDURE — 99999 PR PBB SHADOW E&M-EST. PATIENT-LVL IV: ICD-10-PCS | Mod: PBBFAC,,, | Performed by: PHYSICAL MEDICINE & REHABILITATION

## 2022-08-26 PROCEDURE — 3078F PR MOST RECENT DIASTOLIC BLOOD PRESSURE < 80 MM HG: ICD-10-PCS | Mod: CPTII,S$GLB,, | Performed by: PHYSICAL MEDICINE & REHABILITATION

## 2022-08-26 PROCEDURE — 3078F DIAST BP <80 MM HG: CPT | Mod: CPTII,S$GLB,, | Performed by: PHYSICAL MEDICINE & REHABILITATION

## 2022-08-26 PROCEDURE — 99499 NO LOS: ICD-10-PCS | Mod: S$GLB,,, | Performed by: PHYSICAL MEDICINE & REHABILITATION

## 2022-08-26 PROCEDURE — 1159F PR MEDICATION LIST DOCUMENTED IN MEDICAL RECORD: ICD-10-PCS | Mod: CPTII,S$GLB,, | Performed by: PHYSICAL MEDICINE & REHABILITATION

## 2022-08-26 PROCEDURE — 1159F MED LIST DOCD IN RCRD: CPT | Mod: CPTII,S$GLB,, | Performed by: PHYSICAL MEDICINE & REHABILITATION

## 2022-08-26 PROCEDURE — 4010F ACE/ARB THERAPY RXD/TAKEN: CPT | Mod: CPTII,S$GLB,, | Performed by: PHYSICAL MEDICINE & REHABILITATION

## 2022-08-26 PROCEDURE — 4010F PR ACE/ARB THEARPY RXD/TAKEN: ICD-10-PCS | Mod: CPTII,S$GLB,, | Performed by: PHYSICAL MEDICINE & REHABILITATION

## 2022-08-26 PROCEDURE — 3074F PR MOST RECENT SYSTOLIC BLOOD PRESSURE < 130 MM HG: ICD-10-PCS | Mod: CPTII,S$GLB,, | Performed by: PHYSICAL MEDICINE & REHABILITATION

## 2022-08-26 PROCEDURE — 1160F RVW MEDS BY RX/DR IN RCRD: CPT | Mod: CPTII,S$GLB,, | Performed by: PHYSICAL MEDICINE & REHABILITATION

## 2022-08-26 PROCEDURE — 99999 PR PBB SHADOW E&M-EST. PATIENT-LVL IV: CPT | Mod: PBBFAC,,, | Performed by: PHYSICAL MEDICINE & REHABILITATION

## 2022-08-26 PROCEDURE — 3044F PR MOST RECENT HEMOGLOBIN A1C LEVEL <7.0%: ICD-10-PCS | Mod: CPTII,S$GLB,, | Performed by: PHYSICAL MEDICINE & REHABILITATION

## 2022-08-26 PROCEDURE — 3074F SYST BP LT 130 MM HG: CPT | Mod: CPTII,S$GLB,, | Performed by: PHYSICAL MEDICINE & REHABILITATION

## 2022-08-26 NOTE — Clinical Note
Patient would like to schedule Nevro SCS implant with Dr. Knox in [place]. He is okay with meeting him that day.

## 2022-08-26 NOTE — PROGRESS NOTES
Pain Medicine      Chief Complaint:   Chief Complaint   Patient presents with    Back Pain     Initial HPI (10/11/19): Dalton Kent is a 56 y.o. male referred by Dr. Velazquez for back and neck pain.    Back pain is worse. Back pain began ~ 10 years ago. He has had 2 back surgeries. Back pain is localized to the left over the lateral iliac crest. No radiation into legs. He is unable to describe the quality of the pain, other than feeling deep. Worse with walking, cutting grass, standing, and slight flexion. Pain alleviated by nothing in particular. Left leg gets weak with cutting grass or doing dishes. Denies leg numbness.     He has had neck surgery with Dr. Ontiveros in 2015. He doesn't have neck pain but is having right arm pain and shooting pain into his small, ring and middle fingers and also pain in his tricep area. Pain described as shooting and shocking in the arm. Nothing particularly aggravates it, but did start while fishing and worse at night time. Nothing helps the pain. He did therapy recently.     Onset: Years  Location: Shoulder, arm, lower back left side, hip pain   Radiation: left leg and right arm  Quality: Aching, burning, throbbing, grabbing, tingling, deep, superficial, electric   Exacerbating Factors: exercise, lifting, sitting, standing and walking for more than 20 minutes  Alleviating Factors: nothing  Associated Symptoms: denies night fever/night sweats, urinary incontinence, bowel incontinence, significant weight loss, significant motor weakness and loss of sensations    Severity: Currently: 4/10   Typical Range: 7-8/10     Exacerbation: 8/10     Interval History (07/14/2022):  Dalton Kent returns today for follow up.  At the last clinic visit, referred to psych for SCS pre-psych testing.     Currently, the back pain is worse.  He slipped out of bed and is feeling more pain down the left leg again like previous. Otherwise, no change in the location or quality of the pain  since the most recent visit is reported.  No significant interval events or traumas. No change in bowel or bladder function, & no new weakness or numbness is reported. No new musculoskeletal pain complaints.    Current Pain Scales:  Current: 3/10              Typical Range: 8-10/10    Interval History (08/26/2022):  Dalton Kent returns today for follow up.      He had Nevro SCS placed last week. In the past week, he feels he has had nearly 100% relief. He was in severe pain prior, but has done really well over the past week. He has been able to cut his grass yesterday, and had some mild pain in the left calf, but overall he feels much improved. His back pain is much improved. He is very happy with his stimulator and would like to move forward with implant. He denies any fevers, chills, drainage.     Current Pain Scales:  Current: 2/10              Typical Range in the past week: 0-5/10                   Pain Disability Index  Family/Home Responsibilities:: 2  Recreation:: 0  Social Activity:: 0  Occupation:: 3  Sexual Behavior:: 0  Self Care:: 0  Life-Support Activities:: 0  Pain Disability Index (PDI): 5       Previous Interventions:  - 4/29/22: Caudal ANURAG w/ 0% relief.  - 5/14/21: Left SIJ and GTB CSI w/ 90% relief  - 3/26/21: Left L3 & L4 TF ANURAG w/ 50% relief  - 2/21/20: C7-T1 IL ANURAG w/ minimal relief  - 12/6/19: Cervical TPIs  - 10/11/19: Lumbar TPIs: Did not think they were effective  - Previous spine injections with Dr. Jimmy Rosas in [place] prior to the back surgeries. Also with Dr. Honeycutt and Dr. Pulido.     Previous Therapies:  PT/OT: yes last was 3-4 years ago  Surgery: yes He has had C5-7 ACDF with Dr. Ontiveros in 2016. He has had 2 back surgeries. First back surgery was L5-S1 ALIF in [facility] with Dr. Sorin Thomas. He had above level L4-5 herniated disk and underwent second back surgery, L4-5 TLIF at [facility] with Dr. Isaias Ontiveros in May 2016. Right then  left carpal tunnel release in 2017 w/ Dr. Marin Rosas in May 2017.   Previous Medications:   - NSAIDS: Cant take because of stomach ulcers  - Muscle Relaxants: Xanax. Klonopin. Ativan.   - TCAs: Nortriptyline 25 mg  - SNRIs: Not tried  - Topicals: Bengay, biofreeze not effective  - Anticonvulsants: Gabapentin (Gralise) caused worsening depression, claustrophobia, trouble swallowing. Depakote. Lyrica.  - Opioids: None currently    Current Pain Medications  1. Lyrica 50 mg b.i.d.  2. Tylenol 500 mg, two tablets, 1-2x per day  3. Naltrexone 50 mg daily  4. Elavil 25 mg qHS  5. Gabapentin 300 mg qHS  6. Indomethacin       Blood Thinners: None    Full Medication List:    Current Outpatient Medications:     allopurinoL (ZYLOPRIM) 300 MG tablet, TAKE 1 TABLET BY MOUTH ONCE DAILY, Disp: 90 tablet, Rfl: 3    amitriptyline (ELAVIL) 25 MG tablet, Take 25 mg by mouth every evening., Disp: , Rfl:     benazepriL (LOTENSIN) 10 MG tablet, Take 1 tablet by mouth once daily., Disp: , Rfl:     buPROPion (WELLBUTRIN SR) 100 MG TBSR 12 hr tablet, Take 100 mg by mouth once daily., Disp: , Rfl:     cloNIDine (CATAPRES) 0.1 MG tablet, TAKE 1 TABLET BY MOUTH AT  NIGHT, Disp: 90 tablet, Rfl: 3    EScitalopram oxalate (LEXAPRO) 10 MG tablet, Take 10 mg by mouth once daily., Disp: , Rfl:     gabapentin (NEURONTIN) 300 MG capsule, Take 300 mg by mouth nightly., Disp: , Rfl:     ipratropium (ATROVENT) 21 mcg (0.03 %) nasal spray, USE 2 SPRAYS NASALLY TWICE  DAILY, Disp: 30 mL, Rfl: 7    lamoTRIgine (LAMICTAL) 100 MG tablet, Take 100 mg by mouth nightly., Disp: , Rfl:     metoprolol succinate (TOPROL-XL) 50 MG 24 hr tablet, Take 50 mg by mouth 2 (two) times daily. , Disp: , Rfl:     pantoprazole (PROTONIX) 40 MG tablet, Take 1 tablet (40 mg total) by mouth once daily., Disp: 90 tablet, Rfl: 3    prazosin (MINIPRESS) 2 MG Cap, Take 2 mg by mouth nightly., Disp: , Rfl:     SYNTHROID 112 mcg tablet, Take 112 mg by mouth once daily.,  Disp: , Rfl:     thiamine 100 MG tablet, Take 1 tablet (100 mg total) by mouth once daily., Disp: 30 tablet, Rfl: 5    vitamins-lipotropics Tab, Take 1 tablet by mouth 2 (two) times a day., Disp: 180 tablet, Rfl: 3    MELATONIN ORAL, Take 10 mg by mouth every evening. , Disp: , Rfl:     rosuvastatin (CRESTOR) 10 MG tablet, Take 1 tablet (10 mg total) by mouth every other day. Monday,wed, fri (Patient not taking: Reported on 8/26/2022), Disp: 45 tablet, Rfl: 3     Review of Systems:  Review of Systems   Constitutional: Negative for fever and weight loss.   HENT: Positive for tinnitus. Negative for ear pain.    Eyes: Negative for pain and redness.   Respiratory: Negative for cough and shortness of breath.    Cardiovascular: Negative for chest pain and palpitations.   Gastrointestinal: Positive for heartburn. Negative for constipation.        GERD w/ NSAIDs   Genitourinary: Negative.         Denies urinary incontinence. Denies urine retention.    Musculoskeletal: Positive for back pain, myalgias and neck pain.   Skin: Negative for itching and rash.   Neurological: Positive for tingling, weakness and headaches. Negative for seizures.   Endo/Heme/Allergies: Negative for environmental allergies. Does not bruise/bleed easily.   Psychiatric/Behavioral: Positive for depression. The patient is nervous/anxious and has insomnia.        Allergies:  Gralise [gabapentin], Norco [hydrocodone-acetaminophen], Percocet [oxycodone-acetaminophen], and Statins-hmg-coa reductase inhibitors     Medical History:   has a past medical history of Alcohol abuse, Anxiety, Depression, GERD (gastroesophageal reflux disease), Headache, psychiatric care, Hypertension, Hypothyroid, Lyme disease, Restless leg syndrome, Sleep difficulties, Therapy, Uncontrolled REM sleep behavior disorder, and Weakness generalized (8/17/2022).    Surgical History:   has a past surgical history that includes Anal fistulotomy; Anal sphincterotomy; Appendectomy;  "Colonoscopy (2/2015); Back surgery; Neck surgery; Hand surgery (Bilateral); Carpal tunnel release; Trigger finger release; Nose surgery; Colonoscopy (N/A, 8/29/2019); Epidural steroid injection (N/A, 2/21/2020); Transforaminal epidural injection of steroid (Left, 3/26/2021); Injection of anesthetic agent into sacroiliac joint (Left, 5/14/2021); Injection of steroid (Left, 5/14/2021); Caudal epidural steroid injection (N/A, 4/29/2022); Colonoscopy (N/A, 5/4/2022); and Trial of spinal cord nerve stimulator (N/A, 8/19/2022).    Social History:   reports that he has quit smoking. He has never used smokeless tobacco. He reports previous alcohol use. He reports that he does not use drugs.    Physical Exam:  /72   Pulse 60   Resp 18   Ht 5' 6" (1.676 m)   Wt 93.5 kg (206 lb 0.3 oz)   SpO2 98%   BMI 33.25 kg/m²   GEN: No acute distress. Calm, comfortable  HENT: Normocephalic, atraumatic, moist mucous membranes  EYE: Anicteric sclera, non-injected.   CV: Non-diaphoretic.   RESP: Breathing comfortably. Chest expansion symmetric.  PSYCH: Pleasant mood and appropriate affect. Recent and remote memory intact.   BACK EXAM: Leads removed. No drainage or erythema from the site. Bandages applied.    Imaging:   - MRI Thoracic spine 4/5/22:  There are focal areas of signal alteration involving the thoracic vertebral bodies greatest at T8 and T12.  Findings are most compatible with focal areas of fatty infiltration versus hemangiomas.  No evidence of an acute thoracic spine abnormality.  No congenital spinal stenosis.  No abnormal signal change involving the visualized portion of the spinal cord which terminates at approximately the L1 level.  There is no evidence of a focal disc abnormality.  No evidence of significant spinal canal encroachment or foraminal encroachment.  Impression:  Probable focal fat versus hemangiomas within thoracic vertebral bodies greatest at T8 and T12.  No focal disc abnormality.  No significant " spinal canal or foraminal encroachment.     - MRI Lumbar spine 4/5/22:  MRI examination of the lumbar spine with and without contrast dated April 5, 2022.  Multilayered prior lumbar spine surgery and fusion with pedicle screws at L4 and L5 with posterior fusion rods in place.  Anterior fusion and screw plate with screws at L5-S1.  No evidence of an acute abnormality.  Alignment is maintained.  Focal fatty change versus hemangioma within T12.  The visualized portion of the spinal cord is normal in signal intensity and terminates at approximately the L1 level.  At the T11-T12, T12-L1 and L1-L2 levels there is no evidence of a focal disc abnormality.  There are minimal facet degenerative changes.  No significant spinal canal or foraminal encroachment.  The L2-L3 level there is a minimal disc bulge and mild facet degenerative changes.  No significant spinal canal or foraminal encroachment.  At the L3-L4 level there is a broad-based disc bulge with advanced bilateral facet degenerative changes resulting in mild spinal canal encroachment and bilateral foraminal encroachment appearing similar to the previous study.  At the L4-L5 level there are postsurgical changes.  There is signal alteration along the left lateral aspect of the thecal sac extending towards the foramen which appears similar to the previous study.  Post contrast enhanced images reveal evidence of enhancement compatible with postsurgical scarring and fibrosis.  Mild left-sided foraminal encroachment.  Hypertrophic facet degenerative changes.  At the L5-S1 level there are stable postoperative changes.  No evidence of a recurrent disc abnormality.  No central canal stenosis.  Mild bilateral foraminal encroachment    - MRI Cervical Spine 2/7/20:  Prior multilevel cervical fusion at C5-C6 and C6-C7.  Alignment is maintained.  No congenital spinal stenosis.  No abnormal signal change involving the visualized portion of the spinal cord.  At the C2-C3 and C3-C4  levels there are minimal uncinate process spurs.  There is no evidence of significant spinal canal or foraminal encroachment.  At the C4-C5 level there is minimal disc-osteophyte with mild effacement along the anterior subarachnoid space.  Mild facet degenerative changes.  No significant spinal canal or foraminal encroachment.  Prior fusions performed at C5-C6 and C6-C7.  Alignment is maintained.  No evidence of a focal recurrent disc abnormality.  At the C7-T1 level there is a posterior disc-osteophyte with mild encroachment along the subarachnoid space and mild bilateral foraminal encroachment.    - X-ray Shoulder 12/11/19:  The acromioclavicular and glenohumeral joint spaces are intact.  No fracture or dislocation is seen.  Cervical fusion hardware is identified    - EMG/NCS BUE 10/28/19:  Normal EMG/NCS of the arms.     - X-ray C-spine 10/11/19:  Prior multilevel fusion at C5-C6 and C6-C7.  Fusion screws appear intact and alignment is maintained.  No significant change in alignment with flexion or extension.  There is no plain film evidence of fracture or acute subluxation.  No prevertebral soft tissue swelling.  Minimal degenerative spurring along the anterior inferior endplate of C4.  No disc space narrowing at C2, C3 or C4    - X-ray L-spine 10/11/19:  Prior multilevel fusion at L4-L5 and L5-S1.  Surgical fixation hardware appears intact.  Alignment is maintained.  There is no significant change in alignment with flexion or extension.  Mild degenerative change involving L1 through L3 without significant focal disc space narrowing.  There is no evidence of an acute fracture.  Alignment is maintained.  No evidence of spondylolysis or spondylolisthesis    - Outside Shoulder MRI Right 2/22/19:  Impression:   1. Degenerative arthropathy at AC joint  2. Pericapsular soft tissue swelling and periosteal soft tissue swelling about the AC joint and distal clavicle. A low-grade partial tear of the trapezius muscle at  the insertion upon the clavicle and a low-grade edema or strain of the anterior head of deltoid muscle  3. Suspect a sprain of the coracoclavicular ligaments, which are not torn  4. TEndinosis of the supraspinatus without a tear  5. Slight fraying of the labrum     - EMG/NCS BLE 2/25/19:  Impression: Normal EMG of the legs, although his study was mildly limited due to inability to sample lumbar paraspinal muscles given his prior lumbar surgical scarring.     - Outside Shoulder MRI Right 2/22/19:  Impression:   1. Degenerative arthropathy at AC joint  2. Pericapsular soft tissue swelling and periosteal soft tissue swelling about the AC joint and distal clavicle. A low-grade partial tear of the trapezius muscle at the insertion upon the clavicle and a low-grade edema or strain of the anterior head of deltoid muscle  3. Suspect a sprain of the coracoclavicular ligaments, which are not torn  4. TEndinosis of the supraspinatus without a tear  5. Slight fraying of the labrum    - EMG/NCS (outside records) with bilateral C6 radiculopathy and bilateral carpal tunnel syndrome     Labs:  Lab Results   Component Value Date     08/24/2022     08/24/2022    K 4.5 08/24/2022    K 4.5 08/24/2022     08/24/2022     08/24/2022    CO2 29 08/24/2022    CO2 29 08/24/2022    BUN 14 08/24/2022    BUN 14 08/24/2022    CREATININE 0.95 08/24/2022    CREATININE 0.95 08/24/2022    CALCIUM 9.5 08/24/2022    CALCIUM 9.5 08/24/2022    ANIONGAP 3 (L) 08/24/2022    ANIONGAP 3 (L) 08/24/2022    ESTGFRAFRICA >60 05/12/2022    EGFRNONAA >60 05/12/2022     Lab Results   Component Value Date    ALT 46 (H) 08/24/2022    ALT 46 (H) 08/24/2022    AST 45 08/24/2022    AST 45 08/24/2022    ALKPHOS 82 08/24/2022    ALKPHOS 82 08/24/2022    BILITOT 0.6 08/24/2022    BILITOT 0.6 08/24/2022     Lab Results   Component Value Date     08/24/2022       Assessment:  Dalton Kent is a 56 y.o. male with the following  diagnoses based on history, exam, and imaging:    Problem List Items Addressed This Visit        Neuro    Chronic pain syndrome       Orthopedic    Failed back surgical syndrome - Primary          This is a pleasant 56 y.o. gentleman presenting with:    - Chronic back pain: He has prior fusion at L4-5 and L5-S1 with postsurgical scarring and fibrosis at L4-5 level. His back pain appears to be multifactorial in nature, with contributions from myofascial pain, lumbar spondylosis and due to scar tissue.    - Pain primarily in the left low back which is our target for SCS.    - Chronic Pain Syndrome:    - Excellent relief with Nevro SCS trial.   - Left trochanteric bursitis: Improved with CSI previously  - Chronic neck pain: Prior fusions performed at C5-C6 and C6-C7.  His neck pain, also appears multifactorial. MRI with tear in trapezius, which may be contributing to the pain. Muscular aspects of the pain and tenderness improve with TPIs.   - Chronic right shoulder pain: in the area of the latissimus and described as deep to the scapula, may be subscapularis vs lattisimus pain.  This has improved after trigger point injection.  - Comorbid: Anxiety and depression. H/o EtOH. GERD. HypoTH. ASAD.    Treatment Plan: I discussed with the patient the following assessment and recommendations. The following is the plan the patient agreed upon:  - PT/OT/HEP:  Encouraged cont HEP and regular walking program   - Procedures: Removed SCS trial leads   - Schedule SCS implant with [facility] in [place] with Dr. Knox.   - Can repeat left SIJ and left GTB injection as needed  - Medications: No changes  - Imaging: Reviewed.   - Labs: Reviewed.  Monitor liver enzymes.   - Consulted psychology, Dr. Thompson, for multi-modal approach to his chronic pain    Follow Up: RTC PRN    Disclaimer: This note was partly generated using dictation software which may occasionally result in transcription errors.

## 2022-08-26 NOTE — H&P (VIEW-ONLY)
Pain Medicine      Chief Complaint:   Chief Complaint   Patient presents with    Back Pain     Initial HPI (10/11/19): Dalton Kent is a 56 y.o. male referred by Dr. Velazquez for back and neck pain.    Back pain is worse. Back pain began ~ 10 years ago. He has had 2 back surgeries. Back pain is localized to the left over the lateral iliac crest. No radiation into legs. He is unable to describe the quality of the pain, other than feeling deep. Worse with walking, cutting grass, standing, and slight flexion. Pain alleviated by nothing in particular. Left leg gets weak with cutting grass or doing dishes. Denies leg numbness.     He has had neck surgery with Dr. Ontiveros in 2015. He doesn't have neck pain but is having right arm pain and shooting pain into his small, ring and middle fingers and also pain in his tricep area. Pain described as shooting and shocking in the arm. Nothing particularly aggravates it, but did start while fishing and worse at night time. Nothing helps the pain. He did therapy recently.     Onset: Years  Location: Shoulder, arm, lower back left side, hip pain   Radiation: left leg and right arm  Quality: Aching, burning, throbbing, grabbing, tingling, deep, superficial, electric   Exacerbating Factors: exercise, lifting, sitting, standing and walking for more than 20 minutes  Alleviating Factors: nothing  Associated Symptoms: denies night fever/night sweats, urinary incontinence, bowel incontinence, significant weight loss, significant motor weakness and loss of sensations    Severity: Currently: 4/10   Typical Range: 7-8/10     Exacerbation: 8/10     Interval History (07/14/2022):  Dalton Kent returns today for follow up.  At the last clinic visit, referred to psych for SCS pre-psych testing.     Currently, the back pain is worse.  He slipped out of bed and is feeling more pain down the left leg again like previous. Otherwise, no change in the location or quality of the pain  since the most recent visit is reported.  No significant interval events or traumas. No change in bowel or bladder function, & no new weakness or numbness is reported. No new musculoskeletal pain complaints.    Current Pain Scales:  Current: 3/10              Typical Range: 8-10/10    Interval History (08/26/2022):  Dalton Kent returns today for follow up.      He had Nevro SCS placed last week. In the past week, he feels he has had nearly 100% relief. He was in severe pain prior, but has done really well over the past week. He has been able to cut his grass yesterday, and had some mild pain in the left calf, but overall he feels much improved. His back pain is much improved. He is very happy with his stimulator and would like to move forward with implant. He denies any fevers, chills, drainage.     Current Pain Scales:  Current: 2/10              Typical Range in the past week: 0-5/10                   Pain Disability Index  Family/Home Responsibilities:: 2  Recreation:: 0  Social Activity:: 0  Occupation:: 3  Sexual Behavior:: 0  Self Care:: 0  Life-Support Activities:: 0  Pain Disability Index (PDI): 5       Previous Interventions:  - 4/29/22: Caudal ANURAG w/ 0% relief.  - 5/14/21: Left SIJ and GTB CSI w/ 90% relief  - 3/26/21: Left L3 & L4 TF ANURAG w/ 50% relief  - 2/21/20: C7-T1 IL ANURAG w/ minimal relief  - 12/6/19: Cervical TPIs  - 10/11/19: Lumbar TPIs: Did not think they were effective  - Previous spine injections with Dr. Jimmy Rosas in [place] prior to the back surgeries. Also with Dr. Honeycutt and Dr. Pulido.     Previous Therapies:  PT/OT: yes last was 3-4 years ago  Surgery: yes He has had C5-7 ACDF with Dr. Ontiveros in 2016. He has had 2 back surgeries. First back surgery was L5-S1 ALIF in [facility] with Dr. Sorin Thomas. He had above level L4-5 herniated disk and underwent second back surgery, L4-5 TLIF at [facility] with Dr. Isaias Ontiveros in May 2016. Right then  left carpal tunnel release in 2017 w/ Dr. Marin Rosas in May 2017.   Previous Medications:   - NSAIDS: Cant take because of stomach ulcers  - Muscle Relaxants: Xanax. Klonopin. Ativan.   - TCAs: Nortriptyline 25 mg  - SNRIs: Not tried  - Topicals: Bengay, biofreeze not effective  - Anticonvulsants: Gabapentin (Gralise) caused worsening depression, claustrophobia, trouble swallowing. Depakote. Lyrica.  - Opioids: None currently    Current Pain Medications  1. Lyrica 50 mg b.i.d.  2. Tylenol 500 mg, two tablets, 1-2x per day  3. Naltrexone 50 mg daily  4. Elavil 25 mg qHS  5. Gabapentin 300 mg qHS  6. Indomethacin       Blood Thinners: None    Full Medication List:    Current Outpatient Medications:     allopurinoL (ZYLOPRIM) 300 MG tablet, TAKE 1 TABLET BY MOUTH ONCE DAILY, Disp: 90 tablet, Rfl: 3    amitriptyline (ELAVIL) 25 MG tablet, Take 25 mg by mouth every evening., Disp: , Rfl:     benazepriL (LOTENSIN) 10 MG tablet, Take 1 tablet by mouth once daily., Disp: , Rfl:     buPROPion (WELLBUTRIN SR) 100 MG TBSR 12 hr tablet, Take 100 mg by mouth once daily., Disp: , Rfl:     cloNIDine (CATAPRES) 0.1 MG tablet, TAKE 1 TABLET BY MOUTH AT  NIGHT, Disp: 90 tablet, Rfl: 3    EScitalopram oxalate (LEXAPRO) 10 MG tablet, Take 10 mg by mouth once daily., Disp: , Rfl:     gabapentin (NEURONTIN) 300 MG capsule, Take 300 mg by mouth nightly., Disp: , Rfl:     ipratropium (ATROVENT) 21 mcg (0.03 %) nasal spray, USE 2 SPRAYS NASALLY TWICE  DAILY, Disp: 30 mL, Rfl: 7    lamoTRIgine (LAMICTAL) 100 MG tablet, Take 100 mg by mouth nightly., Disp: , Rfl:     metoprolol succinate (TOPROL-XL) 50 MG 24 hr tablet, Take 50 mg by mouth 2 (two) times daily. , Disp: , Rfl:     pantoprazole (PROTONIX) 40 MG tablet, Take 1 tablet (40 mg total) by mouth once daily., Disp: 90 tablet, Rfl: 3    prazosin (MINIPRESS) 2 MG Cap, Take 2 mg by mouth nightly., Disp: , Rfl:     SYNTHROID 112 mcg tablet, Take 112 mg by mouth once daily.,  Disp: , Rfl:     thiamine 100 MG tablet, Take 1 tablet (100 mg total) by mouth once daily., Disp: 30 tablet, Rfl: 5    vitamins-lipotropics Tab, Take 1 tablet by mouth 2 (two) times a day., Disp: 180 tablet, Rfl: 3    MELATONIN ORAL, Take 10 mg by mouth every evening. , Disp: , Rfl:     rosuvastatin (CRESTOR) 10 MG tablet, Take 1 tablet (10 mg total) by mouth every other day. Monday,wed, fri (Patient not taking: Reported on 8/26/2022), Disp: 45 tablet, Rfl: 3     Review of Systems:  Review of Systems   Constitutional: Negative for fever and weight loss.   HENT: Positive for tinnitus. Negative for ear pain.    Eyes: Negative for pain and redness.   Respiratory: Negative for cough and shortness of breath.    Cardiovascular: Negative for chest pain and palpitations.   Gastrointestinal: Positive for heartburn. Negative for constipation.        GERD w/ NSAIDs   Genitourinary: Negative.         Denies urinary incontinence. Denies urine retention.    Musculoskeletal: Positive for back pain, myalgias and neck pain.   Skin: Negative for itching and rash.   Neurological: Positive for tingling, weakness and headaches. Negative for seizures.   Endo/Heme/Allergies: Negative for environmental allergies. Does not bruise/bleed easily.   Psychiatric/Behavioral: Positive for depression. The patient is nervous/anxious and has insomnia.        Allergies:  Gralise [gabapentin], Norco [hydrocodone-acetaminophen], Percocet [oxycodone-acetaminophen], and Statins-hmg-coa reductase inhibitors     Medical History:   has a past medical history of Alcohol abuse, Anxiety, Depression, GERD (gastroesophageal reflux disease), Headache, psychiatric care, Hypertension, Hypothyroid, Lyme disease, Restless leg syndrome, Sleep difficulties, Therapy, Uncontrolled REM sleep behavior disorder, and Weakness generalized (8/17/2022).    Surgical History:   has a past surgical history that includes Anal fistulotomy; Anal sphincterotomy; Appendectomy;  "Colonoscopy (2/2015); Back surgery; Neck surgery; Hand surgery (Bilateral); Carpal tunnel release; Trigger finger release; Nose surgery; Colonoscopy (N/A, 8/29/2019); Epidural steroid injection (N/A, 2/21/2020); Transforaminal epidural injection of steroid (Left, 3/26/2021); Injection of anesthetic agent into sacroiliac joint (Left, 5/14/2021); Injection of steroid (Left, 5/14/2021); Caudal epidural steroid injection (N/A, 4/29/2022); Colonoscopy (N/A, 5/4/2022); and Trial of spinal cord nerve stimulator (N/A, 8/19/2022).    Social History:   reports that he has quit smoking. He has never used smokeless tobacco. He reports previous alcohol use. He reports that he does not use drugs.    Physical Exam:  /72   Pulse 60   Resp 18   Ht 5' 6" (1.676 m)   Wt 93.5 kg (206 lb 0.3 oz)   SpO2 98%   BMI 33.25 kg/m²   GEN: No acute distress. Calm, comfortable  HENT: Normocephalic, atraumatic, moist mucous membranes  EYE: Anicteric sclera, non-injected.   CV: Non-diaphoretic.   RESP: Breathing comfortably. Chest expansion symmetric.  PSYCH: Pleasant mood and appropriate affect. Recent and remote memory intact.   BACK EXAM: Leads removed. No drainage or erythema from the site. Bandages applied.    Imaging:   - MRI Thoracic spine 4/5/22:  There are focal areas of signal alteration involving the thoracic vertebral bodies greatest at T8 and T12.  Findings are most compatible with focal areas of fatty infiltration versus hemangiomas.  No evidence of an acute thoracic spine abnormality.  No congenital spinal stenosis.  No abnormal signal change involving the visualized portion of the spinal cord which terminates at approximately the L1 level.  There is no evidence of a focal disc abnormality.  No evidence of significant spinal canal encroachment or foraminal encroachment.  Impression:  Probable focal fat versus hemangiomas within thoracic vertebral bodies greatest at T8 and T12.  No focal disc abnormality.  No significant " spinal canal or foraminal encroachment.     - MRI Lumbar spine 4/5/22:  MRI examination of the lumbar spine with and without contrast dated April 5, 2022.  Multilayered prior lumbar spine surgery and fusion with pedicle screws at L4 and L5 with posterior fusion rods in place.  Anterior fusion and screw plate with screws at L5-S1.  No evidence of an acute abnormality.  Alignment is maintained.  Focal fatty change versus hemangioma within T12.  The visualized portion of the spinal cord is normal in signal intensity and terminates at approximately the L1 level.  At the T11-T12, T12-L1 and L1-L2 levels there is no evidence of a focal disc abnormality.  There are minimal facet degenerative changes.  No significant spinal canal or foraminal encroachment.  The L2-L3 level there is a minimal disc bulge and mild facet degenerative changes.  No significant spinal canal or foraminal encroachment.  At the L3-L4 level there is a broad-based disc bulge with advanced bilateral facet degenerative changes resulting in mild spinal canal encroachment and bilateral foraminal encroachment appearing similar to the previous study.  At the L4-L5 level there are postsurgical changes.  There is signal alteration along the left lateral aspect of the thecal sac extending towards the foramen which appears similar to the previous study.  Post contrast enhanced images reveal evidence of enhancement compatible with postsurgical scarring and fibrosis.  Mild left-sided foraminal encroachment.  Hypertrophic facet degenerative changes.  At the L5-S1 level there are stable postoperative changes.  No evidence of a recurrent disc abnormality.  No central canal stenosis.  Mild bilateral foraminal encroachment    - MRI Cervical Spine 2/7/20:  Prior multilevel cervical fusion at C5-C6 and C6-C7.  Alignment is maintained.  No congenital spinal stenosis.  No abnormal signal change involving the visualized portion of the spinal cord.  At the C2-C3 and C3-C4  levels there are minimal uncinate process spurs.  There is no evidence of significant spinal canal or foraminal encroachment.  At the C4-C5 level there is minimal disc-osteophyte with mild effacement along the anterior subarachnoid space.  Mild facet degenerative changes.  No significant spinal canal or foraminal encroachment.  Prior fusions performed at C5-C6 and C6-C7.  Alignment is maintained.  No evidence of a focal recurrent disc abnormality.  At the C7-T1 level there is a posterior disc-osteophyte with mild encroachment along the subarachnoid space and mild bilateral foraminal encroachment.    - X-ray Shoulder 12/11/19:  The acromioclavicular and glenohumeral joint spaces are intact.  No fracture or dislocation is seen.  Cervical fusion hardware is identified    - EMG/NCS BUE 10/28/19:  Normal EMG/NCS of the arms.     - X-ray C-spine 10/11/19:  Prior multilevel fusion at C5-C6 and C6-C7.  Fusion screws appear intact and alignment is maintained.  No significant change in alignment with flexion or extension.  There is no plain film evidence of fracture or acute subluxation.  No prevertebral soft tissue swelling.  Minimal degenerative spurring along the anterior inferior endplate of C4.  No disc space narrowing at C2, C3 or C4    - X-ray L-spine 10/11/19:  Prior multilevel fusion at L4-L5 and L5-S1.  Surgical fixation hardware appears intact.  Alignment is maintained.  There is no significant change in alignment with flexion or extension.  Mild degenerative change involving L1 through L3 without significant focal disc space narrowing.  There is no evidence of an acute fracture.  Alignment is maintained.  No evidence of spondylolysis or spondylolisthesis    - Outside Shoulder MRI Right 2/22/19:  Impression:   1. Degenerative arthropathy at AC joint  2. Pericapsular soft tissue swelling and periosteal soft tissue swelling about the AC joint and distal clavicle. A low-grade partial tear of the trapezius muscle at  the insertion upon the clavicle and a low-grade edema or strain of the anterior head of deltoid muscle  3. Suspect a sprain of the coracoclavicular ligaments, which are not torn  4. TEndinosis of the supraspinatus without a tear  5. Slight fraying of the labrum     - EMG/NCS BLE 2/25/19:  Impression: Normal EMG of the legs, although his study was mildly limited due to inability to sample lumbar paraspinal muscles given his prior lumbar surgical scarring.     - Outside Shoulder MRI Right 2/22/19:  Impression:   1. Degenerative arthropathy at AC joint  2. Pericapsular soft tissue swelling and periosteal soft tissue swelling about the AC joint and distal clavicle. A low-grade partial tear of the trapezius muscle at the insertion upon the clavicle and a low-grade edema or strain of the anterior head of deltoid muscle  3. Suspect a sprain of the coracoclavicular ligaments, which are not torn  4. TEndinosis of the supraspinatus without a tear  5. Slight fraying of the labrum    - EMG/NCS (outside records) with bilateral C6 radiculopathy and bilateral carpal tunnel syndrome     Labs:  Lab Results   Component Value Date     08/24/2022     08/24/2022    K 4.5 08/24/2022    K 4.5 08/24/2022     08/24/2022     08/24/2022    CO2 29 08/24/2022    CO2 29 08/24/2022    BUN 14 08/24/2022    BUN 14 08/24/2022    CREATININE 0.95 08/24/2022    CREATININE 0.95 08/24/2022    CALCIUM 9.5 08/24/2022    CALCIUM 9.5 08/24/2022    ANIONGAP 3 (L) 08/24/2022    ANIONGAP 3 (L) 08/24/2022    ESTGFRAFRICA >60 05/12/2022    EGFRNONAA >60 05/12/2022     Lab Results   Component Value Date    ALT 46 (H) 08/24/2022    ALT 46 (H) 08/24/2022    AST 45 08/24/2022    AST 45 08/24/2022    ALKPHOS 82 08/24/2022    ALKPHOS 82 08/24/2022    BILITOT 0.6 08/24/2022    BILITOT 0.6 08/24/2022     Lab Results   Component Value Date     08/24/2022       Assessment:  Dalton Kent is a 56 y.o. male with the following  diagnoses based on history, exam, and imaging:    Problem List Items Addressed This Visit        Neuro    Chronic pain syndrome       Orthopedic    Failed back surgical syndrome - Primary          This is a pleasant 56 y.o. gentleman presenting with:    - Chronic back pain: He has prior fusion at L4-5 and L5-S1 with postsurgical scarring and fibrosis at L4-5 level. His back pain appears to be multifactorial in nature, with contributions from myofascial pain, lumbar spondylosis and due to scar tissue.    - Pain primarily in the left low back which is our target for SCS.    - Chronic Pain Syndrome:    - Excellent relief with Nevro SCS trial.   - Left trochanteric bursitis: Improved with CSI previously  - Chronic neck pain: Prior fusions performed at C5-C6 and C6-C7.  His neck pain, also appears multifactorial. MRI with tear in trapezius, which may be contributing to the pain. Muscular aspects of the pain and tenderness improve with TPIs.   - Chronic right shoulder pain: in the area of the latissimus and described as deep to the scapula, may be subscapularis vs lattisimus pain.  This has improved after trigger point injection.  - Comorbid: Anxiety and depression. H/o EtOH. GERD. HypoTH. ASAD.    Treatment Plan: I discussed with the patient the following assessment and recommendations. The following is the plan the patient agreed upon:  - PT/OT/HEP:  Encouraged cont HEP and regular walking program   - Procedures: Removed SCS trial leads   - Schedule SCS implant with [facility] in [place] with Dr. Knox.   - Can repeat left SIJ and left GTB injection as needed  - Medications: No changes  - Imaging: Reviewed.   - Labs: Reviewed.  Monitor liver enzymes.   - Consulted psychology, Dr. Thompson, for multi-modal approach to his chronic pain    Follow Up: RTC PRN    Disclaimer: This note was partly generated using dictation software which may occasionally result in transcription errors.

## 2022-09-07 ENCOUNTER — PATIENT MESSAGE (OUTPATIENT)
Dept: PAIN MEDICINE | Facility: CLINIC | Age: 57
End: 2022-09-07
Payer: MEDICARE

## 2022-09-08 ENCOUNTER — TELEPHONE (OUTPATIENT)
Dept: PAIN MEDICINE | Facility: CLINIC | Age: 57
End: 2022-09-08
Payer: MEDICARE

## 2022-09-08 ENCOUNTER — PATIENT MESSAGE (OUTPATIENT)
Dept: PAIN MEDICINE | Facility: CLINIC | Age: 57
End: 2022-09-08
Payer: MEDICARE

## 2022-09-08 DIAGNOSIS — M96.1 FAILED BACK SURGICAL SYNDROME: Primary | ICD-10-CM

## 2022-09-08 NOTE — TELEPHONE ENCOUNTER
Pt's  wife stated she had a missed call from our office. I informed pt no one called from our office. Pt's wife voiced understanding.

## 2022-09-08 NOTE — TELEPHONE ENCOUNTER
Patient has been scheduled for procedure on 9/23.   Denies/Confirms blood thinners  Denies/Confirms pacemaker/ICD      Check in at the registration desk on the first floor of the hospital. [address] [place] [address]  Please DO NOT eat or drink 8 hours prior to your arrival time for the procedure, if diabetic 6 hours prior. If you are taking medications for blood pressure, heart medications, thyroid, cholesterol; you can take them with a small sip of water.  Refrain from drinking alcohol within 24 hours prior to your procedure  You will need someone to drive you home after procedure. You cannot take taxi, Uber, or Lyft.  If you start feeling sick (fever, chills, or coughing) or start on any antibiotics please contact us at [phone] to reschedule.      Pt's wife voiced understanding and confirmed procedure date.

## 2022-09-19 NOTE — PROGRESS NOTES
Individual Psychotherapy (PhD/LCSW)  09/20/2022    Site/Location:  Pt is at home ([place], [place]) attending a Telemedicine Individual Therapy Session    Visit Type: 30 min outpt individual psychotherapy    Therapeutic Intervention: Met with patient Outpatient - Interactive psychotherapy 30 min - CPT code 92354    Chief complaint/reason for encounter: Pain    Interval history and content of current session: Pain Hx: Pt has pain in his lower back radiating to left leg. He has had 2 spinal surgeries. 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 He had 2 bulging discs and now has scar tissue. He notes the surgeries helped somewhat to alleviate the pain but he continues to experience pain. Pt has had epidural injections but the pain worsened with the last injection. Pt notes he has  Attended PT and received myofacial injections. His pain affects his mood. He's currently attending outpt therapy to address sxs of depression and anxiety. He notes these sxs are managed well. Pt denies a trauma hx. He is not working and is receiving disability benefits. He notes he has been dx with Dementia and suffers memory loss. On most days his pain is at 8/10. His pain ranges from 3/10 - 10/10. His pain worsens with prolonged standing, bending, sitting and laying down. His pain is also affected by the cold. 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 His pain decreases with short periods of sitting. Pt sleeps on his side. He reports pain medications do not help to alleviate his pain.     Pt notes he is having his spinal stimulator surgery this Friday. He is hopeful the stimulator will help to reduce pt's pain. This session he was introduced to the Vicious Cycles of Pain theory for pain management. Pt learned how guarding behavior, disrupted sleep, stress and lack of movement can worsen or trigger pain. Pt learned the connection between depressed mood, anxiety and pain. Pt  asked appropriate questions indicating an understanding of the material presented. Pt was receptive to recommendations such as the u-shaped body pillow and a dehumidifier. At pt's follow up session she will be introduced to Bardwell Control Theory for pain management.     Treatment plan:  Target symptoms: pain  Why chosen therapy is appropriate versus another modality: Relevant to diagnosis  Outcome monitoring methods: self-report  Therapeutic intervention type: supportive psychotherapy    Risk parameters:  Patient reports no suicidal ideation  Patient reports no homicidal ideation  Patient reports no self-injurious behavior  Patient reports no violent behavior    Verbal deficits: None    Patient's response to intervention:  The patient's response to intervention is accepting.    Progress toward goals and other mental status changes:  The patient's progress toward goals is good.    Diagnosis:   Chronic pain syndrome  Major Depressive Disorder, recurrent, mild      Plan:  individual psychotherapy    Return to clinic: 2 weeks    Length of Service (minutes): 20       Each patient to whom he or she provides medical services by telemedicine is: (1) informed of the relationship between the physician and patient and the respective role of any other health care provider with respect to management of the patient; and (2) notified that he or she may decline to receive medical services by telemedicine and may withdraw from such care at any time.

## 2022-09-20 ENCOUNTER — PATIENT MESSAGE (OUTPATIENT)
Dept: PSYCHIATRY | Facility: CLINIC | Age: 57
End: 2022-09-20
Payer: MEDICARE

## 2022-09-20 ENCOUNTER — OFFICE VISIT (OUTPATIENT)
Dept: PSYCHIATRY | Facility: CLINIC | Age: 57
End: 2022-09-20
Payer: COMMERCIAL

## 2022-09-20 DIAGNOSIS — G89.4 CHRONIC PAIN SYNDROME: Primary | ICD-10-CM

## 2022-09-20 PROCEDURE — 4010F ACE/ARB THERAPY RXD/TAKEN: CPT | Mod: CPTII,95,, | Performed by: PSYCHOLOGIST

## 2022-09-20 PROCEDURE — 90832 PR PSYCHOTHERAPY W/PATIENT, 30 MIN: ICD-10-PCS | Mod: 95,,, | Performed by: PSYCHOLOGIST

## 2022-09-20 PROCEDURE — 3044F PR MOST RECENT HEMOGLOBIN A1C LEVEL <7.0%: ICD-10-PCS | Mod: CPTII,95,, | Performed by: PSYCHOLOGIST

## 2022-09-20 PROCEDURE — 1159F PR MEDICATION LIST DOCUMENTED IN MEDICAL RECORD: ICD-10-PCS | Mod: CPTII,95,, | Performed by: PSYCHOLOGIST

## 2022-09-20 PROCEDURE — 90832 PSYTX W PT 30 MINUTES: CPT | Mod: 95,,, | Performed by: PSYCHOLOGIST

## 2022-09-20 PROCEDURE — 1159F MED LIST DOCD IN RCRD: CPT | Mod: CPTII,95,, | Performed by: PSYCHOLOGIST

## 2022-09-20 PROCEDURE — 4010F PR ACE/ARB THEARPY RXD/TAKEN: ICD-10-PCS | Mod: CPTII,95,, | Performed by: PSYCHOLOGIST

## 2022-09-20 PROCEDURE — 1160F PR REVIEW ALL MEDS BY PRESCRIBER/CLIN PHARMACIST DOCUMENTED: ICD-10-PCS | Mod: CPTII,95,, | Performed by: PSYCHOLOGIST

## 2022-09-20 PROCEDURE — 1160F RVW MEDS BY RX/DR IN RCRD: CPT | Mod: CPTII,95,, | Performed by: PSYCHOLOGIST

## 2022-09-20 PROCEDURE — 3044F HG A1C LEVEL LT 7.0%: CPT | Mod: CPTII,95,, | Performed by: PSYCHOLOGIST

## 2022-09-22 ENCOUNTER — TELEPHONE (OUTPATIENT)
Dept: PAIN MEDICINE | Facility: CLINIC | Age: 57
End: 2022-09-22
Payer: MEDICARE

## 2022-09-22 NOTE — TELEPHONE ENCOUNTER
----- Message from Pedro Arreguin sent at 9/22/2022 12:08 PM CDT -----  Regarding: Advice  Contact: [phone]  Patient wife is calling to speak with someone about procedure tomorrow she have not received a call about any prep of what time he needed to come in. Please contact pt

## 2022-09-23 ENCOUNTER — ANESTHESIA (OUTPATIENT)
Dept: SURGERY | Facility: HOSPITAL | Age: 57
End: 2022-09-23
Payer: MEDICARE

## 2022-09-23 ENCOUNTER — HOSPITAL ENCOUNTER (OUTPATIENT)
Facility: HOSPITAL | Age: 57
Discharge: HOME | End: 2022-09-23
Attending: PAIN MEDICINE | Admitting: PAIN MEDICINE
Payer: MEDICARE

## 2022-09-23 ENCOUNTER — ANESTHESIA EVENT (OUTPATIENT)
Dept: SURGERY | Facility: HOSPITAL | Age: 57
End: 2022-09-23
Payer: MEDICARE

## 2022-09-23 VITALS
DIASTOLIC BLOOD PRESSURE: 76 MMHG | OXYGEN SATURATION: 100 % | BODY MASS INDEX: 32.14 KG/M2 | HEART RATE: 57 BPM | TEMPERATURE: 98 F | RESPIRATION RATE: 16 BRPM | HEIGHT: 66 IN | WEIGHT: 200 LBS | SYSTOLIC BLOOD PRESSURE: 149 MMHG

## 2022-09-23 DIAGNOSIS — M96.1 FAILED BACK SURGICAL SYNDROME: Primary | ICD-10-CM

## 2022-09-23 PROCEDURE — 25000003 PHARM REV CODE 250: Performed by: NURSE ANESTHETIST, CERTIFIED REGISTERED

## 2022-09-23 PROCEDURE — 63600175 PHARM REV CODE 636 W HCPCS: Performed by: ANESTHESIOLOGY

## 2022-09-23 PROCEDURE — 36000707: Performed by: PAIN MEDICINE

## 2022-09-23 PROCEDURE — 36000706: Performed by: PAIN MEDICINE

## 2022-09-23 PROCEDURE — 71000016 HC POSTOP RECOV ADDL HR: Performed by: PAIN MEDICINE

## 2022-09-23 PROCEDURE — 63650 IMPLANT NEUROELECTRODES: CPT | Mod: ,,, | Performed by: PAIN MEDICINE

## 2022-09-23 PROCEDURE — 27201423 OPTIME MED/SURG SUP & DEVICES STERILE SUPPLY: Performed by: PAIN MEDICINE

## 2022-09-23 PROCEDURE — 25000003 PHARM REV CODE 250: Performed by: PAIN MEDICINE

## 2022-09-23 PROCEDURE — C1787 PATIENT PROGR, NEUROSTIM: HCPCS | Performed by: PAIN MEDICINE

## 2022-09-23 PROCEDURE — 63685 INS/RPLC SPI NPG/RCVR POCKET: CPT | Mod: 59,,, | Performed by: PAIN MEDICINE

## 2022-09-23 PROCEDURE — 63650 PR PERCUT IMPLNT NEUROELECT,EPIDURAL: ICD-10-PCS | Mod: ,,, | Performed by: PAIN MEDICINE

## 2022-09-23 PROCEDURE — C1778 LEAD, NEUROSTIMULATOR: HCPCS | Performed by: PAIN MEDICINE

## 2022-09-23 PROCEDURE — C1822 GEN, NEURO, HF, RECHG BAT: HCPCS | Performed by: PAIN MEDICINE

## 2022-09-23 PROCEDURE — 63600175 PHARM REV CODE 636 W HCPCS: Performed by: PAIN MEDICINE

## 2022-09-23 PROCEDURE — 71000015 HC POSTOP RECOV 1ST HR: Performed by: PAIN MEDICINE

## 2022-09-23 PROCEDURE — 63600175 PHARM REV CODE 636 W HCPCS: Performed by: NURSE ANESTHETIST, CERTIFIED REGISTERED

## 2022-09-23 PROCEDURE — 63685 PR IMPLANT SPINAL NEUROSTIM/RECEIVER: ICD-10-PCS | Mod: 59,,, | Performed by: PAIN MEDICINE

## 2022-09-23 PROCEDURE — 37000009 HC ANESTHESIA EA ADD 15 MINS: Performed by: PAIN MEDICINE

## 2022-09-23 PROCEDURE — 37000008 HC ANESTHESIA 1ST 15 MINUTES: Performed by: PAIN MEDICINE

## 2022-09-23 DEVICE — KIT SENZA II OMNIA IPG 2500: Type: IMPLANTABLE DEVICE | Site: BACK | Status: FUNCTIONAL

## 2022-09-23 DEVICE — KIT LEAD 70CM: Type: IMPLANTABLE DEVICE | Site: BACK | Status: FUNCTIONAL

## 2022-09-23 RX ORDER — DEXMEDETOMIDINE HYDROCHLORIDE 100 UG/ML
INJECTION, SOLUTION INTRAVENOUS
Status: DISCONTINUED | OUTPATIENT
Start: 2022-09-23 | End: 2022-09-23

## 2022-09-23 RX ORDER — PROPOFOL 10 MG/ML
VIAL (ML) INTRAVENOUS
Status: DISCONTINUED | OUTPATIENT
Start: 2022-09-23 | End: 2022-09-23

## 2022-09-23 RX ORDER — MIDAZOLAM HYDROCHLORIDE 1 MG/ML
INJECTION INTRAMUSCULAR; INTRAVENOUS
Status: DISCONTINUED | OUTPATIENT
Start: 2022-09-23 | End: 2022-09-23

## 2022-09-23 RX ORDER — SODIUM CHLORIDE 9 MG/ML
1000 INJECTION, SOLUTION INTRAVENOUS CONTINUOUS
Status: DISCONTINUED | OUTPATIENT
Start: 2022-09-23 | End: 2022-09-23 | Stop reason: HOSPADM

## 2022-09-23 RX ORDER — FENTANYL CITRATE 50 UG/ML
INJECTION, SOLUTION INTRAMUSCULAR; INTRAVENOUS
Status: DISCONTINUED | OUTPATIENT
Start: 2022-09-23 | End: 2022-09-23

## 2022-09-23 RX ORDER — CEFAZOLIN SODIUM 2 G/50ML
2 SOLUTION INTRAVENOUS
Status: COMPLETED | OUTPATIENT
Start: 2022-09-23 | End: 2022-09-23

## 2022-09-23 RX ORDER — BUPIVACAINE HYDROCHLORIDE 2.5 MG/ML
INJECTION, SOLUTION INFILTRATION; PERINEURAL
Status: DISCONTINUED | OUTPATIENT
Start: 2022-09-23 | End: 2022-09-23 | Stop reason: HOSPADM

## 2022-09-23 RX ORDER — DIPHENHYDRAMINE HYDROCHLORIDE 50 MG/ML
25 INJECTION INTRAMUSCULAR; INTRAVENOUS ONCE
Status: COMPLETED | OUTPATIENT
Start: 2022-09-23 | End: 2022-09-23

## 2022-09-23 RX ORDER — LIDOCAINE HYDROCHLORIDE 10 MG/ML
1 INJECTION, SOLUTION EPIDURAL; INFILTRATION; INTRACAUDAL; PERINEURAL ONCE
Status: DISCONTINUED | OUTPATIENT
Start: 2022-09-23 | End: 2022-09-23 | Stop reason: HOSPADM

## 2022-09-23 RX ORDER — LIDOCAINE HYDROCHLORIDE 10 MG/ML
INJECTION INFILTRATION; PERINEURAL
Status: DISCONTINUED | OUTPATIENT
Start: 2022-09-23 | End: 2022-09-23 | Stop reason: HOSPADM

## 2022-09-23 RX ORDER — LIDOCAINE HCL/PF 100 MG/5ML
SYRINGE (ML) INTRAVENOUS
Status: DISCONTINUED | OUTPATIENT
Start: 2022-09-23 | End: 2022-09-23

## 2022-09-23 RX ORDER — PROPOFOL 10 MG/ML
VIAL (ML) INTRAVENOUS CONTINUOUS PRN
Status: DISCONTINUED | OUTPATIENT
Start: 2022-09-23 | End: 2022-09-23

## 2022-09-23 RX ORDER — HYDROCODONE BITARTRATE AND ACETAMINOPHEN 5; 325 MG/1; MG/1
1 TABLET ORAL EVERY 8 HOURS PRN
Qty: 21 TABLET | Refills: 0 | Status: SHIPPED | OUTPATIENT
Start: 2022-09-23 | End: 2022-09-30

## 2022-09-23 RX ORDER — HYDROMORPHONE HYDROCHLORIDE 2 MG/ML
0.5 INJECTION, SOLUTION INTRAMUSCULAR; INTRAVENOUS; SUBCUTANEOUS
Status: DISPENSED | OUTPATIENT
Start: 2022-09-23 | End: 2022-09-23

## 2022-09-23 RX ORDER — BUPIVACAINE HYDROCHLORIDE AND EPINEPHRINE 2.5; 5 MG/ML; UG/ML
INJECTION, SOLUTION EPIDURAL; INFILTRATION; INTRACAUDAL; PERINEURAL
Status: DISCONTINUED | OUTPATIENT
Start: 2022-09-23 | End: 2022-09-23 | Stop reason: HOSPADM

## 2022-09-23 RX ORDER — PHENYLEPHRINE HYDROCHLORIDE 10 MG/ML
INJECTION INTRAVENOUS
Status: DISCONTINUED | OUTPATIENT
Start: 2022-09-23 | End: 2022-09-23

## 2022-09-23 RX ADMIN — PROPOFOL 20 MG: 10 INJECTION, EMULSION INTRAVENOUS at 08:09

## 2022-09-23 RX ADMIN — LIDOCAINE HYDROCHLORIDE 60 MG: 20 INJECTION, SOLUTION INTRAVENOUS at 07:09

## 2022-09-23 RX ADMIN — PHENYLEPHRINE HYDROCHLORIDE 100 MCG: 10 INJECTION INTRAVENOUS at 07:09

## 2022-09-23 RX ADMIN — PROPOFOL 75 MCG/KG/MIN: 10 INJECTION, EMULSION INTRAVENOUS at 07:09

## 2022-09-23 RX ADMIN — FENTANYL CITRATE 25 MCG: 50 INJECTION, SOLUTION INTRAMUSCULAR; INTRAVENOUS at 07:09

## 2022-09-23 RX ADMIN — PROPOFOL 50 MG: 10 INJECTION, EMULSION INTRAVENOUS at 07:09

## 2022-09-23 RX ADMIN — SODIUM CHLORIDE, SODIUM LACTATE, POTASSIUM CHLORIDE, AND CALCIUM CHLORIDE: .6; .31; .03; .02 INJECTION, SOLUTION INTRAVENOUS at 06:09

## 2022-09-23 RX ADMIN — DIPHENHYDRAMINE HYDROCHLORIDE 25 MG: 50 INJECTION, SOLUTION INTRAMUSCULAR; INTRAVENOUS at 10:09

## 2022-09-23 RX ADMIN — PHENYLEPHRINE HYDROCHLORIDE 50 MCG: 10 INJECTION INTRAVENOUS at 07:09

## 2022-09-23 RX ADMIN — PROPOFOL 20 MG: 10 INJECTION, EMULSION INTRAVENOUS at 07:09

## 2022-09-23 RX ADMIN — DEXMEDETOMIDINE HYDROCHLORIDE 8 MCG: 100 INJECTION, SOLUTION, CONCENTRATE INTRAVENOUS at 07:09

## 2022-09-23 RX ADMIN — PHENYLEPHRINE HYDROCHLORIDE 100 MCG: 10 INJECTION INTRAVENOUS at 08:09

## 2022-09-23 RX ADMIN — FENTANYL CITRATE 25 MCG: 50 INJECTION, SOLUTION INTRAMUSCULAR; INTRAVENOUS at 09:09

## 2022-09-23 RX ADMIN — DEXMEDETOMIDINE HYDROCHLORIDE 4 MCG: 100 INJECTION, SOLUTION, CONCENTRATE INTRAVENOUS at 07:09

## 2022-09-23 RX ADMIN — MIDAZOLAM HYDROCHLORIDE 2 MG: 1 INJECTION, SOLUTION INTRAMUSCULAR; INTRAVENOUS at 07:09

## 2022-09-23 RX ADMIN — DEXMEDETOMIDINE HYDROCHLORIDE 4 MCG: 100 INJECTION, SOLUTION, CONCENTRATE INTRAVENOUS at 08:09

## 2022-09-23 RX ADMIN — CEFAZOLIN SODIUM 2 G: 2 SOLUTION INTRAVENOUS at 07:09

## 2022-09-23 RX ADMIN — FENTANYL CITRATE 25 MCG: 50 INJECTION, SOLUTION INTRAMUSCULAR; INTRAVENOUS at 08:09

## 2022-09-23 RX ADMIN — GLYCOPYRROLATE 0.2 MG: 0.2 INJECTION, SOLUTION INTRAMUSCULAR; INTRAVITREAL at 07:09

## 2022-09-23 RX ADMIN — HYDROMORPHONE HYDROCHLORIDE 0.5 MG: 2 INJECTION, SOLUTION INTRAMUSCULAR; INTRAVENOUS; SUBCUTANEOUS at 09:09

## 2022-09-23 NOTE — TRANSFER OF CARE
"Anesthesia Transfer of Care Note    Patient: Dalton Kent    Procedure(s) Performed: Procedure(s) (LRB):  SCS Implant (N/A)    Patient location: OPS    Anesthesia Type: MAC    Transport from OR: Transported from OR on 2-3 L/min O2 by NC with adequate spontaneous ventilation    Post pain: adequate analgesia    Post assessment: no apparent anesthetic complications    Post vital signs: stable    Level of consciousness: awake    Nausea/Vomiting: no nausea/vomiting    Complications: none    Transfer of care protocol was followed      Last vitals:   Visit Vitals  /79   Pulse (!) 58   Temp 36.6 °C (97.9 °F) (Skin)   Resp 18   Ht 5' 6" (1.676 m)   Wt 90.7 kg (200 lb)   SpO2 97%   BMI 32.28 kg/m²     "

## 2022-09-23 NOTE — OP NOTE
Operative Report: Spinal Cord Stimulator Implantation    Pre-operative Diagnosis:  Chronic Pain Syndrome and Failed Back Surgical Syndrome  Post-operative Diagnosis: Chronic Pain Syndrome and Failed Back Surgical Syndrome  Procedure Date: 09/23/2022  Procedure: (1) Placement of Implanted Spinal Cord Stimulator Lead x2    (2) Placement of Implanted Pulse Generator    (3) Intraoperative fluoroscopy    (4) Initial Device Programming    Device : Brock  Anesthesia: Local & Monitored Anesthesia Care  Indications: To alleviate pain & suffering and reduce functional impairment associated with Chronic Pain Syndrome and Failed Back Surgical Syndrome.      Surgeon: Geremias Knox Jr, MD  Assistant: None    Preoperative Antibiotics: Ancef 2gm IV 30 minutes prior to incision    Procedure in Detail: The patient was brought to the operating room and was placed in the prone position, then prepped and draped in the usual sterile fashion with chlorhexidine and ioban. Time out was performed and consent verified.      With fluoroscopic guidance, the location of the desired site for placement of the percutaneous leads was identified and a local anesthetic mixture consisting of Lidocaine and Bupivacaine with epinephrine was injected subcutaneously over the area. An approximately 2 cm longitudinal incision was made in the midline. The incision was dissected down to the supraspinous ligament. Hemostasis was established and a self-retaining retractor was used to allow adequate visualization.  A 14 gauge touhy was then introduced with a paraspinous approach from the right side under fluoroscopic guidance into the T12-L1 interlaminar space. Entry of the Tuohy into the epidural space was verified by using loss of resistance to a saline with a 5 mL glass loss-of-resistance syringe. Once the epidural space was entered, the syringe was removed and an 8-contact lead was passed through the needle and advanced to the top of the T8  vertebra using continuous fluoroscopy. A second lead was then placed in similar fashion using a right paraspinous approach and guided using into position, adjacent to the first lead at the top of T9, with fluoroscopy.  A test stimulation was performed and we ensured adequate coverage of the painful areas as well as appropriate function of the device. After removal of the Touhy needles, the leads were then anchored to the supraspinous ligaments with #2-0 Ethibond suture on a UR-6 needle.  Intermittent fluoroscopy demonstrated maintenance of lead position throughout this process.      A pocket site was identified just above the right iliac crest and marked.  Local anesthetic was administered in the region and a 4-5 centimeter incision was made.  A subcutaneous pocket was then created with a blunt dissection technique for placement of the Nevro implantable pulse generator.  The leads were then tunneled from the midline incision to the IPG pocket using a rosy-tipped tunneling tool.  The leads were then inserted into the implantable pulse generator and the anchoring screws were tightened with the provided torque wrench. The generator was then tested to ensure appropriate function.     Both surgical wounds were irrigated with antibiotic solution.  A small loop in the leads was created and inserted into the midline incision.  Excess lead was coiled behind the IPG and both were inserted into the pocket site.  Both incisions were closed with a 3-layer closure employing #0-0 Vicryl for the deep fascia; #2-0 Vicryl for superficial fascia; and #3-0 Monocryl for skin.  Dermabond was applied to the wounds.  Wound dressing consisted of non-adhesive dressing followed by gauze and secured with transparant, adhesive bandages.    Complications: There were no complications.    Blood Loss: less than 5 mL    Follow-Up: The patient will follow up in clinic as scheduled for post-op wound check.    Geremias Knox Jr,  MD  Interventional Pain Medicine / Anesthesiology

## 2022-09-23 NOTE — ANESTHESIA PREPROCEDURE EVALUATION
09/23/2022  Dalton Kent is a 56 y.o., male.  Past Medical History:   Diagnosis Date    Alcohol abuse     Anxiety     Depression     GERD (gastroesophageal reflux disease)     Headache     Hx of psychiatric care     Hypertension     Hypothyroid     Lyme disease     Restless leg syndrome     Sleep difficulties     Therapy     Uncontrolled REM sleep behavior disorder     Weakness generalized 8/17/2022     Past Surgical History:   Procedure Laterality Date    ANAL FISTULOTOMY      ANAL SPHINCTEROTOMY      APPENDECTOMY      BACK SURGERY      CARPAL TUNNEL RELEASE      bilateral    CAUDAL EPIDURAL STEROID INJECTION N/A 4/29/2022    Procedure: CAUDAL EPIDURAL STEROID INJECTION WITH CATHETER;  Surgeon: Tori Prado MD;  Location: [facility] OR;  Service: Pain Management;  Laterality: N/A;    COLONOSCOPY  2/2015    COLONOSCOPY N/A 8/29/2019    Procedure: COLONOSCOPY WITH BIOPSY;  Surgeon: Giuseppe Mart MD;  Location: [facility] ENDO;  Service: Endoscopy;  Laterality: N/A;    COLONOSCOPY N/A 5/4/2022    Procedure: COLONOSCOPY;  Surgeon: Galina Sterling MD;  Location: [facility] ENDO;  Service: Endoscopy;  Laterality: N/A;    EPIDURAL STEROID INJECTION N/A 2/21/2020    Procedure: CERVICAL EPIDURAL STEROID INJECTION (C7-T1 INTERLAMINAR);  Surgeon: Tori Prado MD;  Location: [facility] OR;  Service: Pain Management;  Laterality: N/A;    HAND SURGERY Bilateral     INJECTION OF ANESTHETIC AGENT INTO SACROILIAC JOINT Left 5/14/2021    Procedure: SACROILIAC JOINT INJECTION;  Surgeon: Tori Prado MD;  Location: [facility] OR;  Service: Pain Management;  Laterality: Left;    INJECTION OF STEROID Left 5/14/2021    Procedure: GREATER TROCHANTERIC BURSA INJECTION;  Surgeon: Tori Prado MD;  Location: [facility] OR;  Service: Pain Management;  Laterality: Left;    NECK SURGERY      NOSE SURGERY       TRANSFORAMINAL EPIDURAL INJECTION OF STEROID Left 3/26/2021    Procedure: TRANSFORAMINAL EPIDURAL STEROID INJECTION (L3);  Surgeon: Tori Prado MD;  Location: [facility] OR;  Service: Pain Management;  Laterality: Left;    TRIAL OF SPINAL CORD NERVE STIMULATOR N/A 8/19/2022    Procedure: SPINAL CORD NEUROSTIMULATOR TRIAL;  Surgeon: Tori Prado MD;  Location: [facility] OR;  Service: Pain Management;  Laterality: N/A;    TRIGGER FINGER RELEASE           Pre-op Assessment       I have reviewed the Medications.     Review of Systems  Anesthesia Hx:  Denies Family Hx of Anesthesia complications.    Social:  Former Smoker    Cardiovascular:   Hypertension    Pulmonary:   Sleep Apnea    Hepatic/GI:   GERD Liver Disease,    Neurological:   Neuromuscular Disease, Headaches    Endocrine:   Hypothyroidism    Psych:   Psychiatric History             Anesthesia Plan  Type of Anesthesia, risks & benefits discussed:    Anesthesia Type: MAC  Intra-op Monitoring Plan: Standard ASA Monitors  Post Op Pain Control Plan: multimodal analgesia  Informed Consent: Informed consent signed with the Patient and all parties understand the risks and agree with anesthesia plan.  All questions answered.   ASA Score: 2    Ready For Surgery From Anesthesia Perspective.     .

## 2022-09-23 NOTE — DISCHARGE SUMMARY
[facility]  Discharge Note  Short Stay     Admit Date: 9/23/2022    Discharge Date: 9/23/2022     Attending Physician: Geremias Knox Jr, MD    Diagnoses: Failed Back Surgical Syndrome & Chronic Pain Syndrome    Discharged Condition: Good     Hospital Course: Patient was admitted for an outpatient interventional pain management procedure and tolerated the procedure well with no complications.     Final Diagnoses: Same as principal problem.     Disposition: Home or Self Care     Follow up/Patient Instructions:    Reconciled Medications:     Medication List        START taking these medications      HYDROcodone-acetaminophen 5-325 mg per tablet  Commonly known as: NORCO  Take 1 tablet by mouth every 8 (eight) hours as needed for Pain.            CONTINUE taking these medications      allopurinoL 300 MG tablet  Commonly known as: ZYLOPRIM  TAKE 1 TABLET BY MOUTH ONCE DAILY     amitriptyline 25 MG tablet  Commonly known as: ELAVIL  Take 25 mg by mouth every evening.     benazepriL 10 MG tablet  Commonly known as: LOTENSIN  Take 1 tablet by mouth once daily.     buPROPion 100 MG TBSR 12 hr tablet  Commonly known as: WELLBUTRIN SR  Take 100 mg by mouth once daily.     cloNIDine 0.1 MG tablet  Commonly known as: CATAPRES  TAKE 1 TABLET BY MOUTH AT  NIGHT     diclofenac sodium 1 % Gel  Commonly known as: VOLTAREN  Apply to affected joints 2-3 times daily     ipratropium 21 mcg (0.03 %) nasal spray  Commonly known as: ATROVENT  USE 2 SPRAYS NASALLY TWICE  DAILY     lamoTRIgine 100 MG tablet  Commonly known as: LAMICTAL  Take 100 mg by mouth nightly.     metoprolol succinate 50 MG 24 hr tablet  Commonly known as: TOPROL-XL  Take 50 mg by mouth 2 (two) times daily.     pantoprazole 40 MG tablet  Commonly known as: PROTONIX  Take 1 tablet (40 mg total) by mouth once daily.     prazosin 2 MG Cap  Commonly known as: MINIPRESS  Take 2 mg by mouth nightly.     pregabalin 50 MG capsule  Commonly known as: LYRICA  Take  1 capsule (50 mg total) by mouth 2 (two) times daily.     rosuvastatin 10 MG tablet  Commonly known as: CRESTOR  TAKE 1 TABLET BY MOUTH  MONDAY, WEDNESDAY, AND  FRIDAY     SYNTHROID 112 MCG tablet  Generic drug: levothyroxine  Take 112 mg by mouth once daily.     thiamine 100 MG tablet  Take 1 tablet (100 mg total) by mouth once daily.     vitamins-lipotropics Tab  Take 1 tablet by mouth 2 (two) times a day.             Discharge Procedure Orders (must include Diet, Follow-up, Activity)   Diet Adult Regular     No driving until:   Order Comments: If you received sedation, no driving for 12 hrs     Remove dressing in 24 hours     Notify your health care provider if you experience any of the following:  temperature >100.4     Notify your health care provider if you experience any of the following:  severe uncontrolled pain     Notify your health care provider if you experience any of the following:  redness, tenderness, or signs of infection (pain, swelling, redness, odor or green/yellow discharge around incision site)     Notify your health care provider if you experience any of the following:  difficulty breathing or increased cough     Notify your health care provider if you experience any of the following:  severe persistent headache     Notify your health care provider if you experience any of the following:  increased confusion or weakness     Activity as tolerated       Geremias Knox Jr, MD  Interventional Pain Medicine / Anesthesiology

## 2022-09-23 NOTE — INTERVAL H&P NOTE
The patient was examined and no significant changes were noted from the updated H&P or last clinic note.    The risks and benefits of this procedure, including alternative therapies, were discussed with the patient.  The discussion of risks included infection, bleeding, need for additional procedures or surgery, nerve damage, paralysis, adverse medication reaction(s), stroke, and if appropriate for the procedure, death.  Questions regarding the procedure, risks, expected outcome, and possible side effects were solicited and answered to Dalton's satisfaction.  Dalton Kent wishes to proceed with the injection or procedure as confirmed by written consent.

## 2022-09-30 ENCOUNTER — TELEPHONE (OUTPATIENT)
Dept: PSYCHIATRY | Facility: CLINIC | Age: 57
End: 2022-09-30
Payer: MEDICARE

## 2022-09-30 ENCOUNTER — OFFICE VISIT (OUTPATIENT)
Dept: PAIN MEDICINE | Facility: CLINIC | Age: 57
End: 2022-09-30
Payer: MEDICARE

## 2022-09-30 VITALS
HEART RATE: 70 BPM | SYSTOLIC BLOOD PRESSURE: 179 MMHG | WEIGHT: 199.94 LBS | DIASTOLIC BLOOD PRESSURE: 93 MMHG | BODY MASS INDEX: 32.27 KG/M2

## 2022-09-30 DIAGNOSIS — M47.816 LUMBAR SPONDYLOSIS: ICD-10-CM

## 2022-09-30 DIAGNOSIS — G89.4 CHRONIC PAIN SYNDROME: ICD-10-CM

## 2022-09-30 DIAGNOSIS — M54.50 LOW BACK PAIN, UNSPECIFIED BACK PAIN LATERALITY, UNSPECIFIED CHRONICITY, UNSPECIFIED WHETHER SCIATICA PRESENT: ICD-10-CM

## 2022-09-30 DIAGNOSIS — Z98.1 HISTORY OF LUMBAR SPINAL FUSION: ICD-10-CM

## 2022-09-30 DIAGNOSIS — Z96.89 S/P INSERTION OF SPINAL CORD STIMULATOR: Primary | ICD-10-CM

## 2022-09-30 DIAGNOSIS — M96.1 FAILED BACK SURGICAL SYNDROME: ICD-10-CM

## 2022-09-30 DIAGNOSIS — M96.1 POSTLAMINECTOMY SYNDROME OF LUMBAR REGION: ICD-10-CM

## 2022-09-30 PROCEDURE — 3008F PR BODY MASS INDEX (BMI) DOCUMENTED: ICD-10-PCS | Mod: CPTII,S$GLB,, | Performed by: NURSE PRACTITIONER

## 2022-09-30 PROCEDURE — 3077F PR MOST RECENT SYSTOLIC BLOOD PRESSURE >= 140 MM HG: ICD-10-PCS | Mod: CPTII,S$GLB,, | Performed by: NURSE PRACTITIONER

## 2022-09-30 PROCEDURE — 3080F DIAST BP >= 90 MM HG: CPT | Mod: CPTII,S$GLB,, | Performed by: NURSE PRACTITIONER

## 2022-09-30 PROCEDURE — 1159F MED LIST DOCD IN RCRD: CPT | Mod: CPTII,S$GLB,, | Performed by: NURSE PRACTITIONER

## 2022-09-30 PROCEDURE — 1160F PR REVIEW ALL MEDS BY PRESCRIBER/CLIN PHARMACIST DOCUMENTED: ICD-10-PCS | Mod: CPTII,S$GLB,, | Performed by: NURSE PRACTITIONER

## 2022-09-30 PROCEDURE — 3044F HG A1C LEVEL LT 7.0%: CPT | Mod: CPTII,S$GLB,, | Performed by: NURSE PRACTITIONER

## 2022-09-30 PROCEDURE — 4010F PR ACE/ARB THEARPY RXD/TAKEN: ICD-10-PCS | Mod: CPTII,S$GLB,, | Performed by: NURSE PRACTITIONER

## 2022-09-30 PROCEDURE — 3077F SYST BP >= 140 MM HG: CPT | Mod: CPTII,S$GLB,, | Performed by: NURSE PRACTITIONER

## 2022-09-30 PROCEDURE — 1160F RVW MEDS BY RX/DR IN RCRD: CPT | Mod: CPTII,S$GLB,, | Performed by: NURSE PRACTITIONER

## 2022-09-30 PROCEDURE — 1159F PR MEDICATION LIST DOCUMENTED IN MEDICAL RECORD: ICD-10-PCS | Mod: CPTII,S$GLB,, | Performed by: NURSE PRACTITIONER

## 2022-09-30 PROCEDURE — 3080F PR MOST RECENT DIASTOLIC BLOOD PRESSURE >= 90 MM HG: ICD-10-PCS | Mod: CPTII,S$GLB,, | Performed by: NURSE PRACTITIONER

## 2022-09-30 PROCEDURE — 99024 POSTOP FOLLOW-UP VISIT: CPT | Mod: S$GLB,,, | Performed by: NURSE PRACTITIONER

## 2022-09-30 PROCEDURE — 3008F BODY MASS INDEX DOCD: CPT | Mod: CPTII,S$GLB,, | Performed by: NURSE PRACTITIONER

## 2022-09-30 PROCEDURE — 99024 PR POST-OP FOLLOW-UP VISIT: ICD-10-PCS | Mod: S$GLB,,, | Performed by: NURSE PRACTITIONER

## 2022-09-30 PROCEDURE — 99999 PR PBB SHADOW E&M-EST. PATIENT-LVL IV: CPT | Mod: PBBFAC,,, | Performed by: NURSE PRACTITIONER

## 2022-09-30 PROCEDURE — 99999 PR PBB SHADOW E&M-EST. PATIENT-LVL IV: ICD-10-PCS | Mod: PBBFAC,,, | Performed by: NURSE PRACTITIONER

## 2022-09-30 PROCEDURE — 4010F ACE/ARB THERAPY RXD/TAKEN: CPT | Mod: CPTII,S$GLB,, | Performed by: NURSE PRACTITIONER

## 2022-09-30 PROCEDURE — 3044F PR MOST RECENT HEMOGLOBIN A1C LEVEL <7.0%: ICD-10-PCS | Mod: CPTII,S$GLB,, | Performed by: NURSE PRACTITIONER

## 2022-09-30 NOTE — TELEPHONE ENCOUNTER
----- Message from Kassidy Thompson PsyD sent at 9/20/2022 10:43 AM CDT -----  Virtual appointment please

## 2022-09-30 NOTE — PROGRESS NOTES
Post-Op Follow Up for Spinal Cord Stimulator Implant     Subjective  Dalton Callum Kent returns today following SCS implant with a NEVRO  system.  Patient reports mild to moderate incisional discomfort and denies fever, chills, wound drainage, or increasing tenderness of the surgical sites.  He is reporting 80% improvement with this stimulation, he express wanting to start performing more of his ADLs.    Objective  Vitals:    09/30/22 0820   BP: (!) 179/93   Pulse: 70   Weight: 90.7 kg (199 lb 15.3 oz)   PainSc:   7       Exam  Dressing removed to expose wounds on low back.  Dermabond remains intact over the wounds.  There is no erythema or discharge.  Wounds are appear clean, dry, and intact.        Assessment:  Successful SCS implant with appropriate wound healing.    Plan  Met with Nevro Rep this am for programming system check  OK to shower.  Do no submerge wound for 2 weeks in bath/pool.  Recommended no bending lifting or twisting    Follow Up: RTC 1 week    ELAINE MoniqueC  Interventional Pain Management    Disclaimer: This note was partly generated using dictation software which may occasionally result in transcription errors.

## 2022-09-30 NOTE — TELEPHONE ENCOUNTER
Called patient to schedule f/u virtual appt per provider request. Patient agreed to date/time offered so it was scheduled. Nothing further needed at this time.

## 2022-10-05 ENCOUNTER — OFFICE VISIT (OUTPATIENT)
Dept: PSYCHIATRY | Facility: CLINIC | Age: 57
End: 2022-10-05
Payer: COMMERCIAL

## 2022-10-05 DIAGNOSIS — G89.4 CHRONIC PAIN SYNDROME: Primary | ICD-10-CM

## 2022-10-05 PROCEDURE — 90834 PR PSYCHOTHERAPY W/PATIENT, 45 MIN: ICD-10-PCS | Mod: 95,,, | Performed by: PSYCHOLOGIST

## 2022-10-05 PROCEDURE — 99999 PR PBB SHADOW E&M-EST. PATIENT-LVL II: ICD-10-PCS | Mod: PBBFAC,,, | Performed by: PSYCHOLOGIST

## 2022-10-05 PROCEDURE — 90834 PSYTX W PT 45 MINUTES: CPT | Mod: 95,,, | Performed by: PSYCHOLOGIST

## 2022-10-05 PROCEDURE — 4010F ACE/ARB THERAPY RXD/TAKEN: CPT | Mod: CPTII,95,, | Performed by: PSYCHOLOGIST

## 2022-10-05 PROCEDURE — 99999 PR PBB SHADOW E&M-EST. PATIENT-LVL II: CPT | Mod: PBBFAC,,, | Performed by: PSYCHOLOGIST

## 2022-10-05 PROCEDURE — 1160F PR REVIEW ALL MEDS BY PRESCRIBER/CLIN PHARMACIST DOCUMENTED: ICD-10-PCS | Mod: CPTII,95,, | Performed by: PSYCHOLOGIST

## 2022-10-05 PROCEDURE — 4010F PR ACE/ARB THEARPY RXD/TAKEN: ICD-10-PCS | Mod: CPTII,95,, | Performed by: PSYCHOLOGIST

## 2022-10-05 PROCEDURE — 1160F RVW MEDS BY RX/DR IN RCRD: CPT | Mod: CPTII,95,, | Performed by: PSYCHOLOGIST

## 2022-10-05 PROCEDURE — 1159F MED LIST DOCD IN RCRD: CPT | Mod: CPTII,95,, | Performed by: PSYCHOLOGIST

## 2022-10-05 PROCEDURE — 3044F PR MOST RECENT HEMOGLOBIN A1C LEVEL <7.0%: ICD-10-PCS | Mod: CPTII,95,, | Performed by: PSYCHOLOGIST

## 2022-10-05 PROCEDURE — 3044F HG A1C LEVEL LT 7.0%: CPT | Mod: CPTII,95,, | Performed by: PSYCHOLOGIST

## 2022-10-05 PROCEDURE — 1159F PR MEDICATION LIST DOCUMENTED IN MEDICAL RECORD: ICD-10-PCS | Mod: CPTII,95,, | Performed by: PSYCHOLOGIST

## 2022-10-05 NOTE — PROGRESS NOTES
Individual Psychotherapy (PhD/LCSW)  10/05/2022     Site/Location:  Pt is at home ([place], [place]) attending a Telemedicine Individual Therapy Session. Due to technical issues, the remainder of pt's session was conducted via phone call.      Visit Type: 45 min outpt individual psychotherapy     Therapeutic Intervention: Met with patient Outpatient - Interactive psychotherapy 45 min - CPT code 88152     Chief complaint/reason for encounter: Pain     Interval history and content of current session: Pain Hx: Pt has pain in his lower back radiating to left leg. He has had 2 spinal surgeries. 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 He had 2 bulging discs and now has scar tissue. He notes the surgeries helped somewhat to alleviate the pain but he continues to experience pain. Pt has had epidural injections but the pain worsened with the last injection. Pt notes he has  Attended PT and received myofacial injections. His pain affects his mood. He's currently attending outpt therapy to address sxs of depression and anxiety. He notes these sxs are managed well. Pt denies a trauma hx. He is not working and is receiving disability benefits. He notes he has been dx with Dementia and suffers memory loss. On most days his pain is at 8/10. His pain ranges from 3/10 - 10/10. His pain worsens with prolonged standing, bending, sitting and laying down. His pain is also affected by the cold. 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 His pain decreases with short periods of sitting. Pt sleeps on his side. He reports pain medications do not help to alleviate his pain.      Pt's pain today is at a 3/10. He was introduced to Gardiner Control Theory of Pain Management. Pt learned about the pain response at the starting of the pain message, along the sensory nerves, spinal cord level, limbic area of the brain and at the cortex level. Pt learned the importance of pacing,  distraction, relaxation, and positive emotions in order to close the martinez. Pt asked appropriate questions indicating an understanding of the material presented. At pt's follow up session, pt to be led through ImTT for pain management.      Treatment plan:  Target symptoms: pain  Why chosen therapy is appropriate versus another modality: Relevant to diagnosis  Outcome monitoring methods: self-report  Therapeutic intervention type: supportive psychotherapy     Risk parameters:  Patient reports no suicidal ideation  Patient reports no homicidal ideation  Patient reports no self-injurious behavior  Patient reports no violent behavior     Verbal deficits: None     Patient's response to intervention:  The patient's response to intervention is accepting.     Progress toward goals and other mental status changes:  The patient's progress toward goals is good.     Diagnosis:   Chronic pain syndrome  Major Depressive Disorder, recurrent, mild        Plan:  individual psychotherapy     Return to clinic: 2 weeks     Length of Service (minutes): 38         Each patient to whom he or she provides medical services by telemedicine is: (1) informed of the relationship between the physician and patient and the respective role of any other health care provider with respect to management of the patient; and (2) notified that he or she may decline to receive medical services by telemedicine and may withdraw from such care at any time.

## 2022-10-07 NOTE — ANESTHESIA POSTPROCEDURE EVALUATION
Anesthesia Post Evaluation    Patient: Dalton Kent    Procedure(s) Performed: Procedure(s) (LRB):  SCS Implant (N/A)  FLUOROSCOPY    Final Anesthesia Type: MAC      Patient location during evaluation: PACU  Patient participation: Yes- Able to Participate  Level of consciousness: awake and alert  Post-procedure vital signs: reviewed and stable  Pain management: adequate  Airway patency: patent    PONV status at discharge: No PONV  Anesthetic complications: no      Cardiovascular status: blood pressure returned to baseline  Respiratory status: unassisted  Hydration status: euvolemic  Follow-up not needed.          Vitals Value Taken Time   /76 09/23/22 1030   Temp 36.4 °C (97.5 °F) 09/23/22 0927   Pulse 57 09/23/22 1030   Resp 16 09/23/22 1030   SpO2 100 % 09/23/22 1030         No case tracking events are documented in the log.      Pain/Adarsh Score: No data recorded

## 2022-10-10 ENCOUNTER — OFFICE VISIT (OUTPATIENT)
Dept: PAIN MEDICINE | Facility: CLINIC | Age: 57
End: 2022-10-10
Payer: MEDICARE

## 2022-10-10 VITALS
WEIGHT: 199.94 LBS | SYSTOLIC BLOOD PRESSURE: 151 MMHG | HEART RATE: 83 BPM | BODY MASS INDEX: 32.27 KG/M2 | DIASTOLIC BLOOD PRESSURE: 89 MMHG

## 2022-10-10 DIAGNOSIS — M47.816 LUMBAR SPONDYLOSIS: ICD-10-CM

## 2022-10-10 DIAGNOSIS — G89.4 CHRONIC PAIN SYNDROME: ICD-10-CM

## 2022-10-10 DIAGNOSIS — Z96.89 S/P INSERTION OF SPINAL CORD STIMULATOR: Primary | ICD-10-CM

## 2022-10-10 DIAGNOSIS — M96.1 FAILED BACK SURGICAL SYNDROME: ICD-10-CM

## 2022-10-10 DIAGNOSIS — M54.16 LUMBAR RADICULOPATHY, CHRONIC: ICD-10-CM

## 2022-10-10 DIAGNOSIS — Z98.1 HISTORY OF LUMBAR SPINAL FUSION: ICD-10-CM

## 2022-10-10 DIAGNOSIS — M54.50 LOW BACK PAIN, UNSPECIFIED BACK PAIN LATERALITY, UNSPECIFIED CHRONICITY, UNSPECIFIED WHETHER SCIATICA PRESENT: ICD-10-CM

## 2022-10-10 DIAGNOSIS — M96.1 POSTLAMINECTOMY SYNDROME OF LUMBAR REGION: ICD-10-CM

## 2022-10-10 PROCEDURE — 3008F BODY MASS INDEX DOCD: CPT | Mod: CPTII,S$GLB,, | Performed by: NURSE PRACTITIONER

## 2022-10-10 PROCEDURE — 3044F PR MOST RECENT HEMOGLOBIN A1C LEVEL <7.0%: ICD-10-PCS | Mod: CPTII,S$GLB,, | Performed by: NURSE PRACTITIONER

## 2022-10-10 PROCEDURE — 1159F PR MEDICATION LIST DOCUMENTED IN MEDICAL RECORD: ICD-10-PCS | Mod: CPTII,S$GLB,, | Performed by: NURSE PRACTITIONER

## 2022-10-10 PROCEDURE — 99024 POSTOP FOLLOW-UP VISIT: CPT | Mod: S$GLB,,, | Performed by: NURSE PRACTITIONER

## 2022-10-10 PROCEDURE — 99999 PR PBB SHADOW E&M-EST. PATIENT-LVL IV: ICD-10-PCS | Mod: PBBFAC,,, | Performed by: NURSE PRACTITIONER

## 2022-10-10 PROCEDURE — 3008F PR BODY MASS INDEX (BMI) DOCUMENTED: ICD-10-PCS | Mod: CPTII,S$GLB,, | Performed by: NURSE PRACTITIONER

## 2022-10-10 PROCEDURE — 4010F ACE/ARB THERAPY RXD/TAKEN: CPT | Mod: CPTII,S$GLB,, | Performed by: NURSE PRACTITIONER

## 2022-10-10 PROCEDURE — 4010F PR ACE/ARB THEARPY RXD/TAKEN: ICD-10-PCS | Mod: CPTII,S$GLB,, | Performed by: NURSE PRACTITIONER

## 2022-10-10 PROCEDURE — 1159F MED LIST DOCD IN RCRD: CPT | Mod: CPTII,S$GLB,, | Performed by: NURSE PRACTITIONER

## 2022-10-10 PROCEDURE — 1160F PR REVIEW ALL MEDS BY PRESCRIBER/CLIN PHARMACIST DOCUMENTED: ICD-10-PCS | Mod: CPTII,S$GLB,, | Performed by: NURSE PRACTITIONER

## 2022-10-10 PROCEDURE — 3044F HG A1C LEVEL LT 7.0%: CPT | Mod: CPTII,S$GLB,, | Performed by: NURSE PRACTITIONER

## 2022-10-10 PROCEDURE — 3079F DIAST BP 80-89 MM HG: CPT | Mod: CPTII,S$GLB,, | Performed by: NURSE PRACTITIONER

## 2022-10-10 PROCEDURE — 3079F PR MOST RECENT DIASTOLIC BLOOD PRESSURE 80-89 MM HG: ICD-10-PCS | Mod: CPTII,S$GLB,, | Performed by: NURSE PRACTITIONER

## 2022-10-10 PROCEDURE — 99024 PR POST-OP FOLLOW-UP VISIT: ICD-10-PCS | Mod: S$GLB,,, | Performed by: NURSE PRACTITIONER

## 2022-10-10 PROCEDURE — 3077F PR MOST RECENT SYSTOLIC BLOOD PRESSURE >= 140 MM HG: ICD-10-PCS | Mod: CPTII,S$GLB,, | Performed by: NURSE PRACTITIONER

## 2022-10-10 PROCEDURE — 99999 PR PBB SHADOW E&M-EST. PATIENT-LVL IV: CPT | Mod: PBBFAC,,, | Performed by: NURSE PRACTITIONER

## 2022-10-10 PROCEDURE — 1160F RVW MEDS BY RX/DR IN RCRD: CPT | Mod: CPTII,S$GLB,, | Performed by: NURSE PRACTITIONER

## 2022-10-10 PROCEDURE — 3077F SYST BP >= 140 MM HG: CPT | Mod: CPTII,S$GLB,, | Performed by: NURSE PRACTITIONER

## 2022-10-10 NOTE — PROGRESS NOTES
Post-Op Follow Up for Spinal Cord Stimulator Implant     Subjective  Dalton Callum Kent returns today following SCS implant with a NEVRO system.  Patient reports mild to moderate incisional discomfort and denies fever, chills, wound drainage, or increasing tenderness of the surgical sites.    Objective  Vitals:    10/10/22 0916   BP: (!) 151/89   Pulse: 83   Weight: 90.7 kg (199 lb 15.3 oz)   PainSc:   2       Exam  Dressing removed to expose wounds on low back.  Dermabond remains intact over the wounds.  There is no erythema or discharge.  Wounds are appear clean, dry, and intact.          Assessment:  Successful SCS implant with appropriate wound healing.    Plan  Dressing re-applied.  OK to shower.  Do no submerge wound for 2 weeks in bath/pool.  Patient's wounds have healed appropriately no need for 3rd post op visit     Follow Up: RTC PRN     Paulino Ramirez, DEANNA-C  Interventional Pain Management    Disclaimer: This note was partly generated using dictation software which may occasionally result in transcription errors.

## 2022-10-11 NOTE — OP NOTE
Dear Destinee,  Keep up your great work!  Be vigilant for lows and be sure to keep something with you at all times to treat them as your risk for them does increase in late pregnancy.  If you have any significant lows, please reduce your insulin to just 34 units in the evening and let your OB know right away.  If you go into active labor prior to your evening dose give just half of the dose and let the staff know soon as you arrive at the hospital.  I suggest you bring glucose gel with you to the hospital to treat any low that happens following delivery, in case an IV is not yet established.    I recommend an oral glucose tolerance test 6 weeks following delivery, and an annual A1c always.    My best wishes,    Jackie Macario PA-C, MPAS  [facility]  Diabetes, Endocrinology, and Metabolism  [phone] Appointments/Nurse  [phone] Fax  [phone] URGENTafter hours/weekend Endocrinologist on call     Patient Name: Dalton Kent   Procedure Date: 2019  MRN: [id number]  : 1965  Age: 53 y.o.  Gender: male   Referring Physician :  Horacio Velazquez MD  Plan for Procedure: Monitored anaesthesia care  Indication: asymptomatic screening exam  Procedure:   Colonoscopy biopsy cold forceps    Surgeon(s) and Role:     * Giuseppe Mart MD - Primary    Complications: None     Medicines: monitored anesthesia care    Procedure:  Prior to the procedure, a History and Physical was performed, and patient medications, allergies and sensitivities were reviewed.  The patient's tolerance of previous anesthesia was reviewed. The risks and benefits of the procedure and the sedation options and risks were discussed with the patient.  All questions were answered and informed consent was obtained.    After I obtained informed consent, the scope was passed under direct vision.  Throughout the procedure, the patient's blood pressure, pulse, and oxygen saturations were monitored continuously.  The Olympus scope CF - [id number] was introduced through the anus and advanced to the cecum, identified by appendiceal orifice and ileocecal valve.  The colonoscopy was performed without difficulty.  The patient tolerated the procedure well.  The quality of the bowel preparation was good     Findings:  lipoma, 15 mm in size, located in Proximal ascending colon, biopsies obtained with cold biopsy forceps    EBL: none    Impression:  Ascending colon lipoma  Otherwise normal colonoscopy to the cecum with no evidence of neoplasia, diverticular disease or mucosal abnormality.    Recommendation:  Repeat exam: 10 years  Return to my office prn  High fiber diet

## 2022-10-18 NOTE — PROGRESS NOTES
Individual Psychotherapy (PhD/LCSW)  10/20/2022     Site/Location:  Pt is at home ([place], [place]) attending a Telemedicine Individual Therapy Session.      Visit Type: 30 min outpt individual psychotherapy     Therapeutic Intervention: Met with patient Outpatient - Interactive psychotherapy 30 min - CPT code 06975     Chief complaint/reason for encounter: Pain     Interval history and content of current session: Pain Hx: Pt has pain in his lower back radiating to left leg. He has had 2 spinal surgeries. 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 He had 2 bulging discs and now has scar tissue. He notes the surgeries helped somewhat to alleviate the pain but he continues to experience pain. Pt has had epidural injections but the pain worsened with the last injection. Pt notes he has  Attended PT and received myofacial injections. His pain affects his mood. He's currently attending outpt therapy to address sxs of depression and anxiety. He notes these sxs are managed well. Pt denies a trauma hx. He is not working and is receiving disability benefits. He notes he has been dx with Dementia and suffers memory loss. On most days his pain is at 8/10. His pain ranges from 3/10 - 10/10. His pain worsens with prolonged standing, bending, sitting and laying down. His pain is also affected by the cold. 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 His pain decreases with short periods of sitting. Pt sleeps on his side. He reports pain medications do not help to alleviate his pain.      Pt was led through passive muscle relaxation as he reported a 0/10 pain level due to the help of the spinal stimulator. Pt participated well during PMR noting his body felt relaxed. Therapist provided pt with resources on PMR and encouraged pt to try it on his own. Therapist reviewed with pt pain psychology theories and tools including the Vicious Cycles of Pain, [place] Control  Theory, the importance of sleep, positive thinking, and pacing himself. Pt noted he did not have any questions. This concludes pt's pain psychology tx sequence. Pt was encouraged to contact the clinic if he has any questions or is in need of future tx sessions. No further follow up needed by this clinician. Pt's chart is closed.      Treatment plan:  Target symptoms: pain  Why chosen therapy is appropriate versus another modality: Relevant to diagnosis  Outcome monitoring methods: self-report  Therapeutic intervention type: supportive psychotherapy     Risk parameters:  Patient reports no suicidal ideation  Patient reports no homicidal ideation  Patient reports no self-injurious behavior  Patient reports no violent behavior     Verbal deficits: None     Patient's response to intervention:  The patient's response to intervention is accepting.     Progress toward goals and other mental status changes:  The patient's progress toward goals is excellent..     Diagnosis:   Chronic pain syndrome  Major Depressive Disorder, recurrent, mild        Length of Service (minutes): 30         Each patient to whom he or she provides medical services by telemedicine is: (1) informed of the relationship between the physician and patient and the respective role of any other health care provider with respect to management of the patient; and (2) notified that he or she may decline to receive medical services by telemedicine and may withdraw from such care at any time.

## 2022-10-20 ENCOUNTER — PATIENT MESSAGE (OUTPATIENT)
Dept: PSYCHIATRY | Facility: CLINIC | Age: 57
End: 2022-10-20
Payer: MEDICARE

## 2022-10-20 ENCOUNTER — OFFICE VISIT (OUTPATIENT)
Dept: PSYCHIATRY | Facility: CLINIC | Age: 57
End: 2022-10-20
Payer: COMMERCIAL

## 2022-10-20 DIAGNOSIS — G89.4 CHRONIC PAIN SYNDROME: Primary | ICD-10-CM

## 2022-10-20 PROCEDURE — 1160F RVW MEDS BY RX/DR IN RCRD: CPT | Mod: CPTII,95,, | Performed by: PSYCHOLOGIST

## 2022-10-20 PROCEDURE — 1159F MED LIST DOCD IN RCRD: CPT | Mod: CPTII,95,, | Performed by: PSYCHOLOGIST

## 2022-10-20 PROCEDURE — 3044F HG A1C LEVEL LT 7.0%: CPT | Mod: CPTII,95,, | Performed by: PSYCHOLOGIST

## 2022-10-20 PROCEDURE — 90832 PR PSYCHOTHERAPY W/PATIENT, 30 MIN: ICD-10-PCS | Mod: 95,S$GLB,, | Performed by: PSYCHOLOGIST

## 2022-10-20 PROCEDURE — 99999 PR PBB SHADOW E&M-EST. PATIENT-LVL II: CPT | Mod: PBBFAC,,, | Performed by: PSYCHOLOGIST

## 2022-10-20 PROCEDURE — 4010F PR ACE/ARB THEARPY RXD/TAKEN: ICD-10-PCS | Mod: CPTII,95,, | Performed by: PSYCHOLOGIST

## 2022-10-20 PROCEDURE — 4010F ACE/ARB THERAPY RXD/TAKEN: CPT | Mod: CPTII,95,, | Performed by: PSYCHOLOGIST

## 2022-10-20 PROCEDURE — 1160F PR REVIEW ALL MEDS BY PRESCRIBER/CLIN PHARMACIST DOCUMENTED: ICD-10-PCS | Mod: CPTII,95,, | Performed by: PSYCHOLOGIST

## 2022-10-20 PROCEDURE — 3044F PR MOST RECENT HEMOGLOBIN A1C LEVEL <7.0%: ICD-10-PCS | Mod: CPTII,95,, | Performed by: PSYCHOLOGIST

## 2022-10-20 PROCEDURE — 99999 PR PBB SHADOW E&M-EST. PATIENT-LVL II: ICD-10-PCS | Mod: PBBFAC,,, | Performed by: PSYCHOLOGIST

## 2022-10-20 PROCEDURE — 90832 PSYTX W PT 30 MINUTES: CPT | Mod: 95,S$GLB,, | Performed by: PSYCHOLOGIST

## 2022-10-20 PROCEDURE — 1159F PR MEDICATION LIST DOCUMENTED IN MEDICAL RECORD: ICD-10-PCS | Mod: CPTII,95,, | Performed by: PSYCHOLOGIST

## 2023-03-20 NOTE — ED PROVIDER NOTES
Encounter Date: 4/4/2020       History     Chief Complaint   Patient presents with    Toe Pain     54-year-old male presents with pain and swelling to his left 4th toe.  He reports that the symptoms have been present for 1 month.  Symptoms have been unchanged since onset.  Denies any injury or trauma.  Patient reports that he was seen by his primary care doctor.  Reports negative x-ray completed at that time.  Reports that he was given an injection (unsure what medication) which lasted for about 12 hr and then the pain returned.  Patient reports decreased range of motion of the toes secondary to pain.  Patient reports mild erythema to the area.  Patient reports intermittent blue/black discoloration to the area.  Patient denies smoking.  No history of diabetes or gout.  Skin remains intact.    The history is provided by the patient.     Review of patient's allergies indicates:   Allergen Reactions    Gralise [gabapentin]      Depression    Norco [hydrocodone-acetaminophen] Itching    Percocet [oxycodone-acetaminophen] Itching     Past Medical History:   Diagnosis Date    Alcohol abuse     Anxiety     Depression     GERD (gastroesophageal reflux disease)     Headache     Hx of psychiatric care     Hypertension     Hypothyroid     Lyme disease     Restless leg syndrome     Sleep difficulties     Therapy     Uncontrolled REM sleep behavior disorder      Past Surgical History:   Procedure Laterality Date    ANAL FISTULOTOMY      ANAL SPHINCTEROTOMY      APPENDECTOMY      BACK SURGERY      CARPAL TUNNEL RELEASE      bilateral    COLONOSCOPY  2/2015    COLONOSCOPY N/A 8/29/2019    Procedure: COLONOSCOPY WITH BIOPSY;  Surgeon: Giuseppe Mart MD;  Location: [facility];  Service: Endoscopy;  Laterality: N/A;    EPIDURAL STEROID INJECTION N/A 2/21/2020    Procedure: CERVICAL EPIDURAL STEROID INJECTION (C7-T1 INTERLAMINAR);  Surgeon: Tori Prado MD;  Location[facility];  Service: Pain Management;   Laterality: N/A;    HAND SURGERY Bilateral     NECK SURGERY      NOSE SURGERY      TRIGGER FINGER RELEASE       Family History   Problem Relation Age of Onset    Colon cancer Neg Hx     Colon polyps Neg Hx     Crohn's disease Neg Hx     Rectal cancer Neg Hx     Ulcerative colitis Neg Hx      Social History     Tobacco Use    Smoking status: Former Smoker    Smokeless tobacco: Never Used   Substance Use Topics    Alcohol use: Not Currently     Comment: socially    Drug use: No     Review of Systems   Constitutional: Negative for fever.   HENT: Negative for congestion, ear pain, rhinorrhea, sore throat and trouble swallowing.    Eyes: Negative for pain and redness.   Respiratory: Negative for cough and shortness of breath.    Cardiovascular: Negative for chest pain.   Gastrointestinal: Negative for abdominal pain.   Genitourinary: Negative for difficulty urinating and dysuria.   Musculoskeletal: Positive for arthralgias (Left 4th toe pain). Negative for back pain, myalgias and neck pain.   Skin: Negative for rash and wound.   Neurological: Negative for seizures, weakness and headaches.   Psychiatric/Behavioral: Negative.        Physical Exam     Initial Vitals [04/04/20 1024]   BP Pulse Resp Temp SpO2   (!) 133/94 78 18 96.6 °F (35.9 °C) 99 %      MAP       --         Physical Exam    Nursing note and vitals reviewed.  Constitutional: No distress.   HENT:   Head: Normocephalic and atraumatic.   Right Ear: External ear normal.   Left Ear: External ear normal.   Nose: Nose normal.   Mouth/Throat: Oropharynx is clear and moist and mucous membranes are normal.   Eyes: Conjunctivae, EOM and lids are normal. Pupils are equal, round, and reactive to light.   Neck: Neck supple.   Cardiovascular: Normal rate, regular rhythm, normal heart sounds and intact distal pulses.   Pulmonary/Chest: Breath sounds normal. No respiratory distress.   Abdominal: Soft. Bowel sounds are normal. There is no tenderness.    Musculoskeletal:        Left foot: There is decreased range of motion (Toes, secondary to pain), tenderness (Fourth toe) and swelling (Fourth toe, also with mild erythema, no fluctuance). There is normal capillary refill, no crepitus, no deformity and no laceration.   Neurological: He is alert and oriented to person, place, and time. He has normal strength.   Skin: Skin is warm and dry. Capillary refill takes less than 2 seconds. No rash noted.   Psychiatric: He has a normal mood and affect. His behavior is normal.         ED Course   Procedures  Labs Reviewed   COMPREHENSIVE METABOLIC PANEL - Abnormal; Notable for the following components:       Result Value    Glucose 124 (*)     BUN, Bld 5 (*)     All other components within normal limits   URIC ACID - Abnormal; Notable for the following components:    Uric Acid 9.8 (*)     All other components within normal limits   CBC W/ AUTO DIFFERENTIAL          Imaging Results          X-Ray Foot Complete Left (Final result)  Result time 04/04/20 11:00:45    Final result by Romie Miner MD (04/04/20 11:00:45)                 Impression:      As above.      Electronically signed by: Romie Miner  Date:    04/04/2020  Time:    11:00             Narrative:    EXAMINATION:  XR FOOT COMPLETE 3 VIEW LEFT    CLINICAL HISTORY:  .  Pain in unspecified toe(s)    TECHNIQUE:  AP, lateral and oblique views of the left foot were performed.    COMPARISON:  None    FINDINGS:  No fracture or dislocation.  No abnormal lytic or blastic lesion.  Lisfranc articulation is congruent.  Cartilage spaces are maintained. Plantar calcaneal spurring is present.  Focal soft tissue swelling about the distal aspect of the 4th digit.  No radiopaque foreign object or subcutaneous emphysema.                                              Medications   colchicine capsule/tablet 1.2 mg (has no administration in time range)   methylPREDNISolone sodium succinate injection 125 mg (has no administration in time  range)   ketorolac injection 30 mg (has no administration in time range)              --Plan of care discussed with collaborating provider. Agrees with plan of care today.                Clinical Impression:       ICD-10-CM ICD-9-CM   1. Acute gout involving toe of left foot, unspecified cause M10.9 274.01   2. Toe pain M79.676 729.5       The patient acknowledges that close follow up with medical provider is required. Instructed to follow up with PCP within 2 days. Patient was given specific return precautions. The patient agrees to comply with all instruction and directions given in the ER.         ED Disposition Condition    Discharge Stable        ED Prescriptions     Medication Sig Dispense Start Date End Date Auth. Provider    indomethacin (INDOCIN) 25 MG capsule Take 1 capsule (25 mg total) by mouth 3 (three) times daily with meals. 21 capsule 4/4/2020  Maria A Valladares NP        Follow-up Information     Follow up With Specialties Details Why Contact Info    Olvin Elder MD Internal Medicine Schedule an appointment as soon as possible for a visit in 2 days  [address] [place] [id number]  [phone]                                       Maria A Valladares NP  04/04/20 6146     Cigars

## 2023-07-08 ENCOUNTER — HOSPITAL ENCOUNTER (EMERGENCY)
Facility: HOSPITAL | Age: 58
Discharge: HOME | End: 2023-07-08
Attending: STUDENT IN AN ORGANIZED HEALTH CARE EDUCATION/TRAINING PROGRAM
Payer: MEDICARE

## 2023-07-08 VITALS
BODY MASS INDEX: 32.86 KG/M2 | WEIGHT: 203.63 LBS | HEART RATE: 93 BPM | OXYGEN SATURATION: 96 % | SYSTOLIC BLOOD PRESSURE: 123 MMHG | DIASTOLIC BLOOD PRESSURE: 68 MMHG | TEMPERATURE: 98 F | RESPIRATION RATE: 18 BRPM

## 2023-07-08 DIAGNOSIS — R10.9 LEFT FLANK PAIN: Primary | ICD-10-CM

## 2023-07-08 LAB
BILIRUB UR QL STRIP: ABNORMAL
CLARITY UR: CLEAR
COLOR UR: YELLOW
GLUCOSE UR QL STRIP: NEGATIVE
HGB UR QL STRIP: NEGATIVE
KETONES UR QL STRIP: ABNORMAL
LEUKOCYTE ESTERASE UR QL STRIP: NEGATIVE
NITRITE UR QL STRIP: NEGATIVE
PH UR STRIP: 6 [PH] (ref 5–8)
PROT UR QL STRIP: NEGATIVE
SP GR UR STRIP: >=1.03 (ref 1–1.03)
URN SPEC COLLECT METH UR: ABNORMAL
UROBILINOGEN UR STRIP-ACNC: NEGATIVE EU/DL

## 2023-07-08 PROCEDURE — 63600175 PHARM REV CODE 636 W HCPCS: Performed by: STUDENT IN AN ORGANIZED HEALTH CARE EDUCATION/TRAINING PROGRAM

## 2023-07-08 PROCEDURE — 81003 URINALYSIS AUTO W/O SCOPE: CPT | Performed by: STUDENT IN AN ORGANIZED HEALTH CARE EDUCATION/TRAINING PROGRAM

## 2023-07-08 PROCEDURE — 96372 THER/PROPH/DIAG INJ SC/IM: CPT | Performed by: STUDENT IN AN ORGANIZED HEALTH CARE EDUCATION/TRAINING PROGRAM

## 2023-07-08 PROCEDURE — 99285 EMERGENCY DEPT VISIT HI MDM: CPT | Mod: 25

## 2023-07-08 PROCEDURE — 25000003 PHARM REV CODE 250: Performed by: STUDENT IN AN ORGANIZED HEALTH CARE EDUCATION/TRAINING PROGRAM

## 2023-07-08 RX ORDER — LIDOCAINE 50 MG/G
1 PATCH TOPICAL DAILY
Qty: 10 PATCH | Refills: 0 | Status: SHIPPED | OUTPATIENT
Start: 2023-07-08 | End: 2023-07-10 | Stop reason: SDUPTHER

## 2023-07-08 RX ORDER — LIDOCAINE 50 MG/G
1 PATCH TOPICAL
Status: DISCONTINUED | OUTPATIENT
Start: 2023-07-08 | End: 2023-07-08

## 2023-07-08 RX ORDER — CYCLOBENZAPRINE HCL 10 MG
10 TABLET ORAL 3 TIMES DAILY PRN
Qty: 15 TABLET | Refills: 0 | Status: SHIPPED | OUTPATIENT
Start: 2023-07-08 | End: 2023-07-13

## 2023-07-08 RX ORDER — LIDOCAINE 50 MG/G
1 PATCH TOPICAL
Status: DISCONTINUED | OUTPATIENT
Start: 2023-07-08 | End: 2023-07-08 | Stop reason: HOSPADM

## 2023-07-08 RX ORDER — CYCLOBENZAPRINE HCL 10 MG
10 TABLET ORAL
Status: COMPLETED | OUTPATIENT
Start: 2023-07-08 | End: 2023-07-08

## 2023-07-08 RX ORDER — KETOROLAC TROMETHAMINE 30 MG/ML
15 INJECTION, SOLUTION INTRAMUSCULAR; INTRAVENOUS
Status: COMPLETED | OUTPATIENT
Start: 2023-07-08 | End: 2023-07-08

## 2023-07-08 RX ORDER — KETOROLAC TROMETHAMINE 30 MG/ML
15 INJECTION, SOLUTION INTRAMUSCULAR; INTRAVENOUS
Status: DISCONTINUED | OUTPATIENT
Start: 2023-07-08 | End: 2023-07-08

## 2023-07-08 RX ADMIN — LIDOCAINE 5% 1 PATCH: 700 PATCH TOPICAL at 02:07

## 2023-07-08 RX ADMIN — KETOROLAC TROMETHAMINE 15 MG: 30 INJECTION, SOLUTION INTRAMUSCULAR; INTRAVENOUS at 02:07

## 2023-07-08 RX ADMIN — CYCLOBENZAPRINE 10 MG: 10 TABLET, FILM COATED ORAL at 02:07

## 2023-07-08 NOTE — ED PROVIDER NOTES
Encounter Date: 7/8/2023       History     Chief Complaint   Patient presents with    Back Pain     57-year-old male with history of previous spinal surgery that required spinal implant, presenting with left-sided flank pain.  Patient was seen one week ago and diagnosed with urinary tract infection, started on Bactrim.  Patient denies fever, vomiting, diarrhea, abdominal pain, chest pain, shortness breath.  No other complaints.  Denies dysuria.  No prior kidney stones.    Review of patient's allergies indicates:   Allergen Reactions    Gralise [gabapentin]      Depression    Percocet [oxycodone-acetaminophen] Itching    Statins-hmg-coa reductase inhibitors      Past Medical History:   Diagnosis Date    Alcohol abuse     Anxiety     Depression     GERD (gastroesophageal reflux disease)     Headache     Hx of psychiatric care     Hypertension     Hypothyroid     Lyme disease     Restless leg syndrome     Sleep difficulties     Therapy     Uncontrolled REM sleep behavior disorder     Weakness generalized 8/17/2022     Past Surgical History:   Procedure Laterality Date    ANAL FISTULOTOMY      ANAL SPHINCTEROTOMY      APPENDECTOMY      BACK SURGERY      CARPAL TUNNEL RELEASE      bilateral    CAUDAL EPIDURAL STEROID INJECTION N/A 4/29/2022    Procedure: CAUDAL EPIDURAL STEROID INJECTION WITH CATHETER;  Surgeon: Tori Prado MD;  Location: [facility] OR;  Service: Pain Management;  Laterality: N/A;    COLONOSCOPY  2/2015    COLONOSCOPY N/A 8/29/2019    Procedure: COLONOSCOPY WITH BIOPSY;  Surgeon: Giuseppe Mart MD;  Location: [facility];  Service: Endoscopy;  Laterality: N/A;    COLONOSCOPY N/A 5/4/2022    Procedure: COLONOSCOPY;  Surgeon: Galina Sterling MD;  Location: [facility] ENDO;  Service: Endoscopy;  Laterality: N/A;    EPIDURAL STEROID INJECTION N/A 2/21/2020    Procedure: CERVICAL EPIDURAL STEROID INJECTION (C7-T1 INTERLAMINAR);  Surgeon: Tori Prado MD;  Location: [facility] OR;  Service: Pain Management;  Laterality:  N/A;    FLUOROSCOPY  2022    Procedure: FLUOROSCOPY;  Surgeon: Geremias Knox Jr., MD;  Location: [facility] OR;  Service: Pain Management;;    HAND SURGERY Bilateral     INJECTION OF ANESTHETIC AGENT INTO SACROILIAC JOINT Left 2021    Procedure: SACROILIAC JOINT INJECTION;  Surgeon: Tori Prado MD;  Location: [facility] OR;  Service: Pain Management;  Laterality: Left;    INJECTION OF STEROID Left 2021    Procedure: GREATER TROCHANTERIC BURSA INJECTION;  Surgeon: Tori Prado MD;  Location: [facility] OR;  Service: Pain Management;  Laterality: Left;    INSERTION OF NEUROSTIMULATOR OF DORSAL COLUMN OF SPINAL CORD N/A 2022    Procedure: SCS Implant;  Surgeon: Geremias Knox Jr., MD;  Location: [facility] OR;  Service: Pain Management;  Laterality: N/A;  Brock Aquino [phone]   confirmed CW     NECK SURGERY      NOSE SURGERY      TRANSFORAMINAL EPIDURAL INJECTION OF STEROID Left 3/26/2021    Procedure: TRANSFORAMINAL EPIDURAL STEROID INJECTION (L3);  Surgeon: Tori Prado MD;  Location: [facility] OR;  Service: Pain Management;  Laterality: Left;    TRIAL OF SPINAL CORD NERVE STIMULATOR N/A 2022    Procedure: SPINAL CORD NEUROSTIMULATOR TRIAL;  Surgeon: Tori Prado MD;  Location: [facility] OR;  Service: Pain Management;  Laterality: N/A;    TRIGGER FINGER RELEASE       Family History   Problem Relation Age of Onset    Colon cancer Neg Hx     Colon polyps Neg Hx     Crohn's disease Neg Hx     Rectal cancer Neg Hx     Ulcerative colitis Neg Hx      Social History     Tobacco Use    Smoking status: Former     Packs/day: 3.00     Years: 11.00     Pack years: 33.00     Types: Cigarettes     Start date:      Quit date:      Years since quittin.5     Passive exposure: Past    Smokeless tobacco: Never   Substance Use Topics    Alcohol use: Not Currently     Comment: socially    Drug use: No     Review of Systems   Constitutional:  Negative for fever.   HENT:  Negative for sore throat.     Respiratory:  Negative for shortness of breath.    Cardiovascular:  Negative for chest pain.   Gastrointestinal:  Negative for abdominal pain, diarrhea, nausea and vomiting.   Genitourinary:  Positive for flank pain. Negative for dysuria.   Musculoskeletal:  Negative for back pain.   Skin:  Negative for rash.   Neurological:  Negative for weakness.   Hematological:  Does not bruise/bleed easily.     Physical Exam     Initial Vitals [07/08/23 1312]   BP Pulse Resp Temp SpO2   123/68 93 20 97.6 °F (36.4 °C) 96 %      MAP       --         Physical Exam    Nursing note and vitals reviewed.  Constitutional: He appears well-developed.   Eyes: EOM are normal.   Neck:   Normal range of motion.  Cardiovascular:            No murmur heard.  Pulmonary/Chest: No respiratory distress.   Abdominal: He exhibits no distension.   Positive left-sided CVA tenderness.  No right CVA tenderness.  No abdominal tenderness to palpation.  No rebound or guarding.   Musculoskeletal:         General: Normal range of motion.      Cervical back: Normal range of motion.     Neurological: He is alert.   Skin: Skin is warm.   Psychiatric: He has a normal mood and affect.       ED Course   Procedures  Labs Reviewed   URINALYSIS, REFLEX TO URINE CULTURE - Abnormal; Notable for the following components:       Result Value    Specific Gravity, UA >=1.030 (*)     Ketones, UA Trace (*)     Bilirubin (UA) 2+ (*)     All other components within normal limits    Narrative:     Specimen Source->Urine          Imaging Results    None          Medications   ketorolac injection 15 mg (has no administration in time range)   LIDOcaine 5 % patch 1 patch (has no administration in time range)   cyclobenzaprine tablet 10 mg (has no administration in time range)     Medical Decision Making:   Differential Diagnosis:   DDX: Possible kidney stone. R/o UTI/pyelonephritis, infected stone. Less likely appendicitis/diverticulitis given benign abdomen, but will be screened.  Otherwise likely muscular strain.  DX: UA. CTAP w/o contrast. Consider labs if large stone to assess for CYNTHIA.  TX: Analgesia, antiemetic PRN. IVF. Treatment/consult as indicated by studies.  DISPO: Pending studies, reassessment. If studies WNL or stable for outpatient management, symptoms controlled, discharge to follow up with PMD +/- urology within 1 week with supportive care recommendations and RTED precautions.                            Clinical Impression:   Final diagnoses:  [R10.9] Left flank pain (Primary)               Corwin Patton MD  07/08/23 1023

## 2023-07-08 NOTE — DISCHARGE INSTRUCTIONS
Please follow up with your primary care physician within 2 days. Ensure that you review all lab work results and/or imaging results. If you have any questions about your discharge paperwork please call the Emergency Department.     Ensure that you follow-up with your primary care provider regarding your enlarged prostate and need for possible PSA screening.    Return to the Emergency Department for any numbness, tingling, weakness, worsening pain, fever, numbness to your groin, urinary or bowel incontinence or retention, or any new or worsening symptoms.       If you were prescribed antibiotics, please take them to completion. If you were prescribed a narcotic or any sedating medication, do not drive or operate heavy equipment or machinery while taking these medications.  If you were diagnosed with a seizure, syncope, any loss of consciousness or decreased alertness, do not drive, swim, operate heavy machinery, or per yourself in any position where a sudden loss of consciousness could put herself or others in danger.    Thank you for visiting [facility], Department of Emergency Medicine. Please see the entirety of the educational materials provided. Please note that a visit to the emergency department does not substitute ongoing care from a primary medical provider or specialist. Please ensure to follow up as recommended. However, please return to the emergency department immediately if symptoms do not improve as discussed, symptoms worsen, new symptoms develop, difficulty in following up or for any of your concerns or issues. Please note on discharge you are acknowledging understanding and agreement on medical evaluation, management recommendations and follow up recommendations.

## 2023-07-08 NOTE — ED TRIAGE NOTES
57 y.o. male presents to ER Room/bed info not found   Chief Complaint   Patient presents with    Back Pain   .   C/o left sided lower back pain for a week, currently on Bactrim for a UTI

## 2023-09-21 NOTE — DISCHARGE INSTRUCTIONS
If sedated do not drive, smoke or drink alcohol for 10 hours  Avoid heavy lifting,straining or running for 3 days  You may not have a bowel movement for 1-3 days after procedure  You may return to normal activities the day after  You may experience some bloating and excessive gas.  This can be relieved by walking, eating lightly,or a heating pad can be applied to the abdomen.   A small amount of blood from the rectum is not serious, especially if hemorrhoids are present,  Go to ER if you notice any;   Chills and/or fever over 101   Persistent vomiting or vomiting blood   Severe abdominal pain, other gas cramp   Severe chest pain   Black tarry stools   Bright red bleeding from your rectum (greater than 1 tablespoon    If EGD, please do not eat or drink until  _________________    Call your doctor for any unusual pain,bleeding or fever.   Parents

## 2023-11-21 ENCOUNTER — TELEPHONE (OUTPATIENT)
Dept: PAIN MEDICINE | Facility: CLINIC | Age: 58
End: 2023-11-21
Payer: MEDICARE

## 2023-11-21 NOTE — TELEPHONE ENCOUNTER
Spoke with pt in regards to message in portal. I scheduled pt to see Thor on 11/30 at 9am. No further concerns expressed. Call ended. ----- Message from Josselyn Nolen sent at 11/21/2023  2:48 PM CST -----  Type:  Sooner Apoointment Request    Caller is requesting a sooner appointment.  Caller declined first available appointment listed below.  Caller will not accept being placed on the waitlist and is requesting a message be sent to doctor.  Name of Caller: Pt  When is the first available appointment?  Symptoms: f/u appt  Would the patient rather a call back or a response via [facility]? Call  Best Call Back Number: [phone]  Additional Information:

## 2023-11-29 NOTE — PROGRESS NOTES
Pain Medicine Established clinic visit      Chief Complaint:   Chief Complaint   Patient presents with    Low-back Pain     Initial HPI (10/11/19): Dalton Kent is a 58 y.o. male referred by Dr. Velazquez for back and neck pain.    Back pain is worse. Back pain began ~ 10 years ago. He has had 2 back surgeries. Back pain is localized to the left over the lateral iliac crest. No radiation into legs. He is unable to describe the quality of the pain, other than feeling deep. Worse with walking, cutting grass, standing, and slight flexion. Pain alleviated by nothing in particular. Left leg gets weak with cutting grass or doing dishes. Denies leg numbness.     He has had neck surgery with Dr. Ontiveros in 2015. He doesn't have neck pain but is having right arm pain and shooting pain into his small, ring and middle fingers and also pain in his tricep area. Pain described as shooting and shocking in the arm. Nothing particularly aggravates it, but did start while fishing and worse at night time. Nothing helps the pain. He did therapy recently.     Onset: Years  Location: Shoulder, arm, lower back left side, hip pain   Radiation: left leg and right arm  Quality: Aching, burning, throbbing, grabbing, tingling, deep, superficial, electric   Exacerbating Factors: exercise, lifting, sitting, standing and walking for more than 20 minutes  Alleviating Factors: nothing  Associated Symptoms: denies night fever/night sweats, urinary incontinence, bowel incontinence, significant weight loss, significant motor weakness and loss of sensations    Severity: Currently: 4/10   Typical Range: 7-8/10     Exacerbation: 8/10      Interval history 11/30/2023:   58-year-old male presents today with his wife for a 1 year follow-up appointment.  He is status post an SCS implant Nevro system  on 09/23/2022.  Reporting that the implant has been helping his low back  and left leg pain.  He did report an increase in mid to left low back pain over  the last few weeks and would like to get his spinal cord stimulator reprogrammed.  The patient and wife that I will assist in setting this up.  Overall  his pain is stable, his pain score is 3/10 today.  He denies any bowel bladder dysfunction, denies saddle anesthesia denies any profound weakness denies any recent incident or trauma.      Interval History (07/14/2022):  Dalton Kent returns today for follow up.  At the last clinic visit, referred to psych for SCS pre-psych testing.     Currently, the back pain is worse.  He slipped out of bed and is feeling more pain down the left leg again like previous. Otherwise, no change in the location or quality of the pain since the most recent visit is reported.  No significant interval events or traumas. No change in bowel or bladder function, & no new weakness or numbness is reported. No new musculoskeletal pain complaints.    Current Pain Scales:  Current: 3/10              Typical Range: 8-10/10    Interval History (08/26/2022):  Dalton Kent returns today for follow up.      He had Nevro SCS placed last week. In the past week, he feels he has had nearly 100% relief. He was in severe pain prior, but has done really well over the past week. He has been able to cut his grass yesterday, and had some mild pain in the left calf, but overall he feels much improved. His back pain is much improved. He is very happy with his stimulator and would like to move forward with implant. He denies any fevers, chills, drainage.     Current Pain Scales:  Current: 2/10              Typical Range in the past week: 0-5/10                   Pain Disability Index  Family/Home Responsibilities:: 3  Recreation:: 3  Social Activity:: 3  Occupation:: 3  Sexual Behavior:: 0  Self Care:: 3  Life-Support Activities:: 3  Pain Disability Index (PDI): 18       Previous Interventions:  -9/23/2022 Placement of  SCS implant Nevro  - 4/29/22: Caudal ANURAG w/ 0% relief.  - 5/14/21:  Left SIJ and GTB CSI w/ 90% relief  - 3/26/21: Left L3 & L4 TF ANURAG w/ 50% relief  - 2/21/20: C7-T1 IL ANURAG w/ minimal relief  - 12/6/19: Cervical TPIs  - 10/11/19: Lumbar TPIs: Did not think they were effective  - Previous spine injections with Dr. Jimmy Rosas in [place] prior to the back surgeries. Also with Dr. Honeycutt and Dr. Pulido.     Previous Therapies:  PT/OT: yes last was 3-4 years ago  Surgery: yes He has had C5-7 ACDF with Dr. Ontiveros in 2016. He has had 2 back surgeries. First back surgery was L5-S1 ALIF in [facility] with Dr. Sorin Thomas. He had above level L4-5 herniated disk and underwent second back surgery, L4-5 TLIF at [facility] with Dr. Isaias Ontiveros in May 2016. Right then left carpal tunnel release in 2017 w/ Dr. Marin Rosas in May 2017.   Previous Medications:   - NSAIDS: Cant take because of stomach ulcers  - Muscle Relaxants: Xanax. Klonopin. Ativan.   - TCAs: Nortriptyline 25 mg  - SNRIs: Not tried  - Topicals: Bengay, biofreeze not effective  - Anticonvulsants: Gabapentin (Gralise) caused worsening depression, claustrophobia, trouble swallowing. Depakote. Lyrica.  - Opioids: None currently    Current Pain Medications  1. Lyrica 50 mg b.i.d.  2. Tylenol 500 mg, two tablets, 1-2x per day  3. Naltrexone 50 mg daily  4. Elavil 25 mg qHS  5. Gabapentin 300 mg qHS  6. Indomethacin       Blood Thinners: None    Full Medication List:    Current Outpatient Medications:     allopurinoL (ZYLOPRIM) 300 MG tablet, TAKE 1 TABLET BY MOUTH ONCE DAILY, Disp: 90 tablet, Rfl: 3    ALPRAZolam (XANAX) 0.25 MG tablet, Take 0.25 mg by mouth daily as needed., Disp: , Rfl:     ascorbic acid, vitamin C, (VITAMIN C) 500 MG tablet, Take 500 mg by mouth once daily., Disp: , Rfl:     benazepriL (LOTENSIN) 10 MG tablet, Take 1 tablet by mouth once daily., Disp: , Rfl:     ferrous sulfate 324 mg (65 mg iron) TbEC, Take 324 mg by mouth once daily., Disp: , Rfl:     guanFACINE (TENEX) 1 MG  Tab, Take 1 tablet (1 mg total) by mouth every evening., Disp: 30 tablet, Rfl: 11    metoprolol succinate (TOPROL-XL) 50 MG 24 hr tablet, Take 50 mg by mouth 2 (two) times daily., Disp: , Rfl:     NON FORMULARY MEDICATION, Take 1 Units by mouth once daily. Pre Biotic, Disp: , Rfl:     pantoprazole (PROTONIX) 40 MG tablet, TAKE 1 TABLET BY MOUTH ONCE  DAILY, Disp: 90 tablet, Rfl: 3    rosuvastatin (CRESTOR) 10 MG tablet, TAKE 1 TABLET BY MOUTH ON  MONDAY, WEDNESDAY, AND  FRIDAY, Disp: 39 tablet, Rfl: 3    SYNTHROID 112 mcg tablet, Take 112 mg by mouth once daily., Disp: , Rfl:     levocetirizine (XYZAL) 5 MG tablet, Take 1 tablet (5 mg total) by mouth every evening. for 10 days, Disp: 10 tablet, Rfl: 0     Review of Systems:  Review of Systems   Constitutional:  Negative for fever and weight loss.   HENT:  Positive for tinnitus. Negative for ear pain.    Eyes:  Negative for pain and redness.   Respiratory:  Negative for cough and shortness of breath.    Cardiovascular:  Negative for chest pain and palpitations.   Gastrointestinal:  Positive for heartburn. Negative for constipation.        GERD w/ NSAIDs   Genitourinary: Negative.         Denies urinary incontinence. Denies urine retention.    Musculoskeletal:  Positive for back pain, myalgias and neck pain.   Skin:  Negative for itching and rash.   Neurological:  Positive for tingling, weakness and headaches. Negative for seizures.   Endo/Heme/Allergies:  Negative for environmental allergies. Does not bruise/bleed easily.   Psychiatric/Behavioral:  Positive for depression. The patient is nervous/anxious and has insomnia.        Allergies:  Gralise [gabapentin], Percocet [oxycodone-acetaminophen], and Statins-hmg-coa reductase inhibitors     Medical History:   has a past medical history of Alcohol abuse, Anxiety, Depression, GERD (gastroesophageal reflux disease), Headache, psychiatric care, Hypertension, Hypothyroid, Lyme disease, Restless leg syndrome, Sleep  "difficulties, Therapy, Uncontrolled REM sleep behavior disorder, and Weakness generalized (8/17/2022).    Surgical History:   has a past surgical history that includes Anal fistulotomy; Anal sphincterotomy; Appendectomy; Colonoscopy (2/2015); Back surgery; Neck surgery; Hand surgery (Bilateral); Carpal tunnel release; Trigger finger release; Nose surgery; Colonoscopy (N/A, 8/29/2019); Epidural steroid injection (N/A, 2/21/2020); Transforaminal epidural injection of steroid (Left, 3/26/2021); Injection of anesthetic agent into sacroiliac joint (Left, 5/14/2021); Injection of steroid (Left, 5/14/2021); Caudal epidural steroid injection (N/A, 4/29/2022); Colonoscopy (N/A, 5/4/2022); Trial of spinal cord nerve stimulator (N/A, 8/19/2022); Insertion of neurostimulator of dorsal column of spinal cord (N/A, 9/23/2022); and Fluoroscopy (9/23/2022).    Social History:   reports that he quit smoking about 37 years ago. His smoking use included cigarettes. He started smoking about 48 years ago. He has a 33.0 pack-year smoking history. He has been exposed to tobacco smoke. He has never used smokeless tobacco. He reports that he does not currently use alcohol. He reports that he does not use drugs.    Physical Exam:  BP (!) 152/94   Pulse 92   Ht 5' 6" (1.676 m)   Wt 94.8 kg (208 lb 15.9 oz)   BMI 33.73 kg/m²       GEN: No acute distress. Calm, comfortable  HENT: Normocephalic, atraumatic, moist mucous membranes  EYE: Anicteric sclera, non-injected.   CV: Non-diaphoretic.   RESP: Breathing comfortably. Chest expansion symmetric.  PSYCH: Pleasant mood and appropriate affect. Recent and remote memory intact.       Imaging:   - MRI Thoracic spine 4/5/22:  There are focal areas of signal alteration involving the thoracic vertebral bodies greatest at T8 and T12.  Findings are most compatible with focal areas of fatty infiltration versus hemangiomas.  No evidence of an acute thoracic spine abnormality.  No congenital spinal " stenosis.  No abnormal signal change involving the visualized portion of the spinal cord which terminates at approximately the L1 level.  There is no evidence of a focal disc abnormality.  No evidence of significant spinal canal encroachment or foraminal encroachment.  Impression:  Probable focal fat versus hemangiomas within thoracic vertebral bodies greatest at T8 and T12.  No focal disc abnormality.  No significant spinal canal or foraminal encroachment.     - MRI Lumbar spine 4/5/22:  MRI examination of the lumbar spine with and without contrast dated April 5, 2022.  Multilayered prior lumbar spine surgery and fusion with pedicle screws at L4 and L5 with posterior fusion rods in place.  Anterior fusion and screw plate with screws at L5-S1.  No evidence of an acute abnormality.  Alignment is maintained.  Focal fatty change versus hemangioma within T12.  The visualized portion of the spinal cord is normal in signal intensity and terminates at approximately the L1 level.  At the T11-T12, T12-L1 and L1-L2 levels there is no evidence of a focal disc abnormality.  There are minimal facet degenerative changes.  No significant spinal canal or foraminal encroachment.  The L2-L3 level there is a minimal disc bulge and mild facet degenerative changes.  No significant spinal canal or foraminal encroachment.  At the L3-L4 level there is a broad-based disc bulge with advanced bilateral facet degenerative changes resulting in mild spinal canal encroachment and bilateral foraminal encroachment appearing similar to the previous study.  At the L4-L5 level there are postsurgical changes.  There is signal alteration along the left lateral aspect of the thecal sac extending towards the foramen which appears similar to the previous study.  Post contrast enhanced images reveal evidence of enhancement compatible with postsurgical scarring and fibrosis.  Mild left-sided foraminal encroachment.  Hypertrophic facet degenerative  changes.  At the L5-S1 level there are stable postoperative changes.  No evidence of a recurrent disc abnormality.  No central canal stenosis.  Mild bilateral foraminal encroachment    - MRI Cervical Spine 2/7/20:  Prior multilevel cervical fusion at C5-C6 and C6-C7.  Alignment is maintained.  No congenital spinal stenosis.  No abnormal signal change involving the visualized portion of the spinal cord.  At the C2-C3 and C3-C4 levels there are minimal uncinate process spurs.  There is no evidence of significant spinal canal or foraminal encroachment.  At the C4-C5 level there is minimal disc-osteophyte with mild effacement along the anterior subarachnoid space.  Mild facet degenerative changes.  No significant spinal canal or foraminal encroachment.  Prior fusions performed at C5-C6 and C6-C7.  Alignment is maintained.  No evidence of a focal recurrent disc abnormality.  At the C7-T1 level there is a posterior disc-osteophyte with mild encroachment along the subarachnoid space and mild bilateral foraminal encroachment.    - X-ray Shoulder 12/11/19:  The acromioclavicular and glenohumeral joint spaces are intact.  No fracture or dislocation is seen.  Cervical fusion hardware is identified    - EMG/NCS BUE 10/28/19:  Normal EMG/NCS of the arms.     - X-ray C-spine 10/11/19:  Prior multilevel fusion at C5-C6 and C6-C7.  Fusion screws appear intact and alignment is maintained.  No significant change in alignment with flexion or extension.  There is no plain film evidence of fracture or acute subluxation.  No prevertebral soft tissue swelling.  Minimal degenerative spurring along the anterior inferior endplate of C4.  No disc space narrowing at C2, C3 or C4    - X-ray L-spine 10/11/19:  Prior multilevel fusion at L4-L5 and L5-S1.  Surgical fixation hardware appears intact.  Alignment is maintained.  There is no significant change in alignment with flexion or extension.  Mild degenerative change involving L1 through L3  without significant focal disc space narrowing.  There is no evidence of an acute fracture.  Alignment is maintained.  No evidence of spondylolysis or spondylolisthesis    - Outside Shoulder MRI Right 2/22/19:  Impression:   1. Degenerative arthropathy at AC joint  2. Pericapsular soft tissue swelling and periosteal soft tissue swelling about the AC joint and distal clavicle. A low-grade partial tear of the trapezius muscle at the insertion upon the clavicle and a low-grade edema or strain of the anterior head of deltoid muscle  3. Suspect a sprain of the coracoclavicular ligaments, which are not torn  4. TEndinosis of the supraspinatus without a tear  5. Slight fraying of the labrum     - EMG/NCS BLE 2/25/19:  Impression: Normal EMG of the legs, although his study was mildly limited due to inability to sample lumbar paraspinal muscles given his prior lumbar surgical scarring.     - Outside Shoulder MRI Right 2/22/19:  Impression:   1. Degenerative arthropathy at AC joint  2. Pericapsular soft tissue swelling and periosteal soft tissue swelling about the AC joint and distal clavicle. A low-grade partial tear of the trapezius muscle at the insertion upon the clavicle and a low-grade edema or strain of the anterior head of deltoid muscle  3. Suspect a sprain of the coracoclavicular ligaments, which are not torn  4. TEndinosis of the supraspinatus without a tear  5. Slight fraying of the labrum    - EMG/NCS (outside records) with bilateral C6 radiculopathy and bilateral carpal tunnel syndrome     Labs:  Lab Results   Component Value Date     08/17/2023    K 4.5 08/17/2023     08/17/2023    CO2 28 08/17/2023    BUN 17 08/17/2023    CREATININE 0.94 08/17/2023    CALCIUM 9.5 08/17/2023    ANIONGAP 7 (L) 08/17/2023    ESTGFRAFRICA >60 05/12/2022    EGFRNONAA >60 05/12/2022     Lab Results   Component Value Date    ALT 31 08/17/2023    AST 35 08/17/2023    ALKPHOS 60 08/17/2023    BILITOT 0.5 08/17/2023     Lab  Results   Component Value Date     10/26/2023       Assessment:  Dalton Kent is a 58 y.o. male with the following diagnoses based on history, exam, and imaging:    Problem List Items Addressed This Visit    None    This is a pleasant 58 y.o. gentleman presenting with:    - Chronic back pain: He has prior fusion at L4-5 and L5-S1 with postsurgical scarring and fibrosis at L4-5 level. His back pain appears to be multifactorial in nature, with contributions from myofascial pain, lumbar spondylosis and due to scar tissue.    - Pain primarily in the left low back which is our target for SCS.    - Chronic Pain Syndrome:    - Excellent relief with Nevro SCS trial.   - Left trochanteric bursitis: Improved with CSI previously  - Chronic neck pain: Prior fusions performed at C5-C6 and C6-C7.  His neck pain, also appears multifactorial. MRI with tear in trapezius, which may be contributing to the pain. Muscular aspects of the pain and tenderness improve with TPIs.   - Chronic right shoulder pain: in the area of the latissimus and described as deep to the scapula, may be subscapularis vs lattisimus pain.  This has improved after trigger point injection.  - Comorbid: Anxiety and depression. H/o EtOH. GERD. HypoTH. ASAD.     11/30/2023 - 58-year-old male with a history of chronic low back pain he does have history of a lumbar fusion at L4-5 and L5-S1 with postsurgical scarring and fibrosis L4-5.  He is also status post an SCS implant [facility] in September of 2022 he continues to report that the SCS implant has provided him continued relief of the low back and left leg pain however he did complain of mid to low back pain on the left primarily today.  I discussed with the patient and  wife that a reprogramming of his stimulator could be beneficial as he has not had this done since the implant.  Discussed I will reach out to the  of [facility] and inform him of an appointment being needed for  reprogramming patient and wife verbalized understanding and agreed.    Treatment Plan: I discussed with the patient the following assessment and recommendations. The following is the plan the patient agreed upon:  - PT/OT/HEP:  Encouraged cont HEP and regular walking program   - Procedures:  No procedures recommended at this time.                  Set up reprogramming of SCS implant Nevro   - Can repeat left SIJ and left GTB injection as needed  - Medications: No changes  - Imaging: Reviewed.   - Labs: Reviewed.  Monitor liver enzymes.       Follow Up: RTC  1 year or sooner if needed.    Disclaimer: This note was partly generated using dictation software which may occasionally result in transcription errors.

## 2023-11-30 ENCOUNTER — OFFICE VISIT (OUTPATIENT)
Dept: PAIN MEDICINE | Facility: CLINIC | Age: 58
End: 2023-11-30
Payer: MEDICARE

## 2023-11-30 ENCOUNTER — PATIENT MESSAGE (OUTPATIENT)
Dept: PAIN MEDICINE | Facility: CLINIC | Age: 58
End: 2023-11-30

## 2023-11-30 VITALS
HEART RATE: 92 BPM | HEIGHT: 66 IN | WEIGHT: 209 LBS | SYSTOLIC BLOOD PRESSURE: 152 MMHG | BODY MASS INDEX: 33.59 KG/M2 | DIASTOLIC BLOOD PRESSURE: 94 MMHG

## 2023-11-30 DIAGNOSIS — M47.816 LUMBAR SPONDYLOSIS: ICD-10-CM

## 2023-11-30 DIAGNOSIS — M96.1 FAILED BACK SURGICAL SYNDROME: ICD-10-CM

## 2023-11-30 DIAGNOSIS — M54.16 LUMBAR RADICULOPATHY, CHRONIC: Primary | ICD-10-CM

## 2023-11-30 DIAGNOSIS — M96.1 POSTLAMINECTOMY SYNDROME OF LUMBAR REGION: ICD-10-CM

## 2023-11-30 DIAGNOSIS — Z96.89 S/P INSERTION OF SPINAL CORD STIMULATOR: ICD-10-CM

## 2023-11-30 DIAGNOSIS — G89.4 CHRONIC PAIN SYNDROME: ICD-10-CM

## 2023-11-30 DIAGNOSIS — M79.18 MYOFASCIAL PAIN: ICD-10-CM

## 2023-11-30 PROCEDURE — 3008F BODY MASS INDEX DOCD: CPT | Mod: CPTII,S$GLB,, | Performed by: NURSE PRACTITIONER

## 2023-11-30 PROCEDURE — 3044F HG A1C LEVEL LT 7.0%: CPT | Mod: CPTII,S$GLB,, | Performed by: NURSE PRACTITIONER

## 2023-11-30 PROCEDURE — 99214 PR OFFICE/OUTPT VISIT, EST, LEVL IV, 30-39 MIN: ICD-10-PCS | Mod: S$GLB,,, | Performed by: NURSE PRACTITIONER

## 2023-11-30 PROCEDURE — 4010F ACE/ARB THERAPY RXD/TAKEN: CPT | Mod: CPTII,S$GLB,, | Performed by: NURSE PRACTITIONER

## 2023-11-30 PROCEDURE — 1160F PR REVIEW ALL MEDS BY PRESCRIBER/CLIN PHARMACIST DOCUMENTED: ICD-10-PCS | Mod: CPTII,S$GLB,, | Performed by: NURSE PRACTITIONER

## 2023-11-30 PROCEDURE — 3080F DIAST BP >= 90 MM HG: CPT | Mod: CPTII,S$GLB,, | Performed by: NURSE PRACTITIONER

## 2023-11-30 PROCEDURE — 1159F PR MEDICATION LIST DOCUMENTED IN MEDICAL RECORD: ICD-10-PCS | Mod: CPTII,S$GLB,, | Performed by: NURSE PRACTITIONER

## 2023-11-30 PROCEDURE — 3080F PR MOST RECENT DIASTOLIC BLOOD PRESSURE >= 90 MM HG: ICD-10-PCS | Mod: CPTII,S$GLB,, | Performed by: NURSE PRACTITIONER

## 2023-11-30 PROCEDURE — 99214 OFFICE O/P EST MOD 30 MIN: CPT | Mod: S$GLB,,, | Performed by: NURSE PRACTITIONER

## 2023-11-30 PROCEDURE — 99999 PR PBB SHADOW E&M-EST. PATIENT-LVL III: ICD-10-PCS | Mod: PBBFAC,,, | Performed by: NURSE PRACTITIONER

## 2023-11-30 PROCEDURE — 3008F PR BODY MASS INDEX (BMI) DOCUMENTED: ICD-10-PCS | Mod: CPTII,S$GLB,, | Performed by: NURSE PRACTITIONER

## 2023-11-30 PROCEDURE — 3077F PR MOST RECENT SYSTOLIC BLOOD PRESSURE >= 140 MM HG: ICD-10-PCS | Mod: CPTII,S$GLB,, | Performed by: NURSE PRACTITIONER

## 2023-11-30 PROCEDURE — 1160F RVW MEDS BY RX/DR IN RCRD: CPT | Mod: CPTII,S$GLB,, | Performed by: NURSE PRACTITIONER

## 2023-11-30 PROCEDURE — 4010F PR ACE/ARB THEARPY RXD/TAKEN: ICD-10-PCS | Mod: CPTII,S$GLB,, | Performed by: NURSE PRACTITIONER

## 2023-11-30 PROCEDURE — 1159F MED LIST DOCD IN RCRD: CPT | Mod: CPTII,S$GLB,, | Performed by: NURSE PRACTITIONER

## 2023-11-30 PROCEDURE — 99999 PR PBB SHADOW E&M-EST. PATIENT-LVL III: CPT | Mod: PBBFAC,,, | Performed by: NURSE PRACTITIONER

## 2023-11-30 PROCEDURE — 3077F SYST BP >= 140 MM HG: CPT | Mod: CPTII,S$GLB,, | Performed by: NURSE PRACTITIONER

## 2023-11-30 PROCEDURE — 3044F PR MOST RECENT HEMOGLOBIN A1C LEVEL <7.0%: ICD-10-PCS | Mod: CPTII,S$GLB,, | Performed by: NURSE PRACTITIONER

## 2024-01-21 NOTE — TELEPHONE ENCOUNTER
Patient informed that Dr Jimmy Bradshaw stopped Lexapro on 11/15/2019. Patient voiced understanding.    Scribe Attestation (For Scribes USE Only)... Attending Attestation (For Attendings USE Only).../Scribe Attestation (For Scribes USE Only)...

## 2024-05-08 PROBLEM — M25.531 PAIN IN RIGHT WRIST: Status: ACTIVE | Noted: 2024-05-08

## 2024-06-03 PROBLEM — G24.5 BLEPHAROSPASM OF BOTH EYES: Status: ACTIVE | Noted: 2024-06-03

## 2024-06-03 PROBLEM — H04.123 DRY EYE SYNDROME OF BOTH EYES: Status: ACTIVE | Noted: 2024-06-03

## 2024-10-15 ENCOUNTER — OFFICE VISIT (OUTPATIENT)
Dept: NEUROLOGY | Facility: CLINIC | Age: 59
End: 2024-10-15
Payer: MEDICARE

## 2024-10-15 VITALS
RESPIRATION RATE: 16 BRPM | BODY MASS INDEX: 31.33 KG/M2 | HEART RATE: 86 BPM | DIASTOLIC BLOOD PRESSURE: 82 MMHG | SYSTOLIC BLOOD PRESSURE: 126 MMHG | HEIGHT: 67 IN | OXYGEN SATURATION: 97 % | WEIGHT: 199.63 LBS

## 2024-10-15 DIAGNOSIS — R25.9 ABNORMAL INVOLUNTARY MOVEMENTS: ICD-10-CM

## 2024-10-15 DIAGNOSIS — R41.3 MEMORY LOSS: Primary | Chronic | ICD-10-CM

## 2024-10-15 DIAGNOSIS — G47.33 OBSTRUCTIVE SLEEP APNEA (ADULT) (PEDIATRIC): Chronic | ICD-10-CM

## 2024-10-15 PROCEDURE — 1160F RVW MEDS BY RX/DR IN RCRD: CPT | Mod: CPTII,S$GLB,, | Performed by: NURSE PRACTITIONER

## 2024-10-15 PROCEDURE — 4010F ACE/ARB THERAPY RXD/TAKEN: CPT | Mod: CPTII,S$GLB,, | Performed by: NURSE PRACTITIONER

## 2024-10-15 PROCEDURE — 99204 OFFICE O/P NEW MOD 45 MIN: CPT | Mod: S$GLB,,, | Performed by: NURSE PRACTITIONER

## 2024-10-15 PROCEDURE — 99999 PR PBB SHADOW E&M-EST. PATIENT-LVL V: CPT | Mod: PBBFAC,,, | Performed by: NURSE PRACTITIONER

## 2024-10-15 PROCEDURE — 3074F SYST BP LT 130 MM HG: CPT | Mod: CPTII,S$GLB,, | Performed by: NURSE PRACTITIONER

## 2024-10-15 PROCEDURE — 3008F BODY MASS INDEX DOCD: CPT | Mod: CPTII,S$GLB,, | Performed by: NURSE PRACTITIONER

## 2024-10-15 PROCEDURE — 3079F DIAST BP 80-89 MM HG: CPT | Mod: CPTII,S$GLB,, | Performed by: NURSE PRACTITIONER

## 2024-10-15 PROCEDURE — 1159F MED LIST DOCD IN RCRD: CPT | Mod: CPTII,S$GLB,, | Performed by: NURSE PRACTITIONER

## 2024-10-15 NOTE — PROGRESS NOTES
Subjective:      Chief Complaint:  No chief complaint on file.      History of Present Illness  Dalton Kent is a 58 y.o. male with memory loss, headaches, and lower back and leg pain. Neuropsych testing in 2019 showed severe cognitive impairment, but this did not rise to the level of dementia-Pseudodementia is suspected, secondary to anxiety/depression. 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 He has HTN, hypothyroidism, hypogonadism, GERD, and RLS and REM sleep behavior disorder. Questionable diagnosis of prior Lyme disease. History of pancreatitis, bilateral CTR, L-spine surgery and C-spine surgery in 2016. He was previously followed by [place]. Honeycutt [place] [facility] [place]. He is medically disabled from his spinal issues. Spinal cord stimulator in place.     He presents today to re-establish care for memory loss. He is here today with his daughter. He saw Dr. Henry/[facility] for complaint of needing to blink often with intermittent diplopia. MG Panel and CTA Head were unremarkable. Trial of Gabapentin given, which was ineffective. He then tried Lyrica. Unclear of effect. Dr. Henry diagnosed him with tic disorder vs. blepharospasm. He is now on Artane per PCP, which completely resolved his blinking for 2-3 months before it returned.     He began to experience dry mouth and feeling as if his tongue is enlarged after starting Lunesta. Dr. Mora prescribed Ingrezza, instead of Artane, but this has not yet been approved yet.      He reports that his frequent involuntary eye closure, which began immediately after waking up from his neurostimulator placement. He saw an eye doctor first, who suggested that he had dry eye. Using eye drops did not help with this.     He has trouble swallowing, secondary to dry mouth. No choking. He saw an ENT, who advised that this was likely medication related.     He has also seen Dr. Honeycutt for evaluation and had testing done [place] [facility] [place].     He does have hand tremors. He reports to fidgeting a great  deal.     Denies headaches currently.     He had tachycardia and saw Cardiology, who thought it could be related to Pamelor. Cardiology increased his Metoprolol.     He was referred to Cardiology previously for presyncopal complaints. Toprol was started for tachycardia on 7 day holter monitor. He is no longer having any presyncopal complaints. Prior he felt as if this would occur when yawning, sneezing, and with bowel movements.    He is no longer using CPAP, due to poor tolerance. He plans to discuss this further with his PCP.     He is no longer seeing Psychiatry. Denies anxiety/depression currently.      Memory loss worse lately per his daughter. No issues with driving. He forgets to take the stove off sometimes or forgets whether or not he turned the stove off.   He loses items at times. He forgets what he is doing at times.     I have reviewed all of this patient's past medical and surgical histories as well as family and social histories and active allergies and medications as documented in the electronic medical record.    Review of Systems  Review of Systems   Constitutional:  Negative for fatigue.   HENT:  Positive for tinnitus. Negative for hearing loss, sinus pressure, sinus pain, trouble swallowing and voice change.    Eyes:  Negative for visual disturbance.   Respiratory:  Negative for shortness of breath.    Cardiovascular:  Negative for leg swelling.   Gastrointestinal:  Negative for abdominal pain.   Musculoskeletal:  Negative for arthralgias, back pain and gait problem.   Skin:  Negative for rash.   Neurological:  Negative for dizziness, tremors, seizures, facial asymmetry, speech difficulty, weakness, light-headedness, numbness and headaches.        Presyncope   Hematological:  Does not bruise/bleed easily.   Psychiatric/Behavioral:  Positive for decreased concentration and sleep disturbance. Negative for agitation, behavioral problems, confusion, dysphoric mood, hallucinations, self-injury and  suicidal ideas. The patient is not nervous/anxious and is not hyperactive.         Memory loss       Objective:       There were no vitals filed for this visit.    Exam:  Gen Appearance, well developed/nourished in no apparent distress, but agitated during visit  CV: 2+ distal pulses with no edema or swelling  Neuro:  MS: Awake, alert, oriented to place, person, time, situation. Sustains attention. Recent memory mildly impaired/remote memory intact, Language is full to spontaneous speech/repetition/naming/comprehension. Fund of Knowledge is full. Able to name current President  CN: PERRL, Extraoccular movements and visual fields are full. Normal facial sensation and strength, Hearing symmetric, Tongue and Palate are midline and strong. Shoulder Shrug symmetric and strong.  Motor: Normal bulk, tone, mild high frequency tremor of the right hand when outstretched. 5/5 strength bilateral upper/lower extremities with 1+ reflexes with muted plantar response  Sensory: symmetric to light touch, pain, temp, and vibration/proprioception. Romberg positive.   Cerebellar: Finger-nose,Heal-shin, Rapid alternating movements intact  Gait: Normal stance, no ataxia  MSK Survey: no pain with ROM to RUE and no tenderness today with exam.   *reduced arm swing on the left with mild impairment in postural reflexes    Imagin/2023 CTA:   FINDINGS:  Distal vertebral arteries, distal internal carotid, basilar artery, anterior, middle and posterior cerebral arteries are patent.     No vascular malformation, aneurysm, branch occlusion or high-grade stenosis.     Impression:  Unremarkable cerebral CT angiogram    MRI Brain 2019:   FINDINGS:  Intracranial compartment: Ventricles and sulci are normal in size for age without evidence of hydrocephalus.  No extra-axial blood or fluid collections. Minimal burden of patchy T2 hyperintense foci scattered in the supratentorial periventricular and subcortical white matter most likely relate to  sequelae of chronic microvascular ischemic disease.  The brain parenchyma appears normal. No mass lesion, acute hemorrhage, edema or acute infarct.  Normal vascular flow voids are preserved.    Skull/extracranial contents (limited evaluation): Bone marrow signal intensity is normal.      Impression       No acute abnormality.     MRA Brain 1/2019:   FINDINGS:  Anterior intracranial circulation: No high-grade focal stenosis, occlusion, aneurysm, or vascular malformation.    Posterior intracranial circulation: No high-grade focal stenosis, occlusion, aneurysm, or vascular malformation.      Impression       Normal MRA of the [place]     1/2019 MRI L-spine:   FINDINGS:  Spinal Alignment: Normal.    Vertebrae: Posterior fusion hardware at L4-5 and anterior fusion hardware at L5-S1 are unchanged, with associated disc spacer devices at L4-5 and L5-S1.  Susceptibility artifact is present surrounding the hardware.  There are associated postsurgical findings of left L4 hemilaminectomy.  Mild susceptibility artifact surrounds the hardware.  1.5 cm hemangioma is present in the posterosuperior portion of the T12 vertebral body.    Discs: Mild diffuse desiccation.  Disc spacer devices are present at L5-4 5 and L5-S1.    Cord: Normal cord signal. Conus terminates normally at the L1-2 level.    Degenerative findings: Minimal circumferential disc bulge at L2-3 with minimal bilateral facet arthropathy and minimal facet effusions in conjunction with mild buckling of the ligamentum flavum does not result in significant compressive phenomena.  Mild circumferential disc bulge at L3-4 with mild bilateral facet arthropathy and buckling of the ligamentum flavum results in mild spinal canal stenosis and mild to moderate foraminal stenoses.  Spinal canal is posteriorly decompressed at L4-5; right neural foramen is widely patent while there is apparent moderate narrowing of the left neural foramen related to either postoperative  granulation tissue along the inferior neural foramen or disc material.  Mild bilateral facet arthropathy at L5-S1 results in at most mild foraminal stenoses.    Paraspinal muscles & soft tissues: Normal.      Impression       Unchanged postoperative findings of L4-S1 fusion with left L4 hemilaminectomy.  There is apparent moderate narrowing of the left neural foramen at L4-5 related to either postoperative granulation tissue along the inferior foramen or disc material.    Additional mild disc and facet degeneration above the fused levels resulting in mild spinal canal stenosis and mild to moderate foraminal stenoses at L3-4.       2/2019 ROSANNA's:  Normal resting ROSANNA's.    EMG BLE 2/2019:   Normal EMG of the legs, though study was mildly limited, due to inability to sample lumbar paraspinal muscles, given his prior lumbar surgical scarring.     EMG BUE 10/2019:   Impression:  Abnormal Study secondary to the Presence of:    Normal EMG/NCS of the arms.   Note EMG/NCS is limited in evaluation of central (ex. myelopathic) lesions. Clinical and imaging correlation suggested.      Labs:   10/2023 testosterone level was low  2023 MG Panel per Dr. Henry unremarkable.   5/2024 TSH normal  Assessment:   Dalton Kent is a 58 y.o. male who presents for an initial visit for evaluation for memory loss, headaches, blurred vision, and lower back and leg pain. He has HTN, hypothyroidism, hypogonadism, GERD, and RLS and REM sleep behavior disorder. Questionable diagnosis of prior Lyme disease. He was previously followed by Dr. Honeycutt at [facility] [place].   Plan:   I recommend:   1. Prior tension distribution headaches and worsening memory likely related to increased mood issues. Neuro exam unremarkable.  Mood, insomnia, and headaches improved with restarting Pamelor, which will continue.   -His Pamelor was stopped prior-this was used for headache prevention, then was changed to Remeron per Psychiatry. Remeron was then stopped by sleep  medicine.   -Note Psych admit in 2020 for suicidal ideation with ETOH abuse, which he has stopped since then.     -repeat Neuropsych testing to quantify memory changes. Neuropsych testing in 2019 showed severe cognitive impairment, but this did not rise to the level of dementia-Pseudodementia was suspected, secondary to anxiety/depression. Mood improved currently. No limitations based on current level of functioning.     2. No longer seeing Psychiatry for mood issues, which are improved.     3. He is no longer taking Klonopin for REM sleep behavior disorder.   Note ASAD History. He is not compliant with ASAD, due to poor tolerance. He will speak with PCP about other options. There are risks involved with not treating ASAD.     4. Will monitor for development of neurodegenerative disorder, given his REM sleep behavior issues and his memory loss.     5. He saw Cardiology prior for presyncopal complaints.   -Presyncopal complaints resolved after starting Metoprolol for tachycardia on 7 day holter monitor. MRI Brain and MRA unremarkable for IC and vascular causes.  -He had tachycardia since his last visit, which was believed to be related to oversupplementation for his thyroid disease.    6. No longer seeing pain management for spinal pain complaints and right shoulder pain. He was seen at [facility] prior.   -EMG BUE unremarkable for radiculopathy, and X-rays of right shoulder were unremarkable.  -He saw Dr. Javed for his shoulder complaints prior.   -There is moderate foraminal narrowing at Left L4/5, which can explain his lumbar complaints; however, EMG BLE 2/2019 was unremarkable for radiculopathy; mildly limited study, due to prior surgical scarring above the lumbar paraspinal muscles.   -Note prior L-spine surgery in 2016 per Dr. Geovanny Ontiveros, whom he declines seeing again.     7. Advised to STOP Lunesta, as this is likely contributing to excessive dry mouth, and this medication can increase involuntary eye movements,  which were being treated with Artane. Artane can also cause dry mouth, but I suspect the Lunesta is causing his complaints, as his mouth complaints began after Lunesta initiation and eye movements began again after adding Lunesta.     -Refer to Movement Disorders Neurology for evaluation of abnormal eye movements, which he reports began after Neurostimulator placement.     FU 6 months    CC: Dr. Jimmy Bradshaw

## 2024-10-20 ENCOUNTER — PATIENT MESSAGE (OUTPATIENT)
Dept: NEUROLOGY | Facility: CLINIC | Age: 59
End: 2024-10-20
Payer: MEDICARE

## 2024-11-07 ENCOUNTER — DOCUMENTATION ONLY (OUTPATIENT)
Dept: NEUROLOGY | Facility: CLINIC | Age: 59
End: 2024-11-07
Payer: MEDICARE

## 2024-11-07 NOTE — PROGRESS NOTES
Review of records from [facility] [place]:   2/2024 Early AD profile:  Normal    EMG 2/2024:  Study showed no sign of denervation in the cranial innervated muscles of the neck and head to explain his eye twitching and blepharospasm. Functional disorder suspected.     Trial of Klonopin was started. Seroquel did not help.     MRI Brain 5/2025:  Moderate cerebral atherosclerosis

## 2024-11-20 ENCOUNTER — OFFICE VISIT (OUTPATIENT)
Dept: NEUROLOGY | Facility: CLINIC | Age: 59
End: 2024-11-20
Payer: MEDICARE

## 2024-11-20 VITALS
BODY MASS INDEX: 33.18 KG/M2 | DIASTOLIC BLOOD PRESSURE: 85 MMHG | HEIGHT: 66 IN | HEART RATE: 80 BPM | WEIGHT: 206.44 LBS | SYSTOLIC BLOOD PRESSURE: 153 MMHG

## 2024-11-20 DIAGNOSIS — G24.5 BLEPHAROSPASM OF BOTH EYES: ICD-10-CM

## 2024-11-20 DIAGNOSIS — G62.9 PERIPHERAL POLYNEUROPATHY: Primary | ICD-10-CM

## 2024-11-20 DIAGNOSIS — R41.3 MEMORY CHANGE: ICD-10-CM

## 2024-11-20 DIAGNOSIS — R25.9 ABNORMAL INVOLUNTARY MOVEMENTS: ICD-10-CM

## 2024-11-20 PROCEDURE — 99999 PR PBB SHADOW E&M-EST. PATIENT-LVL III: CPT | Mod: PBBFAC,,, | Performed by: STUDENT IN AN ORGANIZED HEALTH CARE EDUCATION/TRAINING PROGRAM

## 2024-11-20 NOTE — PROGRESS NOTES
Name: Dalton Kent  MRN: [id number]   CSN: [id number]      Date: 11/29/2024    Referring physician:  No referring provider defined for this encounter.    Chief Complaint / Interval History: Neurologic Problem    History of Present Illness (HPI) 11/20/24:  Dr. Henry c/f tic disorder vs bleph. Now on Artane which completely resolved his blinking for 2-3 months before it returned. Ingrezza prescribed instead of artane. Ingrezza was not started due to high copay. The eye blinking started following neuro stimulator placement. + hand tremors. He also has new tongue and mouth movements for the past 2 months.     Past Medical History: The patient  has a past medical history of Alcohol abuse, Anxiety, Depression, GERD (gastroesophageal reflux disease), Headache, psychiatric care, Hypertension, Hypothyroid, Lyme disease, Restless leg syndrome, Sleep difficulties, Therapy, Uncontrolled REM sleep behavior disorder, and Weakness generalized (8/17/2022).    Relevant Surgical History:   Past Surgical History:   Procedure Laterality Date    ANAL FISTULOTOMY      ANAL SPHINCTEROTOMY      APPENDECTOMY      BACK SURGERY      CARPAL TUNNEL RELEASE      bilateral    CAUDAL EPIDURAL STEROID INJECTION N/A 4/29/2022    Procedure: CAUDAL EPIDURAL STEROID INJECTION WITH CATHETER;  Surgeon: Tori Prado MD;  Location: [facility];  Service: Pain Management;  Laterality: N/A;    COLONOSCOPY  2/2015    COLONOSCOPY N/A 8/29/2019    Procedure: COLONOSCOPY WITH BIOPSY;  Surgeon: Giuseppe Mart MD;  Location: [facility];  Service: Endoscopy;  Laterality: N/A;    COLONOSCOPY N/A 5/4/2022    Procedure: COLONOSCOPY;  Surgeon: Galina Sterling MD;  Location: [facility];  Service: Endoscopy;  Laterality: N/A;    EPIDURAL STEROID INJECTION N/A 2/21/2020    Procedure: CERVICAL EPIDURAL STEROID INJECTION (C7-T1 INTERLAMINAR);  Surgeon: Tori Prado MD;  Location: [facility] OR;  Service: Pain Management;  Laterality: N/A;    FLUOROSCOPY  9/23/2022     Procedure: FLUOROSCOPY;  Surgeon: Geremias Knox Jr., MD;  Location: [facility] OR;  Service: Pain Management;;    HAND SURGERY Bilateral     INJECTION OF ANESTHETIC AGENT INTO SACROILIAC JOINT Left 5/14/2021    Procedure: SACROILIAC JOINT INJECTION;  Surgeon: Tori Prado MD;  Location: [facility] OR;  Service: Pain Management;  Laterality: Left;    INJECTION OF STEROID Left 5/14/2021    Procedure: GREATER TROCHANTERIC BURSA INJECTION;  Surgeon: Tori Prado MD;  Location: [facility] OR;  Service: Pain Management;  Laterality: Left;    INSERTION OF NEUROSTIMULATOR OF DORSAL COLUMN OF SPINAL CORD N/A 9/23/2022    Procedure: SCS Implant;  Surgeon: Geremias Knox Jr., MD;  Location: [facility] OR;  Service: Pain Management;  Laterality: N/A;  Brock Aquino [phone]   confirmed CW 9/22    NECK SURGERY      NOSE SURGERY      TRANSFORAMINAL EPIDURAL INJECTION OF STEROID Left 3/26/2021    Procedure: TRANSFORAMINAL EPIDURAL STEROID INJECTION (L3);  Surgeon: Tori Prado MD;  Location: [facility] OR;  Service: Pain Management;  Laterality: Left;    TRIAL OF SPINAL CORD NERVE STIMULATOR N/A 8/19/2022    Procedure: SPINAL CORD NEUROSTIMULATOR TRIAL;  Surgeon: Tori Prado MD;  Location: [facility] OR;  Service: Pain Management;  Laterality: N/A;    TRIGGER FINGER RELEASE         Social History: The patient  reports that he quit smoking about 38 years ago. His smoking use included cigarettes. He started smoking about 49 years ago. He has a 33 pack-year smoking history. He has been exposed to tobacco smoke. He has never used smokeless tobacco. He reports that he does not currently use alcohol. He reports that he does not use drugs.    Family History: Their family history is not on file.    Allergies: Ace inhibitors, Gralise [gabapentin], Percocet [oxycodone-acetaminophen], and Statins-hmg-coa reductase inhibitors     Meds:   Current Outpatient Medications on File Prior to Visit   Medication Sig Dispense Refill    allopurinoL (ZYLOPRIM)  "300 MG tablet TAKE 1 TABLET BY MOUTH ONCE  DAILY 90 tablet 3    amLODIPine (NORVASC) 5 MG tablet Take 1 tablet (5 mg total) by mouth once daily. 30 tablet 5    levothyroxine (SYNTHROID) 112 MCG tablet TAKE 1 TABLET BY MOUTH BEFORE  BREAKFAST 90 tablet 3    metoprolol succinate (TOPROL-XL) 50 MG 24 hr tablet TAKE 1 TABLET BY MOUTH TWICE  DAILY 180 tablet 3    pantoprazole (PROTONIX) 40 MG tablet TAKE 1 TABLET BY MOUTH ONCE  DAILY 90 tablet 3    rosuvastatin (CRESTOR) 10 MG tablet TAKE 1 TABLET BY MOUTH ON  MONDAY, WEDNESDAY, AND  FRIDAY 36 tablet 3     No current facility-administered medications on file prior to visit.       Exam:  BP (!) 153/85 (BP Location: Right arm, Patient Position: Sitting)   Pulse 80   Ht 5' 6" (1.676 m)   Wt 93.6 kg (206 lb 7.4 oz)   BMI 33.32 kg/m²     Constitutional  Well-developed, well-nourished, appears stated age   Cardiovascular  No LE edema bilaterally   Neurological    * Mental status  MOCA = not done during today's visit     - Orientation  Oriented to conversation     - Memory   Intact recent and remote     - Attention/concentration  Attentive, vigilant during exam     - Language  Intact to conversation.     - Fund of knowledge  Aware of current events     - Executive  Well-organized thoughts     - Other     * Cranial nerves       - CN II  Pupils equal, visual fields full to confrontation     - CN III, IV, VI  Extraocular movements full, normal pursuits and saccades     - CN V  Sensation V1 - V3 intact     - CN VII  Face strong and symmetric bilaterally     - CN VIII  Hearing intact bilaterally     - CN IX, X  Palate raises midline and symmetric     - CN XI  SCM and trapezius 5/5 bilaterally     - CN XII  Tongue midline   * Motor  Muscle bulk normal, strength 5/5 throughout   * Sensory   Intact to light touch throughout   * Coordination  No dysmetria with finger-to-nose or heel-to-shin   * Gait  See below.   * Deep tendon reflexes  2+ and symmetric throughout   Babinski " downgoing bilaterally   * Specialized movement exam Gen: masked facies and increased blink frequency   Speech: hypophonic  Tremor: none   Bradykinesia: none  Tone: normal    Gait: none  Pull Test: negative      Medical Record Review:  Labs, imaging and prior notes reviewed independently.     Diagnoses:          1. Peripheral polyneuropathy  Vitamin B1    Folate    Methylmalonic Acid, Serum    Vitamin B12    Homocysteine, Serum    Phospho-Tau 217    pTau-181, Plasma    Neurofilament Light Chain      2. Memory change  Folate    Vitamin B12    Homocysteine, Serum      3. Blepharospasm of both eyes        4. Abnormal involuntary movements            Assessment:  Blepharospasm vs TD    Plan:  - Will transition from artane to topamax.   - Neuropathy and cognitive labs   - MOCA during next appointment   - Follow up with us in 1 month     The patient has a documented history of DMII, hypertension, hyperlipidemia and/or hypercholesteremia with long-term complications such as cerebrovascular disease, carotid artery stenosis, peripheral vascular disease, and/or aortic atherosclerosis. Collectively these risk factors may contribute to cerebral atherosclerosis, and cerebral hypoperfusion compounded neurocognitive disorder. We discussed maximizing cerebrovascular-related medical therapy, including but not limited to cholesterol medications and antiplatelet agents. We have discussed the value of aggressively controlling vascular risk factors like hypertension, hyperlipidemia, and Diabetes SBP<130, LDL<100, and A1C<7.0. We discussed the need to optimize lifestyle choices, including a heart-healthy diet (e.g., Mediterranean or DASH), increased cardiovascular exercise (goal 150 minutes of moderate-intensity per week), and staying cognitively and socially active.    The visit today is associated with current or anticipated ongoing medical care related to this patients complex condition. Patient should RTC in __ months to see me.      Total time: 60 minutes spent on the encounter, which includes face to face time and non-face to face time preparing to see the patient (eg, review of tests), Obtaining and/or reviewing separately obtained history, Documenting clinical information in the electronic or other health record, Independently interpreting results (not separately reported) and communicating results to the patient/family/caregiver, or Care coordination (not separately reported).     Claudia Lambert MD  Division of Movement and Memory Disorders  [facility]  [phone]

## 2024-11-21 ENCOUNTER — LAB VISIT (OUTPATIENT)
Dept: LAB | Facility: HOSPITAL | Age: 59
End: 2024-11-21
Attending: STUDENT IN AN ORGANIZED HEALTH CARE EDUCATION/TRAINING PROGRAM
Payer: MEDICARE

## 2024-11-21 DIAGNOSIS — R41.3 MEMORY CHANGE: ICD-10-CM

## 2024-11-21 DIAGNOSIS — G62.9 PERIPHERAL POLYNEUROPATHY: ICD-10-CM

## 2024-11-21 LAB
FOLATE SERPL-MCNC: 10.4 NG/ML (ref 4–24)
HCYS SERPL-SCNC: 12.1 UMOL/L (ref 4–16.5)
VIT B12 SERPL-MCNC: 356 PG/ML (ref 210–950)

## 2024-11-21 PROCEDURE — 83921 ORGANIC ACID SINGLE QUANT: CPT | Performed by: STUDENT IN AN ORGANIZED HEALTH CARE EDUCATION/TRAINING PROGRAM

## 2024-11-21 PROCEDURE — 0361U NEURFLMNT LT CHN DIG IA QUAN: CPT | Performed by: STUDENT IN AN ORGANIZED HEALTH CARE EDUCATION/TRAINING PROGRAM

## 2024-11-21 PROCEDURE — 36415 COLL VENOUS BLD VENIPUNCTURE: CPT | Performed by: STUDENT IN AN ORGANIZED HEALTH CARE EDUCATION/TRAINING PROGRAM

## 2024-11-21 PROCEDURE — 83090 ASSAY OF HOMOCYSTEINE: CPT | Performed by: STUDENT IN AN ORGANIZED HEALTH CARE EDUCATION/TRAINING PROGRAM

## 2024-11-21 PROCEDURE — 82607 VITAMIN B-12: CPT | Performed by: STUDENT IN AN ORGANIZED HEALTH CARE EDUCATION/TRAINING PROGRAM

## 2024-11-21 PROCEDURE — 84425 ASSAY OF VITAMIN B-1: CPT | Performed by: STUDENT IN AN ORGANIZED HEALTH CARE EDUCATION/TRAINING PROGRAM

## 2024-11-21 PROCEDURE — 83520 IMMUNOASSAY QUANT NOS NONAB: CPT | Performed by: STUDENT IN AN ORGANIZED HEALTH CARE EDUCATION/TRAINING PROGRAM

## 2024-11-21 PROCEDURE — 82746 ASSAY OF FOLIC ACID SERUM: CPT | Performed by: STUDENT IN AN ORGANIZED HEALTH CARE EDUCATION/TRAINING PROGRAM

## 2024-11-25 LAB
IMMUNOLOGIST REVIEW: NORMAL
M PHOSPHO-TAU 217: 0.09 PG/ML
METHYLMALONATE SERPL-SCNC: 0.4 UMOL/L

## 2024-11-26 ENCOUNTER — PATIENT MESSAGE (OUTPATIENT)
Dept: NEUROLOGY | Facility: CLINIC | Age: 59
End: 2024-11-26
Payer: MEDICARE

## 2024-11-26 DIAGNOSIS — E53.8 B12 DEFICIENCY: Primary | ICD-10-CM

## 2024-11-26 RX ORDER — CYANOCOBALAMIN 1000 UG/ML
INJECTION, SOLUTION INTRAMUSCULAR; SUBCUTANEOUS
Qty: 10 ML | Refills: 2 | Status: SHIPPED | OUTPATIENT
Start: 2024-11-26

## 2024-11-27 DIAGNOSIS — R25.9 ABNORMAL INVOLUNTARY MOVEMENTS: Primary | ICD-10-CM

## 2024-11-27 LAB
NEUROFILAMENT LIGHT CHAIN, PLASMA: 11.1 PG/ML
VIT B1 BLD-MCNC: 94 UG/L (ref 38–122)

## 2024-11-27 RX ORDER — TOPIRAMATE 25 MG/1
TABLET ORAL
Qty: 194 TABLET | Refills: 0 | Status: SHIPPED | OUTPATIENT
Start: 2024-11-27 | End: 2025-03-11

## 2024-11-27 RX ORDER — TRIHEXYPHENIDYL HYDROCHLORIDE 2 MG/1
TABLET ORAL
Qty: 81 TABLET | Refills: 0 | Status: SHIPPED | OUTPATIENT
Start: 2024-11-27 | End: 2025-01-15

## 2024-11-29 PROBLEM — G62.9 PERIPHERAL POLYNEUROPATHY: Status: ACTIVE | Noted: 2024-11-29

## 2024-12-11 ENCOUNTER — TELEPHONE (OUTPATIENT)
Dept: NEUROLOGY | Facility: CLINIC | Age: 59
End: 2024-12-11

## 2024-12-11 ENCOUNTER — OFFICE VISIT (OUTPATIENT)
Dept: NEUROLOGY | Facility: CLINIC | Age: 59
End: 2024-12-11
Payer: MEDICARE

## 2024-12-11 VITALS
HEIGHT: 66 IN | RESPIRATION RATE: 14 BRPM | HEART RATE: 80 BPM | DIASTOLIC BLOOD PRESSURE: 78 MMHG | WEIGHT: 209 LBS | SYSTOLIC BLOOD PRESSURE: 122 MMHG | BODY MASS INDEX: 33.59 KG/M2

## 2024-12-11 DIAGNOSIS — G47.33 OBSTRUCTIVE SLEEP APNEA (ADULT) (PEDIATRIC): ICD-10-CM

## 2024-12-11 DIAGNOSIS — R41.3 MEMORY CHANGE: ICD-10-CM

## 2024-12-11 DIAGNOSIS — E53.8 B12 DEFICIENCY: ICD-10-CM

## 2024-12-11 DIAGNOSIS — R25.9 ABNORMAL INVOLUNTARY MOVEMENTS: Primary | ICD-10-CM

## 2024-12-11 PROCEDURE — 99215 OFFICE O/P EST HI 40 MIN: CPT | Mod: S$GLB,,, | Performed by: STUDENT IN AN ORGANIZED HEALTH CARE EDUCATION/TRAINING PROGRAM

## 2024-12-11 PROCEDURE — 3078F DIAST BP <80 MM HG: CPT | Mod: CPTII,S$GLB,, | Performed by: STUDENT IN AN ORGANIZED HEALTH CARE EDUCATION/TRAINING PROGRAM

## 2024-12-11 PROCEDURE — 3008F BODY MASS INDEX DOCD: CPT | Mod: CPTII,S$GLB,, | Performed by: STUDENT IN AN ORGANIZED HEALTH CARE EDUCATION/TRAINING PROGRAM

## 2024-12-11 PROCEDURE — 1159F MED LIST DOCD IN RCRD: CPT | Mod: CPTII,S$GLB,, | Performed by: STUDENT IN AN ORGANIZED HEALTH CARE EDUCATION/TRAINING PROGRAM

## 2024-12-11 PROCEDURE — 99999 PR PBB SHADOW E&M-EST. PATIENT-LVL IV: CPT | Mod: PBBFAC,,, | Performed by: STUDENT IN AN ORGANIZED HEALTH CARE EDUCATION/TRAINING PROGRAM

## 2024-12-11 PROCEDURE — G2211 COMPLEX E/M VISIT ADD ON: HCPCS | Mod: S$GLB,,, | Performed by: STUDENT IN AN ORGANIZED HEALTH CARE EDUCATION/TRAINING PROGRAM

## 2024-12-11 PROCEDURE — 4010F ACE/ARB THERAPY RXD/TAKEN: CPT | Mod: CPTII,S$GLB,, | Performed by: STUDENT IN AN ORGANIZED HEALTH CARE EDUCATION/TRAINING PROGRAM

## 2024-12-11 PROCEDURE — 3074F SYST BP LT 130 MM HG: CPT | Mod: CPTII,S$GLB,, | Performed by: STUDENT IN AN ORGANIZED HEALTH CARE EDUCATION/TRAINING PROGRAM

## 2024-12-11 PROCEDURE — 3044F HG A1C LEVEL LT 7.0%: CPT | Mod: CPTII,S$GLB,, | Performed by: STUDENT IN AN ORGANIZED HEALTH CARE EDUCATION/TRAINING PROGRAM

## 2024-12-11 RX ORDER — TOPIRAMATE 25 MG/1
50 TABLET ORAL NIGHTLY
Qty: 60 TABLET | Refills: 4 | Status: SHIPPED | OUTPATIENT
Start: 2024-12-11

## 2024-12-11 NOTE — TELEPHONE ENCOUNTER
In-basket referral message sent to [facility] Clinical Support Staff to contact patient to schedule appointment with sleep disorders clinic.

## 2024-12-11 NOTE — PROGRESS NOTES
[facility]  [facility], [place]    Date: 12/11/24  Patient Name: Dalton Kent   MRN: [id number]   PCP: Charbel Mora  Referring Provider: No ref. provider found    Assessment:   Dalton Kent is a 59 y.o. male presenting for abnormal movements. Case most consistent with but not limited to possible TD. Doing much better now after switching from artane to Topamax. MOCA today was 21/30. Remembered 2/5 words on delayed recall. Serum biomarker's for Alzheimer's related pathology are negative. Multiple  factors include low B12, artane, Topamax, and untreated sleep apnea. Will continue to transition from artane to topamax. Continue B12 treatments. Send a referral to sleep medicine. Follow up in 3 months.     Plan:     - Will continue to transition from artane to topamax.   - Continue to treat B12   - Sleep referral   - Follow up with us in 3 months     Problem List Items Addressed This Visit          Neuro    Memory change    Abnormal involuntary movements - Primary    Relevant Medications    topiramate (TOPAMAX) 25 MG tablet       Endocrine    B12 deficiency       Other    Obstructive sleep apnea (adult) (pediatric) (Chronic)    Overview     Patient is currently on CPAP  Monitor compliance  Improvement of sleep efficiency  Improvement of daytime symptoms           Relevant Orders    Ambulatory referral/consult to Sleep Disorders     Keith Alvarez MD    Subjective:   Patient seen in consultation at the request of No ref. provider found for the evaluation of TD. A copy of this note will be sent to the referring physician.      Interval history 12/11/24:   Mr. Dalton Kent is a 59 y.o. male presenting for TD. Since last seen, work up revealed low B12 for which he started treatment. He has noticed some improvement. We also switched him from artane to Topamax. He reports he now swallows better, eye blinking has improved as well. He  is unsure if the Topamax is causing some mild insomnia. Wife says that this was happening even before the Topamax. He has noticed new hiccups after the Topamax. He is still taking 25 mg QHS. He is weaning off artane, now on 0.5 TID about to do 0.5 bid. He has gotten 2 shots of B12. He had 1 fall from the bed, he was sleeping. No other updates or complains at this time. He has untreated ASAD and is not interested in trying another mask.     Per previous by myself 11/20/24:    Assessment:  Blepharospasm vs TD     Plan:  - Will transition from artane to topamax.   - Neuropathy and cognitive labs   - MOCA during next appointment   - Follow up with us in 1 month     PAST MEDICAL HISTORY:  Past Medical History:   Diagnosis Date    Alcohol abuse     Anxiety     Depression     GERD (gastroesophageal reflux disease)     Headache     Hx of psychiatric care     Hypertension     Hypothyroid     Lyme disease     Restless leg syndrome     Sleep difficulties     Therapy     Uncontrolled REM sleep behavior disorder     Weakness generalized 8/17/2022       PAST SURGICAL HISTORY:  Past Surgical History:   Procedure Laterality Date    ANAL FISTULOTOMY      ANAL SPHINCTEROTOMY      APPENDECTOMY      BACK SURGERY      CARPAL TUNNEL RELEASE      bilateral    CAUDAL EPIDURAL STEROID INJECTION N/A 4/29/2022    Procedure: CAUDAL EPIDURAL STEROID INJECTION WITH CATHETER;  Surgeon: Tori Prado MD;  Location: [facility];  Service: Pain Management;  Laterality: N/A;    COLONOSCOPY  2/2015    COLONOSCOPY N/A 8/29/2019    Procedure: COLONOSCOPY WITH BIOPSY;  Surgeon: Giuseppe Mart MD;  Location: [facility];  Service: Endoscopy;  Laterality: N/A;    COLONOSCOPY N/A 5/4/2022    Procedure: COLONOSCOPY;  Surgeon: Galina Sterling MD;  Location: [facility];  Service: Endoscopy;  Laterality: N/A;    EPIDURAL STEROID INJECTION N/A 2/21/2020    Procedure: CERVICAL EPIDURAL STEROID INJECTION (C7-T1 INTERLAMINAR);  Surgeon: Tori Prado MD;  Location:  [facility];  Service: Pain Management;  Laterality: N/A;    FLUOROSCOPY  9/23/2022    Procedure: FLUOROSCOPY;  Surgeon: Geremias Knox Jr., MD;  Location: [facility] OR;  Service: Pain Management;;    HAND SURGERY Bilateral     INJECTION OF ANESTHETIC AGENT INTO SACROILIAC JOINT Left 5/14/2021    Procedure: SACROILIAC JOINT INJECTION;  Surgeon: Tori Prado MD;  Location: [facility];  Service: Pain Management;  Laterality: Left;    INJECTION OF STEROID Left 5/14/2021    Procedure: GREATER TROCHANTERIC BURSA INJECTION;  Surgeon: Tori Prado MD;  Location: [facility];  Service: Pain Management;  Laterality: Left;    INSERTION OF NEUROSTIMULATOR OF DORSAL COLUMN OF SPINAL CORD N/A 9/23/2022    Procedure: SCS Implant;  Surgeon: Geremias Knox Jr., MD;  Location: [facility] OR;  Service: Pain Management;  Laterality: N/A;  Brock Aquino [phone]   confirmed CW 9/22    NECK SURGERY      NOSE SURGERY      TRANSFORAMINAL EPIDURAL INJECTION OF STEROID Left 3/26/2021    Procedure: TRANSFORAMINAL EPIDURAL STEROID INJECTION (L3);  Surgeon: Tori Prado MD;  Location: [facility] OR;  Service: Pain Management;  Laterality: Left;    TRIAL OF SPINAL CORD NERVE STIMULATOR N/A 8/19/2022    Procedure: SPINAL CORD NEUROSTIMULATOR TRIAL;  Surgeon: Tori Prado MD;  Location: [facility] OR;  Service: Pain Management;  Laterality: N/A;    TRIGGER FINGER RELEASE         CURRENT MEDS:  Current Outpatient Medications   Medication Sig Dispense Refill    allopurinoL (ZYLOPRIM) 300 MG tablet TAKE 1 TABLET BY MOUTH ONCE  DAILY 90 tablet 3    amLODIPine (NORVASC) 5 MG tablet Take 1 tablet (5 mg total) by mouth once daily. 30 tablet 5    benazepriL (LOTENSIN) 10 MG tablet Take 10 mg by mouth once daily.      cyanocobalamin 1,000 mcg/mL injection Inject 1ml weekly for 4 weeks, then 1ml every month for a year 10 mL 2    levothyroxine (SYNTHROID) 112 MCG tablet TAKE 1 TABLET BY MOUTH BEFORE  BREAKFAST 90 tablet 3    metoprolol succinate (TOPROL-XL) 50 MG  24 hr tablet TAKE 1 TABLET BY MOUTH TWICE  DAILY 180 tablet 3    pantoprazole (PROTONIX) 40 MG tablet TAKE 1 TABLET BY MOUTH ONCE  DAILY 90 tablet 3    rosuvastatin (CRESTOR) 10 MG tablet TAKE 1 TABLET BY MOUTH ON  MONDAY, WEDNESDAY, AND FRIDAY 36 tablet 3    trihexyphenidyL (ARTANE) 2 MG tablet Take 1 tablet (2 mg total) by mouth 3 (three) times daily with meals for 14 days, THEN 0.5 tablets (1 mg total) 3 (three) times daily with meals for 14 days, THEN 0.5 tablets (1 mg total) 2 (two) times daily with meals for 14 days, THEN 0.5 tablets (1 mg total) once daily for 7 days. 81 tablet 0    vitamins-lipotropics Tab Take 1 tablet by mouth 2 (two) times a day. 60 tablet 5    topiramate (TOPAMAX) 25 MG tablet Take 2 tablets (50 mg total) by mouth every evening. 60 tablet 4     No current facility-administered medications for this visit.       ALLERGIES:  Review of patient's allergies indicates:   Allergen Reactions    Ace inhibitors Other (See Comments)     angioedema    Gralise [gabapentin]      Depression    Percocet [oxycodone-acetaminophen] Itching    Statins-hmg-coa reductase inhibitors        FAMILY HISTORY:  Family History   Problem Relation Name Age of Onset    Colon cancer Neg Hx      Colon polyps Neg Hx      Crohn's disease Neg Hx      Rectal cancer Neg Hx      Ulcerative colitis Neg Hx         SOCIAL HISTORY:  Social History     Tobacco Use    Smoking status: Former     Current packs/day: 0.00     Average packs/day: 3.0 packs/day for 11.0 years (33.0 ttl pk-yrs)     Types: Cigarettes     Start date:      Quit date:      Years since quittin.9     Passive exposure: Past    Smokeless tobacco: Never   Substance Use Topics    Alcohol use: Not Currently    Drug use: No       Review of Systems:  12 system review of systems is negative except for the symptoms mentioned in HPI.      Objective:         Vitals:    24 0808   BP: 122/78   BP Location: Left arm   Patient Position: Sitting   Pulse: 80  "  Resp: 14   Weight: 94.8 kg (208 lb 15.9 oz)   Height: 5' 6" (1.676 m)     General: NAD, well nourished   Eyes: no tearing, discharge, no erythema   ENT: moist mucous membranes of the oral cavity, nares patent    Neck: Supple, full range of motion  Cardiovascular: Warm and well perfused, pulses equal and symmetrical  Lungs: Normal work of breathing, normal chest wall excursions  Skin: No rash, lesions, or breakdown on exposed skin  Psychiatry: Mood and affect are appropriate   Abdomen: soft, non tender, non distended  Extremeties: No cyanosis, clubbing or edema.    Neurological   Constitutional  Well-developed, well-nourished, appears stated age   Cardiovascular  No LE edema bilaterally   Neurological     * Mental status  MOCA = not done during today's visit     - Orientation  Oriented to conversation     - Memory   Intact recent and remote     - Attention/concentration  Attentive, vigilant during exam     - Language  Intact to conversation.     - Fund of knowledge  Aware of current events     - Executive  Well-organized thoughts     - Other     * Cranial nerves       - CN II  Pupils equal, visual fields full to confrontation     - CN III, IV, VI  Extraocular movements full, normal pursuits and saccades     - CN V  Sensation V1 - V3 intact     - CN VII  Face strong and symmetric bilaterally     - CN VIII  Hearing intact bilaterally     - CN IX, X  Palate raises midline and symmetric     - CN XI  SCM and trapezius 5/5 bilaterally     - CN XII  Tongue midline   * Motor  Muscle bulk normal, strength 5/5 throughout   * Sensory   Intact to light touch throughout   * Coordination  No dysmetria with finger-to-nose or heel-to-shin   * Gait  See below.   * Deep tendon reflexes  2+ and symmetric throughout   Babinski downgoing bilaterally   * Specialized movement exam Gen: masked facies and increased blink frequency   Speech: hypophonic  Tremor: none   Bradykinesia: none  Tone: normal    Gait: none  Pull Test: negative  "     Assessment:  TD     Plan:  - Will continue to transition from artane to topamax.   - Continue to treat B12   - Sleep referral   - Follow up with us in 3 months

## 2024-12-17 ENCOUNTER — OFFICE VISIT (OUTPATIENT)
Dept: NEUROLOGY | Facility: CLINIC | Age: 59
End: 2024-12-17
Payer: MEDICARE

## 2024-12-17 DIAGNOSIS — R41.3 MEMORY CHANGE: Primary | ICD-10-CM

## 2024-12-17 DIAGNOSIS — F33.1 MODERATE EPISODE OF RECURRENT MAJOR DEPRESSIVE DISORDER: ICD-10-CM

## 2024-12-17 DIAGNOSIS — F41.9 ANXIETY: ICD-10-CM

## 2024-12-17 PROBLEM — F33.9 RECURRENT MAJOR DEPRESSIVE DISORDER: Status: ACTIVE | Noted: 2019-07-02

## 2024-12-17 PROCEDURE — 99499 UNLISTED E&M SERVICE: CPT | Mod: 95,,, | Performed by: CLINICAL NEUROPSYCHOLOGIST

## 2024-12-17 PROCEDURE — 90791 PSYCH DIAGNOSTIC EVALUATION: CPT | Mod: 95,,, | Performed by: CLINICAL NEUROPSYCHOLOGIST

## 2024-12-17 NOTE — ASSESSMENT & PLAN NOTE
Assessment:  -symptoms were apparently better managed by a mental health treatment after his 2019 psychiatric admission for severe depression and alcohol abuse  -after 2022, no further treatment.  While patient notes no significant symptoms, wife reported significant increase in depression and related symptoms in the past year  Plan:  -Neuropsych testing to assess cognitive status, differential diagnosis and treatment plan    -cognitive symptoms likely related to ongoing depression and sleep trouble

## 2024-12-17 NOTE — ASSESSMENT & PLAN NOTE
"Assessment:  Onset: Patient recalls the 2019 evaluation. Offers results showed "maybe pre-dementia." He notes lifelong trouble with forgetfulness but got worse around 2019 in the context of heavy etoh use, depression, and suicide attempt with inpatient admission.    Wife: Agrees with patient description of onset   Course: Progressively worse more obvious in the past few months. He isn't certain why and is not very elaborative.    Wife: Gradually worse in the past year, but noticeably worse in the past 6-months. Contextually, sleep has been quite poor and mood has been worse. Day to day there is variability that ranges from "how do I cook something" to "generally ok" but she can't pinpoint why such variability.       Plan:  -Neuropsych testing to assess cognitive status, differential diagnosis and treatment plan    "

## 2024-12-17 NOTE — PROGRESS NOTES
"NEUROPSYCHOLOGY CONSULT    Name Dalton Kent   MRN [id number]    1965   Age 59 y.o.   Gender male   Ref Provider  Claudia Lambert MD  [address] [place] [address]   CC/Med. Necessity Cognitive and mood concerns   Visit Type  Virtual visit with synchronous audio and video and audio-only due to tech issues   Time Spent 57-min   Telemedicine Each patient to whom he or she provides medical services by telemedicine is:  (1) informed of the relationship between the physician and patient and the respective role of any other health care provider with respect to management of the patient; and (2) notified that he or she may decline to receive medical services by telemedicine and may withdraw from such care at any time   Consent The patient expressed an understanding of the purpose of the evaluation and consented to all procedures.        SUMMARY/TREATMENT PLAN   Results from the interview indicate the following diagnoses and treatment plan recommendations. This was discussed and the patient who can follow a treatment plan   independently.    Diagnoses/Plan:  Problem List Items Addressed This Visit          Neuro    Memory change - Primary    Current Assessment & Plan     Assessment:  Onset: Patient recalls the 2019 evaluation. Offers results showed "maybe pre-dementia." He notes lifelong trouble with forgetfulness but got worse around 2019 in the context of heavy etoh use, depression, and suicide attempt with inpatient admission.    Wife: Agrees with patient description of onset   Course: Progressively worse more obvious in the past few months. He isn't certain why and is not very elaborative.    Wife: Gradually worse in the past year, but noticeably worse in the past 6-months. Contextually, sleep has been quite poor and mood has been worse. Day to day there is variability that ranges from "how do I cook something" to "generally ok" but she can't pinpoint why such variability.   " "    Plan:  -Neuropsych testing to assess cognitive status, differential diagnosis and treatment plan              Psychiatric    Anxiety    Recurrent major depressive disorder    Overview     -no tx since 2022           Current Assessment & Plan     Assessment:  -symptoms were apparently better managed by a mental health treatment after his 2019 psychiatric admission for severe depression and alcohol abuse  -after 2022, no further treatment.  While patient notes no significant symptoms, wife reported significant increase in depression and related symptoms in the past year  Plan:  -Neuropsych testing to assess cognitive status, differential diagnosis and treatment plan    -cognitive symptoms likely related to ongoing depression and sleep trouble             HPI AND CURRENT SYMPTOMS   Active problems noted below.    2022 Psych Evaluation prior to SCS: Overall, Mr. Kent may be considered a suitable candidate despite moderate risks associated with his testing profile and history.  Review in detail.      2019 Neuropsych: The history and pattern of test results suggests the impairment he experiences is likely due to pseudodementia due to depression as opposed to dementia.      Cognitive Symptoms   Onset: Patient recalls the 2019 evaluation. Offers results showed "maybe pre-dementia." He notes lifelong trouble with forgetfulness but got worse around 2019 in the context of heavy etoh use, depression, and suicide attempt with inpatient admission.    Wife: Agrees with patient description of onset   Course: Progressively worse more obvious in the past few months. He isn't certain why and is not very elaborative.    Wife: Gradually worse in the past year, but noticeably worse in the past 6-months. Contextually, sleep has been quite poor and mood has been worse. Day to day there is variability that ranges from "how do I cook something" to "generally ok" but she can't pinpoint why such variability.       Current Functional " "Status/Needs   ADLs:   Self-Care Eating Dressing/Mobility Safety   Bathing: Independent  Bathroom: Independent  Other: Independent Independent Independent     Instrumental IADLs:    Driving Medications/Health Household Finances   Independent Independent  Uses AM and PM pillbox  Occasional errors but better recently Handles all household chores including cooking and cleaning.  Wife has managed longstanding     Current Psychiatric and Behavioral Symptoms   Mood:   Depression/Dysphoria Anxiety/Fearfulness Irritability   "Not too much"  Wife: In the past year, more amotivated, more irritable, more withdrawn. This waxes/wanes. No clyde dysphoria.  "Nah, none"  Wife: ongoing anxiety but not a change "You better ask my wife"  Wife: Much more irritable but after a while he reliably cools down and apologizes.      Behavior:   Agitation/Resistance Delusions/Paranoia Hallucinations   None None None   Apathy/Motivation Repetitive/Restlessness Other   None None None     Neurovegetative:   Sleep/Nighttime  [employer]   Prior med (Lunesta) helped with sleep but caused tongue biting   New med doesn't keep him asleep (wakes up several times)  Can't estimate hours of sleep but feels tired  Doesn't use CPAP Variable Low     Current Physical Concerns   Motor: Sensory Other   No more TD sxs   No more tremors  No more swallowing issues Hearing is okay but no recent test  Vision is "bad up close" but "can't stand wearing glasses" Back pain: Chronic and SCS 'helps out a lot' but has noticed worse pain in the past month  Weakness: Reports more weakness and fatigue in the past year without etiology     PERTINENT BACKGROUND INFORMATION   Social History   Family Status  (Josselyn) for 41-years   "Celebrated 40 years on a cruise" and "went to [facility]"  Two adult children   Current Living Situation: Home   Primary Source of Support Wife   Daily Activities: Managing house, tv; reduced activities in the past year 2/2 more weakness and " "muscle soreness   Stressors NA   Educational Status HS (12-years of education)  Tried one semester of college   Occupational Status Disability for several years and "you have to ask my wife"  Previously, 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 No know Parkinson's.    Psychiatric History No known heritable risk factors     MEDICAL STATUS   Patient Active Problem List   Diagnosis    Essential hypertension    Hypothyroidism in adult    Hypogonadism in male    Memory change    Anxiety    Obstructive sleep apnea (adult) (pediatric)    Gastroesophageal reflux disease without esophagitis    Recurrent major depressive disorder    Mixed hyperlipidemia    Chronic pain syndrome    Lumbar radiculopathy    Hyperglycemia    Fatty liver    Weakness generalized    Failed back surgical syndrome    Pain in right wrist    Dry eye syndrome of both eyes    Blepharospasm of both eyes    Abnormal involuntary movements    Peripheral polyneuropathy    B12 deficiency     Past Medical History:   Diagnosis Date    Alcohol abuse     Anxiety     Depression     GERD (gastroesophageal reflux disease)     Headache     Hx of psychiatric care     Hypertension     Hypothyroid     Lyme disease     Restless leg syndrome     Sleep difficulties     Therapy     Uncontrolled REM sleep behavior disorder     Weakness generalized 8/17/2022     Past Surgical History:   Procedure Laterality Date    ANAL FISTULOTOMY      ANAL SPHINCTEROTOMY      APPENDECTOMY      BACK SURGERY      CARPAL TUNNEL RELEASE      bilateral    CAUDAL EPIDURAL STEROID INJECTION N/A 4/29/2022    Procedure: CAUDAL EPIDURAL STEROID INJECTION WITH CATHETER;  Surgeon: Tori Prado MD;  Location: [facility] OR;  Service: Pain Management;  Laterality: N/A;    COLONOSCOPY  2/2015    COLONOSCOPY N/A 8/29/2019    Procedure: COLONOSCOPY WITH BIOPSY;  Surgeon: Giuseppe Mart MD; "  Location: [facility];  Service: Endoscopy;  Laterality: N/A;    COLONOSCOPY N/A 5/4/2022    Procedure: COLONOSCOPY;  Surgeon: Galina Sterling MD;  Location: [facility];  Service: Endoscopy;  Laterality: N/A;    EPIDURAL STEROID INJECTION N/A 2/21/2020    Procedure: CERVICAL EPIDURAL STEROID INJECTION (C7-T1 INTERLAMINAR);  Surgeon: Tori Prado MD;  Location: [facility] OR;  Service: Pain Management;  Laterality: N/A;    FLUOROSCOPY  9/23/2022    Procedure: FLUOROSCOPY;  Surgeon: Geremias Knox Jr., MD;  Location: [facility] OR;  Service: Pain Management;;    HAND SURGERY Bilateral     INJECTION OF ANESTHETIC AGENT INTO SACROILIAC JOINT Left 5/14/2021    Procedure: SACROILIAC JOINT INJECTION;  Surgeon: Tori Prado MD;  Location: [facility] OR;  Service: Pain Management;  Laterality: Left;    INJECTION OF STEROID Left 5/14/2021    Procedure: GREATER TROCHANTERIC BURSA INJECTION;  Surgeon: Tori Prado MD;  Location: [facility] OR;  Service: Pain Management;  Laterality: Left;    INSERTION OF NEUROSTIMULATOR OF DORSAL COLUMN OF SPINAL CORD N/A 9/23/2022    Procedure: SCS Implant;  Surgeon: Geremias Knox Jr., MD;  Location: [facility] OR;  Service: Pain Management;  Laterality: N/A;  Brock Aquino [phone]   confirmed CW 9/22    NECK SURGERY      NOSE SURGERY      TRANSFORAMINAL EPIDURAL INJECTION OF STEROID Left 3/26/2021    Procedure: TRANSFORAMINAL EPIDURAL STEROID INJECTION (L3);  Surgeon: Tori Prado MD;  Location: [facility] OR;  Service: Pain Management;  Laterality: Left;    TRIAL OF SPINAL CORD NERVE STIMULATOR N/A 8/19/2022    Procedure: SPINAL CORD NEUROSTIMULATOR TRIAL;  Surgeon: Tori Prado MD;  Location: [facility] OR;  Service: Pain Management;  Laterality: N/A;    TRIGGER FINGER RELEASE          Relevant Neurologic History   Falls NA   TBIs NA   Seizures NA   CVAs NA   Movement Concerns NA   Other B12?  Lyme disease: Many years ago     Relevant Labs   Lab Results   Component Value Date    ALFNYGZS91 356  "11/21/2024     No results found for: "RPR"  Lab Results   Component Value Date    FOLATE 10.4 11/21/2024     Lab Results   Component Value Date    TSH 0.87 11/21/2024    Z4VQPLA 1.190 08/17/2022     Lab Results   Component Value Date    HGBA1C 5.5 11/21/2024     No results found for: "HIV1X2", "OZR16QDPX"       Lab Results   Component Value Date    IFTE106 0.089 11/21/2024         Neurodiagnostics   Results for orders placed or performed during the hospital encounter of 03/15/21   CT Head Without Contrast    Narrative    EXAMINATION:  CT HEAD WITHOUT CONTRAST    CLINICAL HISTORY:  Headache, unspecifiedHeadache, acute, normal neuro exam;    TECHNIQUE:  Axial CT images were obtained. Iterative reconstruction technique was used.  CT/cardiac nuclear exam/s in prior 12 months: 0.    COMPARISON:  None.    FINDINGS:  No acute intracranial hemorrhage.  No mass or mass effect.  No ventricular dilatation or abnormal extra-axial fluid collection.  There is no osseous abnormality.  Patchy mucosal thickening in the ethmoids.      Impression    No acute intracranial hemorrhage, mass or other brain abnormality.      Electronically signed by: Eliud Danielson MD  Date:    03/15/2021  Time:    10:22   Results for orders placed or performed during the hospital encounter of 07/06/20   MRI Brain Without Contrast    Narrative    EXAMINATION:  MRI BRAIN WITHOUT CONTRAST    CLINICAL HISTORY:  Altered mental status, unspecifiedMental status change;    TECHNIQUE:  MRI of the brain was performed in multiple planes and sequences.    COMPARISON:  February 6, 2019    FINDINGS:  Ventricles and midline structures are normal.  No acute ischemia.  Flow void is present in the carotid and basilar arteries consistent patency.  Mild white matter changes.  No mass lesions.      Impression    Mild white matter disease and no acute ischemia.      Electronically signed by: Maddy Hernandez MD  Date:    07/06/2020  Time:    18:25   Results for orders placed or " performed during the hospital encounter of 02/20/19   MRA Brain    Narrative    EXAMINATION:  MRA BRAIN WITHOUT CONTRAST    CLINICAL HISTORY:  dizziness, headache; Headache.    TECHNIQUE:  Non-contrast 3-D time-of-flight intracranial MR angiography was performed through the [place] with MIP reformatting.    COMPARISON:  None.    FINDINGS:  Anterior intracranial circulation: No high-grade focal stenosis, occlusion, aneurysm, or vascular malformation.    Posterior intracranial circulation: No high-grade focal stenosis, occlusion, aneurysm, or vascular malformation.      Impression    Normal MRA of the [place]      Electronically signed by: Mignon Saravia MD  Date:    02/20/2019  Time:    08:28         Medications     Current Outpatient Medications:     allopurinoL (ZYLOPRIM) 300 MG tablet, TAKE 1 TABLET BY MOUTH ONCE  DAILY, Disp: 90 tablet, Rfl: 3    amLODIPine (NORVASC) 5 MG tablet, Take 1 tablet (5 mg total) by mouth once daily., Disp: 30 tablet, Rfl: 5    benazepriL (LOTENSIN) 10 MG tablet, Take 10 mg by mouth once daily., Disp: , Rfl:     cyanocobalamin 1,000 mcg/mL injection, Inject 1ml weekly for 4 weeks, then 1ml every month for a year, Disp: 10 mL, Rfl: 2    levothyroxine (SYNTHROID) 112 MCG tablet, TAKE 1 TABLET BY MOUTH BEFORE  BREAKFAST, Disp: 90 tablet, Rfl: 3    metoprolol succinate (TOPROL-XL) 50 MG 24 hr tablet, TAKE 1 TABLET BY MOUTH TWICE  DAILY, Disp: 180 tablet, Rfl: 3    pantoprazole (PROTONIX) 40 MG tablet, TAKE 1 TABLET BY MOUTH ONCE  DAILY, Disp: 90 tablet, Rfl: 3    rosuvastatin (CRESTOR) 10 MG tablet, TAKE 1 TABLET BY MOUTH ON  MONDAY, WEDNESDAY, AND FRIDAY, Disp: 36 tablet, Rfl: 3    topiramate (TOPAMAX) 25 MG tablet, Take 2 tablets (50 mg total) by mouth every evening., Disp: 60 tablet, Rfl: 4    trihexyphenidyL (ARTANE) 2 MG tablet, Take 1 tablet (2 mg total) by mouth 3 (three) times daily with meals for 14 days, THEN 0.5 tablets (1 mg total) 3 (three) times daily with  meals for 14 days, THEN 0.5 tablets (1 mg total) 2 (two) times daily with meals for 14 days, THEN 0.5 tablets (1 mg total) once daily for 7 days., Disp: 81 tablet, Rfl: 0    vitamins-lipotropics Tab, Take 1 tablet by mouth 2 (two) times a day., Disp: 60 tablet, Rfl: 5     Pertinent Psychiatric History   Sxs:   Longstanding: Depression, anxiety  Intermittent: NA    Substance Use:  Current Use:   10/2024: Drank once   2023-10/2024: Stopped for a year  [id number]: Occasional beer but can't quantify  [id number]: Heavier drinking but wife can't quantify. Treatment Hx:  Medication: None  Current: None for over a year  Pertinent prior: Review prior psych notes  Therapy: Yes  Current: None since 2022  Pertinent prior: Yes, various treatment episodes  Inpatient: One in 2019 for suicide attempt.     BEHAVIORAL OBSERVATIONS   APPEARANCE Casually dressed and adequate grooming/hygiene     ALERTNESS/ORIENTATION Attentive and alert.  -Spontaneously recalled 2019 evaluation  -Spontaneously recalled recent neuro workup and differential diagnoses (TD vs PD) and recent medication changes.    GAIT Not assessed   SENSORY Not assessed   MOTOR  Not assessed   SPEECH/LANGUAGE Normal in rate, rhythm, tone, and volume. No significant word finding difficulty. Expressive and receptive language was normal.   STATED MOOD/AFFECT Stated mood was fine with congruent affect.   INTERPERSONAL BEHAVIOR Rapport was quickly and easily established   SI/HI None reported   HALLUCINATIONS/DELUSIONS None evidenced or endorsed   THOUGHT PROCESSES/INSIGHT Quite digressive and tangential  Somatically preoccupied   TEST TAKING BEHAVIOR / VALIDITY NA     BILLING/CODING   Service Description CPT Code Minutes Units   Psychiatric diagnostic evaluation by physician [phone]   Neurobehavioral status exam by physician [id number]  0   Each additional hour by physician [id number]  0     TECHNICIAN NOTES:    Proposed Battery: Wide Range Achievement Test - Fourth Edition (WRAT-5)  Reading Test; MSVT; Wechsler Adult Intelligence Scale-IV (Digit Span, Similarities); Trail Making Test, parts A and B (Josefina et al., 2004 norms); NAB Naming Test  Category and Letter-cued verbal fluency (animal naming/FAS; Josefina et al., 2004 norms); Carroll Verbal Learning Test - Revised (Form-1); WMS-IV VR and LM; SDMT; [place] Card Sorting Test-64; RCFT; PHQ-9 LEANNE-7.     Feedback Type Feedback Time   VV or Phone  30-min

## 2025-06-29 DIAGNOSIS — R25.9 ABNORMAL INVOLUNTARY MOVEMENTS: ICD-10-CM

## 2025-06-30 RX ORDER — TOPIRAMATE 25 MG/1
50 TABLET, FILM COATED ORAL NIGHTLY
Qty: 60 TABLET | Refills: 4 | Status: SHIPPED | OUTPATIENT
Start: 2025-06-30

## (undated) DEVICE — GOWN POLY REINF BRTH SLV XL

## (undated) DEVICE — BREVI-KATH 19G 12IN

## (undated) DEVICE — ELECTRODE BLADE INSULATED 1 IN

## (undated) DEVICE — DRESSING TRANS 4X4 3/4

## (undated) DEVICE — DRAPE C-ARM/MOBILE XRAY 44X80

## (undated) DEVICE — SYR LUER SLIP GLASS 5ML

## (undated) DEVICE — DRAPE INCISE IOBAN 2 23X23IN

## (undated) DEVICE — SYR 10CC LUER LOCK

## (undated) DEVICE — COVER OVERHEAD SURG LT BLUE

## (undated) DEVICE — TAPE ADH MEDIPORE 4 X 10YDS

## (undated) DEVICE — SUT CTD VICRYL 2.0

## (undated) DEVICE — GLOVE SURG BIOGEL LATEX SZ 7.5

## (undated) DEVICE — STAPLER SKIN ROTATING HEAD

## (undated) DEVICE — ELECTRODE REM PLYHSV RETURN 9

## (undated) DEVICE — TOWEL OR DISP STRL BLUE 4/PK

## (undated) DEVICE — SUT VICRYL PLUS 0 CT1 36IN

## (undated) DEVICE — NDL RK EPIDURAL 16GATWX3 1/2IN

## (undated) DEVICE — APPLICATOR CHLORAPREP ORN 26ML

## (undated) DEVICE — KIT TEMPLATE IPG

## (undated) DEVICE — NDL HYPO REG 25G X 1 1/2

## (undated) DEVICE — KIT TUNNELING TOOL 35CM

## (undated) DEVICE — Device

## (undated) DEVICE — SUT ETHIBOND 0 CR/CT-2 8-18

## (undated) DEVICE — CONTAINER SPECIMEN STRL 4OZ

## (undated) DEVICE — SUPPORT ULNA NERVE PROTECTOR

## (undated) DEVICE — DRAPE C-ARMOR EQUIPMENT COVER

## (undated) DEVICE — GAUZE SPONGE 4X4 12PLY

## (undated) DEVICE — SUT MONOCRYL 3-0 PS-2 UND

## (undated) DEVICE — GOWN POLY REINF BRTH SLV LG

## (undated) DEVICE — DRAPE STERI-DRAPE 1000 17X11IN

## (undated) DEVICE — SENZA CHARGER KIT

## (undated) DEVICE — PACK DRAPE UNIVERSAL CONVERTOR

## (undated) DEVICE — ADHESIVE DERMABOND ADVANCED